# Patient Record
Sex: FEMALE | Race: WHITE | Employment: FULL TIME | URBAN - METROPOLITAN AREA
[De-identification: names, ages, dates, MRNs, and addresses within clinical notes are randomized per-mention and may not be internally consistent; named-entity substitution may affect disease eponyms.]

---

## 2017-01-20 ENCOUNTER — OFFICE VISIT (OUTPATIENT)
Dept: INTERNAL MEDICINE CLINIC | Facility: CLINIC | Age: 47
End: 2017-01-20

## 2017-01-20 VITALS
DIASTOLIC BLOOD PRESSURE: 83 MMHG | TEMPERATURE: 98 F | HEART RATE: 82 BPM | BODY MASS INDEX: 46 KG/M2 | SYSTOLIC BLOOD PRESSURE: 126 MMHG | WEIGHT: 257.13 LBS | RESPIRATION RATE: 20 BRPM

## 2017-01-20 DIAGNOSIS — J40 BRONCHITIS: Primary | ICD-10-CM

## 2017-01-20 PROCEDURE — 99212 OFFICE O/P EST SF 10 MIN: CPT | Performed by: INTERNAL MEDICINE

## 2017-01-20 PROCEDURE — 99213 OFFICE O/P EST LOW 20 MIN: CPT | Performed by: INTERNAL MEDICINE

## 2017-01-20 RX ORDER — BENZONATATE 200 MG/1
200 CAPSULE ORAL 3 TIMES DAILY PRN
Qty: 12 CAPSULE | Refills: 0 | Status: SHIPPED | OUTPATIENT
Start: 2017-01-20 | End: 2017-03-28 | Stop reason: ALTCHOICE

## 2017-01-20 RX ORDER — AMOXICILLIN 875 MG/1
875 TABLET, COATED ORAL 2 TIMES DAILY
Qty: 20 TABLET | Refills: 0 | Status: SHIPPED | OUTPATIENT
Start: 2017-01-20 | End: 2017-01-30

## 2017-01-20 NOTE — PROGRESS NOTES
HPI:    Patient ID: Chun Yanez is a 40year old female. Cough  This is a new problem. The current episode started 1 to 4 weeks ago. The problem has been gradually worsening. The problem occurs every few minutes. The cough is non-productive.  Maria Eugenia Losartan Potassium-HCTZ 100-25 MG Oral Tab Take 1 tablet by mouth once daily. Disp: 90 tablet Rfl: 2   mupirocin 2 % External Ointment Apply 1 Application topically 3 (three) times daily.  Disp: 15 g Rfl: 1   ibuprofen (MOTRIN) 800 MG Oral Tab  Disp:  Rfl

## 2017-02-03 ENCOUNTER — OFFICE VISIT (OUTPATIENT)
Dept: INTERNAL MEDICINE CLINIC | Facility: CLINIC | Age: 47
End: 2017-02-03

## 2017-02-03 VITALS
BODY MASS INDEX: 45.25 KG/M2 | SYSTOLIC BLOOD PRESSURE: 128 MMHG | WEIGHT: 255.38 LBS | TEMPERATURE: 98 F | HEART RATE: 60 BPM | HEIGHT: 63 IN | DIASTOLIC BLOOD PRESSURE: 90 MMHG | RESPIRATION RATE: 20 BRPM

## 2017-02-03 DIAGNOSIS — I10 ESSENTIAL HYPERTENSION: ICD-10-CM

## 2017-02-03 DIAGNOSIS — J40 BRONCHITIS: Primary | ICD-10-CM

## 2017-02-03 PROCEDURE — 99212 OFFICE O/P EST SF 10 MIN: CPT | Performed by: INTERNAL MEDICINE

## 2017-02-03 PROCEDURE — 99214 OFFICE O/P EST MOD 30 MIN: CPT | Performed by: INTERNAL MEDICINE

## 2017-02-03 RX ORDER — LEVOFLOXACIN 750 MG/1
750 TABLET ORAL DAILY
Qty: 5 TABLET | Refills: 0 | Status: SHIPPED | OUTPATIENT
Start: 2017-02-03 | End: 2017-02-08

## 2017-02-03 NOTE — PROGRESS NOTES
HPI:    Patient ID: Brigette Davis is a 40year old female. Cough  This is a recurrent problem. The current episode started 1 to 4 weeks ago. The problem has been gradually worsening. The problem occurs hourly. The cough is productive of sputum.  Adal Losartan Potassium-HCTZ 100-25 MG Oral Tab Take 1 tablet by mouth once daily. Disp: 90 tablet Rfl: 2   mupirocin 2 % External Ointment Apply 1 Application topically 3 (three) times daily.  Disp: 15 g Rfl: 1   ibuprofen (MOTRIN) 800 MG Oral Tab  Disp:  Rfl

## 2017-02-13 ENCOUNTER — OFFICE VISIT (OUTPATIENT)
Dept: INTERNAL MEDICINE CLINIC | Facility: CLINIC | Age: 47
End: 2017-02-13

## 2017-02-13 VITALS
BODY MASS INDEX: 45.13 KG/M2 | WEIGHT: 254.69 LBS | SYSTOLIC BLOOD PRESSURE: 124 MMHG | RESPIRATION RATE: 16 BRPM | HEIGHT: 63 IN | DIASTOLIC BLOOD PRESSURE: 80 MMHG | HEART RATE: 82 BPM

## 2017-02-13 DIAGNOSIS — Z00.00 ROUTINE HEALTH MAINTENANCE: ICD-10-CM

## 2017-02-13 DIAGNOSIS — M72.2 PLANTAR FASCIITIS, BILATERAL: ICD-10-CM

## 2017-02-13 DIAGNOSIS — E03.9 ACQUIRED HYPOTHYROIDISM: ICD-10-CM

## 2017-02-13 DIAGNOSIS — I10 ESSENTIAL HYPERTENSION: Primary | ICD-10-CM

## 2017-02-13 DIAGNOSIS — Z12.31 VISIT FOR SCREENING MAMMOGRAM: ICD-10-CM

## 2017-02-13 PROCEDURE — 99212 OFFICE O/P EST SF 10 MIN: CPT | Performed by: INTERNAL MEDICINE

## 2017-02-13 PROCEDURE — 99214 OFFICE O/P EST MOD 30 MIN: CPT | Performed by: INTERNAL MEDICINE

## 2017-02-13 NOTE — PATIENT INSTRUCTIONS
Do fasting labs in April. .  Fast for 12 hours. Water is okay. Walk in. Please schedule your mammogram in July. Call [phone].

## 2017-02-13 NOTE — PROGRESS NOTES
HPI:    Patient ID: Jennifer Whipple is a 40year old female. HPI Comments: She gets heel pain bilat. She hasn't been exercising. She will do a triathelon this summer. Heel Pain  This is a chronic problem.  The current episode started more than 1 obese. She appears well-developed. HENT:   Head: Normocephalic and atraumatic. Eyes: Conjunctivae and EOM are normal.   Cardiovascular: Normal rate, regular rhythm and normal heart sounds.     Pulmonary/Chest: Effort normal and breath sounds normal. No

## 2017-03-27 ENCOUNTER — TELEPHONE (OUTPATIENT)
Dept: INTERNAL MEDICINE CLINIC | Facility: CLINIC | Age: 47
End: 2017-03-27

## 2017-03-27 NOTE — TELEPHONE ENCOUNTER
Pt stts that she is having left ear pain and it also hurts when she swallows. Any recommendations for relief or is appt needed?

## 2017-03-28 ENCOUNTER — OFFICE VISIT (OUTPATIENT)
Dept: INTERNAL MEDICINE CLINIC | Facility: CLINIC | Age: 47
End: 2017-03-28

## 2017-03-28 VITALS
SYSTOLIC BLOOD PRESSURE: 152 MMHG | WEIGHT: 260.19 LBS | BODY MASS INDEX: 46 KG/M2 | DIASTOLIC BLOOD PRESSURE: 95 MMHG | TEMPERATURE: 97 F | HEART RATE: 71 BPM

## 2017-03-28 DIAGNOSIS — H66.92 ACUTE LEFT OTITIS MEDIA: Primary | ICD-10-CM

## 2017-03-28 PROCEDURE — 99212 OFFICE O/P EST SF 10 MIN: CPT | Performed by: INTERNAL MEDICINE

## 2017-03-28 PROCEDURE — 99214 OFFICE O/P EST MOD 30 MIN: CPT | Performed by: INTERNAL MEDICINE

## 2017-03-28 RX ORDER — AMOXICILLIN 875 MG/1
875 TABLET, COATED ORAL 2 TIMES DAILY
Qty: 20 TABLET | Refills: 0 | Status: SHIPPED | OUTPATIENT
Start: 2017-03-28 | End: 2017-05-22

## 2017-03-28 NOTE — TELEPHONE ENCOUNTER
Actions Requested: Asking if Left ear pain needs evaluation, appt given for tomorrow at [facility], DR. Lenora Conn at 3:50, Dr. Gela Delacruz out of the office, sent to Dr. Heidi Yeboah MD OC  Situation/Background   Problem: Left ear pain   Onset: today   Associated Sympto

## 2017-03-28 NOTE — PROGRESS NOTES
HPI:    Patient ID: Tip Doctor is a 40year old female. HPI  Ear Pain   There is pain in the left ear. This is a new problem. The current episode started yesterday. The problem occurs constantly. The problem has been gradually worsening.  Associat Losartan Potassium-HCTZ 100-25 MG Oral Tab Take 1 tablet by mouth once daily. Disp: 90 tablet Rfl: 2   mupirocin 2 % External Ointment Apply 1 Application topically 3 (three) times daily.  Disp: 15 g Rfl: 1   Allergies:  Azithromycin                Comment: TempSrc: Oral   Weight: 260 lb 3.2 oz (118.026 kg)     Body mass index is 46.1 kg/(m^2). ASSESSMENT/PLAN:   (J33.09) Acute left otitis media  (primary encounter diagnosis)  The patient complains of acute left ear pain that began yesterday.  Franklin Scott

## 2017-04-30 ENCOUNTER — LAB ENCOUNTER (OUTPATIENT)
Dept: LAB | Facility: HOSPITAL | Age: 47
End: 2017-04-30
Attending: INTERNAL MEDICINE
Payer: COMMERCIAL

## 2017-04-30 DIAGNOSIS — I10 ESSENTIAL HYPERTENSION WITH GOAL BLOOD PRESSURE LESS THAN 140/90: Primary | ICD-10-CM

## 2017-04-30 DIAGNOSIS — Z00.00 ROUTINE HEALTH MAINTENANCE: ICD-10-CM

## 2017-04-30 DIAGNOSIS — E03.9 ACQUIRED HYPOTHYROIDISM: ICD-10-CM

## 2017-04-30 PROCEDURE — 85025 COMPLETE CBC W/AUTO DIFF WBC: CPT

## 2017-04-30 PROCEDURE — 80061 LIPID PANEL: CPT

## 2017-04-30 PROCEDURE — 84443 ASSAY THYROID STIM HORMONE: CPT

## 2017-04-30 PROCEDURE — 80053 COMPREHEN METABOLIC PANEL: CPT

## 2017-04-30 PROCEDURE — 36415 COLL VENOUS BLD VENIPUNCTURE: CPT

## 2017-05-04 RX ORDER — LOSARTAN POTASSIUM AND HYDROCHLOROTHIAZIDE 25; 100 MG/1; MG/1
1 TABLET ORAL
Qty: 90 TABLET | Refills: 1 | Status: SHIPPED | OUTPATIENT
Start: 2017-05-04 | End: 2017-08-15

## 2017-05-04 RX ORDER — LEVOTHYROXINE SODIUM 0.1 MG/1
TABLET ORAL
Qty: 90 TABLET | Refills: 1 | Status: SHIPPED | OUTPATIENT
Start: 2017-05-04 | End: 2017-08-15

## 2017-05-17 ENCOUNTER — TELEPHONE (OUTPATIENT)
Dept: INTERNAL MEDICINE CLINIC | Facility: CLINIC | Age: 47
End: 2017-05-17

## 2017-05-17 NOTE — TELEPHONE ENCOUNTER
Patient wants to know if she can be squeezed in for an lexie. With Dr. Calvin Pollock - this is regarding My Chart Encounter 05/15/2017. Please advise.

## 2017-05-22 ENCOUNTER — OFFICE VISIT (OUTPATIENT)
Dept: INTERNAL MEDICINE CLINIC | Facility: CLINIC | Age: 47
End: 2017-05-22

## 2017-05-22 VITALS
SYSTOLIC BLOOD PRESSURE: 155 MMHG | HEIGHT: 63 IN | HEART RATE: 76 BPM | BODY MASS INDEX: 45 KG/M2 | DIASTOLIC BLOOD PRESSURE: 79 MMHG | WEIGHT: 254 LBS

## 2017-05-22 DIAGNOSIS — E66.01 MORBID OBESITY DUE TO EXCESS CALORIES (HCC): Primary | ICD-10-CM

## 2017-05-22 PROCEDURE — 99213 OFFICE O/P EST LOW 20 MIN: CPT | Performed by: INTERNAL MEDICINE

## 2017-05-22 PROCEDURE — 99212 OFFICE O/P EST SF 10 MIN: CPT | Performed by: INTERNAL MEDICINE

## 2017-08-15 ENCOUNTER — OFFICE VISIT (OUTPATIENT)
Dept: INTERNAL MEDICINE CLINIC | Facility: CLINIC | Age: 47
End: 2017-08-15

## 2017-08-15 VITALS
HEIGHT: 63 IN | BODY MASS INDEX: 44.98 KG/M2 | RESPIRATION RATE: 18 BRPM | DIASTOLIC BLOOD PRESSURE: 82 MMHG | WEIGHT: 253.88 LBS | HEART RATE: 78 BPM | SYSTOLIC BLOOD PRESSURE: 118 MMHG

## 2017-08-15 DIAGNOSIS — Z00.00 ROUTINE HEALTH MAINTENANCE: Primary | ICD-10-CM

## 2017-08-15 DIAGNOSIS — E03.9 ACQUIRED HYPOTHYROIDISM: ICD-10-CM

## 2017-08-15 DIAGNOSIS — I10 ESSENTIAL HYPERTENSION WITH GOAL BLOOD PRESSURE LESS THAN 140/90: ICD-10-CM

## 2017-08-15 PROCEDURE — 99396 PREV VISIT EST AGE 40-64: CPT | Performed by: INTERNAL MEDICINE

## 2017-08-15 RX ORDER — LEVOTHYROXINE SODIUM 0.1 MG/1
TABLET ORAL
Qty: 90 TABLET | Refills: 2 | Status: SHIPPED | OUTPATIENT
Start: 2017-08-15 | End: 2018-08-25

## 2017-08-15 RX ORDER — LOSARTAN POTASSIUM AND HYDROCHLOROTHIAZIDE 25; 100 MG/1; MG/1
1 TABLET ORAL
Qty: 90 TABLET | Refills: 2 | Status: SHIPPED | OUTPATIENT
Start: 2017-08-15 | End: 2018-08-25

## 2017-08-15 NOTE — PROGRESS NOTES
HPI:    Patient ID: Abhay Lucio is a 56year old female. Pt is here for a physical.          Review of Systems   Constitutional: Negative for chills, diaphoresis and fever.    HENT: Negative for congestion, ear pain, nosebleeds, sore throat and tro present. Cardiovascular: Normal rate, regular rhythm and normal heart sounds. No murmur heard. Pulmonary/Chest: Effort normal and breath sounds normal. No respiratory distress. She has no wheezes. She has no rales.  Right breast exhibits no inverted n Oral Tab; Take 1 tablet by mouth once daily. Dispense: 90 tablet; Refill: 2    4. Morbid obesity  Counseled regarding the importance of weight loss for overall health.   Advised to start a high protein, low carb diet or join an organized diet program and

## 2017-08-15 NOTE — PATIENT INSTRUCTIONS
Follow a low carbohydrate diet. Eat no more than 100 gm of carbs daily. Please schedule your mammogram.  Call [phone].

## 2017-11-20 ENCOUNTER — OFFICE VISIT (OUTPATIENT)
Dept: INTERNAL MEDICINE CLINIC | Facility: CLINIC | Age: 47
End: 2017-11-20

## 2017-11-20 VITALS
HEART RATE: 86 BPM | TEMPERATURE: 99 F | BODY MASS INDEX: 46 KG/M2 | DIASTOLIC BLOOD PRESSURE: 86 MMHG | WEIGHT: 259 LBS | RESPIRATION RATE: 21 BRPM | SYSTOLIC BLOOD PRESSURE: 150 MMHG

## 2017-11-20 DIAGNOSIS — J02.9 PHARYNGITIS, UNSPECIFIED ETIOLOGY: Primary | ICD-10-CM

## 2017-11-20 PROCEDURE — 99214 OFFICE O/P EST MOD 30 MIN: CPT | Performed by: INTERNAL MEDICINE

## 2017-11-20 PROCEDURE — 99212 OFFICE O/P EST SF 10 MIN: CPT | Performed by: INTERNAL MEDICINE

## 2017-11-20 PROCEDURE — 87880 STREP A ASSAY W/OPTIC: CPT | Performed by: INTERNAL MEDICINE

## 2017-11-20 RX ORDER — AMOXICILLIN 875 MG/1
875 TABLET, COATED ORAL 2 TIMES DAILY
Qty: 20 TABLET | Refills: 0 | Status: SHIPPED | OUTPATIENT
Start: 2017-11-20 | End: 2018-08-25 | Stop reason: ALTCHOICE

## 2017-11-21 NOTE — PROGRESS NOTES
HPI:    Patient ID: Taye Olivas is a 56year old female. Presents for evaluation of ear pain. HPI  Since yesterday patient experiencing pain in the ER, tender to touch below ear at the corner of the left jaw.   Feels fatigued, she works as a kinde present. Cardiovascular: Normal rate. An irregularly irregular rhythm present. No murmur heard. Pulmonary/Chest: Effort normal and breath sounds normal. No respiratory distress. She has no wheezes.    Lymphadenopathy:     She has no cervical adenopath

## 2018-06-20 ENCOUNTER — PATIENT OUTREACH (OUTPATIENT)
Dept: CASE MANAGEMENT | Age: 48
End: 2018-06-20

## 2018-07-30 ENCOUNTER — PATIENT OUTREACH (OUTPATIENT)
Dept: CASE MANAGEMENT | Age: 48
End: 2018-07-30

## 2018-07-30 NOTE — PROGRESS NOTES
Outreach to patient in regards to scheduling her HTN F/U appt. Left message to call back ext. [phone]. Thank you.

## 2018-08-25 ENCOUNTER — LAB ENCOUNTER (OUTPATIENT)
Dept: LAB | Age: 48
End: 2018-08-25
Attending: INTERNAL MEDICINE
Payer: COMMERCIAL

## 2018-08-25 ENCOUNTER — OFFICE VISIT (OUTPATIENT)
Dept: INTERNAL MEDICINE CLINIC | Facility: CLINIC | Age: 48
End: 2018-08-25

## 2018-08-25 VITALS
DIASTOLIC BLOOD PRESSURE: 84 MMHG | SYSTOLIC BLOOD PRESSURE: 132 MMHG | WEIGHT: 268 LBS | HEART RATE: 64 BPM | HEIGHT: 63 IN | BODY MASS INDEX: 47.48 KG/M2

## 2018-08-25 DIAGNOSIS — I10 ESSENTIAL HYPERTENSION WITH GOAL BLOOD PRESSURE LESS THAN 140/90: Primary | ICD-10-CM

## 2018-08-25 DIAGNOSIS — Z00.00 ROUTINE HEALTH MAINTENANCE: ICD-10-CM

## 2018-08-25 DIAGNOSIS — E03.9 ACQUIRED HYPOTHYROIDISM: ICD-10-CM

## 2018-08-25 LAB
ALBUMIN SERPL BCP-MCNC: 3.8 G/DL (ref 3.5–4.8)
ALBUMIN/GLOB SERPL: 1.3 {RATIO} (ref 1–2)
ALP SERPL-CCNC: 49 U/L (ref 32–100)
ALT SERPL-CCNC: 27 U/L (ref 14–54)
ANION GAP SERPL CALC-SCNC: 7 MMOL/L (ref 0–18)
AST SERPL-CCNC: 23 U/L (ref 15–41)
BASOPHILS # BLD: 0.1 K/UL (ref 0–0.2)
BASOPHILS NFR BLD: 1 %
BILIRUB SERPL-MCNC: 0.7 MG/DL (ref 0.3–1.2)
BUN SERPL-MCNC: 17 MG/DL (ref 8–20)
BUN/CREAT SERPL: 21.5 (ref 10–20)
CALCIUM SERPL-MCNC: 8.9 MG/DL (ref 8.5–10.5)
CHLORIDE SERPL-SCNC: 104 MMOL/L (ref 95–110)
CHOLEST SERPL-MCNC: 236 MG/DL (ref 110–200)
CO2 SERPL-SCNC: 28 MMOL/L (ref 22–32)
CREAT SERPL-MCNC: 0.79 MG/DL (ref 0.5–1.5)
EOSINOPHIL # BLD: 0.2 K/UL (ref 0–0.7)
EOSINOPHIL NFR BLD: 5 %
ERYTHROCYTE [DISTWIDTH] IN BLOOD BY AUTOMATED COUNT: 13.8 % (ref 11–15)
GLOBULIN PLAS-MCNC: 3 G/DL (ref 2.5–3.7)
GLUCOSE SERPL-MCNC: 110 MG/DL (ref 70–99)
HCT VFR BLD AUTO: 43 % (ref 35–48)
HDLC SERPL-MCNC: 51 MG/DL
HGB BLD-MCNC: 14.3 G/DL (ref 12–16)
LDLC SERPL CALC-MCNC: 161 MG/DL (ref 0–99)
LYMPHOCYTES # BLD: 1.4 K/UL (ref 1–4)
LYMPHOCYTES NFR BLD: 30 %
MCH RBC QN AUTO: 29.9 PG (ref 27–32)
MCHC RBC AUTO-ENTMCNC: 33.2 G/DL (ref 32–37)
MCV RBC AUTO: 90 FL (ref 80–100)
MONOCYTES # BLD: 0.4 K/UL (ref 0–1)
MONOCYTES NFR BLD: 8 %
NEUTROPHILS # BLD AUTO: 2.7 K/UL (ref 1.8–7.7)
NEUTROPHILS NFR BLD: 56 %
NONHDLC SERPL-MCNC: 185 MG/DL
OSMOLALITY UR CALC.SUM OF ELEC: 290 MOSM/KG (ref 275–295)
PATIENT FASTING: YES
PLATELET # BLD AUTO: 287 K/UL (ref 140–400)
PMV BLD AUTO: 8.2 FL (ref 7.4–10.3)
POTASSIUM SERPL-SCNC: 3.6 MMOL/L (ref 3.3–5.1)
PROT SERPL-MCNC: 6.8 G/DL (ref 5.9–8.4)
RBC # BLD AUTO: 4.77 M/UL (ref 3.7–5.4)
SODIUM SERPL-SCNC: 139 MMOL/L (ref 136–144)
TRIGL SERPL-MCNC: 122 MG/DL (ref 1–149)
TSH SERPL-ACNC: 2.61 UIU/ML (ref 0.45–5.33)
WBC # BLD AUTO: 4.8 K/UL (ref 4–11)

## 2018-08-25 PROCEDURE — 99213 OFFICE O/P EST LOW 20 MIN: CPT | Performed by: INTERNAL MEDICINE

## 2018-08-25 PROCEDURE — 83036 HEMOGLOBIN GLYCOSYLATED A1C: CPT

## 2018-08-25 PROCEDURE — 80053 COMPREHEN METABOLIC PANEL: CPT

## 2018-08-25 PROCEDURE — 80061 LIPID PANEL: CPT

## 2018-08-25 PROCEDURE — 85025 COMPLETE CBC W/AUTO DIFF WBC: CPT

## 2018-08-25 PROCEDURE — 36415 COLL VENOUS BLD VENIPUNCTURE: CPT

## 2018-08-25 PROCEDURE — 99212 OFFICE O/P EST SF 10 MIN: CPT | Performed by: INTERNAL MEDICINE

## 2018-08-25 PROCEDURE — 84443 ASSAY THYROID STIM HORMONE: CPT

## 2018-08-25 RX ORDER — LEVOTHYROXINE SODIUM 0.1 MG/1
TABLET ORAL
Qty: 90 TABLET | Refills: 2 | Status: SHIPPED | OUTPATIENT
Start: 2018-08-25 | End: 2019-10-14

## 2018-08-25 RX ORDER — LOSARTAN POTASSIUM AND HYDROCHLOROTHIAZIDE 25; 100 MG/1; MG/1
1 TABLET ORAL
Qty: 90 TABLET | Refills: 2 | Status: SHIPPED | OUTPATIENT
Start: 2018-08-25 | End: 2019-10-14

## 2018-08-25 NOTE — PROGRESS NOTES
HPI:    Patient ID: Devi Hooks is a 53year old female. She has not been exercising. Her heel pain has resolved. She needs her meds refilled. HTN   This is a chronic problem. The current episode started more than 1 year ago.  The problem (121.6 kg)   LMP 08/14/2018   BMI 47.47 kg/m²   PHYSICAL EXAM:   Physical Exam   Nursing note and vitals reviewed. Constitutional: She is oriented to person, place, and time and obese. She appears well-developed.    HENT:   Head: Normocephalic and atrauma

## 2018-08-26 LAB — HBA1C MFR BLD: 5.7 % (ref 4–6)

## 2018-10-08 ENCOUNTER — OFFICE VISIT (OUTPATIENT)
Dept: INTERNAL MEDICINE CLINIC | Facility: CLINIC | Age: 48
End: 2018-10-08

## 2018-10-08 ENCOUNTER — MED REC SCAN ONLY (OUTPATIENT)
Dept: INTERNAL MEDICINE CLINIC | Facility: CLINIC | Age: 48
End: 2018-10-08

## 2018-10-08 VITALS
RESPIRATION RATE: 18 BRPM | DIASTOLIC BLOOD PRESSURE: 82 MMHG | BODY MASS INDEX: 47.8 KG/M2 | WEIGHT: 269.81 LBS | SYSTOLIC BLOOD PRESSURE: 134 MMHG | HEIGHT: 63 IN | HEART RATE: 80 BPM

## 2018-10-08 DIAGNOSIS — E66.01 CLASS 3 SEVERE OBESITY DUE TO EXCESS CALORIES WITH SERIOUS COMORBIDITY AND BODY MASS INDEX (BMI) OF 45.0 TO 49.9 IN ADULT (HCC): ICD-10-CM

## 2018-10-08 DIAGNOSIS — Z00.00 ROUTINE HEALTH MAINTENANCE: Primary | ICD-10-CM

## 2018-10-08 DIAGNOSIS — E03.9 ACQUIRED HYPOTHYROIDISM: ICD-10-CM

## 2018-10-08 DIAGNOSIS — I10 ESSENTIAL HYPERTENSION WITH GOAL BLOOD PRESSURE LESS THAN 140/90: ICD-10-CM

## 2018-10-08 PROBLEM — J40 BRONCHITIS: Status: RESOLVED | Noted: 2017-01-20 | Resolved: 2018-10-08

## 2018-10-08 PROCEDURE — 90471 IMMUNIZATION ADMIN: CPT | Performed by: INTERNAL MEDICINE

## 2018-10-08 PROCEDURE — 90686 IIV4 VACC NO PRSV 0.5 ML IM: CPT | Performed by: INTERNAL MEDICINE

## 2018-10-08 PROCEDURE — 99396 PREV VISIT EST AGE 40-64: CPT | Performed by: INTERNAL MEDICINE

## 2018-10-08 NOTE — PROGRESS NOTES
HPI:    Patient ID: Author Alexis is a 53year old female. Pt is here for a physical.    Pt joined a gym. Hyperlipidemia   This is a chronic problem. The current episode started more than 1 year ago. The problem is uncontrolled.  Recent lipid reaction(s): AZITHROMYCIN  Hydrocodone                 Comment:Other reaction(s): feels dazed     Social History    Tobacco Use      Smoking status: Never Smoker      Smokeless tobacco: Never Used    Alcohol use:  Yes      Alcohol/week: 0.0 oz      Comment: adnexum displays no tenderness. No signs of injury around the vagina. Genitourinary Comments: Ovaries not palpable. Lymphadenopathy:     She has no cervical adenopathy. Neurological: She is alert and oriented to person, place, and time.  No cranial ne

## 2018-10-08 NOTE — ASSESSMENT & PLAN NOTE
Unremarkable exam.  Counseled regarding exercise and healthy diet. Labs reviewed with pt.   PAP was done in 2016

## 2019-01-26 ENCOUNTER — OFFICE VISIT (OUTPATIENT)
Dept: INTERNAL MEDICINE CLINIC | Facility: CLINIC | Age: 49
End: 2019-01-26

## 2019-01-26 VITALS
HEART RATE: 72 BPM | SYSTOLIC BLOOD PRESSURE: 136 MMHG | DIASTOLIC BLOOD PRESSURE: 86 MMHG | HEIGHT: 63 IN | TEMPERATURE: 99 F | BODY MASS INDEX: 48.43 KG/M2 | WEIGHT: 273.31 LBS

## 2019-01-26 DIAGNOSIS — J01.90 ACUTE NON-RECURRENT SINUSITIS, UNSPECIFIED LOCATION: Primary | ICD-10-CM

## 2019-01-26 LAB
CONTROL LINE PRESENT WITH A CLEAR BACKGROUND (YES/NO): YES YES/NO
KIT LOT #: NORMAL NUMERIC
STREP GRP A CUL-SCR: NEGATIVE

## 2019-01-26 PROCEDURE — 99213 OFFICE O/P EST LOW 20 MIN: CPT | Performed by: INTERNAL MEDICINE

## 2019-01-26 PROCEDURE — 87880 STREP A ASSAY W/OPTIC: CPT | Performed by: INTERNAL MEDICINE

## 2019-01-26 PROCEDURE — 99212 OFFICE O/P EST SF 10 MIN: CPT | Performed by: INTERNAL MEDICINE

## 2019-01-26 RX ORDER — AMOXICILLIN AND CLAVULANATE POTASSIUM 875; 125 MG/1; MG/1
1 TABLET, FILM COATED ORAL 2 TIMES DAILY
Qty: 20 TABLET | Refills: 0 | Status: SHIPPED | OUTPATIENT
Start: 2019-01-26 | End: 2019-02-05

## 2019-01-26 NOTE — ASSESSMENT & PLAN NOTE
The patient has a sore throat and earache. 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 Her ear pain is likely due to either an early otitis or eustachian tube dysfunction. Will treat with antibiotics.

## 2019-01-26 NOTE — PROGRESS NOTES
HPI:    Patient ID: Suzi Crenshaw is a 53year old female. She has been sick for 5 days. Her left ear is painful. She lost her voice. Ear Pain    There is pain in the left ear. This is a new problem. The current episode started yesterday.  The 273 lb 4.8 oz (124 kg)   BMI 48.41 kg/m²   PHYSICAL EXAM:   Physical Exam   Nursing note and vitals reviewed. Constitutional: She appears well-developed and well-nourished. No distress. HENT:   Head: Normocephalic and atraumatic.    Right Ear: Tympanic

## 2019-02-12 ENCOUNTER — OFFICE VISIT (OUTPATIENT)
Dept: INTERNAL MEDICINE CLINIC | Facility: CLINIC | Age: 49
End: 2019-02-12

## 2019-02-12 VITALS
HEIGHT: 63 IN | HEART RATE: 76 BPM | SYSTOLIC BLOOD PRESSURE: 128 MMHG | TEMPERATURE: 98 F | BODY MASS INDEX: 47.29 KG/M2 | WEIGHT: 266.88 LBS | DIASTOLIC BLOOD PRESSURE: 80 MMHG

## 2019-02-12 DIAGNOSIS — R05.9 COUGH: Primary | ICD-10-CM

## 2019-02-12 PROBLEM — J01.90 ACUTE NON-RECURRENT SINUSITIS: Status: RESOLVED | Noted: 2019-01-26 | Resolved: 2019-02-12

## 2019-02-12 PROCEDURE — 99213 OFFICE O/P EST LOW 20 MIN: CPT | Performed by: INTERNAL MEDICINE

## 2019-02-12 PROCEDURE — 99212 OFFICE O/P EST SF 10 MIN: CPT | Performed by: INTERNAL MEDICINE

## 2019-02-12 RX ORDER — LEVOFLOXACIN 500 MG/1
500 TABLET, FILM COATED ORAL DAILY
Qty: 7 TABLET | Refills: 0 | Status: SHIPPED | OUTPATIENT
Start: 2019-02-12 | End: 2019-02-19

## 2019-02-12 NOTE — PROGRESS NOTES
HPI:    Patient ID: Author Espinoza is a 53year old female. Pt was here 1/26/19 for otitis media. The antibiotic caused nausea so she didn't finish it. The cough improved for a while and then worsened. Now the cough is severe.   She had a temp 101 No    Family History   Problem Relation Age of Onset   • Diabetes Mother         borderline   • Colon Cancer Paternal Uncle        . /80 (BP Location: Right arm, Patient Position: Sitting, Cuff Size: large)   Pulse 76   Temp 98.4 °F (36.9 °C) (Oral)

## 2019-02-12 NOTE — ASSESSMENT & PLAN NOTE
Pt is a  and has had a cough for 3 weeks. She didn't tolerate Augmentin and cannot take Zithromax. Will Rx Levaquin. Robitussin DM and Mucinex over-the-counter  Note for work.

## 2019-02-12 NOTE — PATIENT INSTRUCTIONS
Take Mucinex over-the-counter during the day. Robitussin DM at night. Please schedule your mammogram.  Call [phone].

## 2019-02-22 ENCOUNTER — TELEPHONE (OUTPATIENT)
Dept: INTERNAL MEDICINE CLINIC | Facility: CLINIC | Age: 49
End: 2019-02-22

## 2019-02-22 NOTE — TELEPHONE ENCOUNTER
Pt states still coughing and wondering if she needs to be seen again by LV. Last OV with LV on 2/12 for same and given 1 week antibiotic. No longer has fever, but still coughing.  Informed pt antibiotic still working, but would schedule appt for next week i

## 2019-02-27 ENCOUNTER — OFFICE VISIT (OUTPATIENT)
Dept: INTERNAL MEDICINE CLINIC | Facility: CLINIC | Age: 49
End: 2019-02-27

## 2019-02-27 VITALS
WEIGHT: 271 LBS | HEART RATE: 70 BPM | DIASTOLIC BLOOD PRESSURE: 82 MMHG | BODY MASS INDEX: 48.02 KG/M2 | SYSTOLIC BLOOD PRESSURE: 124 MMHG | HEIGHT: 63 IN

## 2019-02-27 DIAGNOSIS — J98.01 COUGH DUE TO BRONCHOSPASM: Primary | ICD-10-CM

## 2019-02-27 PROBLEM — R05.9 COUGH: Status: RESOLVED | Noted: 2019-02-12 | Resolved: 2019-02-27

## 2019-02-27 PROCEDURE — 99213 OFFICE O/P EST LOW 20 MIN: CPT | Performed by: INTERNAL MEDICINE

## 2019-02-27 PROCEDURE — 99212 OFFICE O/P EST SF 10 MIN: CPT | Performed by: INTERNAL MEDICINE

## 2019-02-27 RX ORDER — ALBUTEROL SULFATE 90 UG/1
2 AEROSOL, METERED RESPIRATORY (INHALATION) EVERY 6 HOURS PRN
Qty: 1 INHALER | Refills: 0 | Status: SHIPPED | OUTPATIENT
Start: 2019-02-27 | End: 2019-05-24

## 2019-02-27 RX ORDER — CODEINE PHOSPHATE AND GUAIFENESIN 10; 100 MG/5ML; MG/5ML
5 SOLUTION ORAL EVERY 6 HOURS PRN
Qty: 240 ML | Refills: 0 | Status: SHIPPED | OUTPATIENT
Start: 2019-02-27 | End: 2019-03-09

## 2019-02-27 RX ORDER — PREDNISONE 10 MG/1
TABLET ORAL
Qty: 11 TABLET | Refills: 0 | Status: SHIPPED | OUTPATIENT
Start: 2019-02-27 | End: 2019-05-24 | Stop reason: ALTCHOICE

## 2019-02-27 NOTE — PROGRESS NOTES
HPI:    Patient ID: Ella Foster is a 53year old female. She has been coughing for over a month. It is like a spasm. She has had antibiotics twice, most recently Levaquin.   She improved a little bit, but the cough is severe and she has stress in TAKE 1 TABLET BY MOUTH EVERY DAY BEFORE BREAKFAST Disp: 90 tablet Rfl: 2   Losartan Potassium-HCTZ 100-25 MG Oral Tab Take 1 tablet by mouth once daily.  Disp: 90 tablet Rfl: 2       Allergies:  Azithromycin                Comment:Other reaction(s): JORGE ALBERTO Take 5 mL by mouth every 6 (six) hours as needed for cough. NO DRIVING FOR 6 HOURS AFTER TAKING THIS MEDICATION. Dispense: 240 mL; Refill: 0  - Albuterol Sulfate  (90 Base) MCG/ACT Inhalation Aero Soln;  Inhale 2 puffs into the lungs every 6 (six) h

## 2019-05-24 ENCOUNTER — OFFICE VISIT (OUTPATIENT)
Dept: INTERNAL MEDICINE CLINIC | Facility: CLINIC | Age: 49
End: 2019-05-24

## 2019-05-24 VITALS
DIASTOLIC BLOOD PRESSURE: 86 MMHG | HEIGHT: 63 IN | OXYGEN SATURATION: 94 % | TEMPERATURE: 98 F | SYSTOLIC BLOOD PRESSURE: 139 MMHG | WEIGHT: 270 LBS | HEART RATE: 78 BPM | BODY MASS INDEX: 47.84 KG/M2

## 2019-05-24 DIAGNOSIS — J04.0 ACUTE VIRAL LARYNGITIS: ICD-10-CM

## 2019-05-24 DIAGNOSIS — J06.9 URI WITH COUGH AND CONGESTION: Primary | ICD-10-CM

## 2019-05-24 PROCEDURE — 99212 OFFICE O/P EST SF 10 MIN: CPT | Performed by: PHYSICIAN ASSISTANT

## 2019-05-24 PROCEDURE — 99213 OFFICE O/P EST LOW 20 MIN: CPT | Performed by: PHYSICIAN ASSISTANT

## 2019-05-24 RX ORDER — BENZONATATE 100 MG/1
100 CAPSULE ORAL 3 TIMES DAILY PRN
Qty: 30 CAPSULE | Refills: 0 | Status: SHIPPED | OUTPATIENT
Start: 2019-05-24 | End: 2019-10-14

## 2019-05-24 RX ORDER — ALBUTEROL SULFATE 90 UG/1
2 AEROSOL, METERED RESPIRATORY (INHALATION) EVERY 4 HOURS PRN
Qty: 1 INHALER | Refills: 6 | Status: SHIPPED | OUTPATIENT
Start: 2019-05-24 | End: 2019-10-14

## 2019-05-24 NOTE — PATIENT INSTRUCTIONS
Start taking tessalon Perles up to three times daily for cough  Over the counter xyzal or zyrtec daily in the morning  Use inhaler in the evening 2 puffs   Can use cough syrup given previously or nyquil if not working  Hot steam and hot teas to soothe thro

## 2019-05-24 NOTE — PROGRESS NOTES
HPI:    Patient ID: James Guidry is a 53year old female. HPI   Patient presents complaining of 1 week of a cough which has been mildly productive with whitish-yellow mucus and rhinorrhea and a hoarse voice. Patient is a teacher.   Patient previous Unspecified essential hypertension      Social History    Tobacco Use      Smoking status: Never Smoker      Smokeless tobacco: Never Used    Alcohol use:  Yes      Alcohol/week: 0.0 oz      Comment: rarely    Past Surgical History:   Procedure Laterality D morning  Use inhaler in the evening 2 puffs   Can use cough syrup given previously or nyquil if not working    2. Acute viral laryngitis  hot steam and hot teas to soothe throat and salt water gargles.      No orders of the defined types were placed in this

## 2019-05-29 ENCOUNTER — OFFICE VISIT (OUTPATIENT)
Dept: INTERNAL MEDICINE CLINIC | Facility: CLINIC | Age: 49
End: 2019-05-29

## 2019-05-29 VITALS
SYSTOLIC BLOOD PRESSURE: 158 MMHG | HEART RATE: 81 BPM | BODY MASS INDEX: 47.84 KG/M2 | DIASTOLIC BLOOD PRESSURE: 97 MMHG | WEIGHT: 270 LBS | TEMPERATURE: 99 F | HEIGHT: 63 IN

## 2019-05-29 DIAGNOSIS — L03.011 FELON OF FINGER OF RIGHT HAND: Primary | ICD-10-CM

## 2019-05-29 PROCEDURE — 99213 OFFICE O/P EST LOW 20 MIN: CPT | Performed by: PHYSICIAN ASSISTANT

## 2019-05-29 PROCEDURE — 99212 OFFICE O/P EST SF 10 MIN: CPT | Performed by: PHYSICIAN ASSISTANT

## 2019-05-29 RX ORDER — AMOXICILLIN AND CLAVULANATE POTASSIUM 875; 125 MG/1; MG/1
TABLET, FILM COATED ORAL
Refills: 0 | COMMUNITY
Start: 2019-05-27 | End: 2019-10-14

## 2019-05-29 NOTE — PROGRESS NOTES
HPI:    Patient ID: Dana Benitez is a 53year old female. HPI   Patient presents for follow-up from the ER of a felon on the right index finger that was numbed and lanced. Patient was also given Augmentin at the ER.   States that she is noticed that • Unspecified essential hypertension      Social History    Tobacco Use      Smoking status: Never Smoker      Smokeless tobacco: Never Used    Alcohol use:  Yes      Alcohol/week: 0.0 oz      Comment: rarely    Past Surgical History:   Procedure Laterality -Advised patient to monitor her finger. Currently sensation is intact, no infection noted, range of motion is intact and cap refill is present. Bruising present on the finger which is traveling down the finger slowly.   Advised patient is possible this ju

## 2019-06-06 ENCOUNTER — TELEPHONE (OUTPATIENT)
Dept: INTERNAL MEDICINE CLINIC | Facility: CLINIC | Age: 49
End: 2019-06-06

## 2019-10-14 ENCOUNTER — OFFICE VISIT (OUTPATIENT)
Dept: INTERNAL MEDICINE CLINIC | Facility: CLINIC | Age: 49
End: 2019-10-14

## 2019-10-14 ENCOUNTER — LAB ENCOUNTER (OUTPATIENT)
Dept: LAB | Age: 49
End: 2019-10-14
Attending: PHYSICIAN ASSISTANT
Payer: COMMERCIAL

## 2019-10-14 VITALS
HEIGHT: 63 IN | HEART RATE: 88 BPM | BODY MASS INDEX: 48.37 KG/M2 | SYSTOLIC BLOOD PRESSURE: 132 MMHG | TEMPERATURE: 98 F | WEIGHT: 273 LBS | DIASTOLIC BLOOD PRESSURE: 78 MMHG

## 2019-10-14 DIAGNOSIS — Z00.00 PHYSICAL EXAM: ICD-10-CM

## 2019-10-14 DIAGNOSIS — I10 ESSENTIAL HYPERTENSION WITH GOAL BLOOD PRESSURE LESS THAN 140/90: ICD-10-CM

## 2019-10-14 DIAGNOSIS — E03.9 ACQUIRED HYPOTHYROIDISM: ICD-10-CM

## 2019-10-14 DIAGNOSIS — Z23 NEED FOR VACCINATION: ICD-10-CM

## 2019-10-14 DIAGNOSIS — Z12.39 SCREENING FOR BREAST CANCER: ICD-10-CM

## 2019-10-14 DIAGNOSIS — Z00.00 PHYSICAL EXAM: Primary | ICD-10-CM

## 2019-10-14 DIAGNOSIS — E66.01 CLASS 3 SEVERE OBESITY DUE TO EXCESS CALORIES WITHOUT SERIOUS COMORBIDITY WITH BODY MASS INDEX (BMI) OF 45.0 TO 49.9 IN ADULT (HCC): ICD-10-CM

## 2019-10-14 PROCEDURE — 36415 COLL VENOUS BLD VENIPUNCTURE: CPT

## 2019-10-14 PROCEDURE — 90686 IIV4 VACC NO PRSV 0.5 ML IM: CPT | Performed by: PHYSICIAN ASSISTANT

## 2019-10-14 PROCEDURE — 84443 ASSAY THYROID STIM HORMONE: CPT

## 2019-10-14 PROCEDURE — 85025 COMPLETE CBC W/AUTO DIFF WBC: CPT

## 2019-10-14 PROCEDURE — 84439 ASSAY OF FREE THYROXINE: CPT

## 2019-10-14 PROCEDURE — 81001 URINALYSIS AUTO W/SCOPE: CPT

## 2019-10-14 PROCEDURE — 80053 COMPREHEN METABOLIC PANEL: CPT

## 2019-10-14 PROCEDURE — 99396 PREV VISIT EST AGE 40-64: CPT | Performed by: PHYSICIAN ASSISTANT

## 2019-10-14 PROCEDURE — 90471 IMMUNIZATION ADMIN: CPT | Performed by: PHYSICIAN ASSISTANT

## 2019-10-14 PROCEDURE — 80061 LIPID PANEL: CPT

## 2019-10-14 RX ORDER — LEVOTHYROXINE SODIUM 0.1 MG/1
TABLET ORAL
Qty: 90 TABLET | Refills: 2 | Status: SHIPPED | OUTPATIENT
Start: 2019-10-14 | End: 2020-08-17

## 2019-10-14 RX ORDER — LOSARTAN POTASSIUM AND HYDROCHLOROTHIAZIDE 25; 100 MG/1; MG/1
1 TABLET ORAL
Qty: 90 TABLET | Refills: 2 | Status: SHIPPED | OUTPATIENT
Start: 2019-10-14 | End: 2020-09-07

## 2019-10-14 NOTE — PROGRESS NOTES
HPI:    Patient ID: Beryl Cano is a 51year old female. HPI   Patient presents for a physical exam, doing generally well. Complains of BL foot pain which is a chronic issue, patient is aware she needs to loose weight but has been lazy.  Is interes Pulse 88   Temp 98.1 °F (36.7 °C) (Oral)   Ht 5' 3\" (1.6 m)   Wt 273 lb (123.8 kg)   LMP 09/16/2019 (Approximate)   BMI 48.36 kg/m²   Body mass index is 48.36 kg/m². Physical Exam    Constitutional: She is oriented to person, place, and time and obese. self-exam. Body mass index is 48.36 kg/m². recommended low fat diet and aerobic exercise 30 minutes three times weekly. The patient indicates understanding of these issues and agrees to the plan. The patient is asked to return for CPX in 1 year.

## 2020-01-30 ENCOUNTER — HOSPITAL ENCOUNTER (OUTPATIENT)
Dept: MAMMOGRAPHY | Age: 50
Discharge: HOME | End: 2020-01-30
Attending: PHYSICIAN ASSISTANT
Payer: COMMERCIAL

## 2020-01-30 ENCOUNTER — OFFICE VISIT (OUTPATIENT)
Dept: SURGERY | Facility: CLINIC | Age: 50
End: 2020-01-30

## 2020-01-30 VITALS
BODY MASS INDEX: 47.97 KG/M2 | HEIGHT: 62.5 IN | WEIGHT: 267.38 LBS | DIASTOLIC BLOOD PRESSURE: 78 MMHG | SYSTOLIC BLOOD PRESSURE: 118 MMHG | HEART RATE: 82 BPM

## 2020-01-30 DIAGNOSIS — E03.9 ACQUIRED HYPOTHYROIDISM: ICD-10-CM

## 2020-01-30 DIAGNOSIS — E66.01 MORBID OBESITY WITH BMI OF 45.0-49.9, ADULT (HCC): ICD-10-CM

## 2020-01-30 DIAGNOSIS — I10 HYPERTENSION, UNSPECIFIED TYPE: ICD-10-CM

## 2020-01-30 DIAGNOSIS — R73.03 PREDIABETES: ICD-10-CM

## 2020-01-30 DIAGNOSIS — E78.00 HYPERCHOLESTEREMIA: Primary | ICD-10-CM

## 2020-01-30 DIAGNOSIS — Z86.718 HISTORY OF DVT (DEEP VEIN THROMBOSIS): ICD-10-CM

## 2020-01-30 DIAGNOSIS — R63.5 WEIGHT GAIN: ICD-10-CM

## 2020-01-30 DIAGNOSIS — Z12.39 SCREENING FOR BREAST CANCER: ICD-10-CM

## 2020-01-30 DIAGNOSIS — R06.83 SNORING: ICD-10-CM

## 2020-01-30 PROCEDURE — 99204 OFFICE O/P NEW MOD 45 MIN: CPT | Performed by: NURSE PRACTITIONER

## 2020-01-30 PROCEDURE — 77067 SCR MAMMO BI INCL CAD: CPT | Performed by: PHYSICIAN ASSISTANT

## 2020-01-30 PROCEDURE — 77063 BREAST TOMOSYNTHESIS BI: CPT | Performed by: PHYSICIAN ASSISTANT

## 2020-01-30 RX ORDER — PHENTERMINE HYDROCHLORIDE 15 MG/1
15 CAPSULE ORAL EVERY MORNING
Qty: 30 CAPSULE | Refills: 0 | Status: SHIPPED | OUTPATIENT
Start: 2020-01-30 | End: 2020-02-27

## 2020-01-30 RX ORDER — TOPIRAMATE 25 MG/1
25 TABLET ORAL DAILY
Qty: 30 TABLET | Refills: 1 | Status: SHIPPED | OUTPATIENT
Start: 2020-01-30 | End: 2020-01-30

## 2020-01-30 RX ORDER — TOPIRAMATE 25 MG/1
TABLET ORAL
Qty: 90 TABLET | Refills: 0 | Status: SHIPPED | OUTPATIENT
Start: 2020-01-30 | End: 2020-06-12

## 2020-01-30 NOTE — PATIENT INSTRUCTIONS
Values: Breathing well, Fitting on Furniture, Children, Health      Set a bed time:   Aim to go to sleep at 10:30 PM  Consider magnesium 200 to 400 mg for sleep if needed    Consider a sleep study     Aim for 1 fruit/day: berries, avocado, tomatoes, apples can increase this to half your body weight in ounces/day. Aim for total daily carbohydrates:  g/day- non starchy vegetables are free after that (ex. greens, peppers, broccoli, cauliflower). Aim for 65-80 g protein per day.     Aim for 3-4 servi

## 2020-01-30 NOTE — PROGRESS NOTES
The [facility]ness [facility] Weight [facility] Consultation Note       Date of Consult:  2020    Patient:  Marline Cardoza  :      1970  MRN:      [id number]    Referring Provider: Dr. Michela Viramontes       Chief Complaint:  Patient presents with:  Ankit Acevedo morning. 30 capsule 0   • Levothyroxine Sodium 100 MCG Oral Tab TAKE 1 TABLET BY MOUTH EVERY DAY BEFORE BREAKFAST 90 tablet 2   • Losartan Potassium-HCTZ 100-25 MG Oral Tab Take 1 tablet by mouth once daily.  90 tablet 2   • TOPIRAMATE 25 MG Oral Tab TAKE 1 coffee, 1 cup daily        Occupational Exposure: Not Asked        Hobby Hazards: Not Asked        Sleep Concern: Not Asked        Stress Concern: Not Asked        Weight Concern: Not Asked        Special Diet: Not Asked        Back Care: Not Asked daily    Behavior Modifications Reviewed and Discussed:    Eat breakfast, Eat 3 meals per day, Plan meals in advance, Read nutrition labels, Drink 64oz of water per day, Maintain a daily food journal, Utilize portion control strategies to reduce calorie in hypothyroidism  Hypertension, unspecified type  History of DVT (deep vein thrombosis)  Snoring  Morbid obesity with BMI of 45.0-49.9, adult [facility])    PLAN     Patient is not interested in bariatric surgery.  Patient desires to pursue medical weight loss at t Future  -     VITAMIN B12; Future  -     VITAMIN D, 25-HYDROXY; Future  -     TSH+FREE T4; Future  -     Phentermine HCl 15 MG Oral Cap;  Take 1 capsule (15 mg total) by mouth every morning.  -     LEPTIN, SERUM; Future    Morbid obesity with BMI of 45.0-49 week.    4. Increase fruit and vegetable servings to 5-6 per day. 5. Improve sleep and stress.        Reviewed labs: 10/14/19  Renal/liver function intact; CBC in range  TSH 3.790 on levothyroxine  Plan for updated thyroid studies, vitamin D, B 12, lepti

## 2020-02-22 ENCOUNTER — APPOINTMENT (OUTPATIENT)
Dept: LAB | Facility: HOSPITAL | Age: 50
End: 2020-02-22
Attending: NURSE PRACTITIONER
Payer: COMMERCIAL

## 2020-02-22 DIAGNOSIS — I10 HYPERTENSION, UNSPECIFIED TYPE: ICD-10-CM

## 2020-02-22 DIAGNOSIS — E03.9 ACQUIRED HYPOTHYROIDISM: ICD-10-CM

## 2020-02-22 DIAGNOSIS — R63.5 WEIGHT GAIN: ICD-10-CM

## 2020-02-22 DIAGNOSIS — R06.83 SNORING: ICD-10-CM

## 2020-02-22 DIAGNOSIS — E78.00 HYPERCHOLESTEREMIA: ICD-10-CM

## 2020-02-22 DIAGNOSIS — R73.03 PREDIABETES: ICD-10-CM

## 2020-02-22 DIAGNOSIS — Z86.718 HISTORY OF DVT (DEEP VEIN THROMBOSIS): ICD-10-CM

## 2020-02-22 LAB
EST. AVERAGE GLUCOSE BLD GHB EST-MCNC: 117 MG/DL (ref 68–126)
HBA1C MFR BLD HPLC: 5.7 % (ref ?–5.7)
T4 FREE SERPL-MCNC: 1.6 NG/DL (ref 0.8–1.7)
TSI SER-ACNC: 1.63 MIU/ML (ref 0.36–3.74)
VIT B12 SERPL-MCNC: 659 PG/ML (ref 193–986)

## 2020-02-22 PROCEDURE — 84443 ASSAY THYROID STIM HORMONE: CPT

## 2020-02-22 PROCEDURE — 84439 ASSAY OF FREE THYROXINE: CPT

## 2020-02-22 PROCEDURE — 36415 COLL VENOUS BLD VENIPUNCTURE: CPT

## 2020-02-22 PROCEDURE — 82397 CHEMILUMINESCENT ASSAY: CPT

## 2020-02-22 PROCEDURE — 83036 HEMOGLOBIN GLYCOSYLATED A1C: CPT

## 2020-02-22 PROCEDURE — 82306 VITAMIN D 25 HYDROXY: CPT

## 2020-02-22 PROCEDURE — 82607 VITAMIN B-12: CPT

## 2020-02-24 LAB — 25(OH)D3 SERPL-MCNC: 10.2 NG/ML (ref 30–100)

## 2020-02-27 ENCOUNTER — OFFICE VISIT (OUTPATIENT)
Dept: SURGERY | Facility: CLINIC | Age: 50
End: 2020-02-27

## 2020-02-27 VITALS
WEIGHT: 254.88 LBS | DIASTOLIC BLOOD PRESSURE: 80 MMHG | SYSTOLIC BLOOD PRESSURE: 112 MMHG | OXYGEN SATURATION: 97 % | BODY MASS INDEX: 45.73 KG/M2 | HEIGHT: 62.5 IN | HEART RATE: 90 BPM

## 2020-02-27 DIAGNOSIS — R63.5 WEIGHT GAIN: ICD-10-CM

## 2020-02-27 DIAGNOSIS — R06.83 SNORING: ICD-10-CM

## 2020-02-27 DIAGNOSIS — E66.01 MORBID OBESITY WITH BMI OF 45.0-49.9, ADULT (HCC): ICD-10-CM

## 2020-02-27 DIAGNOSIS — R73.03 PREDIABETES: ICD-10-CM

## 2020-02-27 DIAGNOSIS — Z86.718 HISTORY OF DVT (DEEP VEIN THROMBOSIS): ICD-10-CM

## 2020-02-27 DIAGNOSIS — I10 HYPERTENSION, UNSPECIFIED TYPE: Primary | ICD-10-CM

## 2020-02-27 DIAGNOSIS — E55.9 VITAMIN D DEFICIENCY: ICD-10-CM

## 2020-02-27 DIAGNOSIS — E78.00 HYPERCHOLESTEREMIA: ICD-10-CM

## 2020-02-27 DIAGNOSIS — E03.9 ACQUIRED HYPOTHYROIDISM: ICD-10-CM

## 2020-02-27 PROCEDURE — 99214 OFFICE O/P EST MOD 30 MIN: CPT | Performed by: NURSE PRACTITIONER

## 2020-02-27 RX ORDER — PHENTERMINE HYDROCHLORIDE 15 MG/1
15 CAPSULE ORAL EVERY MORNING
Qty: 30 CAPSULE | Refills: 1 | Status: SHIPPED | OUTPATIENT
Start: 2020-02-27 | End: 2020-04-27

## 2020-02-27 NOTE — PATIENT INSTRUCTIONS
Continue medications as ordered, monitor tingling closely. If unable to tolerate, switch to PGX.      Start PGX Daily, 2.5 grams packet mixed with water or 1-2 capsules to stabilize blood sugar and increase satiety (fullness) prior to dinner Jack Brown

## 2020-02-27 NOTE — PROGRESS NOTES
[address]  Dept: [phone]       Patient:  Author Espinoza  :      1970  MRN:      [id number]    Chief Complaint:  Patient present Start weight: 267    Wt Readings from Last 6 Encounters:  02/27/20 : 254 lb 14.4 oz (115.6 kg)  01/30/20 : 267 lb 6.4 oz (121.3 kg)  10/14/19 : 273 lb (123.8 kg)  05/29/19 : 270 lb (122.5 kg)  05/24/19 : 270 lb (122.5 kg)  02/27/19 : 271 lb (122.9 kg) member of club or organization: Not on file        Attends meetings of clubs or organizations: Not on file        Relationship status: Not on file      Intimate partner violence:        Fear of current or ex partner: Not on file        Emotionally abused: [id number]  · Average CHO Intake: Not tracking   · Is patient exercising? no  · Type of exercise?  Other:  Recess duty daily- walks the field 10 minutes     Eating Habits  · Patient states the following:  · Eats 3 meal(s) per day  · Length of time it takes symmetric, no tenderness/mass/nodules  Lungs: clear to auscultation bilaterally  Heart: S1, S2 normal, no murmur, click, rub or gallop, regular rate and rhythm  Abdomen: soft, non-tender; bowel sounds normal; no masses,  no organomegaly and abdomen obese 10/14/2019 09:42 AM    TRIG 135 10/14/2019 09:42 AM    VLDL 27 10/14/2019 09:42 AM       HYPERTENSION: Blood pressure stable on the above medications. No interval change in antihypertensive medication.      HYPOTHYROIDISM: On levothyroxine.  Continue presen the evening if unable to tolerate topiramate      Discussed risks, benefits, rationale, and side effects of medication(s) including hypertension, palpitations, tachycardia, dizziness, constipation, dry mouth, and anxiety among others. She understands that

## 2020-02-28 LAB — LEPTIN: 23.9 NG/ML

## 2020-04-26 DIAGNOSIS — R63.5 WEIGHT GAIN: ICD-10-CM

## 2020-04-26 DIAGNOSIS — E78.00 HYPERCHOLESTEREMIA: ICD-10-CM

## 2020-04-26 DIAGNOSIS — R73.03 PREDIABETES: ICD-10-CM

## 2020-04-26 DIAGNOSIS — R06.83 SNORING: ICD-10-CM

## 2020-04-26 DIAGNOSIS — I10 HYPERTENSION, UNSPECIFIED TYPE: ICD-10-CM

## 2020-04-26 DIAGNOSIS — E03.9 ACQUIRED HYPOTHYROIDISM: ICD-10-CM

## 2020-04-26 DIAGNOSIS — Z86.718 HISTORY OF DVT (DEEP VEIN THROMBOSIS): ICD-10-CM

## 2020-04-27 RX ORDER — PHENTERMINE HYDROCHLORIDE 15 MG/1
CAPSULE ORAL
Qty: 30 CAPSULE | Refills: 0 | Status: SHIPPED | OUTPATIENT
Start: 2020-04-27 | End: 2020-06-12 | Stop reason: DRUGHIGH

## 2020-05-07 ENCOUNTER — VIRTUAL PHONE E/M (OUTPATIENT)
Dept: SURGERY | Facility: CLINIC | Age: 50
End: 2020-05-07

## 2020-05-07 VITALS — WEIGHT: 238 LBS | BODY MASS INDEX: 43 KG/M2

## 2020-05-07 DIAGNOSIS — R73.03 PREDIABETES: ICD-10-CM

## 2020-05-07 DIAGNOSIS — Z86.718 HISTORY OF DVT (DEEP VEIN THROMBOSIS): ICD-10-CM

## 2020-05-07 DIAGNOSIS — E03.9 ACQUIRED HYPOTHYROIDISM: ICD-10-CM

## 2020-05-07 DIAGNOSIS — E66.01 MORBID OBESITY WITH BMI OF 40.0-44.9, ADULT (HCC): ICD-10-CM

## 2020-05-07 DIAGNOSIS — I10 HYPERTENSION, UNSPECIFIED TYPE: ICD-10-CM

## 2020-05-07 DIAGNOSIS — E55.9 VITAMIN D DEFICIENCY: ICD-10-CM

## 2020-05-07 DIAGNOSIS — E78.00 HYPERCHOLESTEREMIA: Primary | ICD-10-CM

## 2020-05-07 DIAGNOSIS — R06.83 SNORING: ICD-10-CM

## 2020-05-07 DIAGNOSIS — Z51.81 ENCOUNTER FOR THERAPEUTIC DRUG MONITORING: ICD-10-CM

## 2020-05-07 PROCEDURE — 99214 OFFICE O/P EST MOD 30 MIN: CPT | Performed by: NURSE PRACTITIONER

## 2020-05-07 NOTE — PATIENT INSTRUCTIONS
Read about plant powered eating, pick a jump start program:   [place] over [employer]. Eat to Live. How Not to Diet. Blue Zones. Whole 30. Or Eat Fat, Get Thin.  Or 10 Day Blood Sugar Detox- These are more paleo driven, includes more clean, organic anima

## 2020-05-07 NOTE — PROGRESS NOTES
Virtual Telephone Check-In    Suzi Crenshaw verbally consents to a Virtual/Telephone Check-In visit on 05/07/20. Patient understands and accepts financial responsibility for any deductible, co-insurance and/or co-pays associated with this service. antihypertensive medication.      HYPOTHYROIDISM: On levothyroxine. Continue present management.     OBESITY/WEIGHT GAIN:  PREDIABETES:     Discussed starting weight:  267 lbs; BMI: 48.1; WC: 51 in.   Patient's goal weight: Unsure  Values: Breathing well, F dry mouth, and anxiety among others. She understands that I will not call in the prescription for her; she has to have an appointment to have the medication refilled. Must avoid pregnancy during use, counseled on adequate contraception during use.  Patient

## 2020-06-12 ENCOUNTER — OFFICE VISIT (OUTPATIENT)
Dept: SURGERY | Facility: CLINIC | Age: 50
End: 2020-06-12

## 2020-06-12 VITALS
DIASTOLIC BLOOD PRESSURE: 64 MMHG | SYSTOLIC BLOOD PRESSURE: 118 MMHG | HEIGHT: 62.5 IN | OXYGEN SATURATION: 98 % | WEIGHT: 223.19 LBS | HEART RATE: 96 BPM | BODY MASS INDEX: 40.05 KG/M2

## 2020-06-12 DIAGNOSIS — Z51.81 ENCOUNTER FOR THERAPEUTIC DRUG MONITORING: ICD-10-CM

## 2020-06-12 DIAGNOSIS — E66.01 MORBID OBESITY WITH BMI OF 40.0-44.9, ADULT (HCC): ICD-10-CM

## 2020-06-12 DIAGNOSIS — R06.83 SNORING: ICD-10-CM

## 2020-06-12 DIAGNOSIS — Z86.718 HISTORY OF DVT (DEEP VEIN THROMBOSIS): ICD-10-CM

## 2020-06-12 DIAGNOSIS — R73.03 PREDIABETES: ICD-10-CM

## 2020-06-12 DIAGNOSIS — E55.9 VITAMIN D DEFICIENCY: ICD-10-CM

## 2020-06-12 DIAGNOSIS — E78.00 HYPERCHOLESTEREMIA: Primary | ICD-10-CM

## 2020-06-12 DIAGNOSIS — I10 HYPERTENSION, UNSPECIFIED TYPE: ICD-10-CM

## 2020-06-12 DIAGNOSIS — E03.9 ACQUIRED HYPOTHYROIDISM: ICD-10-CM

## 2020-06-12 PROCEDURE — 99214 OFFICE O/P EST MOD 30 MIN: CPT | Performed by: NURSE PRACTITIONER

## 2020-06-12 RX ORDER — PHENTERMINE HYDROCHLORIDE 30 MG/1
30 CAPSULE ORAL EVERY MORNING
Qty: 30 CAPSULE | Refills: 1 | Status: SHIPPED | OUTPATIENT
Start: 2020-06-12 | End: 2020-08-20 | Stop reason: DRUGHIGH

## 2020-06-12 RX ORDER — TOPIRAMATE 25 MG/1
25 TABLET ORAL DAILY
Qty: 90 TABLET | Refills: 0 | Status: SHIPPED | OUTPATIENT
Start: 2020-06-12 | End: 2020-08-20

## 2020-06-12 NOTE — PROGRESS NOTES
[address], [address]  Dept: [phone]       Patient:  Chun Yanez  :      1970  MRN:      [id number]    Chief Complaint:  Patient present weight loss: -44    Start weight: 267    Wt Readings from Last 6 Encounters:  06/12/20 : 223 lb 3.2 oz (101.2 kg)  05/07/20 : 238 lb (108 kg)  02/27/20 : 254 lb 14.4 oz (115.6 kg)  01/30/20 : 267 lb 6.4 oz (121.3 kg)  10/14/19 : 273 lb (123.8 kg)  05/29/19 Not on file        Attends [employer] service: Not on file        Active member of club or organization: Not on file        Attends meetings of clubs or organizations: Not on file        Relationship status: Not on file      Intimate partner violence: nutrition labels? yes  · Average Caloric Intake:   Not tracking   · Average CHO Intake: Not tracking   · Is patient exercising? no  · Type of exercise?  Other:  Walks dog occasionally    Eating Habits  · Patient states the following:  · Eats 3 meal(s) per obvious abnormality, atraumatic  Neck: no adenopathy, no carotid bruit, no JVD, supple, symmetrical, trachea midline and thyroid not enlarged, symmetric, no tenderness/mass/nodules  Lungs: clear to auscultation bilaterally  Heart: S1, S2 normal, no murmur, deficiency    Morbid obesity with BMI of 40.0-44.9, adult (HCC)  -     Phentermine HCl 30 MG Oral Cap; Take 1 capsule (30 mg total) by mouth every morning.  -     topiramate 25 MG Oral Tab; Take 1 tablet (25 mg total) by mouth daily.     HYPERCHOLESTEROLEMI intact; CBC in range  TSH 3.790 on levothyroxine  2/22/2020 Updated TSH, T4 in range  B 12 in range  Vitamin D low- continue 50,000 IU capsule weekly      Reviewed medication options for weight loss in detail with patient.      +cravings, emotional eating,

## 2020-06-12 NOTE — PATIENT INSTRUCTIONS
Values: Breathing well, Fitting on Furniture, Children, Health      Exercise Goal: Outdoor bike riding or dog walking- Monday- build up slowly.     Bowen Morton, Naturopathic Doctor   [facility]  [address]

## 2020-07-10 ENCOUNTER — OFFICE VISIT (OUTPATIENT)
Dept: SURGERY | Facility: CLINIC | Age: 50
End: 2020-07-10

## 2020-07-10 VITALS
BODY MASS INDEX: 38.43 KG/M2 | DIASTOLIC BLOOD PRESSURE: 74 MMHG | WEIGHT: 214.19 LBS | SYSTOLIC BLOOD PRESSURE: 116 MMHG | HEIGHT: 62.5 IN | HEART RATE: 76 BPM

## 2020-07-10 DIAGNOSIS — E03.9 ACQUIRED HYPOTHYROIDISM: ICD-10-CM

## 2020-07-10 DIAGNOSIS — Z51.81 ENCOUNTER FOR THERAPEUTIC DRUG MONITORING: ICD-10-CM

## 2020-07-10 DIAGNOSIS — R06.83 SNORING: ICD-10-CM

## 2020-07-10 DIAGNOSIS — E66.9 OBESITY (BMI 30-39.9): ICD-10-CM

## 2020-07-10 DIAGNOSIS — E78.00 HYPERCHOLESTEREMIA: Primary | ICD-10-CM

## 2020-07-10 DIAGNOSIS — E55.9 VITAMIN D DEFICIENCY: ICD-10-CM

## 2020-07-10 DIAGNOSIS — Z86.718 HISTORY OF DVT (DEEP VEIN THROMBOSIS): ICD-10-CM

## 2020-07-10 DIAGNOSIS — R73.03 PREDIABETES: ICD-10-CM

## 2020-07-10 DIAGNOSIS — I10 HYPERTENSION, UNSPECIFIED TYPE: ICD-10-CM

## 2020-07-10 PROCEDURE — 99214 OFFICE O/P EST MOD 30 MIN: CPT | Performed by: NURSE PRACTITIONER

## 2020-07-10 NOTE — PROGRESS NOTES
[address]  Dept: [phone]       Patient:  Devi Hooks  :      1970  MRN:      [id number]    Chief Complaint:  Patient present weight loss: -53    Start weight: 267    Wt Readings from Last 6 Encounters:  07/10/20 : 214 lb 3.2 oz (97.2 kg)  06/12/20 : 223 lb 3.2 oz (101.2 kg)  05/07/20 : 238 lb (108 kg)  02/27/20 : 254 lb 14.4 oz (115.6 kg)  01/30/20 : 267 lb 6.4 oz (121.3 kg)  10 together: Not on file        Attends Hinduism service: Not on file        Active member of club or organization: Not on file        Attends meetings of clubs or organizations: Not on file        Relationship status: Not on file      Intimate partner viole reading nutrition labels? yes  · Average Caloric Intake:      · Average CHO Intake:    · Is patient exercising? yes  · Type of exercise?  Other:  Daily walks with dogs; plans to restart swimming     Eating Habits  · Patient states the following:  · Eats 3 Head: Normocephalic, without obvious abnormality, atraumatic  Neck: no adenopathy, no carotid bruit, no JVD, supple, symmetrical, trachea midline and thyroid not enlarged, symmetric, no tenderness/mass/nodules  Lungs: clear to auscultation bilaterally  H Date/Time    CHOLEST 216 (H) 10/14/2019 09:42 AM     (H) 10/14/2019 09:42 AM    HDL 52 10/14/2019 09:42 AM    TRIG 135 10/14/2019 09:42 AM    VLDL 27 10/14/2019 09:42 AM       HYPERTENSION: Blood pressure stable on the above medications.  No interval mg in the AM-med refilled.      Continue 25 mg topiramate in the evening with dinner - side effect mild tingling.     May take PGX if desires.     Discussed risks, benefits, rationale, and side effects of medication(s) including hypertension, palpitations,

## 2020-07-10 NOTE — PATIENT INSTRUCTIONS
Practice Intermittent Fasting consistently. Aim to fast at least 12 hours overnight. Eat/drink between the hours of 7 am and 7 pm. Avoid food/drink after 7 pm.  Drink water any time. You may consider vegetable or bone broth in the evening as needed.

## 2020-07-24 ENCOUNTER — OFFICE VISIT (OUTPATIENT)
Dept: INTERNAL MEDICINE CLINIC | Facility: CLINIC | Age: 50
End: 2020-07-24

## 2020-07-24 VITALS
WEIGHT: 214.38 LBS | TEMPERATURE: 99 F | SYSTOLIC BLOOD PRESSURE: 126 MMHG | DIASTOLIC BLOOD PRESSURE: 76 MMHG | HEIGHT: 62 IN | HEART RATE: 85 BPM | BODY MASS INDEX: 39.45 KG/M2

## 2020-07-24 DIAGNOSIS — H66.90 ACUTE OTITIS MEDIA, UNSPECIFIED OTITIS MEDIA TYPE: Primary | ICD-10-CM

## 2020-07-24 PROCEDURE — 3074F SYST BP LT 130 MM HG: CPT | Performed by: PHYSICIAN ASSISTANT

## 2020-07-24 PROCEDURE — 3008F BODY MASS INDEX DOCD: CPT | Performed by: PHYSICIAN ASSISTANT

## 2020-07-24 PROCEDURE — 99213 OFFICE O/P EST LOW 20 MIN: CPT | Performed by: PHYSICIAN ASSISTANT

## 2020-07-24 PROCEDURE — 3078F DIAST BP <80 MM HG: CPT | Performed by: PHYSICIAN ASSISTANT

## 2020-07-24 RX ORDER — AMOXICILLIN AND CLAVULANATE POTASSIUM 875; 125 MG/1; MG/1
1 TABLET, FILM COATED ORAL 2 TIMES DAILY
Qty: 14 TABLET | Refills: 0 | Status: SHIPPED | OUTPATIENT
Start: 2020-07-24 | End: 2020-07-31

## 2020-07-24 NOTE — PROGRESS NOTES
HPI:    Patient ID: Lupe Castillo is a 53year old female. HPI  Presents with BL ear pain, worse on the left, has been swimming frequently, denies fevers or discharge from the ear. Review of Systems   Constitutional: Negative.     HENT: Positive fo SURGERY  2001    [facility]    • OTHER SURGICAL HISTORY  2004    vaginal delivery   • OTHER SURGICAL HISTORY  2006    vaginal delivery   \  Family History   Problem Relation Age of Onset   • Hypertension Father         overweight    • Diabetes Mother         jordan Tab 14 tablet 0     Sig: Take 1 tablet by mouth 2 (two) times daily for 7 days.        Imaging & Referrals:  None         [address] [facility] [facility] [id number]

## 2020-07-30 ENCOUNTER — TELEMEDICINE (OUTPATIENT)
Dept: INTERNAL MEDICINE CLINIC | Facility: CLINIC | Age: 50
End: 2020-07-30

## 2020-07-30 DIAGNOSIS — H92.02 LEFT EAR PAIN: Primary | ICD-10-CM

## 2020-07-30 PROCEDURE — 99213 OFFICE O/P EST LOW 20 MIN: CPT | Performed by: PHYSICIAN ASSISTANT

## 2020-07-30 RX ORDER — METHYLPREDNISOLONE 4 MG/1
TABLET ORAL
Qty: 1 KIT | Refills: 0 | Status: SHIPPED | OUTPATIENT
Start: 2020-07-30 | End: 2020-08-20

## 2020-07-30 NOTE — PROGRESS NOTES
This is a telemedicine visit with live, interactive video and audio. Patient understands and accepts financial responsibility for any deductible, co-insurance and/or co-pays associated with this service.     SUBJECTIVE  Follow up for left ear pain, stat Levothyroxine Sodium 100 MCG Oral Tab TAKE 1 TABLET BY MOUTH EVERY DAY BEFORE BREAKFAST 90 tablet 2   • Losartan Potassium-HCTZ 100-25 MG Oral Tab Take 1 tablet by mouth once daily.  90 tablet 2       OBJECTIVE  Physical Exam:   alert, appears stated age an

## 2020-08-06 ENCOUNTER — OFFICE VISIT (OUTPATIENT)
Dept: OTOLARYNGOLOGY | Facility: CLINIC | Age: 50
End: 2020-08-06

## 2020-08-06 VITALS
TEMPERATURE: 98 F | WEIGHT: 214.5 LBS | HEIGHT: 63 IN | BODY MASS INDEX: 38.01 KG/M2 | DIASTOLIC BLOOD PRESSURE: 90 MMHG | SYSTOLIC BLOOD PRESSURE: 112 MMHG

## 2020-08-06 DIAGNOSIS — H92.03 OTALGIA OF BOTH EARS: Primary | ICD-10-CM

## 2020-08-06 PROCEDURE — 3008F BODY MASS INDEX DOCD: CPT | Performed by: OTOLARYNGOLOGY

## 2020-08-06 PROCEDURE — 3080F DIAST BP >= 90 MM HG: CPT | Performed by: OTOLARYNGOLOGY

## 2020-08-06 PROCEDURE — 3074F SYST BP LT 130 MM HG: CPT | Performed by: OTOLARYNGOLOGY

## 2020-08-06 PROCEDURE — 99243 OFF/OP CNSLTJ NEW/EST LOW 30: CPT | Performed by: OTOLARYNGOLOGY

## 2020-08-06 RX ORDER — CELECOXIB 200 MG/1
CAPSULE ORAL
Qty: 90 CAPSULE | Refills: 0 | OUTPATIENT
Start: 2020-08-06

## 2020-08-06 RX ORDER — CELECOXIB 200 MG/1
200 CAPSULE ORAL DAILY PRN
Qty: 30 CAPSULE | Refills: 0 | Status: SHIPPED | OUTPATIENT
Start: 2020-08-06 | End: 2020-09-05

## 2020-08-06 NOTE — PROGRESS NOTES
Juan Montalvo is a 53year old female. Patient presents with:  Ear Problem: c/o ear pain for 3 weeks, had 1 course of antibiotic and steroids      HISTORY OF PRESENT ILLNESS  She presents with 3 weeks history of bilateral ear pain.   Seen by her primar Fatigue, fever and weight loss. ENMT Negative Drooling. Eyes Negative Blurred vision and vision changes. Respiratory Negative Dyspnea and wheezing. Cardio Negative Chest pain, irregular heartbeat/palpitations and syncope.    GI Negative Abdominal pa Current Outpatient Medications:   •  celecoxib 200 MG Oral Cap, Take 1 capsule (200 mg total) by mouth daily as needed for Pain., Disp: 30 capsule, Rfl: 0  •  Phentermine HCl 30 MG Oral Cap, Take 1 capsule (30 mg total) by mouth every morning., Disp:

## 2020-08-16 DIAGNOSIS — E03.9 ACQUIRED HYPOTHYROIDISM: ICD-10-CM

## 2020-08-17 RX ORDER — LEVOTHYROXINE SODIUM 0.1 MG/1
TABLET ORAL
Qty: 90 TABLET | Refills: 2 | Status: SHIPPED | OUTPATIENT
Start: 2020-08-17 | End: 2021-08-12

## 2020-08-20 ENCOUNTER — OFFICE VISIT (OUTPATIENT)
Dept: SURGERY | Facility: CLINIC | Age: 50
End: 2020-08-20

## 2020-08-20 VITALS
HEART RATE: 72 BPM | BODY MASS INDEX: 37.77 KG/M2 | HEIGHT: 62.5 IN | SYSTOLIC BLOOD PRESSURE: 112 MMHG | DIASTOLIC BLOOD PRESSURE: 76 MMHG | WEIGHT: 210.5 LBS

## 2020-08-20 DIAGNOSIS — R73.03 PREDIABETES: ICD-10-CM

## 2020-08-20 DIAGNOSIS — E66.9 OBESITY (BMI 30-39.9): ICD-10-CM

## 2020-08-20 DIAGNOSIS — R06.83 SNORING: ICD-10-CM

## 2020-08-20 DIAGNOSIS — I10 HYPERTENSION, UNSPECIFIED TYPE: ICD-10-CM

## 2020-08-20 DIAGNOSIS — E78.00 HYPERCHOLESTEREMIA: Primary | ICD-10-CM

## 2020-08-20 DIAGNOSIS — Z86.718 HISTORY OF DVT (DEEP VEIN THROMBOSIS): ICD-10-CM

## 2020-08-20 DIAGNOSIS — E03.9 ACQUIRED HYPOTHYROIDISM: ICD-10-CM

## 2020-08-20 DIAGNOSIS — Z51.81 ENCOUNTER FOR THERAPEUTIC DRUG MONITORING: ICD-10-CM

## 2020-08-20 DIAGNOSIS — E55.9 VITAMIN D DEFICIENCY: ICD-10-CM

## 2020-08-20 DIAGNOSIS — E66.01 MORBID OBESITY WITH BMI OF 40.0-44.9, ADULT (HCC): ICD-10-CM

## 2020-08-20 PROCEDURE — 99214 OFFICE O/P EST MOD 30 MIN: CPT | Performed by: NURSE PRACTITIONER

## 2020-08-20 PROCEDURE — 3074F SYST BP LT 130 MM HG: CPT | Performed by: NURSE PRACTITIONER

## 2020-08-20 PROCEDURE — 3078F DIAST BP <80 MM HG: CPT | Performed by: NURSE PRACTITIONER

## 2020-08-20 PROCEDURE — 3008F BODY MASS INDEX DOCD: CPT | Performed by: NURSE PRACTITIONER

## 2020-08-20 RX ORDER — TOPIRAMATE 25 MG/1
25 TABLET ORAL 2 TIMES DAILY
Qty: 180 TABLET | Refills: 0 | Status: SHIPPED | OUTPATIENT
Start: 2020-08-20 | End: 2021-04-16

## 2020-08-20 RX ORDER — PHENTERMINE HYDROCHLORIDE 37.5 MG/1
37.5 CAPSULE ORAL EVERY MORNING
Qty: 30 CAPSULE | Refills: 1 | Status: SHIPPED | OUTPATIENT
Start: 2020-08-20 | End: 2020-10-07

## 2020-08-20 NOTE — PATIENT INSTRUCTIONS
Marco Mixonecttonya  Factor 75    Green   [employer]     [employer]   [employer]  [employer]      Isacc Alex, Naturopathic Doctor   [address]   [place] [address] [phone]

## 2020-08-20 NOTE — PROGRESS NOTES
[address]  Dept: [phone]       Patient:  Rajendra Galvan  :      1970  MRN:      [id number]    Chief Complaint:  Patient present Encounters:  08/20/20 : 210 lb 8 oz (95.5 kg)  08/06/20 : 214 lb 8.1 oz (97.3 kg)  07/24/20 : 214 lb 6.4 oz (97.3 kg)  07/10/20 : 214 lb 3.2 oz (97.2 kg)  06/12/20 : 223 lb 3.2 oz (101.2 kg)  05/07/20 : 238 lb (108 kg)      Patient Medications:    Current on file      Intimate partner violence:        Fear of current or ex partner: Not on file        Emotionally abused: Not on file        Physically abused: Not on file        Forced sexual activity: Not on file    Other Topics      Concerns:         following:  · Eats 3 meal(s) per day  · Length of time it takes to consume a meal:    · # of snacks per day: 2   · Type of snacks:  Cheese stick, fruit    · Amount of soda consumption per day:  None   · Amount of water (in ounces) per day:  Close to 64 oz not enlarged, symmetric, no tenderness/mass/nodules  Lungs: clear to auscultation bilaterally  Heart: S1, S2 normal, no murmur, click, rub or gallop, regular rate and rhythm  Abdomen: soft, non-tender; bowel sounds normal; no masses,  no organomegaly and a 137 (H) 10/14/2019 09:42 AM    HDL 52 10/14/2019 09:42 AM    TRIG 135 10/14/2019 09:42 AM    VLDL 27 10/14/2019 09:42 AM       HYPERTENSION: Blood pressure stable on the above medications.  No interval change in antihypertensive medication.      HYPOTHYROID refilled.      Continue 25 mg topiramate, increase to BID - monitor closely for side effect tingling.     May take PGX if desires.     Discussed risks, benefits, rationale, and side effects of medication(s) including hypertension, palpitations, tachycardia

## 2020-09-03 DIAGNOSIS — I10 ESSENTIAL HYPERTENSION WITH GOAL BLOOD PRESSURE LESS THAN 140/90: ICD-10-CM

## 2020-09-07 RX ORDER — LOSARTAN POTASSIUM AND HYDROCHLOROTHIAZIDE 25; 100 MG/1; MG/1
TABLET ORAL
Qty: 90 TABLET | Refills: 2 | Status: SHIPPED | OUTPATIENT
Start: 2020-09-07 | End: 2021-08-12

## 2020-09-10 ENCOUNTER — OFFICE VISIT (OUTPATIENT)
Dept: OTOLARYNGOLOGY | Facility: CLINIC | Age: 50
End: 2020-09-10

## 2020-09-10 VITALS — HEIGHT: 62.5 IN | BODY MASS INDEX: 37.68 KG/M2 | WEIGHT: 210 LBS | TEMPERATURE: 97 F

## 2020-09-10 DIAGNOSIS — H92.03 OTALGIA OF BOTH EARS: Primary | ICD-10-CM

## 2020-09-10 PROCEDURE — 3008F BODY MASS INDEX DOCD: CPT | Performed by: OTOLARYNGOLOGY

## 2020-09-10 PROCEDURE — 99213 OFFICE O/P EST LOW 20 MIN: CPT | Performed by: OTOLARYNGOLOGY

## 2020-09-10 RX ORDER — CYCLOBENZAPRINE HCL 5 MG
5 TABLET ORAL 3 TIMES DAILY PRN
Qty: 90 TABLET | Refills: 1 | Status: SHIPPED | OUTPATIENT
Start: 2020-09-10 | End: 2021-05-26

## 2020-09-10 RX ORDER — CELECOXIB 200 MG/1
200 CAPSULE ORAL DAILY PRN
Qty: 30 CAPSULE | Refills: 1 | Status: SHIPPED | OUTPATIENT
Start: 2020-09-10 | End: 2020-09-11

## 2020-09-10 NOTE — PROGRESS NOTES
Gordo Ma is a 53year old female.   Patient presents with:  Ear Problem: TMJ, per still having lower jaw pain ,  per pt still feeling stressed , pt did get mouth guard and has been using since 08/22      HISTORY OF PRESENT ILLNESS  She presents wit History:   Diagnosis Date   • Clot in the renal vein ([facility] [address] 01/01/2014    left lower extremity   • Cyst of skin     right abdominal skin cyst January 2016   • Hypothyroidism    • Unspecified essential hypertension        Past Surgical History:   Procedure L Normal Inspection - Right: Normal, Left: Normal. Canal - Right: Normal, Left: Normal. TM - Right: Normal, Left: Normal.   Skin Normal Inspection - Normal.        Lymph Detail Normal Submental. Submandibular.  Anterior cervical. Posterior cervical. Supraclav Daisy Mendoza MD    9/10/2020    6:13 PM

## 2020-09-11 RX ORDER — CELECOXIB 200 MG/1
CAPSULE ORAL
Qty: 90 CAPSULE | Refills: 0 | Status: SHIPPED | OUTPATIENT
Start: 2020-09-11 | End: 2021-05-26

## 2020-10-01 ENCOUNTER — OFFICE VISIT (OUTPATIENT)
Dept: SURGERY | Facility: CLINIC | Age: 50
End: 2020-10-01

## 2020-10-01 VITALS
DIASTOLIC BLOOD PRESSURE: 92 MMHG | HEART RATE: 93 BPM | BODY MASS INDEX: 36.28 KG/M2 | WEIGHT: 202.19 LBS | SYSTOLIC BLOOD PRESSURE: 144 MMHG | HEIGHT: 62.5 IN | OXYGEN SATURATION: 95 %

## 2020-10-01 DIAGNOSIS — Z86.718 HISTORY OF DVT (DEEP VEIN THROMBOSIS): ICD-10-CM

## 2020-10-01 DIAGNOSIS — E66.9 OBESITY (BMI 30-39.9): ICD-10-CM

## 2020-10-01 DIAGNOSIS — R73.03 PREDIABETES: ICD-10-CM

## 2020-10-01 DIAGNOSIS — I10 HYPERTENSION, UNSPECIFIED TYPE: ICD-10-CM

## 2020-10-01 DIAGNOSIS — E78.00 HYPERCHOLESTEREMIA: Primary | ICD-10-CM

## 2020-10-01 DIAGNOSIS — R06.83 SNORING: ICD-10-CM

## 2020-10-01 DIAGNOSIS — E03.9 ACQUIRED HYPOTHYROIDISM: ICD-10-CM

## 2020-10-01 DIAGNOSIS — E55.9 VITAMIN D DEFICIENCY: ICD-10-CM

## 2020-10-01 DIAGNOSIS — Z51.81 ENCOUNTER FOR THERAPEUTIC DRUG MONITORING: ICD-10-CM

## 2020-10-01 PROCEDURE — 3008F BODY MASS INDEX DOCD: CPT | Performed by: NURSE PRACTITIONER

## 2020-10-01 PROCEDURE — 3080F DIAST BP >= 90 MM HG: CPT | Performed by: NURSE PRACTITIONER

## 2020-10-01 PROCEDURE — 99214 OFFICE O/P EST MOD 30 MIN: CPT | Performed by: NURSE PRACTITIONER

## 2020-10-01 PROCEDURE — 3077F SYST BP >= 140 MM HG: CPT | Performed by: NURSE PRACTITIONER

## 2020-10-01 NOTE — PATIENT INSTRUCTIONS
Start magnesium 200 mg/day. Epsom salt baths. 5 minutes of relaxation of relaxation. Continue medications. Add 2 veggies to dinner and a handful of nuts or seeds. Return to clinic around the holidays.

## 2020-10-01 NOTE — PROGRESS NOTES
[address]  Dept: [phone]       Patient:  Mima Joseph  :      1970  MRN:      [id number]    Chief Complaint:  Patient present oz (97.3 kg)  07/24/20 : 214 lb 6.4 oz (97.3 kg)  07/10/20 : 214 lb 3.2 oz (97.2 kg)      Patient Medications:    Current Outpatient Medications   Medication Sig Dispense Refill   • CELECOXIB 200 MG Oral Cap TAKE 1 CAPSULE(200 MG) BY MOUTH DAILY AS NEEDED Not on file        Attends meetings of clubs or organizations: Not on file        Relationship status: Not on file      Intimate partner violence        Fear of current or ex partner: Not on file        Emotionally abused: Not on file        Physically abu exercising? yes  · Type of exercise?      Eating Habits  · Patient states the following:  · Eats 3 meal(s) per day  · # of snacks per day: 1-2   · Type of snacks:  fruit    · Amount of soda consumption per day:  None   · Amount of water (in ounces) per day: click, rub or gallop, regular rate and rhythm  Abdomen: soft, non-tender; bowel sounds normal; no masses,  no organomegaly and abdomen obese   Extremities: extremities normal, atraumatic, no cyanosis or edema  Pulses: 2+ and symmetric  Skin: Skin color, te medications. No interval change in antihypertensive medication. Monitor closely.       HYPOTHYROIDISM: On levothyroxine. Continue present management.     OBESITY/WEIGHT GAIN:  PREDIABETES:     Discussed starting weight:  267 lbs; BMI: 48.1; WC: 51 in.   Maryanne Moder rationale, and side effects of medication(s) including hypertension, palpitations, tachycardia, dizziness, constipation, dry mouth, and anxiety among others. She understands that I will not call in the prescription for her; she has to have an appointment t

## 2020-10-07 DIAGNOSIS — F43.9 STRESS: ICD-10-CM

## 2020-10-07 DIAGNOSIS — I10 HYPERTENSION, UNSPECIFIED TYPE: ICD-10-CM

## 2020-10-07 DIAGNOSIS — E78.00 HYPERCHOLESTEREMIA: Primary | ICD-10-CM

## 2020-10-07 DIAGNOSIS — Z86.718 HISTORY OF DVT (DEEP VEIN THROMBOSIS): ICD-10-CM

## 2020-10-07 DIAGNOSIS — R73.03 PREDIABETES: ICD-10-CM

## 2020-10-07 DIAGNOSIS — E55.9 VITAMIN D DEFICIENCY: ICD-10-CM

## 2020-10-07 DIAGNOSIS — E66.9 OBESITY (BMI 30-39.9): ICD-10-CM

## 2020-10-07 DIAGNOSIS — E03.9 ACQUIRED HYPOTHYROIDISM: ICD-10-CM

## 2020-10-08 RX ORDER — PHENTERMINE HYDROCHLORIDE 37.5 MG/1
37.5 CAPSULE ORAL EVERY MORNING
Qty: 30 CAPSULE | Refills: 1 | Status: SHIPPED | OUTPATIENT
Start: 2020-10-08 | End: 2021-02-15

## 2020-10-26 ENCOUNTER — OFFICE VISIT (OUTPATIENT)
Dept: INTERNAL MEDICINE CLINIC | Facility: CLINIC | Age: 50
End: 2020-10-26

## 2020-10-26 VITALS
SYSTOLIC BLOOD PRESSURE: 132 MMHG | HEART RATE: 85 BPM | OXYGEN SATURATION: 99 % | DIASTOLIC BLOOD PRESSURE: 84 MMHG | HEIGHT: 62.5 IN | TEMPERATURE: 98 F | WEIGHT: 208 LBS | BODY MASS INDEX: 37.32 KG/M2

## 2020-10-26 DIAGNOSIS — Z12.31 ENCOUNTER FOR SCREENING MAMMOGRAM FOR MALIGNANT NEOPLASM OF BREAST: ICD-10-CM

## 2020-10-26 DIAGNOSIS — E55.9 VITAMIN D DEFICIENCY: ICD-10-CM

## 2020-10-26 DIAGNOSIS — Z00.00 PHYSICAL EXAM: Primary | ICD-10-CM

## 2020-10-26 PROCEDURE — 3079F DIAST BP 80-89 MM HG: CPT | Performed by: PHYSICIAN ASSISTANT

## 2020-10-26 PROCEDURE — 3008F BODY MASS INDEX DOCD: CPT | Performed by: PHYSICIAN ASSISTANT

## 2020-10-26 PROCEDURE — 99396 PREV VISIT EST AGE 40-64: CPT | Performed by: PHYSICIAN ASSISTANT

## 2020-10-26 PROCEDURE — 3075F SYST BP GE 130 - 139MM HG: CPT | Performed by: PHYSICIAN ASSISTANT

## 2020-10-26 NOTE — PROGRESS NOTES
HPI:    Patient ID: Bruna Wilcox is a 53year old female. HPI   Patient presents for a physical exam, doing generally well.has been seeing bariatrics, doing well with the treatment. Notes more stress with her children right now.  Last pap was 2016 a History:   Procedure Laterality Date   • ANKLE FRACTURE SURGERY Left 2014    left ankle stress fracture 2014   • KNEE SURGERY  2001    MCL    • OTHER SURGICAL HISTORY  2004    vaginal delivery   • OTHER SURGICAL HISTORY  2006    vaginal delivery   \  Famil place, and time. She has normal reflexes. She displays normal reflexes. No cranial nerve deficit. Skin: Skin is warm and dry. No rash noted. She is not diaphoretic. No erythema. No pallor. Psychiatric: She has a normal mood and affect.  Her behavior is

## 2020-11-25 ENCOUNTER — TELEMEDICINE (OUTPATIENT)
Dept: SURGERY | Facility: CLINIC | Age: 50
End: 2020-11-25

## 2020-11-25 VITALS — BODY MASS INDEX: 38 KG/M2 | WEIGHT: 211 LBS

## 2020-11-25 DIAGNOSIS — R06.83 SNORING: ICD-10-CM

## 2020-11-25 DIAGNOSIS — R73.03 PREDIABETES: ICD-10-CM

## 2020-11-25 DIAGNOSIS — E55.9 VITAMIN D DEFICIENCY: ICD-10-CM

## 2020-11-25 DIAGNOSIS — Z51.81 ENCOUNTER FOR THERAPEUTIC DRUG MONITORING: ICD-10-CM

## 2020-11-25 DIAGNOSIS — I10 HYPERTENSION, UNSPECIFIED TYPE: ICD-10-CM

## 2020-11-25 DIAGNOSIS — E03.9 ACQUIRED HYPOTHYROIDISM: ICD-10-CM

## 2020-11-25 DIAGNOSIS — E78.00 HYPERCHOLESTEREMIA: Primary | ICD-10-CM

## 2020-11-25 DIAGNOSIS — Z86.718 HISTORY OF DVT (DEEP VEIN THROMBOSIS): ICD-10-CM

## 2020-11-25 PROCEDURE — 99214 OFFICE O/P EST MOD 30 MIN: CPT | Performed by: NURSE PRACTITIONER

## 2020-11-25 NOTE — PROGRESS NOTES
Virtual Video/Telephone Check-In    Norberto Martinez verbally consents to a [facility] visit on 11/25/20. Patient has been referred to the [facility] website at [email or web address] to review the yearly Consent to Treat document.     P Medical History:   Diagnosis Date   • Clot in the renal vein ([facility] [address] 01/01/2014    left lower extremity   • Cyst of skin     right abdominal skin cyst January 2016   • Hypothyroidism    • Unspecified essential hypertension         Comorbidities:  Back pain-I needs        Medical: Not on file        Non-medical: Not on file    Tobacco Use      Smoking status: Never Smoker      Smokeless tobacco: Never Used    Substance and Sexual Activity      Alcohol use:  Yes        Alcohol/week: 0.0 standard drinks        Com borderline, overweight    • Colon Cancer Paternal Uncle      Initial Intake:  After dinner behavior: chips, popcorn cakes   Night eating: -  Portion sizes: at times   Binge: BED 7-  Emotional: +  Depression: Total PHQ Score: 1  Grazing: -  Sweet tooth: at headaches  Behavioral/Psych: negative  Endocrine: negative  All other systems were reviewed and are negative    Physical Exam:  General: alert, oriented x 3, cooperative, speaking in full sentences, appears stated age and cooperative, obese   Head: Normoce 09:42 AM    VLDL 27 10/14/2019 09:42 AM       HYPERTENSION: Blood pressure elevated on the above medications. No interval change in antihypertensive medication. Monitor closely.       HYPOTHYROIDISM: On levothyroxine.  Continue present management.     JOHNY desires.     Discussed risks, benefits, rationale, and side effects of medication(s) including hypertension, palpitations, tachycardia, dizziness, constipation, dry mouth, and anxiety among others. She understands that I will not call in the prescription f

## 2020-11-28 ENCOUNTER — LAB ENCOUNTER (OUTPATIENT)
Dept: LAB | Age: 50
End: 2020-11-28
Attending: PHYSICIAN ASSISTANT
Payer: COMMERCIAL

## 2020-11-28 DIAGNOSIS — Z00.00 PHYSICAL EXAM: ICD-10-CM

## 2020-11-28 DIAGNOSIS — E55.9 VITAMIN D DEFICIENCY: ICD-10-CM

## 2020-11-28 PROCEDURE — 84439 ASSAY OF FREE THYROXINE: CPT

## 2020-11-28 PROCEDURE — 82306 VITAMIN D 25 HYDROXY: CPT

## 2020-11-28 PROCEDURE — 80061 LIPID PANEL: CPT

## 2020-11-28 PROCEDURE — 85025 COMPLETE CBC W/AUTO DIFF WBC: CPT

## 2020-11-28 PROCEDURE — 80053 COMPREHEN METABOLIC PANEL: CPT

## 2020-11-28 PROCEDURE — 84443 ASSAY THYROID STIM HORMONE: CPT

## 2020-11-28 PROCEDURE — 81001 URINALYSIS AUTO W/SCOPE: CPT

## 2020-11-28 PROCEDURE — 36415 COLL VENOUS BLD VENIPUNCTURE: CPT

## 2021-01-30 ENCOUNTER — HOSPITAL ENCOUNTER (OUTPATIENT)
Dept: MAMMOGRAPHY | Age: 51
Discharge: HOME | End: 2021-01-30
Attending: PHYSICIAN ASSISTANT
Payer: COMMERCIAL

## 2021-01-30 ENCOUNTER — LAB ENCOUNTER (OUTPATIENT)
Dept: LAB | Age: 51
End: 2021-01-30
Attending: PHYSICIAN ASSISTANT
Payer: COMMERCIAL

## 2021-01-30 DIAGNOSIS — Z12.31 ENCOUNTER FOR SCREENING MAMMOGRAM FOR MALIGNANT NEOPLASM OF BREAST: ICD-10-CM

## 2021-01-30 DIAGNOSIS — E03.9 ACQUIRED HYPOTHYROIDISM: ICD-10-CM

## 2021-01-30 LAB — TSI SER-ACNC: 2.92 MIU/ML (ref 0.36–3.74)

## 2021-01-30 PROCEDURE — 77067 SCR MAMMO BI INCL CAD: CPT | Performed by: PHYSICIAN ASSISTANT

## 2021-01-30 PROCEDURE — 36415 COLL VENOUS BLD VENIPUNCTURE: CPT

## 2021-01-30 PROCEDURE — 84443 ASSAY THYROID STIM HORMONE: CPT

## 2021-01-30 PROCEDURE — 77063 BREAST TOMOSYNTHESIS BI: CPT | Performed by: PHYSICIAN ASSISTANT

## 2021-02-15 ENCOUNTER — LAB ENCOUNTER (OUTPATIENT)
Dept: LAB | Facility: HOSPITAL | Age: 51
End: 2021-02-15
Attending: NURSE PRACTITIONER
Payer: COMMERCIAL

## 2021-02-15 ENCOUNTER — OFFICE VISIT (OUTPATIENT)
Dept: SURGERY | Facility: CLINIC | Age: 51
End: 2021-02-15

## 2021-02-15 VITALS
BODY MASS INDEX: 37.08 KG/M2 | WEIGHT: 206.63 LBS | HEART RATE: 102 BPM | HEIGHT: 62.5 IN | OXYGEN SATURATION: 99 % | DIASTOLIC BLOOD PRESSURE: 76 MMHG | SYSTOLIC BLOOD PRESSURE: 120 MMHG

## 2021-02-15 DIAGNOSIS — I10 HYPERTENSION, UNSPECIFIED TYPE: ICD-10-CM

## 2021-02-15 DIAGNOSIS — E78.00 HYPERCHOLESTEREMIA: ICD-10-CM

## 2021-02-15 DIAGNOSIS — E03.9 ACQUIRED HYPOTHYROIDISM: ICD-10-CM

## 2021-02-15 DIAGNOSIS — E78.00 HYPERCHOLESTEREMIA: Primary | ICD-10-CM

## 2021-02-15 DIAGNOSIS — R06.83 SNORING: ICD-10-CM

## 2021-02-15 DIAGNOSIS — E55.9 VITAMIN D DEFICIENCY: ICD-10-CM

## 2021-02-15 DIAGNOSIS — E66.9 OBESITY (BMI 30-39.9): ICD-10-CM

## 2021-02-15 DIAGNOSIS — Z86.718 HISTORY OF DVT (DEEP VEIN THROMBOSIS): ICD-10-CM

## 2021-02-15 DIAGNOSIS — R73.03 PREDIABETES: ICD-10-CM

## 2021-02-15 DIAGNOSIS — Z51.81 ENCOUNTER FOR THERAPEUTIC DRUG MONITORING: ICD-10-CM

## 2021-02-15 PROCEDURE — 99214 OFFICE O/P EST MOD 30 MIN: CPT | Performed by: NURSE PRACTITIONER

## 2021-02-15 PROCEDURE — 3008F BODY MASS INDEX DOCD: CPT | Performed by: NURSE PRACTITIONER

## 2021-02-15 PROCEDURE — 93010 ELECTROCARDIOGRAM REPORT: CPT | Performed by: NURSE PRACTITIONER

## 2021-02-15 PROCEDURE — 3074F SYST BP LT 130 MM HG: CPT | Performed by: NURSE PRACTITIONER

## 2021-02-15 PROCEDURE — 3078F DIAST BP <80 MM HG: CPT | Performed by: NURSE PRACTITIONER

## 2021-02-15 PROCEDURE — 93005 ELECTROCARDIOGRAM TRACING: CPT

## 2021-02-15 RX ORDER — PHENTERMINE AND TOPIRAMATE 7.5; 46 MG/1; MG/1
1 CAPSULE, EXTENDED RELEASE ORAL DAILY
Qty: 30 CAPSULE | Refills: 0 | Status: SHIPPED | OUTPATIENT
Start: 2021-02-15 | End: 2021-04-16

## 2021-02-15 RX ORDER — PHENTERMINE HYDROCHLORIDE 37.5 MG/1
37.5 CAPSULE ORAL EVERY MORNING
Qty: 14 CAPSULE | Refills: 0 | Status: SHIPPED | OUTPATIENT
Start: 2021-02-15 | End: 2021-04-16

## 2021-02-15 NOTE — PROGRESS NOTES
[address], [address]  Dept: [phone]       Patient:  Maritza Biggs  :      1970  MRN:      [id number]    Chief Complaint:  Weight Nelida kg)  10/01/20 : 202 lb 3.2 oz (91.7 kg)  09/10/20 : 210 lb (95.3 kg)  08/20/20 : 210 lb 8 oz (95.5 kg)      Patient Medications:    Current Outpatient Medications   Medication Sig Dispense Refill   • Phentermine-Topiramate (QSYMIA) 7.5-46 MG Oral Capsule S Minutes per session: Not on file      Stress: Not on file    Relationships      Social connections        Talks on phone: Not on file        Gets together: Not on file        Attends Buddhist service: Not on file        Active member of club or [place] [employer]  Sports Drinks:  No                  Juice:  No     Food Journal  · Reviewed and Discussed:       · Patient has a Food Journal?: yes     · Patient is reading nutrition labels?   yes  · Average Caloric Intake:      · Average CHO Intake:    · Is pa supple, symmetrical, trachea midline and thyroid not enlarged, symmetric, no tenderness/mass/nodules  Lungs: clear to auscultation bilaterally  Heart: S1, S2 normal, no murmur, click, rub or gallop, regular rate and rhythm  Abdomen: soft, non-tender; bowel morning. Obesity (BMI 30-39.9)  -     Phentermine-Topiramate (QSYMIA) 7.5-46 MG Oral Capsule SR 24 Hr; Take 1 capsule by mouth daily.  -     Phentermine HCl 37.5 MG Oral Cap; Take 1 capsule (37.5 mg total) by mouth every morning.     HYPERCHOLESTEROLEMIA range  TSH 3.790 on levothyroxine  2/22/2020 Updated TSH, T4 in range. B 12 in range. Vitamin D low- continue 50,000 IU capsule weekly. 11/28/2020: CMP and CBC in range.   Vitamin D remains low.     Reviewed medication options for weight loss in detail

## 2021-04-05 NOTE — PATIENT INSTRUCTIONS
Increase water intake to 70-90 oz of water per day. Increase activity level- try to walk more outside. Will follow up with ania lawrence.

## 2021-04-05 NOTE — PROGRESS NOTES
Brigette Davis is a 53year old female Acupuncture Therapy. Complaints:  1. Stress  2. Desire to lose weight  3. LT knee pain    Initial Consult: /84 after tx.  Raul Noriega complains of extremely high stress associated with family situation.   This has

## 2021-04-08 NOTE — PROGRESS NOTES
Tip Doctor is a 53year old female Acupuncture Therapy. Complaints:  1. Stress/anxiety  2. LT shoulder pain  3. Desire to lose weight  4.  LT knee pain     Initial Consult: /84 after tx.  Samreen Miranda complains of extremely high stress associated wi

## 2021-04-16 DIAGNOSIS — Z51.81 ENCOUNTER FOR THERAPEUTIC DRUG MONITORING: ICD-10-CM

## 2021-04-16 DIAGNOSIS — E66.9 OBESITY (BMI 30-39.9): ICD-10-CM

## 2021-04-16 RX ORDER — PHENTERMINE AND TOPIRAMATE 7.5; 46 MG/1; MG/1
1 CAPSULE, EXTENDED RELEASE ORAL DAILY
Qty: 30 CAPSULE | Refills: 0 | Status: SHIPPED | OUTPATIENT
Start: 2021-04-16 | End: 2021-05-26 | Stop reason: DRUGHIGH

## 2021-04-16 NOTE — PROGRESS NOTES
Norberto Martinez is a 53year old female Acupuncture Therapy. Complaints:  1. Stress/anxiety 8/10  2. LT shoulder pain  3.  Desire to lose weight     Initial Consult from 4/5/21: /84 after tx. Kinga Books complains of extremely high stress associated wi

## 2021-04-19 NOTE — PROGRESS NOTES
Maritza Biggs is a 53year old female Acupuncture Therapy. Complaints:  1. Stress/anxiety 8/10  2. LT shoulder pain  3.  Desire to lose weight     Initial Consult from 4/5/21: /84 after tx. Storm Álvarez complains of extremely high stress associated wi

## 2021-04-23 NOTE — PROGRESS NOTES
Maritza Hamilton is a 53year old female Acupuncture Therapy. Complaints:  1. Stress/anxiety 8/10  2. Desire to lose weight     Initial Consult from 4/5/21: /84 after tx. Patel Manley complains of extremely high stress associated with family situation.

## 2021-05-22 DIAGNOSIS — E66.9 OBESITY (BMI 30-39.9): ICD-10-CM

## 2021-05-22 DIAGNOSIS — Z51.81 ENCOUNTER FOR THERAPEUTIC DRUG MONITORING: ICD-10-CM

## 2021-05-26 ENCOUNTER — OFFICE VISIT (OUTPATIENT)
Dept: SURGERY | Facility: CLINIC | Age: 51
End: 2021-05-26

## 2021-05-26 VITALS
BODY MASS INDEX: 38.1 KG/M2 | WEIGHT: 212.38 LBS | OXYGEN SATURATION: 98 % | SYSTOLIC BLOOD PRESSURE: 120 MMHG | HEART RATE: 80 BPM | DIASTOLIC BLOOD PRESSURE: 84 MMHG | HEIGHT: 62.5 IN

## 2021-05-26 DIAGNOSIS — R73.03 PREDIABETES: ICD-10-CM

## 2021-05-26 DIAGNOSIS — E78.00 HYPERCHOLESTEREMIA: ICD-10-CM

## 2021-05-26 DIAGNOSIS — Z51.81 ENCOUNTER FOR THERAPEUTIC DRUG MONITORING: Primary | ICD-10-CM

## 2021-05-26 DIAGNOSIS — R06.83 SNORING: ICD-10-CM

## 2021-05-26 DIAGNOSIS — E55.9 VITAMIN D DEFICIENCY: ICD-10-CM

## 2021-05-26 DIAGNOSIS — E66.9 OBESITY (BMI 30-39.9): ICD-10-CM

## 2021-05-26 DIAGNOSIS — Z86.718 HISTORY OF DVT (DEEP VEIN THROMBOSIS): ICD-10-CM

## 2021-05-26 DIAGNOSIS — I10 HYPERTENSION, UNSPECIFIED TYPE: ICD-10-CM

## 2021-05-26 DIAGNOSIS — E03.9 ACQUIRED HYPOTHYROIDISM: ICD-10-CM

## 2021-05-26 PROCEDURE — 3008F BODY MASS INDEX DOCD: CPT | Performed by: NURSE PRACTITIONER

## 2021-05-26 PROCEDURE — 99214 OFFICE O/P EST MOD 30 MIN: CPT | Performed by: NURSE PRACTITIONER

## 2021-05-26 PROCEDURE — 3074F SYST BP LT 130 MM HG: CPT | Performed by: NURSE PRACTITIONER

## 2021-05-26 PROCEDURE — 3079F DIAST BP 80-89 MM HG: CPT | Performed by: NURSE PRACTITIONER

## 2021-05-26 RX ORDER — PHENTERMINE AND TOPIRAMATE 11.25; 69 MG/1; MG/1
1 CAPSULE, EXTENDED RELEASE ORAL DAILY
Qty: 30 CAPSULE | Refills: 1 | Status: SHIPPED | OUTPATIENT
Start: 2021-05-26 | End: 2021-06-25

## 2021-05-26 RX ORDER — PHENTERMINE AND TOPIRAMATE 7.5; 46 MG/1; MG/1
1 CAPSULE, EXTENDED RELEASE ORAL DAILY
Qty: 30 CAPSULE | Refills: 0 | OUTPATIENT
Start: 2021-05-26

## 2021-05-26 NOTE — PROGRESS NOTES
[address]  Dept: [phone]       Patient:  El Villegas  :      1970  MRN:      [id number]    Chief Complaint:  Weight Nelida (94.3 kg)  10/01/20 : 202 lb 3.2 oz (91.7 kg)  09/10/20 : 210 lb (95.3 kg)      Patient Medications:    Current Outpatient Medications   Medication Sig Dispense Refill   • Phentermine-Topiramate (QSYMIA) 11.25-69 MG Oral Capsule SR 24 Hr Take 1 capsule by Transportation (Non-Medical):   Physical Activity:       Days of Exercise per Week:       Minutes of Exercise per Session:   Stress:       Feeling of Stress :   Social Connections:       Frequency of Communication with Friends and Family:       Frequency o water/day   · Toughest challenge:  Family stress   · Sleep: Better    Poor eating, describes lack of motivation and time      Nutritional Goals  Calorie-controlled diet:  Healthy Plate Method , Eat 310-632 calories within 1 hour of waking  and Eat 3-4 cups 5.7 on 2/22/2020.       HYPERTENSION:  The patient's blood pressure is elevated today, however she reports significant stress, tearful during visit.       HYPERCHOLESTEROLEMIA: Elevated.        Lab Results   Component Value Date/Time    CHOLEST 230 (H) 11/2 management.     OBESITY/WEIGHT GAIN:  PREDIABETES:     Discussed starting weight:  267 lbs; BMI: 48.1; WC: 51 in.   Patient's goal weight: Unsure  Values: Breathing well, Fitting on Furniture, Children, Health       Recommended patient continue intensive li others. She understands that I will not call in the prescription for her; she has to have an appointment to have the medication refilled. Must avoid pregnancy during use, counseled on adequate contraception during use.  Patient states understanding of all i

## 2021-07-22 ENCOUNTER — OFFICE VISIT (OUTPATIENT)
Dept: SURGERY | Facility: CLINIC | Age: 51
End: 2021-07-22

## 2021-07-22 VITALS
WEIGHT: 212.63 LBS | SYSTOLIC BLOOD PRESSURE: 112 MMHG | HEART RATE: 92 BPM | OXYGEN SATURATION: 98 % | BODY MASS INDEX: 38.15 KG/M2 | DIASTOLIC BLOOD PRESSURE: 74 MMHG | HEIGHT: 62.5 IN

## 2021-07-22 DIAGNOSIS — E66.9 OBESITY (BMI 30-39.9): ICD-10-CM

## 2021-07-22 DIAGNOSIS — R06.83 SNORING: ICD-10-CM

## 2021-07-22 DIAGNOSIS — E55.9 VITAMIN D DEFICIENCY: ICD-10-CM

## 2021-07-22 DIAGNOSIS — R73.03 PREDIABETES: ICD-10-CM

## 2021-07-22 DIAGNOSIS — Z86.718 HISTORY OF DVT (DEEP VEIN THROMBOSIS): ICD-10-CM

## 2021-07-22 DIAGNOSIS — I10 HYPERTENSION, UNSPECIFIED TYPE: ICD-10-CM

## 2021-07-22 DIAGNOSIS — E78.00 HYPERCHOLESTEREMIA: ICD-10-CM

## 2021-07-22 DIAGNOSIS — E03.9 ACQUIRED HYPOTHYROIDISM: ICD-10-CM

## 2021-07-22 DIAGNOSIS — Z51.81 ENCOUNTER FOR THERAPEUTIC DRUG MONITORING: Primary | ICD-10-CM

## 2021-07-22 DIAGNOSIS — F43.9 STRESS: ICD-10-CM

## 2021-07-22 PROCEDURE — 3074F SYST BP LT 130 MM HG: CPT | Performed by: NURSE PRACTITIONER

## 2021-07-22 PROCEDURE — 3078F DIAST BP <80 MM HG: CPT | Performed by: NURSE PRACTITIONER

## 2021-07-22 PROCEDURE — 99214 OFFICE O/P EST MOD 30 MIN: CPT | Performed by: NURSE PRACTITIONER

## 2021-07-22 PROCEDURE — 3008F BODY MASS INDEX DOCD: CPT | Performed by: NURSE PRACTITIONER

## 2021-07-22 RX ORDER — PHENTERMINE AND TOPIRAMATE 11.25; 69 MG/1; MG/1
CAPSULE, EXTENDED RELEASE ORAL
COMMUNITY
End: 2021-07-22

## 2021-07-22 RX ORDER — SERTRALINE HYDROCHLORIDE 25 MG/1
25 TABLET, FILM COATED ORAL DAILY
Qty: 30 TABLET | Refills: 2 | Status: SHIPPED | OUTPATIENT
Start: 2021-07-22 | End: 2021-08-16

## 2021-07-22 RX ORDER — PHENTERMINE AND TOPIRAMATE 11.25; 69 MG/1; MG/1
1 CAPSULE, EXTENDED RELEASE ORAL DAILY
Qty: 30 CAPSULE | Refills: 2 | Status: SHIPPED | OUTPATIENT
Start: 2021-07-22 | End: 2021-08-26

## 2021-07-22 NOTE — PATIENT INSTRUCTIONS
Recommend magnesium glycinate 200 to 500 mg daily to improve sleep. Consider zoloft 50 mg/day. Continue Qsymia.

## 2021-07-22 NOTE — PROGRESS NOTES
[address]  Dept: [phone]       Patient:  Eugenio Resendiz  :      1970  MRN:      [id number]    Chief Complaint:  Weight Nelida lb (95.7 kg)  10/26/20 : 208 lb (94.3 kg)  10/01/20 : 202 lb 3.2 oz (91.7 kg)      Patient Medications:    Current Outpatient Medications   Medication Sig Dispense Refill   • Phentermine-Topiramate (QSYMIA) 11.25-69 MG Oral Capsule SR 24 Hr Take by mouth. (Non-Medical):   Physical Activity:       Days of Exercise per Week:       Minutes of Exercise per Session:   Stress:       Feeling of Stress :   Social Connections:       Frequency of Communication with Friends and Family:       Frequency of Social Gather · Toughest challenge:  Family stress   · Sleep: Bad      Nutritional Goals  Calorie-controlled diet:  Healthy Plate Method , Eat 044-907 calories within 1 hour of waking  and Eat 3-4 cups of fresh fruits or vegetables daily    Behavior Modifications Revi pressure is elevated today, however she reports significant stress, tearful during visit.       HYPERCHOLESTEROLEMIA: Elevated.        Lab Results   Component Value Date/Time    CHOLEST 230 (H) 11/28/2020 10:14 AM     (H) 11/28/2020 10:14 AM    HDL 7 modifications at this time for weight loss.     Reviewed lifestyle modifications: Whole Food/Plant Strong/Low Glycemic Index diet, moderate alcohol consumption, reduced sodium intake to no more than 2,400 mg/day, and at least 150 minutes of moderate physic understanding of all information and instructions.     2/23/2021 EKG within normal limits. Prefers not to take injectable, Saxenda.     May take PGX if desires.     Recommend magnesium glycinate to improve sleep.      Continue IF, 12 hours.     Hold off

## 2021-08-12 DIAGNOSIS — I10 ESSENTIAL HYPERTENSION WITH GOAL BLOOD PRESSURE LESS THAN 140/90: ICD-10-CM

## 2021-08-12 DIAGNOSIS — E03.9 ACQUIRED HYPOTHYROIDISM: ICD-10-CM

## 2021-08-12 RX ORDER — LOSARTAN POTASSIUM AND HYDROCHLOROTHIAZIDE 25; 100 MG/1; MG/1
1 TABLET ORAL DAILY
Qty: 90 TABLET | Refills: 0 | Status: SHIPPED | OUTPATIENT
Start: 2021-08-12 | End: 2021-12-20

## 2021-08-12 RX ORDER — LEVOTHYROXINE SODIUM 0.1 MG/1
100 TABLET ORAL
Qty: 90 TABLET | Refills: 0 | Status: SHIPPED | OUTPATIENT
Start: 2021-08-12 | End: 2021-12-20

## 2021-08-12 NOTE — TELEPHONE ENCOUNTER
Refilled per [facility], [facility] protocol.  90-day Rx sent; patient has appt to establish care scheduled for 8/31/21 with Dr Scott Lantigua.   Requested Prescriptions   Pending Prescriptions Disp Refills    Levothyroxine Sodium 100 MCG Oral Tab 90 tablet 2     Sig:

## 2021-08-16 DIAGNOSIS — F43.9 STRESS: ICD-10-CM

## 2021-08-16 DIAGNOSIS — E66.9 OBESITY (BMI 30-39.9): ICD-10-CM

## 2021-08-16 RX ORDER — SERTRALINE HYDROCHLORIDE 25 MG/1
25 TABLET, FILM COATED ORAL DAILY
Qty: 30 TABLET | Refills: 2 | Status: SHIPPED | OUTPATIENT
Start: 2021-08-16 | End: 2021-08-31

## 2021-08-26 DIAGNOSIS — F43.9 STRESS: ICD-10-CM

## 2021-08-26 DIAGNOSIS — E66.9 OBESITY (BMI 30-39.9): ICD-10-CM

## 2021-08-26 RX ORDER — PHENTERMINE AND TOPIRAMATE 11.25; 69 MG/1; MG/1
1 CAPSULE, EXTENDED RELEASE ORAL DAILY
Qty: 30 CAPSULE | Refills: 2 | Status: SHIPPED | OUTPATIENT
Start: 2021-08-26 | End: 2021-12-20

## 2021-08-31 ENCOUNTER — OFFICE VISIT (OUTPATIENT)
Dept: INTERNAL MEDICINE CLINIC | Facility: CLINIC | Age: 51
End: 2021-08-31

## 2021-08-31 VITALS
HEIGHT: 62.5 IN | HEART RATE: 66 BPM | WEIGHT: 209 LBS | RESPIRATION RATE: 15 BRPM | DIASTOLIC BLOOD PRESSURE: 82 MMHG | BODY MASS INDEX: 37.5 KG/M2 | SYSTOLIC BLOOD PRESSURE: 130 MMHG

## 2021-08-31 DIAGNOSIS — Z12.11 COLON CANCER SCREENING: ICD-10-CM

## 2021-08-31 DIAGNOSIS — E66.9 OBESITY (BMI 30-39.9): ICD-10-CM

## 2021-08-31 DIAGNOSIS — E03.9 ACQUIRED HYPOTHYROIDISM: ICD-10-CM

## 2021-08-31 DIAGNOSIS — I10 HYPERTENSION, UNSPECIFIED TYPE: Primary | ICD-10-CM

## 2021-08-31 DIAGNOSIS — F43.9 STRESS: ICD-10-CM

## 2021-08-31 DIAGNOSIS — F41.9 ANXIETY: ICD-10-CM

## 2021-08-31 DIAGNOSIS — Z01.419 ENCOUNTER FOR ROUTINE GYNECOLOGICAL EXAMINATION WITH PAPANICOLAOU SMEAR OF CERVIX: ICD-10-CM

## 2021-08-31 PROCEDURE — 3079F DIAST BP 80-89 MM HG: CPT | Performed by: INTERNAL MEDICINE

## 2021-08-31 PROCEDURE — 3008F BODY MASS INDEX DOCD: CPT | Performed by: INTERNAL MEDICINE

## 2021-08-31 PROCEDURE — 3075F SYST BP GE 130 - 139MM HG: CPT | Performed by: INTERNAL MEDICINE

## 2021-08-31 PROCEDURE — 99214 OFFICE O/P EST MOD 30 MIN: CPT | Performed by: INTERNAL MEDICINE

## 2021-08-31 RX ORDER — SERTRALINE HYDROCHLORIDE 25 MG/1
25 TABLET, FILM COATED ORAL DAILY
Qty: 90 TABLET | Refills: 2 | Status: SHIPPED | OUTPATIENT
Start: 2021-08-31 | End: 2021-12-20

## 2021-08-31 NOTE — PROGRESS NOTES
Devi Hooks is a 49year old female.   Patient presents with:  Establish Care      HPI:   New pt -used to see PA ARNAV  C/c establish care   C/o anxiety >depression -going through a divorce and now living separate separate so sleeping better and feeling denies headaches , + anxiety >> depression-- overall doing well, better     EXAM:   BP (!) 132/96 (BP Location: Right arm, Patient Position: Sitting, Cuff Size: large)   Pulse 66   Resp 15   Ht 5' 2.5\" (1.588 m)   Wt 209 lb (94.8 kg)   LMP 01/29/2021   BM

## 2021-10-02 DIAGNOSIS — E66.9 OBESITY (BMI 30-39.9): ICD-10-CM

## 2021-10-02 DIAGNOSIS — F43.9 STRESS: ICD-10-CM

## 2021-10-02 RX ORDER — PHENTERMINE AND TOPIRAMATE 11.25; 69 MG/1; MG/1
1 CAPSULE, EXTENDED RELEASE ORAL DAILY
Qty: 30 CAPSULE | Refills: 2 | OUTPATIENT
Start: 2021-10-02

## 2021-12-20 ENCOUNTER — OFFICE VISIT (OUTPATIENT)
Dept: INTERNAL MEDICINE CLINIC | Facility: CLINIC | Age: 51
End: 2021-12-20

## 2021-12-20 ENCOUNTER — HOSPITAL ENCOUNTER (OUTPATIENT)
Dept: GENERAL RADIOLOGY | Facility: HOSPITAL | Age: 51
Discharge: HOME | End: 2021-12-20
Attending: INTERNAL MEDICINE
Payer: COMMERCIAL

## 2021-12-20 ENCOUNTER — LAB ENCOUNTER (OUTPATIENT)
Dept: LAB | Facility: HOSPITAL | Age: 51
End: 2021-12-20
Attending: INTERNAL MEDICINE
Payer: COMMERCIAL

## 2021-12-20 VITALS
BODY MASS INDEX: 40.52 KG/M2 | WEIGHT: 225.81 LBS | SYSTOLIC BLOOD PRESSURE: 147 MMHG | HEIGHT: 62.5 IN | DIASTOLIC BLOOD PRESSURE: 68 MMHG | HEART RATE: 69 BPM

## 2021-12-20 DIAGNOSIS — E55.9 VITAMIN D DEFICIENCY: ICD-10-CM

## 2021-12-20 DIAGNOSIS — E03.9 ACQUIRED HYPOTHYROIDISM: ICD-10-CM

## 2021-12-20 DIAGNOSIS — F41.9 ANXIETY AND DEPRESSION: ICD-10-CM

## 2021-12-20 DIAGNOSIS — M79.642 PAIN IN BOTH HANDS: ICD-10-CM

## 2021-12-20 DIAGNOSIS — E66.01 MORBID OBESITY WITH BMI OF 40.0-44.9, ADULT (HCC): ICD-10-CM

## 2021-12-20 DIAGNOSIS — I10 PRIMARY HYPERTENSION: Primary | ICD-10-CM

## 2021-12-20 DIAGNOSIS — R73.03 BORDERLINE DIABETES: ICD-10-CM

## 2021-12-20 DIAGNOSIS — M79.641 PAIN IN BOTH HANDS: ICD-10-CM

## 2021-12-20 DIAGNOSIS — M25.50 MULTIPLE JOINT PAIN: ICD-10-CM

## 2021-12-20 DIAGNOSIS — F32.A ANXIETY AND DEPRESSION: ICD-10-CM

## 2021-12-20 DIAGNOSIS — I10 ESSENTIAL HYPERTENSION WITH GOAL BLOOD PRESSURE LESS THAN 140/90: ICD-10-CM

## 2021-12-20 PROCEDURE — 86140 C-REACTIVE PROTEIN: CPT

## 2021-12-20 PROCEDURE — 86200 CCP ANTIBODY: CPT

## 2021-12-20 PROCEDURE — 83036 HEMOGLOBIN GLYCOSYLATED A1C: CPT

## 2021-12-20 PROCEDURE — 3008F BODY MASS INDEX DOCD: CPT | Performed by: INTERNAL MEDICINE

## 2021-12-20 PROCEDURE — 73130 X-RAY EXAM OF HAND: CPT | Performed by: INTERNAL MEDICINE

## 2021-12-20 PROCEDURE — 36415 COLL VENOUS BLD VENIPUNCTURE: CPT

## 2021-12-20 PROCEDURE — 86431 RHEUMATOID FACTOR QUANT: CPT

## 2021-12-20 PROCEDURE — 86038 ANTINUCLEAR ANTIBODIES: CPT

## 2021-12-20 PROCEDURE — 3077F SYST BP >= 140 MM HG: CPT | Performed by: INTERNAL MEDICINE

## 2021-12-20 PROCEDURE — 82306 VITAMIN D 25 HYDROXY: CPT

## 2021-12-20 PROCEDURE — 85652 RBC SED RATE AUTOMATED: CPT

## 2021-12-20 PROCEDURE — 99214 OFFICE O/P EST MOD 30 MIN: CPT | Performed by: INTERNAL MEDICINE

## 2021-12-20 PROCEDURE — 3078F DIAST BP <80 MM HG: CPT | Performed by: INTERNAL MEDICINE

## 2021-12-20 RX ORDER — LEVOTHYROXINE SODIUM 0.1 MG/1
100 TABLET ORAL
Qty: 90 TABLET | Refills: 3 | Status: SHIPPED | OUTPATIENT
Start: 2021-12-20

## 2021-12-20 RX ORDER — LOSARTAN POTASSIUM AND HYDROCHLOROTHIAZIDE 25; 100 MG/1; MG/1
1 TABLET ORAL DAILY
Qty: 90 TABLET | Refills: 3 | Status: SHIPPED | OUTPATIENT
Start: 2021-12-20

## 2021-12-20 NOTE — PROGRESS NOTES
Abhay Lucio is a 49year old female.   Patient presents with:  Pain: hand, wrist, hip and elbow       HPI:   Pt ocmes as an urgent visit   C/c sarika rosado pains   C/o fingers b/l and chynauckels hurt   Sister had RA after her first child 22 yrs ago and she Alcohol/week: 0.0 standard drinks      Comment: rarely    Drug use: No       REVIEW OF SYSTEMS:   GENERAL HEALTH: No fevers, chills, sweats, fatigue  VISION: No recent vision problems, blurry vision or double vision  RESPIRATORY: denies shortness of breath XR HAND (MIN 3 VIEWS), LEFT (CPT=73130); Future  And   Pain in both hands  -     XR HAND (MIN 3 VIEWS), LEFT (CPT=73130); Future  -     XR HAND (MIN 3 VIEWS), RIGHT (CPT=73130);  Future  Will check labs and baseline xray  and refer to rheum     Vitamin D de

## 2021-12-23 ENCOUNTER — TELEPHONE (OUTPATIENT)
Dept: INTERNAL MEDICINE CLINIC | Facility: CLINIC | Age: 51
End: 2021-12-23

## 2021-12-23 DIAGNOSIS — Z00.00 PHYSICAL EXAM, ANNUAL: Primary | ICD-10-CM

## 2021-12-23 DIAGNOSIS — Z12.31 SCREENING MAMMOGRAM, ENCOUNTER FOR: ICD-10-CM

## 2021-12-24 NOTE — TELEPHONE ENCOUNTER
Detailed message left on patients voicmail (HIPPA verified) informing order was placed. Central scheduling number provided and office number provided if any questions.

## 2021-12-27 ENCOUNTER — NURSE ONLY (OUTPATIENT)
Dept: GASTROENTEROLOGY | Facility: CLINIC | Age: 51
End: 2021-12-27

## 2021-12-27 DIAGNOSIS — Z12.11 COLON CANCER SCREENING: Primary | ICD-10-CM

## 2021-12-27 RX ORDER — POLYETHYLENE GLYCOL 3350, SODIUM CHLORIDE, SODIUM BICARBONATE, POTASSIUM CHLORIDE 420; 11.2; 5.72; 1.48 G/4L; G/4L; G/4L; G/4L
POWDER, FOR SOLUTION ORAL
Qty: 4000 ML | Refills: 0 | Status: SHIPPED | OUTPATIENT
Start: 2021-12-27

## 2021-12-27 NOTE — PROGRESS NOTES
Scheduled for:  Colonoscopy [address]    Provider Name:  Dr. Diana Ortiz  Date:  4/28/2022  Location:  [facility]  Sedation:   MAC  Time:  9:15 am (pt is aware to arrive at 8:15 am)  Prep:  Split dose trilyte  Meds/Allergies Reconciled?: Brenda/APN reviewed.     Diagnosis w

## 2021-12-27 NOTE — PROGRESS NOTES
The patient's chart has been reviewed. Okay to schedule pt for CLN r/t screening for CLN CA with Dr. Zachary Evangelista w/ [facility] or  Dr. Misti Tobias w/ [facility] or MAC.      Split dose Colyte/TriLyte or equivalent(eRx) preparation.   -Eligible for NE: No r/t BMI  -Den

## 2021-12-27 NOTE — PROGRESS NOTES
Gigi Bruno patient for her scheduled telephone colon screening.      PCP visit on 12/20/2021 & referred for her first colonoscopy     CBC from 11/28/2020 show no signs of anemia     Anticoagulants: no  Diabetic Meds: no  BP meds(Ace inhibitors/ARB's

## 2021-12-30 ENCOUNTER — OFFICE VISIT (OUTPATIENT)
Dept: SURGERY | Facility: CLINIC | Age: 51
End: 2021-12-30

## 2021-12-30 VITALS
DIASTOLIC BLOOD PRESSURE: 84 MMHG | BODY MASS INDEX: 39.29 KG/M2 | OXYGEN SATURATION: 98 % | HEIGHT: 62.5 IN | HEART RATE: 80 BPM | WEIGHT: 219 LBS | SYSTOLIC BLOOD PRESSURE: 122 MMHG

## 2021-12-30 DIAGNOSIS — I10 HYPERTENSION, UNSPECIFIED TYPE: ICD-10-CM

## 2021-12-30 DIAGNOSIS — R63.5 WEIGHT GAIN: ICD-10-CM

## 2021-12-30 DIAGNOSIS — R73.03 PREDIABETES: ICD-10-CM

## 2021-12-30 DIAGNOSIS — Z51.81 ENCOUNTER FOR THERAPEUTIC DRUG MONITORING: ICD-10-CM

## 2021-12-30 DIAGNOSIS — E03.9 ACQUIRED HYPOTHYROIDISM: ICD-10-CM

## 2021-12-30 DIAGNOSIS — E66.9 OBESITY (BMI 30-39.9): ICD-10-CM

## 2021-12-30 DIAGNOSIS — F43.9 STRESS: ICD-10-CM

## 2021-12-30 DIAGNOSIS — E78.00 HYPERCHOLESTEREMIA: ICD-10-CM

## 2021-12-30 DIAGNOSIS — R06.83 SNORING: ICD-10-CM

## 2021-12-30 DIAGNOSIS — E55.9 VITAMIN D DEFICIENCY: ICD-10-CM

## 2021-12-30 DIAGNOSIS — Z51.81 ENCOUNTER FOR THERAPEUTIC DRUG MONITORING: Primary | ICD-10-CM

## 2021-12-30 DIAGNOSIS — Z86.718 HISTORY OF DVT (DEEP VEIN THROMBOSIS): ICD-10-CM

## 2021-12-30 PROCEDURE — 3074F SYST BP LT 130 MM HG: CPT | Performed by: NURSE PRACTITIONER

## 2021-12-30 PROCEDURE — 99214 OFFICE O/P EST MOD 30 MIN: CPT | Performed by: NURSE PRACTITIONER

## 2021-12-30 PROCEDURE — 3079F DIAST BP 80-89 MM HG: CPT | Performed by: NURSE PRACTITIONER

## 2021-12-30 PROCEDURE — 3008F BODY MASS INDEX DOCD: CPT | Performed by: NURSE PRACTITIONER

## 2021-12-30 RX ORDER — TOPIRAMATE 25 MG/1
25 TABLET ORAL 2 TIMES DAILY
Qty: 60 TABLET | Refills: 1 | Status: SHIPPED | OUTPATIENT
Start: 2021-12-30 | End: 2021-12-31

## 2021-12-30 RX ORDER — PHENTERMINE HYDROCHLORIDE 37.5 MG/1
37.5 TABLET ORAL
Qty: 30 TABLET | Refills: 1 | Status: SHIPPED | OUTPATIENT
Start: 2021-12-30

## 2021-12-30 NOTE — PROGRESS NOTES
[address]  Dept: [phone]       Patient:  Mima Joseph  :      1970  MRN:      [id number]    Chief Complaint:  Weight Nelida kg)  08/31/21 : 209 lb (94.8 kg)  07/22/21 : 212 lb 9.6 oz (96.4 kg)  05/26/21 : 212 lb 6.4 oz (96.3 kg)  02/15/21 : 206 lb 9.6 oz (93.7 kg)      Patient Medications:    Current Outpatient Medications   Medication Sig Dispense Refill   • Phentermine HCl 37 Social History Narrative      Not on file    Social Determinants of Health  Financial Resource Strain: Not on file  Food Insecurity: Not on file  Transportation Needs: Not on file  Physical Activity: Not on file  Stress: Not on file  Social Connections: No daily    Behavior Modifications Reviewed and Discussed  Eat breakfast, Eat 3 meals per day, Plan meals in advance, Read nutrition labels, Drink 64 oz of water per day, Maintain a daily food journal, Utlize portion control strategies to reduce calorie intak 10:14 AM    HDL 72 (H) 11/28/2020 10:14 AM    TRIG 119 11/28/2020 10:14 AM    VLDL 24 11/28/2020 10:14 AM     HYPOTHYROIDISM: On levothyroxine. Updated TSH on 2/22/2020 1.630.  1/30/2021 updated TSH 2.920.     Encounter Diagnosis(ses):   Encounter for the tablet (37.5 mg total) by mouth every morning before breakfast.  -     topiramate 25 MG Oral Tab; Take 1 tablet (25 mg total) by mouth 2 (two) times daily.  -     COMP METABOLIC PANEL (14); Future  -     EKG 12-LEAD;  Future    Acquired hypothyroidism  - PANEL (14); Future  -     EKG 12-LEAD; Future    HYPERCHOLESTEROLEMIA:  Recommend dietary changes and lifestyle modifications as discussed below. Monitor.        Lab Results   Component Value Date/Time    CHOLEST 230 (H) 11/28/2020 10:14 AM     (H) 1 substance abuse. Previous successful treatment with phentermine/topiramate combination (topiramate 25 mg BID, phentermine 37.5 mg/day).     Hold off on Qsymia.     Restart phentermine/topiramate combination 1/2 tablet of 37.5 mg in the AM, increase to fu

## 2021-12-30 NOTE — PATIENT INSTRUCTIONS
Restart phentermine/topiramate combination:    Restart phentermine 1/2 tablet of 37.5 mg in the AM, increase to full tablet as tolerated; Restart 25 mg topiramate in the evening around dinnertime, increase to twice a day as tolerated.     I recommend a

## 2021-12-31 RX ORDER — TOPIRAMATE 25 MG/1
TABLET ORAL
Qty: 180 TABLET | Refills: 0 | Status: SHIPPED | OUTPATIENT
Start: 2021-12-31

## 2022-01-17 ENCOUNTER — OFFICE VISIT (OUTPATIENT)
Dept: RHEUMATOLOGY | Facility: CLINIC | Age: 52
End: 2022-01-17
Payer: COMMERCIAL

## 2022-01-17 VITALS
HEART RATE: 89 BPM | DIASTOLIC BLOOD PRESSURE: 93 MMHG | HEIGHT: 62.5 IN | BODY MASS INDEX: 38.22 KG/M2 | WEIGHT: 213 LBS | SYSTOLIC BLOOD PRESSURE: 136 MMHG

## 2022-01-17 DIAGNOSIS — M15.9 PRIMARY OSTEOARTHRITIS INVOLVING MULTIPLE JOINTS: Primary | ICD-10-CM

## 2022-01-17 DIAGNOSIS — M79.644 PAIN IN FINGER OF BOTH HANDS: ICD-10-CM

## 2022-01-17 DIAGNOSIS — M79.645 PAIN IN FINGER OF BOTH HANDS: ICD-10-CM

## 2022-01-17 PROCEDURE — 3080F DIAST BP >= 90 MM HG: CPT | Performed by: INTERNAL MEDICINE

## 2022-01-17 PROCEDURE — 99244 OFF/OP CNSLTJ NEW/EST MOD 40: CPT | Performed by: INTERNAL MEDICINE

## 2022-01-17 PROCEDURE — 3008F BODY MASS INDEX DOCD: CPT | Performed by: INTERNAL MEDICINE

## 2022-01-17 PROCEDURE — 3075F SYST BP GE 130 - 139MM HG: CPT | Performed by: INTERNAL MEDICINE

## 2022-01-17 RX ORDER — METHYLPREDNISOLONE 4 MG/1
TABLET ORAL
Qty: 1 EACH | Refills: 0 | Status: SHIPPED | OUTPATIENT
Start: 2022-01-17

## 2022-01-17 NOTE — PROGRESS NOTES
Dear Dr. Ross Buitrago:    I saw your patient Fernanda Colby in consultation this afternoon at your request, for evaluation of bilateral hand pain.       As you know, she is a 69-year-old woman who developed primarily left third finger PIP joint pain a month and Sed rate 7. Past medical history:  She had mild COVID infection in October 2021 from which she recovered quickly. She has hypertension, hypothyroidism, obesity, high cholesterol, prediabetes. She had left knee MCL surgery in 2011.   She has has vitami squaring of her thumb bases and some bony prominence of her thumb IP and MCP joints. Her second through fifth MCP joints are without inflammation. No obvious bony enlargement across her hand DIP joints.   There seems to be some prominence especially of he

## 2022-01-31 ENCOUNTER — HOSPITAL ENCOUNTER (OUTPATIENT)
Dept: MAMMOGRAPHY | Age: 52
Discharge: HOME | End: 2022-01-31
Attending: INTERNAL MEDICINE
Payer: COMMERCIAL

## 2022-01-31 DIAGNOSIS — Z12.31 SCREENING MAMMOGRAM, ENCOUNTER FOR: ICD-10-CM

## 2022-01-31 PROCEDURE — 77063 BREAST TOMOSYNTHESIS BI: CPT | Performed by: INTERNAL MEDICINE

## 2022-01-31 PROCEDURE — 77067 SCR MAMMO BI INCL CAD: CPT | Performed by: INTERNAL MEDICINE

## 2022-02-21 ENCOUNTER — LAB ENCOUNTER (OUTPATIENT)
Dept: LAB | Age: 52
End: 2022-02-21
Attending: NURSE PRACTITIONER
Payer: COMMERCIAL

## 2022-02-21 ENCOUNTER — OFFICE VISIT (OUTPATIENT)
Dept: INTERNAL MEDICINE CLINIC | Facility: CLINIC | Age: 52
End: 2022-02-21
Payer: COMMERCIAL

## 2022-02-21 VITALS
WEIGHT: 215 LBS | SYSTOLIC BLOOD PRESSURE: 134 MMHG | DIASTOLIC BLOOD PRESSURE: 73 MMHG | HEIGHT: 62.5 IN | HEART RATE: 80 BPM | BODY MASS INDEX: 38.57 KG/M2

## 2022-02-21 DIAGNOSIS — I10 HYPERTENSION, UNSPECIFIED TYPE: ICD-10-CM

## 2022-02-21 DIAGNOSIS — R73.03 PREDIABETES: ICD-10-CM

## 2022-02-21 DIAGNOSIS — E55.9 VITAMIN D DEFICIENCY: ICD-10-CM

## 2022-02-21 DIAGNOSIS — Z86.718 HISTORY OF DVT (DEEP VEIN THROMBOSIS): ICD-10-CM

## 2022-02-21 DIAGNOSIS — F43.9 STRESS: ICD-10-CM

## 2022-02-21 DIAGNOSIS — Z00.00 PHYSICAL EXAM, ANNUAL: ICD-10-CM

## 2022-02-21 DIAGNOSIS — M54.6 CHRONIC MIDLINE THORACIC BACK PAIN: ICD-10-CM

## 2022-02-21 DIAGNOSIS — R63.5 WEIGHT GAIN: ICD-10-CM

## 2022-02-21 DIAGNOSIS — G89.29 CHRONIC MIDLINE THORACIC BACK PAIN: ICD-10-CM

## 2022-02-21 DIAGNOSIS — Z51.81 ENCOUNTER FOR THERAPEUTIC DRUG MONITORING: ICD-10-CM

## 2022-02-21 DIAGNOSIS — E66.9 OBESITY (BMI 30-39.9): ICD-10-CM

## 2022-02-21 DIAGNOSIS — E03.9 ACQUIRED HYPOTHYROIDISM: ICD-10-CM

## 2022-02-21 DIAGNOSIS — E78.00 HYPERCHOLESTEREMIA: ICD-10-CM

## 2022-02-21 DIAGNOSIS — N62 LARGE BREASTS: ICD-10-CM

## 2022-02-21 DIAGNOSIS — Z00.00 PHYSICAL EXAM, ANNUAL: Primary | ICD-10-CM

## 2022-02-21 DIAGNOSIS — R06.83 SNORING: ICD-10-CM

## 2022-02-21 LAB
ALBUMIN SERPL-MCNC: 3.4 G/DL (ref 3.4–5)
ALBUMIN/GLOB SERPL: 1.1 {RATIO} (ref 1–2)
ALP LIVER SERPL-CCNC: 44 U/L
ALT SERPL-CCNC: 21 U/L
ANION GAP SERPL CALC-SCNC: 5 MMOL/L (ref 0–18)
AST SERPL-CCNC: 9 U/L (ref 15–37)
BILIRUB SERPL-MCNC: 0.5 MG/DL (ref 0.1–2)
BUN BLD-MCNC: 21 MG/DL (ref 7–18)
BUN/CREAT SERPL: 25 (ref 10–20)
CALCIUM BLD-MCNC: 8.7 MG/DL (ref 8.5–10.1)
CHLORIDE SERPL-SCNC: 107 MMOL/L (ref 98–112)
CHOLEST SERPL-MCNC: 199 MG/DL (ref ?–200)
CO2 SERPL-SCNC: 27 MMOL/L (ref 21–32)
CREAT BLD-MCNC: 0.84 MG/DL
DEPRECATED RDW RBC AUTO: 41.6 FL (ref 35.1–46.3)
ERYTHROCYTE [DISTWIDTH] IN BLOOD BY AUTOMATED COUNT: 12.8 % (ref 11–15)
FASTING PATIENT LIPID ANSWER: YES
FASTING STATUS PATIENT QL REPORTED: YES
GLOBULIN PLAS-MCNC: 3 G/DL (ref 2.8–4.4)
GLUCOSE BLD-MCNC: 107 MG/DL (ref 70–99)
HCT VFR BLD AUTO: 43.6 %
HDLC SERPL-MCNC: 67 MG/DL (ref 40–59)
HGB BLD-MCNC: 14.5 G/DL
LDLC SERPL CALC-MCNC: 105 MG/DL (ref ?–100)
MCH RBC QN AUTO: 29.4 PG (ref 26–34)
MCHC RBC AUTO-ENTMCNC: 33.3 G/DL (ref 31–37)
MCV RBC AUTO: 88.4 FL
NONHDLC SERPL-MCNC: 132 MG/DL (ref ?–130)
OSMOLALITY SERPL CALC.SUM OF ELEC: 291 MOSM/KG (ref 275–295)
PLATELET # BLD AUTO: 335 10(3)UL (ref 150–450)
POTASSIUM SERPL-SCNC: 3.8 MMOL/L (ref 3.5–5.1)
PROT SERPL-MCNC: 6.4 G/DL (ref 6.4–8.2)
RBC # BLD AUTO: 4.93 X10(6)UL
SODIUM SERPL-SCNC: 139 MMOL/L (ref 136–145)
TRIGL SERPL-MCNC: 155 MG/DL (ref 30–149)
TSI SER-ACNC: 2.52 MIU/ML (ref 0.36–3.74)
VIT D+METAB SERPL-MCNC: 13.8 NG/ML (ref 30–100)
VLDLC SERPL CALC-MCNC: 26 MG/DL (ref 0–30)
WBC # BLD AUTO: 5.5 X10(3) UL (ref 4–11)

## 2022-02-21 PROCEDURE — 3075F SYST BP GE 130 - 139MM HG: CPT | Performed by: INTERNAL MEDICINE

## 2022-02-21 PROCEDURE — 82306 VITAMIN D 25 HYDROXY: CPT

## 2022-02-21 PROCEDURE — 80061 LIPID PANEL: CPT

## 2022-02-21 PROCEDURE — 99396 PREV VISIT EST AGE 40-64: CPT | Performed by: INTERNAL MEDICINE

## 2022-02-21 PROCEDURE — 36415 COLL VENOUS BLD VENIPUNCTURE: CPT

## 2022-02-21 PROCEDURE — 84443 ASSAY THYROID STIM HORMONE: CPT

## 2022-02-21 PROCEDURE — 85027 COMPLETE CBC AUTOMATED: CPT

## 2022-02-21 PROCEDURE — 93010 ELECTROCARDIOGRAM REPORT: CPT | Performed by: NURSE PRACTITIONER

## 2022-02-21 PROCEDURE — 93005 ELECTROCARDIOGRAM TRACING: CPT

## 2022-02-21 PROCEDURE — 3078F DIAST BP <80 MM HG: CPT | Performed by: INTERNAL MEDICINE

## 2022-02-21 PROCEDURE — 3008F BODY MASS INDEX DOCD: CPT | Performed by: INTERNAL MEDICINE

## 2022-02-21 PROCEDURE — 80053 COMPREHEN METABOLIC PANEL: CPT

## 2022-02-23 RX ORDER — ERGOCALCIFEROL 1.25 MG/1
50000 CAPSULE ORAL
Qty: 12 CAPSULE | Refills: 0 | Status: SHIPPED | OUTPATIENT
Start: 2022-02-23 | End: 2022-05-12

## 2022-03-06 DIAGNOSIS — R73.03 PREDIABETES: ICD-10-CM

## 2022-03-06 DIAGNOSIS — R63.5 WEIGHT GAIN: ICD-10-CM

## 2022-03-06 DIAGNOSIS — R06.83 SNORING: ICD-10-CM

## 2022-03-06 DIAGNOSIS — F43.9 STRESS: ICD-10-CM

## 2022-03-06 DIAGNOSIS — E03.9 ACQUIRED HYPOTHYROIDISM: ICD-10-CM

## 2022-03-06 DIAGNOSIS — Z51.81 ENCOUNTER FOR THERAPEUTIC DRUG MONITORING: ICD-10-CM

## 2022-03-06 DIAGNOSIS — Z86.718 HISTORY OF DVT (DEEP VEIN THROMBOSIS): ICD-10-CM

## 2022-03-06 DIAGNOSIS — I10 HYPERTENSION, UNSPECIFIED TYPE: ICD-10-CM

## 2022-03-06 DIAGNOSIS — E66.9 OBESITY (BMI 30-39.9): ICD-10-CM

## 2022-03-06 DIAGNOSIS — E78.00 HYPERCHOLESTEREMIA: ICD-10-CM

## 2022-03-06 DIAGNOSIS — E55.9 VITAMIN D DEFICIENCY: ICD-10-CM

## 2022-03-06 RX ORDER — ERGOCALCIFEROL 1.25 MG/1
50000 CAPSULE ORAL
Qty: 12 CAPSULE | Refills: 0 | OUTPATIENT
Start: 2022-03-06 | End: 2022-05-23

## 2022-03-06 NOTE — TELEPHONE ENCOUNTER
Duplicate request, previously addressed. ergocalciferol 1.25 MG (48017 UT) Oral Cap 12 capsule 0 2/23/2022 5/12/2022    Sig - Route: Take 1 capsule (50,000 Units total) by mouth every 7 days for 12 doses.  - Oral    Sent to pharmacy as: Vitamin D (Ergocalciferol) 1.25 MG (16430 UT) Oral Capsule (DRISDOL/VITAMIN D2)    E-Prescribing Status: Receipt confirmed by pharmacy (2/23/2022  2:55 PM CST)        Order Questions               Pharmacy    [facility] #[id number] - [place], [facility] [address] [place] [address] AT [facility]  [address] [facility] [place], [phone], [phone]

## 2022-03-08 RX ORDER — PHENTERMINE HYDROCHLORIDE 37.5 MG/1
TABLET ORAL
Qty: 30 TABLET | Refills: 0 | OUTPATIENT
Start: 2022-03-08

## 2022-03-09 RX ORDER — PHENTERMINE HYDROCHLORIDE 37.5 MG/1
37.5 TABLET ORAL
Qty: 30 TABLET | Refills: 1 | Status: SHIPPED | OUTPATIENT
Start: 2022-03-09

## 2022-03-31 ENCOUNTER — OFFICE VISIT (OUTPATIENT)
Dept: SURGERY | Facility: CLINIC | Age: 52
End: 2022-03-31
Payer: COMMERCIAL

## 2022-03-31 VITALS
OXYGEN SATURATION: 97 % | HEART RATE: 88 BPM | SYSTOLIC BLOOD PRESSURE: 112 MMHG | DIASTOLIC BLOOD PRESSURE: 80 MMHG | WEIGHT: 214.38 LBS | BODY MASS INDEX: 38.46 KG/M2 | HEIGHT: 62.5 IN

## 2022-03-31 DIAGNOSIS — Z86.718 HISTORY OF DVT (DEEP VEIN THROMBOSIS): ICD-10-CM

## 2022-03-31 DIAGNOSIS — E55.9 VITAMIN D DEFICIENCY: ICD-10-CM

## 2022-03-31 DIAGNOSIS — E78.00 HYPERCHOLESTEREMIA: ICD-10-CM

## 2022-03-31 DIAGNOSIS — I10 HYPERTENSION, UNSPECIFIED TYPE: ICD-10-CM

## 2022-03-31 DIAGNOSIS — Z51.81 ENCOUNTER FOR THERAPEUTIC DRUG MONITORING: Primary | ICD-10-CM

## 2022-03-31 DIAGNOSIS — R06.83 SNORING: ICD-10-CM

## 2022-03-31 DIAGNOSIS — E03.9 ACQUIRED HYPOTHYROIDISM: ICD-10-CM

## 2022-03-31 DIAGNOSIS — E66.9 OBESITY (BMI 30-39.9): ICD-10-CM

## 2022-03-31 DIAGNOSIS — R73.03 PREDIABETES: ICD-10-CM

## 2022-03-31 DIAGNOSIS — R63.5 WEIGHT GAIN: ICD-10-CM

## 2022-03-31 DIAGNOSIS — F43.9 STRESS: ICD-10-CM

## 2022-03-31 PROCEDURE — 3008F BODY MASS INDEX DOCD: CPT | Performed by: NURSE PRACTITIONER

## 2022-03-31 PROCEDURE — 3079F DIAST BP 80-89 MM HG: CPT | Performed by: NURSE PRACTITIONER

## 2022-03-31 PROCEDURE — 99214 OFFICE O/P EST MOD 30 MIN: CPT | Performed by: NURSE PRACTITIONER

## 2022-03-31 PROCEDURE — 3074F SYST BP LT 130 MM HG: CPT | Performed by: NURSE PRACTITIONER

## 2022-03-31 RX ORDER — TOPIRAMATE 25 MG/1
25 TABLET ORAL 2 TIMES DAILY
Qty: 180 TABLET | Refills: 0 | Status: SHIPPED | OUTPATIENT
Start: 2022-03-31

## 2022-04-20 ENCOUNTER — TELEPHONE (OUTPATIENT)
Dept: GASTROENTEROLOGY | Facility: CLINIC | Age: 52
End: 2022-04-20

## 2022-04-20 NOTE — TELEPHONE ENCOUNTER
Patient contacted. I reviewed medications to hold: ex phentermine/losartan-hydrochlorothiazide/vitamins/supplements. Reviewed how to mix and drink split dose prep. Confirmed arrival date and time. Reviewed diet modifications prior to procedure in depth. [facility].

## 2022-04-28 ENCOUNTER — ANESTHESIA (OUTPATIENT)
Dept: ENDOSCOPY | Facility: HOSPITAL | Age: 52
End: 2022-04-28
Payer: COMMERCIAL

## 2022-04-28 ENCOUNTER — HOSPITAL ENCOUNTER (OUTPATIENT)
Facility: HOSPITAL | Age: 52
Setting detail: HOSPITAL OUTPATIENT SURGERY
Discharge: HOME | End: 2022-04-28
Attending: INTERNAL MEDICINE | Admitting: INTERNAL MEDICINE
Payer: COMMERCIAL

## 2022-04-28 ENCOUNTER — ANESTHESIA EVENT (OUTPATIENT)
Dept: ENDOSCOPY | Facility: HOSPITAL | Age: 52
End: 2022-04-28
Payer: COMMERCIAL

## 2022-04-28 VITALS
OXYGEN SATURATION: 99 % | HEIGHT: 62 IN | DIASTOLIC BLOOD PRESSURE: 94 MMHG | BODY MASS INDEX: 39.38 KG/M2 | RESPIRATION RATE: 21 BRPM | HEART RATE: 73 BPM | WEIGHT: 214 LBS | SYSTOLIC BLOOD PRESSURE: 142 MMHG

## 2022-04-28 DIAGNOSIS — Z12.11 COLON CANCER SCREENING: ICD-10-CM

## 2022-04-28 LAB — B-HCG UR QL: NEGATIVE

## 2022-04-28 PROCEDURE — 45380 COLONOSCOPY AND BIOPSY: CPT | Performed by: INTERNAL MEDICINE

## 2022-04-28 PROCEDURE — 0DBL8ZX EXCISION OF TRANSVERSE COLON, VIA NATURAL OR ARTIFICIAL OPENING ENDOSCOPIC, DIAGNOSTIC: ICD-10-PCS | Performed by: INTERNAL MEDICINE

## 2022-04-28 RX ORDER — SODIUM CHLORIDE, SODIUM LACTATE, POTASSIUM CHLORIDE, CALCIUM CHLORIDE 600; 310; 30; 20 MG/100ML; MG/100ML; MG/100ML; MG/100ML
INJECTION, SOLUTION INTRAVENOUS CONTINUOUS
Status: DISCONTINUED | OUTPATIENT
Start: 2022-04-28 | End: 2022-04-28

## 2022-04-28 RX ORDER — LIDOCAINE HYDROCHLORIDE 10 MG/ML
INJECTION, SOLUTION EPIDURAL; INFILTRATION; INTRACAUDAL; PERINEURAL AS NEEDED
Status: DISCONTINUED | OUTPATIENT
Start: 2022-04-28 | End: 2022-04-28 | Stop reason: SURG

## 2022-04-28 RX ADMIN — SODIUM CHLORIDE, SODIUM LACTATE, POTASSIUM CHLORIDE, CALCIUM CHLORIDE: 600; 310; 30; 20 INJECTION, SOLUTION INTRAVENOUS at 09:57:00

## 2022-04-28 RX ADMIN — LIDOCAINE HYDROCHLORIDE 50 MG: 10 INJECTION, SOLUTION EPIDURAL; INFILTRATION; INTRACAUDAL; PERINEURAL at 09:36:00

## 2022-04-28 RX ADMIN — SODIUM CHLORIDE, SODIUM LACTATE, POTASSIUM CHLORIDE, CALCIUM CHLORIDE: 600; 310; 30; 20 INJECTION, SOLUTION INTRAVENOUS at 09:36:00

## 2022-04-28 NOTE — ANESTHESIA POSTPROCEDURE EVALUATION
Patient: Ramon Rojas    Procedure Summary     Date: 04/28/22 Room / Location: [address] [place] [address] ENDOSCOPY 04 / [address] [place] [address] ENDOSCOPY    Anesthesia Start: 4452 Anesthesia Stop: 1001    Procedure: COLONOSCOPY (N/A ) Diagnosis:       Colon cancer screening      (Polyp, diverticulosis, hemorrhoids)    Surgeons: Carol Valadez MD Anesthesiologist: Pastora Fonseca CRNA    Anesthesia Type: MAC ASA Status: 3          Anesthesia Type: MAC    Vitals Value Taken Time   /89 04/28/22 1002   Temp  04/28/22 1002   Pulse 91 04/28/22 1002   Resp 20 04/28/22 1002   SpO2 98 04/28/22 1002       [address] [place] [address] AN Post Evaluation:   Patient Evaluated in PACU  Patient Participation: complete - patient participated  Level of Consciousness: awake  Pain Management: adequate  Airway Patency:patent  Dental exam unchanged from preop  Yes    Cardiovascular Status: acceptable  Respiratory Status: acceptable  Postoperative Hydration acceptable      [address], CRNA  4/28/2022 10:02 AM

## 2022-04-28 NOTE — OPERATIVE REPORT
[facility] Endoscopy Report      Preoperative Diagnosis:  - colon cancer screening      Postoperative Diagnosis:  - colon polyp x 1  - diverticulosis   - internal hemorrhoids      Procedure:    Colonoscopy       Surgeon:  Sherry Hargrove M.D. Anesthesia:  MAC sedation    Technique:  After informed consent, the patient was placed in the left lateral recumbent position. Digital rectal examination revealed no palpable intraluminal abnormalities. An Olympus variable stiffness 190 series HD colonoscope was inserted into the rectum and advanced under direct vision by following the lumen to the cecum. The colon was examined upon withdrawal in the left lateral position. The procedure was well tolerated without immediate complication. Findings:  The preparation of the colon was good. The terminal ileum was examined for 4 cm and visually normal.  The ileocecal valve was well preserved. The visualized colonic mucosa from the cecum to the anal verge was normal with an intact vascular pattern. Colon polyp x1 removed from the proximal transverse colon, this was diminutive and removed by cold forceps technique. No bleeding was noted at the polypectomy site and specimens retrieved and sent for analysis. Diverticulosis located extending from the sigmoid to the transverse colon, no evidence of diverticulitis. Small internal hemorrhoids noted on retroflexed view. Estimated blood loss-insignificant  Specimens-colon polyp      Impression:  - colon polyp x 1  - diverticulosis   - internal hemorrhoids    Recommendations:  - Post polypectomy instructions given  - Repeat colonoscopy in 7- 10 years  - High fiber diet for diverticular disease  - Symptomatic treatment of hemorrhoids          Dariel Manrique.  Britta Mills MD  4/28/2022  10:00 AM

## 2022-04-29 ENCOUNTER — TELEPHONE (OUTPATIENT)
Dept: GASTROENTEROLOGY | Facility: CLINIC | Age: 52
End: 2022-04-29

## 2022-04-29 NOTE — TELEPHONE ENCOUNTER
----- Message from Evelyn Kim MD sent at 4/29/2022  5:01 PM CDT -----  I wanted to get back to you with your colonoscopy results. You had one colon polyp removed which was benign. I would advise a repeat colonoscopy in 10 years to make sure no new polyps are forming. You also have internal hemorrhoids and diverticulosis. Please stay on a high fiber diet and call with any questions.

## 2022-05-21 DIAGNOSIS — I10 HYPERTENSION, UNSPECIFIED TYPE: ICD-10-CM

## 2022-05-21 DIAGNOSIS — R63.5 WEIGHT GAIN: ICD-10-CM

## 2022-05-21 DIAGNOSIS — E78.00 HYPERCHOLESTEREMIA: ICD-10-CM

## 2022-05-21 DIAGNOSIS — Z51.81 ENCOUNTER FOR THERAPEUTIC DRUG MONITORING: ICD-10-CM

## 2022-05-21 DIAGNOSIS — R06.83 SNORING: ICD-10-CM

## 2022-05-21 DIAGNOSIS — F43.9 STRESS: ICD-10-CM

## 2022-05-21 DIAGNOSIS — E66.9 OBESITY (BMI 30-39.9): ICD-10-CM

## 2022-05-21 DIAGNOSIS — E03.9 ACQUIRED HYPOTHYROIDISM: ICD-10-CM

## 2022-05-21 DIAGNOSIS — E55.9 VITAMIN D DEFICIENCY: ICD-10-CM

## 2022-05-21 DIAGNOSIS — R73.03 PREDIABETES: ICD-10-CM

## 2022-05-21 DIAGNOSIS — Z86.718 HISTORY OF DVT (DEEP VEIN THROMBOSIS): ICD-10-CM

## 2022-05-23 RX ORDER — PHENTERMINE HYDROCHLORIDE 37.5 MG/1
37.5 TABLET ORAL
Qty: 30 TABLET | Refills: 1 | Status: SHIPPED | OUTPATIENT
Start: 2022-05-23

## 2022-07-11 ENCOUNTER — OFFICE VISIT (OUTPATIENT)
Dept: SURGERY | Facility: CLINIC | Age: 52
End: 2022-07-11
Payer: COMMERCIAL

## 2022-07-11 VITALS
SYSTOLIC BLOOD PRESSURE: 118 MMHG | DIASTOLIC BLOOD PRESSURE: 70 MMHG | HEART RATE: 85 BPM | BODY MASS INDEX: 37.32 KG/M2 | WEIGHT: 208 LBS | HEIGHT: 62.5 IN | OXYGEN SATURATION: 97 %

## 2022-07-11 DIAGNOSIS — F43.9 STRESS: ICD-10-CM

## 2022-07-11 DIAGNOSIS — R73.03 PREDIABETES: ICD-10-CM

## 2022-07-11 DIAGNOSIS — Z51.81 ENCOUNTER FOR THERAPEUTIC DRUG MONITORING: Primary | ICD-10-CM

## 2022-07-11 DIAGNOSIS — E66.9 OBESITY (BMI 30-39.9): ICD-10-CM

## 2022-07-11 DIAGNOSIS — I10 HYPERTENSION, UNSPECIFIED TYPE: ICD-10-CM

## 2022-07-11 DIAGNOSIS — E78.00 HYPERCHOLESTEREMIA: ICD-10-CM

## 2022-07-11 DIAGNOSIS — E03.9 ACQUIRED HYPOTHYROIDISM: ICD-10-CM

## 2022-07-11 DIAGNOSIS — E55.9 VITAMIN D DEFICIENCY: ICD-10-CM

## 2022-07-11 DIAGNOSIS — Z86.718 HISTORY OF DVT (DEEP VEIN THROMBOSIS): ICD-10-CM

## 2022-07-11 DIAGNOSIS — R63.5 WEIGHT GAIN: ICD-10-CM

## 2022-07-11 DIAGNOSIS — R06.83 SNORING: ICD-10-CM

## 2022-07-11 RX ORDER — PHENTERMINE HYDROCHLORIDE 37.5 MG/1
37.5 TABLET ORAL
Qty: 30 TABLET | Refills: 2 | Status: SHIPPED | OUTPATIENT
Start: 2022-07-11

## 2022-07-11 RX ORDER — TOPIRAMATE 25 MG/1
25 TABLET ORAL 2 TIMES DAILY
Qty: 180 TABLET | Refills: 0 | Status: SHIPPED | OUTPATIENT
Start: 2022-07-11

## 2022-07-11 NOTE — PATIENT INSTRUCTIONS
Other: Aim for 150 minutes exercise/week  Outdoor bike path- 20 minutes 3 days/week     3 Day, 5 Day, 7 Day, 10 Day Challenge by next visit.

## 2022-07-22 ENCOUNTER — PATIENT MESSAGE (OUTPATIENT)
Dept: SURGERY | Facility: CLINIC | Age: 52
End: 2022-07-22

## 2022-07-22 ENCOUNTER — TELEPHONE (OUTPATIENT)
Dept: SURGERY | Facility: CLINIC | Age: 52
End: 2022-07-22

## 2022-10-02 DIAGNOSIS — F41.9 ANXIETY: ICD-10-CM

## 2022-10-03 RX ORDER — SERTRALINE HYDROCHLORIDE 25 MG/1
25 TABLET, FILM COATED ORAL DAILY
Qty: 90 TABLET | Refills: 1 | Status: SHIPPED | OUTPATIENT
Start: 2022-10-03 | End: 2023-02-20 | Stop reason: ALTCHOICE

## 2022-10-03 NOTE — TELEPHONE ENCOUNTER
Refill passed per [place] [facility], [facility] protocol. Requested Prescriptions   Pending Prescriptions Disp Refills    sertraline 25 MG Oral Tab 90 tablet 1     Sig: Take 1 tablet (25 mg total) by mouth daily.         Psychiatric Non-Scheduled (Anti-Anxiety) Passed - 10/2/2022  7:32 PM        Passed - In person appointment or virtual visit in the past 6 mos or appointment in next 3 mos       Recent Outpatient Visits              2 months ago Encounter for therapeutic drug monitoring    [facility] [employer] [facility]    5 months ago [address], [address] [place], MD    Telemedicine    6 months ago Encounter for therapeutic drug monitoring    Jermaine Jain [place] [address] Enoch Hack, APRN    Office Visit    7 months ago Physical exam, annual    [facility], [facility], [address] MD    Office Visit    8 months ago Primary osteoarthritis involving multiple joints    [facility], [address] [facility],[address], Dejuan Wallace MD    Office Visit                            Recent Outpatient Visits              2 months ago Encounter for therapeutic drug monitoring    [facility]    Office Visit    5 months ago [address], [address] Sal[address] MD    Telemedicine    6 months ago Encounter for therapeutic drug monitoring    Jermaine Jain [address] E[place]ch Hack, APRN    Office Visit    7 months ago Physical exam, annual    Edenilson Solomon MD    Office Visit    8 months ago Primary osteoarthritis involving multiple joints    [facility] [place] [facility], Dejuan Wallace MD    Office Visit

## 2022-10-10 ENCOUNTER — TELEMEDICINE (OUTPATIENT)
Dept: INTERNAL MEDICINE CLINIC | Facility: CLINIC | Age: 52
End: 2022-10-10

## 2022-10-10 DIAGNOSIS — U07.1 COVID: Primary | ICD-10-CM

## 2022-10-10 PROCEDURE — 99213 OFFICE O/P EST LOW 20 MIN: CPT | Performed by: NURSE PRACTITIONER

## 2022-10-10 NOTE — PATIENT INSTRUCTIONS
Stop takng     Take Flonase nasal spray daily for congestion and runny nose    Take allegra or zyrtec daily for congestion, post-nasal drip and runny nose    You can also take benadryl at night time as needed for congestion and cough    Increase your fluid intake     Continue lozenges as needed for sore throat     You can also take tylenol as needed for sore throat

## 2022-10-12 ENCOUNTER — PATIENT MESSAGE (OUTPATIENT)
Dept: INTERNAL MEDICINE CLINIC | Facility: CLINIC | Age: 52
End: 2022-10-12

## 2022-10-12 NOTE — TELEPHONE ENCOUNTER
From: Ez John  To: CELESTE Doshi  Sent: 10/12/2022 11:31 AM CDT  Subject: Note for school    Hi,    I was informed by my school that I do need a drFarhat's note about our visit. Can you provide a note for me? My symptoms have improved some but I do still have coughing fits, blow my nose often, and have a headache. I'm still deciding if I will return to school Thursday or Friday. Thank you!   Tiara Gupta

## 2022-10-15 DIAGNOSIS — R63.5 WEIGHT GAIN: ICD-10-CM

## 2022-10-15 DIAGNOSIS — E03.9 ACQUIRED HYPOTHYROIDISM: ICD-10-CM

## 2022-10-15 DIAGNOSIS — R06.83 SNORING: ICD-10-CM

## 2022-10-15 DIAGNOSIS — R73.03 PREDIABETES: ICD-10-CM

## 2022-10-15 DIAGNOSIS — F43.9 STRESS: ICD-10-CM

## 2022-10-15 DIAGNOSIS — Z51.81 ENCOUNTER FOR THERAPEUTIC DRUG MONITORING: ICD-10-CM

## 2022-10-15 DIAGNOSIS — E78.00 HYPERCHOLESTEREMIA: ICD-10-CM

## 2022-10-15 DIAGNOSIS — I10 HYPERTENSION, UNSPECIFIED TYPE: ICD-10-CM

## 2022-10-15 DIAGNOSIS — E66.9 OBESITY (BMI 30-39.9): ICD-10-CM

## 2022-10-15 DIAGNOSIS — Z86.718 HISTORY OF DVT (DEEP VEIN THROMBOSIS): ICD-10-CM

## 2022-10-15 DIAGNOSIS — E55.9 VITAMIN D DEFICIENCY: ICD-10-CM

## 2022-10-19 RX ORDER — TOPIRAMATE 25 MG/1
25 TABLET ORAL 2 TIMES DAILY
Qty: 180 TABLET | Refills: 0 | Status: SHIPPED | OUTPATIENT
Start: 2022-10-19

## 2022-12-13 ENCOUNTER — OFFICE VISIT (OUTPATIENT)
Dept: INTERNAL MEDICINE CLINIC | Facility: CLINIC | Age: 52
End: 2022-12-13
Payer: COMMERCIAL

## 2022-12-13 VITALS
RESPIRATION RATE: 16 BRPM | SYSTOLIC BLOOD PRESSURE: 138 MMHG | BODY MASS INDEX: 39.93 KG/M2 | DIASTOLIC BLOOD PRESSURE: 88 MMHG | HEIGHT: 62 IN | WEIGHT: 217 LBS | HEART RATE: 90 BPM

## 2022-12-13 DIAGNOSIS — M25.512 CHRONIC PAIN OF BOTH SHOULDERS: ICD-10-CM

## 2022-12-13 DIAGNOSIS — M54.6 CHRONIC MIDLINE THORACIC BACK PAIN: ICD-10-CM

## 2022-12-13 DIAGNOSIS — G89.29 CHRONIC MIDLINE THORACIC BACK PAIN: ICD-10-CM

## 2022-12-13 DIAGNOSIS — N62 LARGE BREASTS: Primary | ICD-10-CM

## 2022-12-13 DIAGNOSIS — G89.29 CHRONIC PAIN OF BOTH SHOULDERS: ICD-10-CM

## 2022-12-13 DIAGNOSIS — Z01.419 ENCOUNTER FOR ROUTINE GYNECOLOGICAL EXAMINATION WITH PAPANICOLAOU SMEAR OF CERVIX: ICD-10-CM

## 2022-12-13 DIAGNOSIS — M25.511 CHRONIC PAIN OF BOTH SHOULDERS: ICD-10-CM

## 2022-12-13 PROCEDURE — 99213 OFFICE O/P EST LOW 20 MIN: CPT | Performed by: NURSE PRACTITIONER

## 2022-12-13 PROCEDURE — 3008F BODY MASS INDEX DOCD: CPT | Performed by: NURSE PRACTITIONER

## 2022-12-13 PROCEDURE — 3075F SYST BP GE 130 - 139MM HG: CPT | Performed by: NURSE PRACTITIONER

## 2022-12-13 PROCEDURE — 3079F DIAST BP 80-89 MM HG: CPT | Performed by: NURSE PRACTITIONER

## 2022-12-21 ENCOUNTER — OFFICE VISIT (OUTPATIENT)
Dept: OBGYN CLINIC | Facility: CLINIC | Age: 52
End: 2022-12-21
Payer: COMMERCIAL

## 2022-12-21 ENCOUNTER — HOSPITAL ENCOUNTER (OUTPATIENT)
Dept: GENERAL RADIOLOGY | Facility: HOSPITAL | Age: 52
Discharge: HOME | End: 2022-12-21
Attending: NURSE PRACTITIONER
Payer: COMMERCIAL

## 2022-12-21 VITALS
BODY MASS INDEX: 40 KG/M2 | DIASTOLIC BLOOD PRESSURE: 77 MMHG | HEART RATE: 73 BPM | SYSTOLIC BLOOD PRESSURE: 118 MMHG | WEIGHT: 216 LBS

## 2022-12-21 DIAGNOSIS — Z23 NEED FOR VACCINATION: ICD-10-CM

## 2022-12-21 DIAGNOSIS — G89.29 CHRONIC PAIN OF BOTH SHOULDERS: ICD-10-CM

## 2022-12-21 DIAGNOSIS — M25.511 CHRONIC PAIN OF BOTH SHOULDERS: ICD-10-CM

## 2022-12-21 DIAGNOSIS — M25.512 CHRONIC PAIN OF BOTH SHOULDERS: ICD-10-CM

## 2022-12-21 DIAGNOSIS — Z12.31 SCREENING MAMMOGRAM FOR BREAST CANCER: ICD-10-CM

## 2022-12-21 DIAGNOSIS — M54.6 CHRONIC MIDLINE THORACIC BACK PAIN: ICD-10-CM

## 2022-12-21 DIAGNOSIS — Z23 NEEDS FLU SHOT: ICD-10-CM

## 2022-12-21 DIAGNOSIS — E66.9 OBESITY WITH BODY MASS INDEX OF 30.0-39.9: ICD-10-CM

## 2022-12-21 DIAGNOSIS — Z01.419 ENCOUNTER FOR ANNUAL ROUTINE GYNECOLOGICAL EXAMINATION: Primary | ICD-10-CM

## 2022-12-21 DIAGNOSIS — Z12.4 SCREENING FOR MALIGNANT NEOPLASM OF CERVIX: ICD-10-CM

## 2022-12-21 DIAGNOSIS — G89.29 CHRONIC MIDLINE THORACIC BACK PAIN: ICD-10-CM

## 2022-12-21 PROCEDURE — 90686 IIV4 VACC NO PRSV 0.5 ML IM: CPT | Performed by: ADVANCED PRACTICE MIDWIFE

## 2022-12-21 PROCEDURE — 90471 IMMUNIZATION ADMIN: CPT | Performed by: ADVANCED PRACTICE MIDWIFE

## 2022-12-21 PROCEDURE — 3078F DIAST BP <80 MM HG: CPT | Performed by: ADVANCED PRACTICE MIDWIFE

## 2022-12-21 PROCEDURE — 73030 X-RAY EXAM OF SHOULDER: CPT | Performed by: NURSE PRACTITIONER

## 2022-12-21 PROCEDURE — 99396 PREV VISIT EST AGE 40-64: CPT | Performed by: ADVANCED PRACTICE MIDWIFE

## 2022-12-21 PROCEDURE — 72072 X-RAY EXAM THORAC SPINE 3VWS: CPT | Performed by: NURSE PRACTITIONER

## 2022-12-21 PROCEDURE — 3074F SYST BP LT 130 MM HG: CPT | Performed by: ADVANCED PRACTICE MIDWIFE

## 2022-12-22 LAB — HPV I/H RISK 1 DNA SPEC QL NAA+PROBE: NEGATIVE

## 2023-01-20 ENCOUNTER — TELEPHONE (OUTPATIENT)
Dept: OBGYN CLINIC | Facility: CLINIC | Age: 53
End: 2023-01-20

## 2023-01-20 DIAGNOSIS — E03.9 ACQUIRED HYPOTHYROIDISM: ICD-10-CM

## 2023-01-20 DIAGNOSIS — I10 ESSENTIAL HYPERTENSION WITH GOAL BLOOD PRESSURE LESS THAN 140/90: ICD-10-CM

## 2023-01-23 RX ORDER — LOSARTAN POTASSIUM AND HYDROCHLOROTHIAZIDE 25; 100 MG/1; MG/1
1 TABLET ORAL DAILY
Qty: 90 TABLET | Refills: 0 | Status: SHIPPED | OUTPATIENT
Start: 2023-01-23

## 2023-01-23 RX ORDER — LEVOTHYROXINE SODIUM 0.1 MG/1
TABLET ORAL
Qty: 90 TABLET | Refills: 0 | Status: SHIPPED | OUTPATIENT
Start: 2023-01-23

## 2023-01-23 NOTE — TELEPHONE ENCOUNTER
Please advise on refill request for       LOSARTAN-HYDROCHLOROTHIAZIDE 100-25 MG Oral Tab [Pharmacy Med Name: LOSARTAN/HCTZ 100/25MG TABLETS] 90 tablet 3    Sig: Take 1 tablet by mouth daily. Hypertensive Medications Protocol Failed - 1/20/2023  6:54 AM       Failed - CMP or BMP in past 6 months         Refill passed per [facility], [facility] protocol. Requested Prescriptions   Pending Prescriptions Disp Refills    LEVOTHYROXINE 100 MCG Oral Tab [Pharmacy Med Name: LEVOTHYROXINE 0.100MG (100MCG) TAB] 90 tablet 3     Sig: TAKE 1 TABLET(100 MCG) BY MOUTH BEFORE BREAKFAST       Thyroid Medication Protocol Passed - 1/20/2023  6:54 AM        Passed - TSH in past 12 months        Passed - Last TSH value is normal     Lab Results   Component Value Date    TSH 2.520 02/21/2022    [facility] 2.14 04/17/2016                 Passed - In person appointment or virtual visit in the past 12 mos or appointment in next 3 mos     Recent Outpatient Visits              1 month ago Encounter for annual routine gynecological examination    [employer], [address], [address] [facility], [facility], [address] [facility]    Office Visit    1 month ago Large breasts    [facility]    Office Visit    3 months ago [place], [address] [place], [place] [address]    Telemedicine    6 months ago Encounter for therapeutic drug monitoring    [address] [facility] APR    Office Visit    9 months ago [facility] Tyree MANCUSO, [facility] Ann Julian MD    Telemedicine                        LOSARTAN-HYDROCHLOROTHIAZIDE 100-25 MG Oral Tab [Pharmacy Med Name: LOSARTAN/HCTZ 100/25MG TABLETS] 90 tablet 3     Sig: Take 1 tablet by mouth daily.        Hypertensive Medications Protocol Failed - 1/20/2023  6:54 AM        Failed - CMP or BMP in past 6 months     No results found for this or any previous visit (from the past 4392 hour(s)).             Passed - In person appointment in the past 12 or next 3 months     Recent Outpatient Visits              1 month ago Encounter for annual routine gynecological examination    [facility] Leos, [address], [address] [facility], [place], [address]    Office Visit    1 month ago Large breasts    Camilo Mcalester, [place], Elfrida, APRN    Office Visit    3 months ago [facility], [place], Elfrida, APRN    Telemedicine    6 months ago Encounter for therapeutic drug monitoring    Melina Hem, Joseph, Wen Face, APRN    Office Visit    9 months ago [address], [address], Lila Hayes MD    Telemedicine                      Passed - Last BP reading less than 140/90     BP Readings from Last 1 Encounters:  12/21/22 : 118/77              Passed - In person appointment or virtual visit in the past 6 months     Recent Outpatient Visits              1 month ago Encounter for annual routine gynecological examination    Presley Leos, [address], [address] [facility], CN    Office Visit    1 month ago Large breasts    Camilo Mcalester, T[place], Elfrida, APRN    Office Visit    3 months ago [address] [facility], T[place], Elfrida, APRN    Telemedicine    6 months ago Encounter for therapeutic drug monitoring    Melina Hem, Joseph, Wen Face, APRN    Office Visit    9 months ago [address], [address] Layman Links, MD    Telemedicine                      [facility] - [facility] or GFRNAA > 50     GFR Evaluation  GFRNAA: 81 , resulted on 2/21/2022

## 2023-02-20 ENCOUNTER — HOSPITAL ENCOUNTER (OUTPATIENT)
Dept: GENERAL RADIOLOGY | Age: 53
Discharge: HOME | End: 2023-02-20
Attending: NURSE PRACTITIONER
Payer: COMMERCIAL

## 2023-02-20 ENCOUNTER — OFFICE VISIT (OUTPATIENT)
Dept: INTERNAL MEDICINE CLINIC | Facility: CLINIC | Age: 53
End: 2023-02-20

## 2023-02-20 VITALS
BODY MASS INDEX: 41.04 KG/M2 | SYSTOLIC BLOOD PRESSURE: 148 MMHG | RESPIRATION RATE: 16 BRPM | DIASTOLIC BLOOD PRESSURE: 98 MMHG | HEIGHT: 62 IN | HEART RATE: 74 BPM | WEIGHT: 223 LBS

## 2023-02-20 DIAGNOSIS — M25.572 ACUTE LEFT ANKLE PAIN: Primary | ICD-10-CM

## 2023-02-20 DIAGNOSIS — M25.572 ACUTE LEFT ANKLE PAIN: ICD-10-CM

## 2023-02-20 PROCEDURE — 73610 X-RAY EXAM OF ANKLE: CPT | Performed by: NURSE PRACTITIONER

## 2023-02-20 PROCEDURE — 99213 OFFICE O/P EST LOW 20 MIN: CPT | Performed by: NURSE PRACTITIONER

## 2023-02-20 PROCEDURE — 3080F DIAST BP >= 90 MM HG: CPT | Performed by: NURSE PRACTITIONER

## 2023-02-20 PROCEDURE — 3077F SYST BP >= 140 MM HG: CPT | Performed by: NURSE PRACTITIONER

## 2023-02-20 PROCEDURE — 3008F BODY MASS INDEX DOCD: CPT | Performed by: NURSE PRACTITIONER

## 2023-02-20 RX ORDER — METHYLPREDNISOLONE 4 MG/1
TABLET ORAL
Qty: 21 TABLET | Refills: 0 | Status: SHIPPED | OUTPATIENT
Start: 2023-02-20

## 2023-03-17 ENCOUNTER — OFFICE VISIT (OUTPATIENT)
Dept: PODIATRY CLINIC | Facility: CLINIC | Age: 53
End: 2023-03-17

## 2023-03-17 ENCOUNTER — HOSPITAL ENCOUNTER (OUTPATIENT)
Dept: MRI IMAGING | Age: 53
Discharge: HOME | End: 2023-03-17
Attending: STUDENT IN AN ORGANIZED HEALTH CARE EDUCATION/TRAINING PROGRAM
Payer: COMMERCIAL

## 2023-03-17 DIAGNOSIS — S82.55XD CLOSED NONDISPLACED FRACTURE OF MEDIAL MALLEOLUS OF LEFT TIBIA WITH ROUTINE HEALING: ICD-10-CM

## 2023-03-17 DIAGNOSIS — M76.822 POSTERIOR TIBIAL TENDON DYSFUNCTION (PTTD) OF LEFT LOWER EXTREMITY: ICD-10-CM

## 2023-03-17 DIAGNOSIS — G89.29 CHRONIC PAIN OF LEFT ANKLE: Primary | ICD-10-CM

## 2023-03-17 DIAGNOSIS — M25.572 CHRONIC PAIN OF LEFT ANKLE: Primary | ICD-10-CM

## 2023-03-17 DIAGNOSIS — M25.572 CHRONIC PAIN OF LEFT ANKLE: ICD-10-CM

## 2023-03-17 DIAGNOSIS — G89.29 CHRONIC PAIN OF LEFT ANKLE: ICD-10-CM

## 2023-03-17 PROCEDURE — A9575 INJ GADOTERATE MEGLUMI 0.1ML: HCPCS | Performed by: STUDENT IN AN ORGANIZED HEALTH CARE EDUCATION/TRAINING PROGRAM

## 2023-03-17 PROCEDURE — 99203 OFFICE O/P NEW LOW 30 MIN: CPT | Performed by: STUDENT IN AN ORGANIZED HEALTH CARE EDUCATION/TRAINING PROGRAM

## 2023-03-17 PROCEDURE — 73723 MRI JOINT LWR EXTR W/O&W/DYE: CPT | Performed by: STUDENT IN AN ORGANIZED HEALTH CARE EDUCATION/TRAINING PROGRAM

## 2023-03-17 PROCEDURE — L1902 AFO ANKLE GAUNTLET PRE OTS: HCPCS | Performed by: STUDENT IN AN ORGANIZED HEALTH CARE EDUCATION/TRAINING PROGRAM

## 2023-03-17 RX ORDER — ERGOCALCIFEROL 1.25 MG/1
50000 CAPSULE ORAL WEEKLY
Qty: 4 CAPSULE | Refills: 0 | Status: SHIPPED | OUTPATIENT
Start: 2023-03-17 | End: 2023-04-16

## 2023-03-17 RX ORDER — GADOTERATE MEGLUMINE 376.9 MG/ML
20 INJECTION INTRAVENOUS
Status: COMPLETED | OUTPATIENT
Start: 2023-03-17 | End: 2023-03-17

## 2023-03-17 RX ADMIN — GADOTERATE MEGLUMINE 20 ML: 376.9 INJECTION INTRAVENOUS at 16:37:00

## 2023-03-20 ENCOUNTER — OFFICE VISIT (OUTPATIENT)
Dept: PODIATRY CLINIC | Facility: CLINIC | Age: 53
End: 2023-03-20

## 2023-03-20 DIAGNOSIS — M66.872 TIBIALIS POSTERIOR TENDON TEAR, NONTRAUMATIC, LEFT: Primary | ICD-10-CM

## 2023-03-21 ENCOUNTER — TELEPHONE (OUTPATIENT)
Dept: CASE MANAGEMENT | Age: 53
End: 2023-03-21

## 2023-03-21 ENCOUNTER — TELEPHONE (OUTPATIENT)
Dept: ORTHOPEDICS CLINIC | Facility: CLINIC | Age: 53
End: 2023-03-21

## 2023-03-21 DIAGNOSIS — M66.872 NONTRAUMATIC TEAR OF TIBIALIS POSTERIOR TENDON, LEFT: Primary | ICD-10-CM

## 2023-03-21 NOTE — TELEPHONE ENCOUNTER
S/w Emigdio [place] with the referral team and she states referral request has to come from the pcp office and she will send request to pcp office. [facility] is on the [facility] list so it should not be an issue for pt to go there. Called Michelle at [facility] and informed her of this and that pt will need to wait for referral approval from pcp.  Michelle will f/u with pt

## 2023-03-21 NOTE — TELEPHONE ENCOUNTER
Per Michelle asking if pt's referral can be faxed to their office, pt wants to be seen there instead of [place].  Please advise    Fax: [phone]

## 2023-03-21 NOTE — TELEPHONE ENCOUNTER
Dexter Schafer,     Orthopedics department is requesting a referral  for patient to see Dr. Cedric Cardoso at [address] [place] [address] location. Pended referral please review diagnosis and sign off if you agree. Thank you.   Blake Estes

## 2023-03-27 ENCOUNTER — MED REC SCAN ONLY (OUTPATIENT)
Dept: INTERNAL MEDICINE CLINIC | Facility: CLINIC | Age: 53
End: 2023-03-27

## 2023-04-19 ENCOUNTER — LAB ENCOUNTER (OUTPATIENT)
Dept: LAB | Facility: HOSPITAL | Age: 53
End: 2023-04-19
Attending: ORTHOPAEDIC SURGERY
Payer: COMMERCIAL

## 2023-04-19 DIAGNOSIS — M76.822 POSTERIOR TIBIAL TENDINITIS OF LEFT LEG: ICD-10-CM

## 2023-04-19 DIAGNOSIS — Z01.818 PREOPERATIVE EXAMINATION, UNSPECIFIED: Primary | ICD-10-CM

## 2023-04-19 DIAGNOSIS — E55.9 AVITAMINOSIS D: ICD-10-CM

## 2023-04-19 LAB
ALBUMIN SERPL-MCNC: 3.3 G/DL (ref 3.4–5)
ALBUMIN/GLOB SERPL: 1 {RATIO} (ref 1–2)
ALP LIVER SERPL-CCNC: 44 U/L
ALT SERPL-CCNC: 18 U/L
ANION GAP SERPL CALC-SCNC: 1 MMOL/L (ref 0–18)
AST SERPL-CCNC: 12 U/L (ref 15–37)
ATRIAL RATE: 73 BPM
BASOPHILS # BLD AUTO: 0.05 X10(3) UL (ref 0–0.2)
BASOPHILS NFR BLD AUTO: 1 %
BILIRUB SERPL-MCNC: 0.5 MG/DL (ref 0.1–2)
BUN BLD-MCNC: 17 MG/DL (ref 7–18)
BUN/CREAT SERPL: 19.3 (ref 10–20)
CALCIUM BLD-MCNC: 8.6 MG/DL (ref 8.5–10.1)
CHLORIDE SERPL-SCNC: 108 MMOL/L (ref 98–112)
CO2 SERPL-SCNC: 29 MMOL/L (ref 21–32)
CREAT BLD-MCNC: 0.88 MG/DL
DEPRECATED RDW RBC AUTO: 42.2 FL (ref 35.1–46.3)
EOSINOPHIL # BLD AUTO: 0.21 X10(3) UL (ref 0–0.7)
EOSINOPHIL NFR BLD AUTO: 4.3 %
ERYTHROCYTE [DISTWIDTH] IN BLOOD BY AUTOMATED COUNT: 13.1 % (ref 11–15)
FASTING STATUS PATIENT QL REPORTED: NO
GFR SERPLBLD BASED ON 1.73 SQ M-ARVRAT: 80 ML/MIN/1.73M2 (ref 60–?)
GLOBULIN PLAS-MCNC: 3.4 G/DL (ref 2.8–4.4)
GLUCOSE BLD-MCNC: 100 MG/DL (ref 70–99)
HCT VFR BLD AUTO: 41.4 %
HGB BLD-MCNC: 13.6 G/DL
IMM GRANULOCYTES # BLD AUTO: 0.01 X10(3) UL (ref 0–1)
IMM GRANULOCYTES NFR BLD: 0.2 %
LYMPHOCYTES # BLD AUTO: 1.49 X10(3) UL (ref 1–4)
LYMPHOCYTES NFR BLD AUTO: 30.3 %
MCH RBC QN AUTO: 29 PG (ref 26–34)
MCHC RBC AUTO-ENTMCNC: 32.9 G/DL (ref 31–37)
MCV RBC AUTO: 88.3 FL
MONOCYTES # BLD AUTO: 0.41 X10(3) UL (ref 0.1–1)
MONOCYTES NFR BLD AUTO: 8.4 %
NEUTROPHILS # BLD AUTO: 2.74 X10 (3) UL (ref 1.5–7.7)
NEUTROPHILS # BLD AUTO: 2.74 X10(3) UL (ref 1.5–7.7)
NEUTROPHILS NFR BLD AUTO: 55.8 %
OSMOLALITY SERPL CALC.SUM OF ELEC: 288 MOSM/KG (ref 275–295)
P AXIS: 29 DEGREES
P-R INTERVAL: 166 MS
PLATELET # BLD AUTO: 300 10(3)UL (ref 150–450)
POTASSIUM SERPL-SCNC: 3.7 MMOL/L (ref 3.5–5.1)
PROT SERPL-MCNC: 6.7 G/DL (ref 6.4–8.2)
Q-T INTERVAL: 400 MS
QRS DURATION: 76 MS
QTC CALCULATION (BEZET): 440 MS
R AXIS: 21 DEGREES
RBC # BLD AUTO: 4.69 X10(6)UL
SODIUM SERPL-SCNC: 138 MMOL/L (ref 136–145)
T AXIS: 27 DEGREES
VENTRICULAR RATE: 73 BPM
VIT D+METAB SERPL-MCNC: 36.8 NG/ML (ref 30–100)
WBC # BLD AUTO: 4.9 X10(3) UL (ref 4–11)

## 2023-04-19 PROCEDURE — 82306 VITAMIN D 25 HYDROXY: CPT

## 2023-04-19 PROCEDURE — 93010 ELECTROCARDIOGRAM REPORT: CPT | Performed by: INTERNAL MEDICINE

## 2023-04-19 PROCEDURE — 36415 COLL VENOUS BLD VENIPUNCTURE: CPT

## 2023-04-19 PROCEDURE — 93005 ELECTROCARDIOGRAM TRACING: CPT

## 2023-04-19 PROCEDURE — 80053 COMPREHEN METABOLIC PANEL: CPT

## 2023-04-19 PROCEDURE — 85025 COMPLETE CBC W/AUTO DIFF WBC: CPT

## 2023-04-26 DIAGNOSIS — I10 ESSENTIAL HYPERTENSION WITH GOAL BLOOD PRESSURE LESS THAN 140/90: ICD-10-CM

## 2023-04-26 DIAGNOSIS — E03.9 ACQUIRED HYPOTHYROIDISM: ICD-10-CM

## 2023-04-27 RX ORDER — LEVOTHYROXINE SODIUM 0.1 MG/1
100 TABLET ORAL
Qty: 90 TABLET | Refills: 0 | Status: SHIPPED | OUTPATIENT
Start: 2023-04-27

## 2023-04-27 RX ORDER — LOSARTAN POTASSIUM AND HYDROCHLOROTHIAZIDE 25; 100 MG/1; MG/1
1 TABLET ORAL DAILY
Qty: 90 TABLET | Refills: 0 | Status: SHIPPED | OUTPATIENT
Start: 2023-04-27

## 2023-04-27 NOTE — TELEPHONE ENCOUNTER
Spoke with patient, (  Name and  verified ) informed of message  Below    Patient will callback to schedule an appointment

## 2023-04-27 NOTE — TELEPHONE ENCOUNTER
Please review; protocol failed/ no protocol  Medication pended for your review and approval.    Requested Prescriptions   Pending Prescriptions Disp Refills    levothyroxine 100 MCG Oral Tab 90 tablet 0     Sig: Take 1 tablet (100 mcg total) by mouth before breakfast.       Thyroid Medication Protocol Failed - 4/26/2023  7:10 AM        Failed - TSH in past 12 months        Passed - Last TSH value is normal     Lab Results   Component Value Date    TSH 2.520 02/21/2022    [facility] 2.14 04/17/2016                 Passed - In person appointment or virtual visit in the past 12 mos or appointment in next 3 mos     Recent Outpatient Visits              1 month ago Tibialis posterior tendon tear, nontraumatic, left    [facility], [place], Noah Calder[place]    Office Visit    1 month ago Chronic pain of left ankle    [facility], [place], Noah [place]    Office Visit    2 months ago Acute left ankle pain    [facility], [address] [place] [address]    Office Visit    4 months ago Encounter for annual routine gynecological examination    [address] [facility], [address] [facility],[address], [address] [facility], [facility]    Office Visit    4 months ago Large breasts    [facility], [address] [place] [address]    Office Visit                        losartan-hydroCHLOROthiazide 100-25 MG Oral Tab 90 tablet 0     Sig: Take 1 tablet by mouth daily.        Hypertensive Medications Protocol Failed - 4/26/2023  7:10 AM        Failed - Last BP reading less than 140/90     BP Readings from Last 1 Encounters:  02/20/23 : (!) 148/98                Passed - In person appointment in the past 12 or next 3 months     Recent Outpatient Visits              1 month ago Tibialis posterior tendon tear, nontraumatic, left    [facility], [place], Noah Jair Hickman MARIA[place]    Office Visit    1 month ago Chronic pain of left ankle    [facility], [address], [place] Jair [place]    Office Visit    2 months ago Acute left ankle pain    [facility], [address] [facility], [place], [facility]    Office Visit    4 months ago Encounter for annual routine gynecological examination    [address], [address] [facility],[address], [address] [facility], RONDA Scott    Office Visit    4 months ago Large breasts    [facility], [address] [facility], [facility], [place], [facility]    Office Visit                      Passed - CMP or BMP in past 6 months     Recent Results (from the past 4392 hour(s))   COMP METABOLIC PANEL (14)    Collection Time: 04/19/23  9:04 AM   Result Value Ref Range    Glucose 100 (H) 70 - 99 mg/dL    Sodium 138 136 - 145 mmol/L    Potassium 3.7 3.5 - 5.1 mmol/L    Chloride 108 98 - 112 mmol/L    CO2 29.0 21.0 - 32.0 mmol/L    Anion Gap 1 0 - 18 mmol/L    BUN 17 7 - 18 mg/dL    Creatinine 0.88 0.55 - 1.02 mg/dL    BUN/CREA Ratio 19.3 10.0 - 20.0    Calcium, Total 8.6 8.5 - 10.1 mg/dL    Calculated Osmolality 288 275 - 295 mOsm/kg    eGFR-Cr 80 >=60 mL/min/1.73m2    ALT 18 13 - 56 U/L    AST 12 (L) 15 - 37 U/L    Alkaline Phosphatase 44 41 - 108 U/L    Bilirubin, Total 0.5 0.1 - 2.0 mg/dL    Total Protein 6.7 6.4 - 8.2 g/dL    Albumin 3.3 (L) 3.4 - 5.0 g/dL    Globulin  3.4 2.8 - 4.4 g/dL    A/G Ratio 1.0 1.0 - 2.0    Patient Fasting for CMP? No      *Note: Due to a large number of results and/or encounters for the requested time period, some results have not been displayed. A complete set of results can be found in Results Review.                  Passed - In person appointment or virtual visit in the past 6 months     Recent Outpatient Visits              1 month ago Tibialis posterior tendon tear, nontraumatic, left    [facility], [address], Noah Calderón MARIANA    Office Visit    1 month ago Chronic pain of left ankle    [facility], [address], Shara Doss    Office Visit    2 months ago Acute left ankle pain    [facility], [address] [facility], [facility]    Office Visit    4 months ago Encounter for annual routine gynecological examination    [address], [address] [facility],[address], [address] [facility], [facility]    Office Visit    4 months ago Large breasts    [place], [address] [facility], [place], [facility]    Office Visit                      Passed - [facility] or GFRNAA > 50     GFR Evaluation  EGFRCR: 80 , resulted on 4/19/2023

## 2023-04-27 NOTE — TELEPHONE ENCOUNTER
Left message to call back please transfer to triage. A mychart message was also sent. Please place a TSH order.    Thanks

## 2023-04-28 ENCOUNTER — MED REC SCAN ONLY (OUTPATIENT)
Dept: INTERNAL MEDICINE CLINIC | Facility: CLINIC | Age: 53
End: 2023-04-28

## 2023-05-11 ENCOUNTER — APPOINTMENT (OUTPATIENT)
Dept: GENERAL RADIOLOGY | Facility: HOSPITAL | Age: 53
End: 2023-05-11
Attending: ORTHOPAEDIC SURGERY
Payer: COMMERCIAL

## 2023-05-11 ENCOUNTER — HOSPITAL ENCOUNTER (OUTPATIENT)
Facility: HOSPITAL | Age: 53
Setting detail: HOSPITAL OUTPATIENT SURGERY
Discharge: HOME | End: 2023-05-11
Attending: ORTHOPAEDIC SURGERY | Admitting: ORTHOPAEDIC SURGERY
Payer: COMMERCIAL

## 2023-05-11 ENCOUNTER — ANESTHESIA EVENT (OUTPATIENT)
Dept: SURGERY | Facility: HOSPITAL | Age: 53
End: 2023-05-11
Payer: COMMERCIAL

## 2023-05-11 ENCOUNTER — ANESTHESIA (OUTPATIENT)
Dept: SURGERY | Facility: HOSPITAL | Age: 53
End: 2023-05-11
Payer: COMMERCIAL

## 2023-05-11 VITALS
OXYGEN SATURATION: 96 % | HEIGHT: 62 IN | HEART RATE: 78 BPM | WEIGHT: 227 LBS | SYSTOLIC BLOOD PRESSURE: 138 MMHG | RESPIRATION RATE: 16 BRPM | TEMPERATURE: 98 F | BODY MASS INDEX: 41.77 KG/M2 | DIASTOLIC BLOOD PRESSURE: 83 MMHG

## 2023-05-11 LAB — B-HCG UR QL: NEGATIVE

## 2023-05-11 PROCEDURE — 0LXW0ZZ TRANSFER LEFT FOOT TENDON, OPEN APPROACH: ICD-10-PCS | Performed by: ORTHOPAEDIC SURGERY

## 2023-05-11 PROCEDURE — 0QSM04Z REPOSITION LEFT TARSAL WITH INTERNAL FIXATION DEVICE, OPEN APPROACH: ICD-10-PCS | Performed by: ORTHOPAEDIC SURGERY

## 2023-05-11 PROCEDURE — 0L8P0ZZ DIVISION OF LEFT LOWER LEG TENDON, OPEN APPROACH: ICD-10-PCS | Performed by: ORTHOPAEDIC SURGERY

## 2023-05-11 PROCEDURE — 81025 URINE PREGNANCY TEST: CPT

## 2023-05-11 PROCEDURE — 64450 NJX AA&/STRD OTHER PN/BRANCH: CPT | Performed by: ORTHOPAEDIC SURGERY

## 2023-05-11 PROCEDURE — 76000 FLUOROSCOPY <1 HR PHYS/QHP: CPT | Performed by: ORTHOPAEDIC SURGERY

## 2023-05-11 PROCEDURE — 76942 ECHO GUIDE FOR BIOPSY: CPT | Performed by: ORTHOPAEDIC SURGERY

## 2023-05-11 PROCEDURE — 0MQR0ZZ REPAIR LEFT ANKLE BURSA AND LIGAMENT, OPEN APPROACH: ICD-10-PCS | Performed by: ORTHOPAEDIC SURGERY

## 2023-05-11 PROCEDURE — 0LQW0ZZ REPAIR LEFT FOOT TENDON, OPEN APPROACH: ICD-10-PCS | Performed by: ORTHOPAEDIC SURGERY

## 2023-05-11 DEVICE — TENO SCRW,BIO-COMP
Type: IMPLANTABLE DEVICE | Site: ANKLE | Status: FUNCTIONAL
Brand: ARTHREX®

## 2023-05-11 RX ORDER — CEFAZOLIN SODIUM/WATER 2 G/20 ML
SYRINGE (ML) INTRAVENOUS AS NEEDED
Status: DISCONTINUED | OUTPATIENT
Start: 2023-05-11 | End: 2023-05-11 | Stop reason: SURG

## 2023-05-11 RX ORDER — DEXAMETHASONE SODIUM PHOSPHATE 10 MG/ML
INJECTION, SOLUTION INTRAMUSCULAR; INTRAVENOUS AS NEEDED
Status: DISCONTINUED | OUTPATIENT
Start: 2023-05-11 | End: 2023-05-11 | Stop reason: SURG

## 2023-05-11 RX ORDER — DOCUSATE SODIUM 100 MG/1
100 CAPSULE, LIQUID FILLED ORAL 2 TIMES DAILY PRN
Qty: 20 CAPSULE | Refills: 0 | Status: SHIPPED | OUTPATIENT
Start: 2023-05-11

## 2023-05-11 RX ORDER — IBUPROFEN 600 MG/1
600 TABLET ORAL EVERY 6 HOURS PRN
Qty: 60 TABLET | Refills: 0 | Status: SHIPPED
Start: 2023-05-11

## 2023-05-11 RX ORDER — SODIUM CHLORIDE, SODIUM LACTATE, POTASSIUM CHLORIDE, CALCIUM CHLORIDE 600; 310; 30; 20 MG/100ML; MG/100ML; MG/100ML; MG/100ML
INJECTION, SOLUTION INTRAVENOUS CONTINUOUS
Status: DISCONTINUED | OUTPATIENT
Start: 2023-05-11 | End: 2023-05-11

## 2023-05-11 RX ORDER — CEFAZOLIN SODIUM/WATER 2 G/20 ML
2 SYRINGE (ML) INTRAVENOUS ONCE
Status: DISCONTINUED | OUTPATIENT
Start: 2023-05-11 | End: 2023-05-11 | Stop reason: HOSPADM

## 2023-05-11 RX ORDER — METOCLOPRAMIDE 10 MG/1
10 TABLET ORAL ONCE
Status: COMPLETED | OUTPATIENT
Start: 2023-05-11 | End: 2023-05-11

## 2023-05-11 RX ORDER — GLYCOPYRROLATE 0.2 MG/ML
INJECTION, SOLUTION INTRAMUSCULAR; INTRAVENOUS AS NEEDED
Status: DISCONTINUED | OUTPATIENT
Start: 2023-05-11 | End: 2023-05-11 | Stop reason: SURG

## 2023-05-11 RX ORDER — MIDAZOLAM HYDROCHLORIDE 1 MG/ML
INJECTION INTRAMUSCULAR; INTRAVENOUS AS NEEDED
Status: DISCONTINUED | OUTPATIENT
Start: 2023-05-11 | End: 2023-05-11 | Stop reason: SURG

## 2023-05-11 RX ORDER — HYDROCODONE BITARTRATE AND ACETAMINOPHEN 5; 325 MG/1; MG/1
1 TABLET ORAL EVERY 6 HOURS PRN
Qty: 30 TABLET | Refills: 0 | Status: SHIPPED | OUTPATIENT
Start: 2023-05-11

## 2023-05-11 RX ORDER — SODIUM CHLORIDE, SODIUM LACTATE, POTASSIUM CHLORIDE, CALCIUM CHLORIDE 600; 310; 30; 20 MG/100ML; MG/100ML; MG/100ML; MG/100ML
INJECTION, SOLUTION INTRAVENOUS CONTINUOUS PRN
Status: DISCONTINUED | OUTPATIENT
Start: 2023-05-11 | End: 2023-05-11 | Stop reason: SURG

## 2023-05-11 RX ORDER — ACETAMINOPHEN 500 MG
1000 TABLET ORAL EVERY 6 HOURS PRN
Qty: 90 TABLET | Refills: 0 | Status: SHIPPED | OUTPATIENT
Start: 2023-05-11

## 2023-05-11 RX ORDER — DEXAMETHASONE SODIUM PHOSPHATE 4 MG/ML
VIAL (ML) INJECTION AS NEEDED
Status: DISCONTINUED | OUTPATIENT
Start: 2023-05-11 | End: 2023-05-11 | Stop reason: SURG

## 2023-05-11 RX ORDER — HYDROCODONE BITARTRATE AND ACETAMINOPHEN 5; 325 MG/1; MG/1
1 TABLET ORAL EVERY 6 HOURS PRN
Status: DISCONTINUED | OUTPATIENT
Start: 2023-05-11 | End: 2023-05-11

## 2023-05-11 RX ORDER — ACETAMINOPHEN 500 MG
1000 TABLET ORAL ONCE
Status: COMPLETED | OUTPATIENT
Start: 2023-05-11 | End: 2023-05-11

## 2023-05-11 RX ORDER — ROPIVACAINE HYDROCHLORIDE 5 MG/ML
INJECTION, SOLUTION EPIDURAL; INFILTRATION; PERINEURAL
Status: DISCONTINUED | OUTPATIENT
Start: 2023-05-11 | End: 2023-05-11 | Stop reason: SURG

## 2023-05-11 RX ORDER — MORPHINE SULFATE 10 MG/ML
6 INJECTION, SOLUTION INTRAMUSCULAR; INTRAVENOUS EVERY 10 MIN PRN
Status: DISCONTINUED | OUTPATIENT
Start: 2023-05-11 | End: 2023-05-11

## 2023-05-11 RX ORDER — ONDANSETRON 2 MG/ML
4 INJECTION INTRAMUSCULAR; INTRAVENOUS EVERY 6 HOURS PRN
Status: DISCONTINUED | OUTPATIENT
Start: 2023-05-11 | End: 2023-05-11

## 2023-05-11 RX ORDER — LIDOCAINE HYDROCHLORIDE 10 MG/ML
INJECTION, SOLUTION INFILTRATION; PERINEURAL
Status: DISCONTINUED | OUTPATIENT
Start: 2023-05-11 | End: 2023-05-11 | Stop reason: SURG

## 2023-05-11 RX ORDER — HYDROMORPHONE HYDROCHLORIDE 1 MG/ML
0.4 INJECTION, SOLUTION INTRAMUSCULAR; INTRAVENOUS; SUBCUTANEOUS EVERY 5 MIN PRN
Status: DISCONTINUED | OUTPATIENT
Start: 2023-05-11 | End: 2023-05-11

## 2023-05-11 RX ORDER — PROCHLORPERAZINE EDISYLATE 5 MG/ML
5 INJECTION INTRAMUSCULAR; INTRAVENOUS EVERY 8 HOURS PRN
Status: DISCONTINUED | OUTPATIENT
Start: 2023-05-11 | End: 2023-05-11

## 2023-05-11 RX ORDER — MORPHINE SULFATE 4 MG/ML
2 INJECTION, SOLUTION INTRAMUSCULAR; INTRAVENOUS EVERY 10 MIN PRN
Status: DISCONTINUED | OUTPATIENT
Start: 2023-05-11 | End: 2023-05-11

## 2023-05-11 RX ORDER — ONDANSETRON 2 MG/ML
INJECTION INTRAMUSCULAR; INTRAVENOUS AS NEEDED
Status: DISCONTINUED | OUTPATIENT
Start: 2023-05-11 | End: 2023-05-11 | Stop reason: SURG

## 2023-05-11 RX ORDER — HYDROMORPHONE HYDROCHLORIDE 1 MG/ML
0.2 INJECTION, SOLUTION INTRAMUSCULAR; INTRAVENOUS; SUBCUTANEOUS EVERY 5 MIN PRN
Status: DISCONTINUED | OUTPATIENT
Start: 2023-05-11 | End: 2023-05-11

## 2023-05-11 RX ORDER — EPHEDRINE SULFATE 50 MG/ML
INJECTION, SOLUTION INTRAVENOUS AS NEEDED
Status: DISCONTINUED | OUTPATIENT
Start: 2023-05-11 | End: 2023-05-11 | Stop reason: SURG

## 2023-05-11 RX ORDER — ASPIRIN 81 MG/1
81 TABLET ORAL 2 TIMES DAILY
Qty: 70 TABLET | Refills: 0 | Status: SHIPPED | OUTPATIENT
Start: 2023-05-11

## 2023-05-11 RX ORDER — NALOXONE HYDROCHLORIDE 0.4 MG/ML
80 INJECTION, SOLUTION INTRAMUSCULAR; INTRAVENOUS; SUBCUTANEOUS AS NEEDED
Status: DISCONTINUED | OUTPATIENT
Start: 2023-05-11 | End: 2023-05-11

## 2023-05-11 RX ORDER — HYDROMORPHONE HYDROCHLORIDE 1 MG/ML
0.6 INJECTION, SOLUTION INTRAMUSCULAR; INTRAVENOUS; SUBCUTANEOUS EVERY 5 MIN PRN
Status: DISCONTINUED | OUTPATIENT
Start: 2023-05-11 | End: 2023-05-11

## 2023-05-11 RX ORDER — FAMOTIDINE 20 MG/1
20 TABLET, FILM COATED ORAL ONCE
Status: COMPLETED | OUTPATIENT
Start: 2023-05-11 | End: 2023-05-11

## 2023-05-11 RX ORDER — DEXAMETHASONE SODIUM PHOSPHATE 10 MG/ML
INJECTION, SOLUTION INTRAMUSCULAR; INTRAVENOUS
Status: DISCONTINUED | OUTPATIENT
Start: 2023-05-11 | End: 2023-05-11 | Stop reason: SURG

## 2023-05-11 RX ORDER — MORPHINE SULFATE 4 MG/ML
4 INJECTION, SOLUTION INTRAMUSCULAR; INTRAVENOUS EVERY 10 MIN PRN
Status: DISCONTINUED | OUTPATIENT
Start: 2023-05-11 | End: 2023-05-11

## 2023-05-11 RX ORDER — LIDOCAINE HYDROCHLORIDE 10 MG/ML
INJECTION, SOLUTION EPIDURAL; INFILTRATION; INTRACAUDAL; PERINEURAL AS NEEDED
Status: DISCONTINUED | OUTPATIENT
Start: 2023-05-11 | End: 2023-05-11 | Stop reason: SURG

## 2023-05-11 RX ORDER — TRAMADOL HYDROCHLORIDE 50 MG/1
50 TABLET ORAL EVERY 6 HOURS PRN
Qty: 24 TABLET | Refills: 0 | Status: SHIPPED | OUTPATIENT
Start: 2023-05-11

## 2023-05-11 RX ADMIN — SODIUM CHLORIDE, SODIUM LACTATE, POTASSIUM CHLORIDE, CALCIUM CHLORIDE: 600; 310; 30; 20 INJECTION, SOLUTION INTRAVENOUS at 09:26:00

## 2023-05-11 RX ADMIN — EPHEDRINE SULFATE 5 MG: 50 INJECTION, SOLUTION INTRAVENOUS at 10:32:00

## 2023-05-11 RX ADMIN — LIDOCAINE HYDROCHLORIDE 50 MG: 10 INJECTION, SOLUTION EPIDURAL; INFILTRATION; INTRACAUDAL; PERINEURAL at 09:32:00

## 2023-05-11 RX ADMIN — DEXAMETHASONE SODIUM PHOSPHATE 8 MG: 10 INJECTION, SOLUTION INTRAMUSCULAR; INTRAVENOUS at 09:39:00

## 2023-05-11 RX ADMIN — ROPIVACAINE HYDROCHLORIDE 30 ML: 5 INJECTION, SOLUTION EPIDURAL; INFILTRATION; PERINEURAL at 09:00:00

## 2023-05-11 RX ADMIN — LIDOCAINE HYDROCHLORIDE 5 ML: 10 INJECTION, SOLUTION INFILTRATION; PERINEURAL at 09:00:00

## 2023-05-11 RX ADMIN — DEXAMETHASONE SODIUM PHOSPHATE 10 MG: 10 INJECTION, SOLUTION INTRAMUSCULAR; INTRAVENOUS at 09:00:00

## 2023-05-11 RX ADMIN — ONDANSETRON 4 MG: 2 INJECTION INTRAMUSCULAR; INTRAVENOUS at 11:34:00

## 2023-05-11 RX ADMIN — GLYCOPYRROLATE 0.2 MG: 0.2 INJECTION, SOLUTION INTRAMUSCULAR; INTRAVENOUS at 09:39:00

## 2023-05-11 RX ADMIN — MIDAZOLAM HYDROCHLORIDE 2 MG: 1 INJECTION INTRAMUSCULAR; INTRAVENOUS at 09:28:00

## 2023-05-11 RX ADMIN — DEXAMETHASONE SODIUM PHOSPHATE 8 MG: 4 MG/ML VIAL (ML) INJECTION at 09:40:00

## 2023-05-11 RX ADMIN — SODIUM CHLORIDE, SODIUM LACTATE, POTASSIUM CHLORIDE, CALCIUM CHLORIDE: 600; 310; 30; 20 INJECTION, SOLUTION INTRAVENOUS at 10:31:00

## 2023-05-11 RX ADMIN — CEFAZOLIN SODIUM/WATER 2 G: 2 G/20 ML SYRINGE (ML) INTRAVENOUS at 09:36:00

## 2023-05-11 RX ADMIN — SODIUM CHLORIDE, SODIUM LACTATE, POTASSIUM CHLORIDE, CALCIUM CHLORIDE: 600; 310; 30; 20 INJECTION, SOLUTION INTRAVENOUS at 11:10:00

## 2023-05-11 NOTE — OPERATIVE REPORT
Rodrigo Bobo    PATIENT'S NAME: Radha Guadarrama   ATTENDING PHYSICIAN: Mayur Phillip DO   OPERATING PHYSICIAN: Jorge L Antonio   PATIENT ACCOUNT#:   [de-identified]    LOCATION:  [facility]  MEDICAL RECORD #:   [id number]       YOB: 1970  ADMISSION DATE:       05/11/2023      OPERATION DATE:  05/11/2023    OPERATIVE REPORT      PREOPERATIVE DIAGNOSIS:    1. Left posterior tibial tendon tear. 2.   Left posterior tibial tendinitis. 3.   Left hindfoot valgus. 4.   Equinus contracture. POSTOPERATIVE DIAGNOSIS:    1. Left posterior tibial tendon tear. 2.   Left posterior tibial tendinitis. 3.   Left hindfoot valgus. 4.   Equinus contracture. 5.   Spring ligament rupture. PROCEDURE:    1. Left foot posterior tibial tendon repair. 2.   Medial displacement calcaneal osteotomy. 3.   Flexor digitorum longus deep tendon transfer. 4.   Gastroc resection. 5.   Spring ligament secondary reconstruction. ASSISTANT:  Sanjay Dawson PA-C, who was essential in aiding reduction, assist in repair in order to facilitate surgery and preserved the neurovascular bundle and cartilage. ANESTHESIA:  General with a regional block. ESTIMATED BLOOD LOSS:  10 mL. IMPLANTS:  Medline 6.5 cannulated headless screw x2, Arthrex 4.75 x 15 mm tenodesis screw. COMPLICATIONS:  None. CONDITION:  Stable to the PACU. INDICATIONS:  Patient is a 50-year-old female, with a history of pain and injury to her left foot. She failed conservative management, including wearing a CAM boot, anti-inflammatories, therapy, exercises and activity modification. All risks, benefits and alternatives were explained to the patient, including, but not limited to DVT, infection, wound healing problems, bone healing problems, neurovascular damage, risks of continued pain, and risk for need for further surgery. Patient understood and wished to proceed with the above procedure. OPERATIVE TECHNIQUE:  Patient was met in the preoperative holding area and marked with an indelible marker. She was brought back to the operative suite and placed on the operating table in supine position. General anesthesia was administered by department of anesthesia. Once she was adequately sedated, a well padded left thigh tourniquet was placed and she was prepped and draped in the usual sterile fashion. A time-out was performed and all in the room were in agreement with patient, procedure, site of procedure. Preoperative antibiotics were administered by the Department of Anesthesia prior to incision. The limb was exsanguinated and tourniquet inflated to 10-15 mmHg and reinflated for approximately 90 minutes. Left foot posterior tibial tendon repair. We made a medial incision over the posterior tibial tendon and dissected down. We identified our tear and repaired this with a #2 FiberWire suture. We found we had excellent repair. The synovitis was excised from the tendon sheath with a rongeur and scalpel. Medial displacement calcaneal osteotomy. Through a lateral incision we dissected down and identified our calcaneus and protected our neurovascular bundle, as well as our peroneal tendons. The soft tissue was removed off the lateral wall and protectors were placed both proximally and distally to protect our soft tissue. The position was checked under fluoroscopy. Once we selected our position, the oscillating saw was used to make an osteotomy. A medial side was performed and the guide pin was used to help hold the osteotomy in position. A second guide wire was placed and the position was checked under fluoroscopy. Once we elected our position we made an incision posteriorly, measured, drilled and inserted 2 headless screws. There was excellent compression achieved across our osteotomy site with good shift. Flexor digitorum deep tendon transfer.     Through a medial incision we dissected down and identified our flexor digitorum longus tendon. An incision was made in the tendon sheath and synovitis was removed. Careful dissection was made down to the [address] and a tenotomy was performed. Tendon was then secured with the FiberLoop suture and a guide pin was placed in the navicular. The position was checked under fluoroscopy. Once we elected our position, we measured a 4.5 tendon and drilled it for a 4.75 tenodesis screw. The tendon was transferred into the bone and secured with a tenodesis screw under proper tension. We had good restoration of the foot arch medially. Gastrocnemius recession:  The knee was extended and ankle dorsiflexed. A medial longitudinal incision was made along the medial gastrocnemius muscle and carried down to the fascial layer. The dissection was taken down to the gastroc and soleus fascia. Under tension, the gastroc fascia is released with a scalpel, which allowed for significantly increased ankle dorsiflexion with knee extension. Medial spring ligament secondary repair. Through a medial incision we dissected down and identified a chronic tear of the spring ligament, which exposed our talonavicular joint. We elected to repair the tendon using a #2 FiberWire suture and repaired this up to the joint in a running fashion. This was repaired up to the posterior tibial tendon as well. There was good purchase of our suture and improved alignment of the foot. Our wounds were then thoroughly irrigated. Final x-rays were taken and incisions were closed using 0 Vicryl suture for the fascial layer, 2-0 Vicryl for subcutaneous tissue and running 3-0 Monocryl for subcuticular closure. Nylon 3-0 was placed in the heel and a sterile dressing was placed, consisting of silver alginate sponge, 4 x 4, ABD, Webril, and a bulky Herron sugar-tong splint. The tourniquet had been deflated and toes pinked up nicely.   The patient was awoken from anesthesia and brought to PACU in stable condition. She will be nonweightbearing to her left lower extremity, received DVT prophylaxis and will follow up in the office in 1 week.      Dictated By Fidel Gilmore DO  d: 05/11/2023 11:34:29  t: 05/11/2023 17:17:18  [facility] [id number]  KZO/

## 2023-05-11 NOTE — H&P
Pre-op Diagnosis: Posterior tibial tendinitis left leg    The above referenced H&P was reviewed by Eugene Anthony PA-C on 5/11/2023, the patient was examined and no significant changes have occurred in the patient's condition since the H&P was performed. OK to proceed with procedure as planned.

## 2023-05-11 NOTE — BRIEF OP NOTE
Pre-Operative Diagnosis: Posterior tibial tendinitis left leg     Post-Operative Diagnosis: Posterior tibial tendinitis left leg      Procedure Performed:   Left posterior tibial tendon repair, flexor digitorum longus transfer medial displacement calcaneal osteotomy, gastrocnemius recession, SPRING LIGAMENT REPAIR    Surgeon(s) and Role:     * Meghann Acevedo DO - Primary    Assistant(s):  PA: Dasia Tillman PA-C     Surgical Findings: posterior tibial tendinitis     Specimen: n/a     Estimated Blood Loss: 10 cc    Dictation Number:  [id number]    Brandi Pelayo DO  5/11/2023  11:25 AM

## 2023-05-11 NOTE — ANESTHESIA PROCEDURE NOTES
Airway  Date/Time: 5/11/2023 9:34 AM  Urgency: Elective    Airway not difficult    General Information and Staff    Patient location during procedure: OR  Anesthesiologist: Wilbert Leong DO  Performed: anesthesiologist   Performed by: Wilbert Leong DO  Authorized by: Wilbert Leong DO      Indications and Patient Condition  Indications for airway management: anesthesia  Spontaneous Ventilation: absent  Sedation level: deep  Preoxygenated: yes  Patient position: sniffing  Mask difficulty assessment: 1 - vent by mask    Final Airway Details  Final airway type: endotracheal airway      Successful airway: ETT  Cuffed: yes   Successful intubation technique: direct laryngoscopy  Facilitating devices/methods: intubating stylet  Endotracheal tube insertion site: oral  Blade: Lazaro  Blade size: #4  ETT size (mm): 7.0    Cormack-Lehane Classification: grade IIA - partial view of glottis  Placement verified by: chest auscultation and capnometry   Measured from: teeth  ETT to teeth (cm): 21  Number of attempts at approach: 1  Number of other approaches attempted: 0

## 2023-05-11 NOTE — ANESTHESIA PROCEDURE NOTES
Peripheral Block    Date/Time: 5/11/2023 9:00 AM    Performed by: Neeraj Andrea MD  Authorized by: Neeraj Andrea MD      General Information and Staff    Start Time:  5/11/2023 9:00 AM  End Time:  5/11/2023 9:15 AM  Anesthesiologist:  Neeraj Andrea MD  Performed by: Anesthesiologist  Patient Location:  PACU    Block Placement: Pre Induction  Site Identification: real time ultrasound guided and image stored and retrievable    Block site/laterality marked before start: site marked  Reason for Block: at surgeon's request and post-op pain management    Preanesthetic Checklist: 2 patient identifers, IV checked, risks and benefits discussed, monitors and equipment checked, pre-op evaluation, timeout performed, anesthesia consent and sterile technique used      Procedure Details    Patient Position:   Prep: ChloraPrep and patient draped    Monitoring:  Cardiac monitor  Block Type:  Popliteal  Laterality:  Left  Injection Technique:  Single-shot    Needle    Needle Type:  Short-bevel  Needle Gauge:  22 G  Needle Length:  90 mm  Needle Localization:  Ultrasound guidance  Reason for Ultrasound Use: appropriate spread of the medication was noted in real time and no ultrasound evidence of intravascular and/or intraneural injection            Assessment    Injection Assessment:  Good spread noted, incremental injection, local visualized surrounding nerve on ultrasound, low pressure, negative aspiration for heme and no pain on injection  Heart Rate Change: No    - Patient tolerated block procedure well without evidence of immediate block related complications.      Medications  5/11/2023 9:00 AM  fentaNYL (SUBLIMAZE) injection 50mcg/ml - Intravenous   50 mcg - 5/11/2023 12:43:00 PM  lidocaine injection 1% - Intradermal   5 mL - 5/11/2023 9:00:00 AM  ropivacaine (NAROPIN) injection 0.5% - Infiltration   30 mL - 5/11/2023 9:00:00 AM  dexamethasone (DECADRON) PF injection 10 mg/ml - Injection   10 mg - 5/11/2023 9:00:00 AM    Additional Comments

## 2023-05-23 ENCOUNTER — MED REC SCAN ONLY (OUTPATIENT)
Dept: INTERNAL MEDICINE CLINIC | Facility: CLINIC | Age: 53
End: 2023-05-23

## 2023-06-12 ENCOUNTER — TELEPHONE (OUTPATIENT)
Dept: CASE MANAGEMENT | Age: 53
End: 2023-06-12

## 2023-06-12 DIAGNOSIS — M76.822 POSTERIOR TIBIAL TENDINITIS, LEFT LEG: Primary | ICD-10-CM

## 2023-06-12 NOTE — TELEPHONE ENCOUNTER
Dr. Ramsey Villalobos,     The patient is requesting a referral for physical therapy post surgery. Please check DX. Pended referral please review diagnosis and sign off if you agree. Thank you.   Blake Estes

## 2023-06-28 ENCOUNTER — MED REC SCAN ONLY (OUTPATIENT)
Dept: INTERNAL MEDICINE CLINIC | Facility: CLINIC | Age: 53
End: 2023-06-28

## 2023-06-29 ENCOUNTER — MED REC SCAN ONLY (OUTPATIENT)
Dept: INTERNAL MEDICINE CLINIC | Facility: CLINIC | Age: 53
End: 2023-06-29

## 2023-07-12 ENCOUNTER — MED REC SCAN ONLY (OUTPATIENT)
Dept: INTERNAL MEDICINE CLINIC | Facility: CLINIC | Age: 53
End: 2023-07-12

## 2023-07-12 ENCOUNTER — TELEPHONE (OUTPATIENT)
Dept: INTERNAL MEDICINE CLINIC | Facility: CLINIC | Age: 53
End: 2023-07-12

## 2023-07-12 DIAGNOSIS — M76.822 POSTERIOR TIBIAL TENDINITIS OF LEFT LEG: ICD-10-CM

## 2023-07-12 DIAGNOSIS — Z47.89 ORTHOTIC TRAINING: Primary | ICD-10-CM

## 2023-07-12 DIAGNOSIS — M25.572 LEFT ANKLE PAIN, UNSPECIFIED CHRONICITY: ICD-10-CM

## 2023-07-12 DIAGNOSIS — M77.32 CALCANEAL SPUR OF LEFT FOOT: ICD-10-CM

## 2023-07-12 DIAGNOSIS — M65.872 OTHER SYNOVITIS AND TENOSYNOVITIS, LEFT ANKLE AND FOOT: ICD-10-CM

## 2023-07-12 DIAGNOSIS — S96.012D STRAIN OF MUSCLE AND TENDON OF LONG FLEXOR MUSCLE OF TOE AT ANKLE AND FOOT LEVEL, LEFT FOOT, SUBSEQUENT ENCOUNTER: ICD-10-CM

## 2023-07-12 NOTE — TELEPHONE ENCOUNTER
Received fax from [facility] requesting a referral to be placed for patient to continue to receive PT through their facility.     Referral pended and routed to PCP for approval     CPT codes provided:  [phone]

## 2023-07-24 ENCOUNTER — MED REC SCAN ONLY (OUTPATIENT)
Dept: INTERNAL MEDICINE CLINIC | Facility: CLINIC | Age: 53
End: 2023-07-24

## 2023-07-29 DIAGNOSIS — I10 ESSENTIAL HYPERTENSION WITH GOAL BLOOD PRESSURE LESS THAN 140/90: ICD-10-CM

## 2023-07-29 DIAGNOSIS — E03.9 ACQUIRED HYPOTHYROIDISM: ICD-10-CM

## 2023-07-31 RX ORDER — LOSARTAN POTASSIUM AND HYDROCHLOROTHIAZIDE 25; 100 MG/1; MG/1
1 TABLET ORAL DAILY
Qty: 90 TABLET | Refills: 2 | Status: SHIPPED | OUTPATIENT
Start: 2023-07-31

## 2023-07-31 NOTE — TELEPHONE ENCOUNTER
Please review. Protocol failed / Has no protocol. No Active/ Future labs pended for TSH    Requested Prescriptions   Pending Prescriptions Disp Refills    levothyroxine 100 MCG Oral Tab 90 tablet 0     Sig: Take 1 tablet (100 mcg total) by mouth before breakfast.       Thyroid Medication Protocol Failed - 7/29/2023  8:36 AM        Failed - TSH in past 12 months        Passed - Last TSH value is normal     Lab Results   Component Value Date    TSH 2.520 02/21/2022    [facility] 2.14 04/17/2016                 Passed - In person appointment or virtual visit in the past 12 mos or appointment in next 3 mos     Recent Outpatient Visits              4 months ago Tibialis posterior tendon tear, nontraumatic, left    [facility], [address], [place]    Office Visit    4 months ago Chronic pain of left ankle    [facility], [address], [place]    Office Visit    5 months ago Acute left ankle pain    [facility], [address] [place] [address], [address] [place] [address]    Office Visit    7 months ago Encounter for annual routine gynecological examination    [address] [facility],[address], [address] [facility],[address] Floor, [facility], [facility]    Office Visit    7 months ago Large breasts    [facility], [address] [place] [address], [address] [place] [address]    Office Visit                       Signed Prescriptions Disp Refills    losartan-hydroCHLOROthiazide 100-25 MG Oral Tab 90 tablet 2     Sig: Take 1 tablet by mouth daily.        Hypertensive Medications Protocol Passed - 7/29/2023  8:36 AM        Passed - In person appointment in the past 12 or next 3 months     Recent Outpatient Visits              4 months ago Tibialis posterior tendon tear, nontraumatic, left    [facility], [address], [place]    Office Visit    4 months ago Chronic pain of left ankle    [facility], [address], [address] [place], [place]    Office Visit    5 months ago Acute left ankle pain    [facility], [address] [facility]    Office Visit    7 months ago Encounter for annual routine gynecological examination    [employer], [address] [facility],[address], 1200 [facility], [facility]    Office Visit    7 months ago Large breasts    [facility], [address] [facility], [facility]    Office Visit                      Passed - Last BP reading less than 140/90     BP Readings from Last 1 Encounters:  05/11/23 : 138/83              Passed - CMP or BMP in past 6 months     Recent Results (from the past 4392 hour(s))   COMP METABOLIC PANEL (14)    Collection Time: 04/19/23  9:04 AM   Result Value Ref Range    Glucose 100 (H) 70 - 99 mg/dL    Sodium 138 136 - 145 mmol/L    Potassium 3.7 3.5 - 5.1 mmol/L    Chloride 108 98 - 112 mmol/L    CO2 29.0 21.0 - 32.0 mmol/L    Anion Gap 1 0 - 18 mmol/L    BUN 17 7 - 18 mg/dL    Creatinine 0.88 0.55 - 1.02 mg/dL    BUN/CREA Ratio 19.3 10.0 - 20.0    Calcium, Total 8.6 8.5 - 10.1 mg/dL    Calculated Osmolality 288 275 - 295 mOsm/kg    eGFR-Cr 80 >=60 mL/min/1.73m2    ALT 18 13 - 56 U/L    AST 12 (L) 15 - 37 U/L    Alkaline Phosphatase 44 41 - 108 U/L    Bilirubin, Total 0.5 0.1 - 2.0 mg/dL    Total Protein 6.7 6.4 - 8.2 g/dL    Albumin 3.3 (L) 3.4 - 5.0 g/dL    Globulin  3.4 2.8 - 4.4 g/dL    A/G Ratio 1.0 1.0 - 2.0    Patient Fasting for CMP? No      *Note: Due to a large number of results and/or encounters for the requested time period, some results have not been displayed. A complete set of results can be found in Results Review.                Passed - In person appointment or virtual visit in the past 6 months     Recent Outpatient Visits              4 months ago Tibialis posterior tendon tear, nontraumatic, left [address] [facility], [place], [place]    Office Visit    4 months ago Chronic pain of left ankle    [facility], [address], [place], [place]    Office Visit    5 months ago Acute left ankle pain    [facility], [address] [facility], [address] [place] [address]    Office Visit    7 months ago Encounter for annual routine gynecological examination    Brandi Avelar, [address], 1200 [facility], [address] [place] [address], [facility]    Office Visit    7 months ago Large breasts    [address] [facility], [address] [place] [address], [place], [facility]    Office Visit                      Passed - EGFRCR or GFRNAA > 50     GFR Evaluation  EGFRCR: 80 , resulted on 4/19/2023                Recent Outpatient Visits              4 months ago Tibialis posterior tendon tear, nontraumatic, left    [facility], [address], [place], [place]    Office Visit    4 months ago Chronic pain of left ankle    [facility], [address], [place], [place]    Office Visit    5 months ago Acute left ankle pain    [facility], [address] [facility], [address] [place] [address], [place], [facility]    Office Visit    7 months ago Encounter for annual routine gynecological examination    Brandi Avelar, [address], [address] [facility], [address] [place] [address], [facility]    Office Visit    7 months ago Large breasts    [address] [facility], [address] [place] [address], [facility]    Office Visit

## 2023-07-31 NOTE — TELEPHONE ENCOUNTER
Refill passed per [facility] protocol. Requested Prescriptions   Pending Prescriptions Disp Refills    levothyroxine 100 MCG Oral Tab 90 tablet 0     Sig: Take 1 tablet (100 mcg total) by mouth before breakfast.       Thyroid Medication Protocol Failed - 7/29/2023  8:36 AM        Failed - TSH in past 12 months        Passed - Last TSH value is normal     Lab Results   Component Value Date    TSH 2.520 02/21/2022    [facility] 2.14 04/17/2016                 Passed - In person appointment or virtual visit in the past 12 mos or appointment in next 3 mos     Recent Outpatient Visits              4 months ago Tibialis posterior tendon tear, nontraumatic, left    [facility], [place], [place]    Office Visit    4 months ago Chronic pain of left ankle    [facility], [address] [place], [place]    Office Visit    5 months ago Acute left ankle pain    [address] [facility],[address], [address]    Office Visit    7 months ago Encounter for annual routine gynecological examination    [address] [facility],[address], [address] [facility],[address], 1200 [facility], [facility]    Office Visit    7 months ago Large breasts    [facility], [address] [place] [address]    Office Visit                        losartan-hydroCHLOROthiazide 100-25 MG Oral Tab 90 tablet 0     Sig: Take 1 tablet by mouth daily.        Hypertensive Medications Protocol Passed - 7/29/2023  8:36 AM        Passed - In person appointment in the past 12 or next 3 months     Recent Outpatient Visits              4 months ago Tibialis posterior tendon tear, nontraumatic, left    [facility], [place], [place]    Office Visit    4 months ago Chronic pain of left ankle    [facility], [address] [place], Noah Fitch D[place]    Office Visit    5 months ago Acute left ankle pain    [facility], [address] [facility], [place], [facility]    Office Visit    7 months ago Encounter for annual routine gynecological examination    Tonie Gutierrez, [address] [facility] [address], 1200 [facility], [place], [facility]    Office Visit    7 months ago Large breasts    [facility], [address] [facility], [facility], [place], [facility]    Office Visit                      Passed - Last BP reading less than 140/90     BP Readings from Last 1 Encounters:  05/11/23 : 138/83              Passed - CMP or BMP in past 6 months     Recent Results (from the past 4392 hour(s))   COMP METABOLIC PANEL (14)    Collection Time: 04/19/23  9:04 AM   Result Value Ref Range    Glucose 100 (H) 70 - 99 mg/dL    Sodium 138 136 - 145 mmol/L    Potassium 3.7 3.5 - 5.1 mmol/L    Chloride 108 98 - 112 mmol/L    CO2 29.0 21.0 - 32.0 mmol/L    Anion Gap 1 0 - 18 mmol/L    BUN 17 7 - 18 mg/dL    Creatinine 0.88 0.55 - 1.02 mg/dL    BUN/CREA Ratio 19.3 10.0 - 20.0    Calcium, Total 8.6 8.5 - 10.1 mg/dL    Calculated Osmolality 288 275 - 295 mOsm/kg    eGFR-Cr 80 >=60 mL/min/1.73m2    ALT 18 13 - 56 U/L    AST 12 (L) 15 - 37 U/L    Alkaline Phosphatase 44 41 - 108 U/L    Bilirubin, Total 0.5 0.1 - 2.0 mg/dL    Total Protein 6.7 6.4 - 8.2 g/dL    Albumin 3.3 (L) 3.4 - 5.0 g/dL    Globulin  3.4 2.8 - 4.4 g/dL    A/G Ratio 1.0 1.0 - 2.0    Patient Fasting for CMP? No      *Note: Due to a large number of results and/or encounters for the requested time period, some results have not been displayed. A complete set of results can be found in Results Review.                Passed - In person appointment or virtual visit in the past 6 months     Recent Outpatient Visits              4 months ago Tibialis posterior tendon tear, nontraumatic, left    [facility], [address], [place], [place]    Office Visit 4 months ago Chronic pain of left ankle    [facility], [address], [place], [place]    Office Visit    5 months ago Acute left ankle pain    [facility], [address] [facility], [address] [place] [address]    Office Visit    7 months ago Encounter for annual routine gynecological examination    [address], [address], 1200 [facility], [address]     Office Visit    7 months ago Large breasts    [address] [facility], [address] [place] [address], [facility]    Office Visit                      Passed - EGFRCR or GFRNAA > 50     GFR Evaluation  EGFRCR: 80 , resulted on 4/19/2023               Recent Outpatient Visits              4 months ago Tibialis posterior tendon tear, nontraumatic, left    [facility], [address], [place], [place]    Office Visit    4 months ago Chronic pain of left ankle    [facility], [address], [place], [place]    Office Visit    5 months ago Acute left ankle pain    [facility], [address] [facility], [address] [place] [address]    Office Visit    7 months ago Encounter for annual routine gynecological examination    [address], [address], 1200 [facility], [facility]    Office Visit    7 months ago Large breasts    [address] [facility], [address] [place] [address], [facility]    Office Visit

## 2023-08-01 RX ORDER — LEVOTHYROXINE SODIUM 0.1 MG/1
100 TABLET ORAL
Qty: 90 TABLET | Refills: 0 | Status: SHIPPED | OUTPATIENT
Start: 2023-08-01

## 2023-08-10 ENCOUNTER — TELEPHONE (OUTPATIENT)
Dept: INTERNAL MEDICINE CLINIC | Facility: CLINIC | Age: 53
End: 2023-08-10

## 2023-08-10 ENCOUNTER — MED REC SCAN ONLY (OUTPATIENT)
Dept: INTERNAL MEDICINE CLINIC | Facility: CLINIC | Age: 53
End: 2023-08-10

## 2023-08-10 NOTE — TELEPHONE ENCOUNTER
Konrad Manning  [phone]   Fax: [phone]    They faxed a form for Prior Authorization for PT for the pt's ins. Call Bharatul w/ status when they will receive back.

## 2023-10-02 ENCOUNTER — OFFICE VISIT (OUTPATIENT)
Dept: SURGERY | Facility: CLINIC | Age: 53
End: 2023-10-02
Payer: COMMERCIAL

## 2023-10-02 VITALS
SYSTOLIC BLOOD PRESSURE: 130 MMHG | BODY MASS INDEX: 41.62 KG/M2 | OXYGEN SATURATION: 97 % | HEART RATE: 73 BPM | DIASTOLIC BLOOD PRESSURE: 82 MMHG | WEIGHT: 232 LBS | HEIGHT: 62.5 IN

## 2023-10-02 DIAGNOSIS — F43.9 STRESS: ICD-10-CM

## 2023-10-02 DIAGNOSIS — R73.03 PREDIABETES: ICD-10-CM

## 2023-10-02 DIAGNOSIS — E66.01 MORBID OBESITY WITH BMI OF 40.0-44.9, ADULT (HCC): ICD-10-CM

## 2023-10-02 DIAGNOSIS — R06.83 SNORING: ICD-10-CM

## 2023-10-02 DIAGNOSIS — E78.00 HYPERCHOLESTEREMIA: Primary | ICD-10-CM

## 2023-10-02 DIAGNOSIS — I10 HYPERTENSION, UNSPECIFIED TYPE: ICD-10-CM

## 2023-10-02 DIAGNOSIS — E03.9 ACQUIRED HYPOTHYROIDISM: ICD-10-CM

## 2023-10-02 DIAGNOSIS — Z86.718 HISTORY OF DVT (DEEP VEIN THROMBOSIS): ICD-10-CM

## 2023-10-02 DIAGNOSIS — E55.9 VITAMIN D DEFICIENCY: ICD-10-CM

## 2023-10-02 RX ORDER — PHENTERMINE HYDROCHLORIDE 37.5 MG/1
37.5 TABLET ORAL
Qty: 30 TABLET | Refills: 3 | Status: SHIPPED | OUTPATIENT
Start: 2023-10-02

## 2023-10-02 RX ORDER — TOPIRAMATE 25 MG/1
25 TABLET ORAL 2 TIMES DAILY
Qty: 60 TABLET | Refills: 3 | Status: SHIPPED | OUTPATIENT
Start: 2023-10-02

## 2023-10-02 NOTE — PATIENT INSTRUCTIONS
Aim for 80 grams protein/day. 35 grams fiber/day. 25-30 grams/meal.     3 PM nuts/seeds. Eat within 1-3 hours upon waking. Meals between 7 or 9 AM and 7 PM.     6 days on, 1 day off. Cronometer for tracking. Add psyllium husk daily. Reynaldo. Build up to 35-40 grams of fiber/day. Aim for 64 oz of water/day. Aim for a total of:  2 fruits a day (avocado, tomato, citrus/oranges, apples, berries)  1/2 cup or medium size    4 non-starchy vegetables/day (1/2 cup cooked; 1 cup raw) (greens, peppers, onions, garlic, broccoli, cauliflower, brussels sprouts, asparagus, etc.)    0-2 starches/day (oatmeal, sweet potato, carrots, brown rice, etc). 3 protein per day (fish, seafood, meat, or plants: salmon, nuts, seeds, shrimp, chicken, turkey, beans, lentils, chickpeas, etc). 2 healthy fats (avocado, avocado oil, olives, olive oil, salmon, nuts, seeds)    I recommend a whole food, plant powered diet with low glycemic index:     Aim for 3 meals a day and 1-2 snacks as needed. Aim for a protein + produce at each meal time. Breakfast ideas:  1. Fruit and nuts/seeds. 2. Eggs scrambled with vegetables. 3. Oatmeal (stovetop), cinnamon, 2 tbsp flaxseed, berries, nuts. 4. Protein shake + fruit. (1 scoop with water/ice). [place] [id number], [place], [place], and Sp[place] are good brands. Snacks:  Raw vegetables and hummus, apples and peanut butter, nuts, seeds, fruit, pecans drizzled with organic honey. Use the Healthy Plate method for lunch and dinner:  1/2 right side of plate non-starchy vegetables. Bottom left 1/4 plate protein. Top left 1/4 starch as desired. Aim for 150 minutes moderate level exercise weekly with 2-3 days strength training. Add Magnesium glycinate 200 to 500 mg/day for sleep. Life Extension. NOW. Garden of Life. Whole Food Brand. MaryRuth's. Pure Encapsulations. Or consider Natural Calm.    by Jailyn Bean  Follow dosage instructions. Start 1 teaspoon and increase up to 3 teaspoons as needed for sleep.

## 2023-10-05 ENCOUNTER — OFFICE VISIT (OUTPATIENT)
Dept: SURGERY | Facility: CLINIC | Age: 53
End: 2023-10-05
Payer: COMMERCIAL

## 2023-10-05 DIAGNOSIS — R73.03 PREDIABETES: ICD-10-CM

## 2023-10-05 DIAGNOSIS — E78.00 HYPERCHOLESTEREMIA: ICD-10-CM

## 2023-10-05 DIAGNOSIS — I10 HYPERTENSION, UNSPECIFIED TYPE: ICD-10-CM

## 2023-10-05 DIAGNOSIS — E55.9 VITAMIN D DEFICIENCY: ICD-10-CM

## 2023-10-05 DIAGNOSIS — E03.9 ACQUIRED HYPOTHYROIDISM: ICD-10-CM

## 2023-10-05 DIAGNOSIS — E66.01 MORBID OBESITY WITH BMI OF 40.0-44.9, ADULT (HCC): Primary | ICD-10-CM

## 2023-10-05 PROCEDURE — 97802 MEDICAL NUTRITION INDIV IN: CPT | Performed by: DIETITIAN, REGISTERED

## 2023-10-05 NOTE — PATIENT INSTRUCTIONS
Goals:   1) Aim for 60-75g of protein per day  2) Try gold standard optimum nutrition protein powder, dymatize protein powder, orgain protein powder  3) Aim for 64oz/day of water per day  4) Aim for 15-20g of protein for breakfast: cottage cheese, greek yogurt, smoothie, eggs  5) Aim for protein and produce with every meal  6) Try to chew 25-30 times per bite  7) Try to follow myplate method for lunch and dinner  8) Try hearts of palm pasta, zucchini noodles, cauliflower rice  9) Can try healthy choice cafe steamers

## 2023-11-12 DIAGNOSIS — E03.9 ACQUIRED HYPOTHYROIDISM: ICD-10-CM

## 2023-11-14 RX ORDER — LEVOTHYROXINE SODIUM 0.1 MG/1
100 TABLET ORAL
Qty: 90 TABLET | Refills: 0 | Status: SHIPPED | OUTPATIENT
Start: 2023-11-14

## 2023-11-14 NOTE — TELEPHONE ENCOUNTER
Please review; protocol failed.    Requested Prescriptions   Pending Prescriptions Disp Refills    LEVOTHYROXINE 100 MCG Oral Tab [Pharmacy Med Name: LEVOTHYROXINE 0.100MG (100MCG) TAB] 90 tablet 0     Sig: TAKE 1 TABLET(100 MCG) BY MOUTH BEFORE BREAKFAST       Thyroid Medication Protocol Failed - 11/12/2023 11:02 PM        Failed - TSH in past 12 months        Passed - Last TSH value is normal     Lab Results   Component Value Date    TSH 2.520 02/21/2022    [facility] 2.14 04/17/2016                 Passed - In person appointment or virtual visit in the past 12 mos or appointment in next 3 mos     Recent Outpatient Visits              1 month ago Morbid obesity with BMI of 40.0-44.9, adult ([facility] [address]    [address], [address], [address] [place] [address], [address] [place] [address]    Office Visit    1 month ago [facility], [address], [facility], [facility]    Office Visit    7 months ago Tibialis posterior tendon tear, nontraumatic, left    [facility], [address], [place], [place]    Office Visit    8 months ago Chronic pain of left ankle    [facility], [address], [facility] [place] [place]    Office Visit    8 months ago Acute left ankle pain    [facility]    [facility]          Future Appointments         Provider Department Appt Notes    In 2 months John Merritt, [facility] [address], [address], [facility]                  Recent Outpatient Visits              1 month ago Morbid obesity with BMI of 40.0-44.9, adult [facility])    [address], [address], [address] [place] [address], [facility], [address] [place] [address]    Office Visit    1 month ago [facility], [address], [facility], APR    Office Visit    7 months ago Tibialis posterior tendon tear, nontraumatic, left [facility], [place], [place]    Office Visit    8 months ago Chronic pain of left ankle    [facility], [address], [place], [place]    Office Visit    8 months ago Acute left ankle pain    [facility], [employer], Marian [place], [place]    Office Visit          Future Appointments         Provider Department Appt Notes    In 2 months [facility] Man [facility], [address] [facility],[address], [facility]

## 2023-12-08 ENCOUNTER — TELEMEDICINE (OUTPATIENT)
Dept: INTERNAL MEDICINE CLINIC | Facility: CLINIC | Age: 53
End: 2023-12-08
Payer: COMMERCIAL

## 2023-12-08 DIAGNOSIS — M25.512 CHRONIC LEFT SHOULDER PAIN: Primary | ICD-10-CM

## 2023-12-08 DIAGNOSIS — G89.29 CHRONIC LEFT SHOULDER PAIN: Primary | ICD-10-CM

## 2023-12-08 PROCEDURE — 99213 OFFICE O/P EST LOW 20 MIN: CPT | Performed by: NURSE PRACTITIONER

## 2023-12-08 RX ORDER — CYCLOBENZAPRINE HCL 5 MG
5 TABLET ORAL NIGHTLY
Qty: 30 TABLET | Refills: 0 | Status: SHIPPED | OUTPATIENT
Start: 2023-12-08

## 2023-12-08 RX ORDER — CELECOXIB 100 MG/1
100 CAPSULE ORAL 2 TIMES DAILY
Qty: 30 CAPSULE | Refills: 0 | Status: SHIPPED | OUTPATIENT
Start: 2023-12-08

## 2023-12-08 NOTE — PROGRESS NOTES
Virtual Visit Check-In    [address] or legal guardian   verbally consents to a Virtual Visit Check-In service on 12/8/2023    Patient understands and accepts financial responsibility for any deductible, co-insurance and/or co-pays associated with this service. Duration of the service:   Call duration  10 minutes   Video visit     Chief Complaint   Patient presents with    Shoulder Pain     Left shoulder pain x 2 weeks. HPI:    Patient ID: Partha Javier is a 47year old female. She presents with left shoulder pain. She is having decreased ROM. She has increased pain when she lays on the left side. She states a few weeks ago she was doing leaf blowing. She thinks that trigger her symptoms. The symptoms started about 2 weeks. She describes the pain as shooting and throbbing. She has taken ibuprofen and heat for her symptoms with little relief. She is concerned because the symptoms have not improved despite taking medication. She did have an x-ray of the left shoulder completed in 12/2022 that showed Primary osteoarthritic changes of the left acromioclavicular joint. ROS    Review of Systems   Constitutional:  Negative for fever. HENT: Negative. Respiratory:  Negative for cough, shortness of breath and wheezing. Cardiovascular:  Negative for chest pain. Gastrointestinal:  Negative for abdominal pain. Genitourinary: Negative. Musculoskeletal:  Positive for arthralgias (left shoulder). Skin: Negative. Neurological: Negative. Psychiatric/Behavioral: Negative. Current Outpatient Medications   Medication Sig Dispense Refill    celecoxib (CELEBREX) 100 MG Oral Cap Take 1 capsule (100 mg total) by mouth 2 (two) times daily. 30 capsule 0    cyclobenzaprine 5 MG Oral Tab Take 1 tablet (5 mg total) by mouth nightly.  30 tablet 0    levothyroxine 100 MCG Oral Tab Take 1 tablet (100 mcg total) by mouth before breakfast. 90 tablet 0    Phentermine HCl 37.5 MG Oral Tab Take 1 tablet (37.5 mg total) by mouth every morning before breakfast. 30 tablet 3    topiramate 25 MG Oral Tab Take 1 tablet (25 mg total) by mouth 2 (two) times daily. 60 tablet 3    losartan-hydroCHLOROthiazide 100-25 MG Oral Tab Take 1 tablet by mouth daily. 90 tablet 2    diclofenac 1 % External Gel Apply 2 g topically 4 (four) times daily. 1 each 0       Allergies: Allergies   Allergen Reactions    Azithromycin      Other reaction(s): AZITHROMYCIN    Hydrocodone      Other reaction(s): feels dazed          Current Outpatient Medications:     celecoxib (CELEBREX) 100 MG Oral Cap, Take 1 capsule (100 mg total) by mouth 2 (two) times daily. , Disp: 30 capsule, Rfl: 0    cyclobenzaprine 5 MG Oral Tab, Take 1 tablet (5 mg total) by mouth nightly., Disp: 30 tablet, Rfl: 0    levothyroxine 100 MCG Oral Tab, Take 1 tablet (100 mcg total) by mouth before breakfast., Disp: 90 tablet, Rfl: 0    Phentermine HCl 37.5 MG Oral Tab, Take 1 tablet (37.5 mg total) by mouth every morning before breakfast., Disp: 30 tablet, Rfl: 3    topiramate 25 MG Oral Tab, Take 1 tablet (25 mg total) by mouth 2 (two) times daily. , Disp: 60 tablet, Rfl: 3    losartan-hydroCHLOROthiazide 100-25 MG Oral Tab, Take 1 tablet by mouth daily. , Disp: 90 tablet, Rfl: 2    diclofenac 1 % External Gel, Apply 2 g topically 4 (four) times daily. , Disp: 1 each, Rfl: 0    Past Medical History:   Diagnosis Date    Clot in the renal vein ([facility] [address] 01/01/2014    left lower extremity    Cyst of skin     right abdominal skin cyst January 2016    Deep vein thrombosis (HCC)     hx of DVT    Disorder of thyroid     High blood pressure     Hx of motion sickness     Hypothyroidism     PONV (postoperative nausea and vomiting)     Unspecified essential hypertension     Visual impairment          PHYSICAL EXAM:     Vitals signs taken at home if available:    Limited examination for this telephone visit due to the coronavirus emergency    Patient was speaking in complete sentences, no increased work of breathing and very coherent and alert on the phone. Alert and oriented x 3  Patient was responding to questions appropriately. Patient did not appear short of breath. ASSESSMENT/PLAN:     Encounter Diagnosis   Name Primary? Chronic left shoulder pain Yes       1. Chronic left shoulder pain  - celecoxib (CELEBREX) 100 MG Oral Cap; Take 1 capsule (100 mg total) by mouth 2 (two) times daily. Dispense: 30 capsule; Refill: 0  - cyclobenzaprine 5 MG Oral Tab; Take 1 tablet (5 mg total) by mouth nightly. Dispense: 30 tablet; Refill: 0  - if symptoms do not improve patient should follow up with physiatry. Patient reminded to practice good health and safety measures including washing hands, social distancing, covering mouth when coughing/ sneezing, avoid social meetings/ gatherings in face of this Covid 19 pandemic. Patient verbalized understanding of plan and all questions answered to the best of my ability. Patient to call back if any change/ worsening of symptoms. No orders of the defined types were placed in this encounter. Meds This Visit:  Requested Prescriptions     Signed Prescriptions Disp Refills    celecoxib (CELEBREX) 100 MG Oral Cap 30 capsule 0     Sig: Take 1 capsule (100 mg total) by mouth 2 (two) times daily. cyclobenzaprine 5 MG Oral Tab 30 tablet 0     Sig: Take 1 tablet (5 mg total) by mouth nightly. Imaging & Referrals:  None               Chartiy Marinelli, CELESTE  12/8/2023  9:36 AM      Please note that the following visit was completed using two-way, real-time interactive audio and/or video communication. This has been done in good herrera to provide continuity of care in the best interest of the provider-patient relationship, due to the ongoing public health crisis/national emergency and because of restrictions of visitation. There are limitations of this visit as no physical exam could be performed.   Every conscious effort was taken to allow for sufficient and adequate time. This billing was spent on reviewing labs, medications, radiology tests and decision making.   Appropriate medical decision-making and tests are ordered as detailed in the plan of care above  [id number]

## 2023-12-16 ENCOUNTER — HOSPITAL ENCOUNTER (OUTPATIENT)
Dept: MAMMOGRAPHY | Age: 53
Discharge: HOME | End: 2023-12-16
Attending: ADVANCED PRACTICE MIDWIFE
Payer: COMMERCIAL

## 2023-12-16 DIAGNOSIS — Z12.31 SCREENING MAMMOGRAM FOR BREAST CANCER: ICD-10-CM

## 2023-12-16 PROCEDURE — 77063 BREAST TOMOSYNTHESIS BI: CPT | Performed by: ADVANCED PRACTICE MIDWIFE

## 2023-12-16 PROCEDURE — 77067 SCR MAMMO BI INCL CAD: CPT | Performed by: ADVANCED PRACTICE MIDWIFE

## 2023-12-21 ENCOUNTER — OFFICE VISIT (OUTPATIENT)
Dept: INTERNAL MEDICINE CLINIC | Facility: CLINIC | Age: 53
End: 2023-12-21
Payer: COMMERCIAL

## 2023-12-21 VITALS
RESPIRATION RATE: 16 BRPM | BODY MASS INDEX: 39.01 KG/M2 | HEIGHT: 62 IN | DIASTOLIC BLOOD PRESSURE: 90 MMHG | SYSTOLIC BLOOD PRESSURE: 130 MMHG | HEART RATE: 99 BPM | WEIGHT: 212 LBS

## 2023-12-21 DIAGNOSIS — R09.81 NASAL CONGESTION: ICD-10-CM

## 2023-12-21 DIAGNOSIS — M26.622 TMJ TENDERNESS, LEFT: ICD-10-CM

## 2023-12-21 DIAGNOSIS — M25.512 CHRONIC LEFT SHOULDER PAIN: Primary | ICD-10-CM

## 2023-12-21 DIAGNOSIS — H92.02 LEFT EAR PAIN: ICD-10-CM

## 2023-12-21 DIAGNOSIS — G89.29 CHRONIC LEFT SHOULDER PAIN: Primary | ICD-10-CM

## 2023-12-21 PROCEDURE — 3075F SYST BP GE 130 - 139MM HG: CPT | Performed by: NURSE PRACTITIONER

## 2023-12-21 PROCEDURE — 3080F DIAST BP >= 90 MM HG: CPT | Performed by: NURSE PRACTITIONER

## 2023-12-21 PROCEDURE — 99214 OFFICE O/P EST MOD 30 MIN: CPT | Performed by: NURSE PRACTITIONER

## 2023-12-21 PROCEDURE — 3008F BODY MASS INDEX DOCD: CPT | Performed by: NURSE PRACTITIONER

## 2023-12-21 RX ORDER — FLUTICASONE PROPIONATE 50 MCG
2 SPRAY, SUSPENSION (ML) NASAL DAILY
Qty: 11.1 ML | Refills: 0 | Status: SHIPPED | OUTPATIENT
Start: 2023-12-21 | End: 2023-12-22

## 2023-12-21 RX ORDER — METHYLPREDNISOLONE 4 MG/1
TABLET ORAL
Qty: 21 TABLET | Refills: 0 | Status: SHIPPED | OUTPATIENT
Start: 2023-12-21

## 2023-12-21 RX ORDER — CYCLOBENZAPRINE HCL 5 MG
5 TABLET ORAL NIGHTLY
Qty: 30 TABLET | Refills: 0 | Status: SHIPPED | OUTPATIENT
Start: 2023-12-21

## 2023-12-22 RX ORDER — FLUTICASONE PROPIONATE 50 MCG
2 SPRAY, SUSPENSION (ML) NASAL DAILY
Qty: 48 G | Refills: 3 | Status: SHIPPED | OUTPATIENT
Start: 2023-12-22

## 2023-12-22 NOTE — TELEPHONE ENCOUNTER
Refill passed per [facility] protocol.   Requested Prescriptions   Pending Prescriptions Disp Refills    FLUTICASONE PROPIONATE 50 MCG/ACT Nasal Suspension [Pharmacy Med Name: FLUTICASONE 50MCG NASAL SP (120) RX] 48 g 0     Sig: SHAKE LIQUID AND USE 2 SPRAYS IN EACH NOSTRIL DAILY       Allergy Medication Protocol Passed - 12/21/2023 11:58 AM        Passed - In person appointment or virtual visit in the past 12 mos or appointment in next 3 mos     Recent Outpatient Visits              Yesterday Chronic left shoulder pain    Anival Holman, [place], APRROSMERY    Office Visit    2 weeks ago Chronic left shoulder pain    [facility], [address] [facility]    Telemedicine    2 months ago Morbid obesity with BMI of 40.0-44.9, [facility])    [address], [address] [facility],[address], [address] [place] [address]    Office Visit    2 months ago [address], [place], CELESTE    Office Visit    9 months ago Tibialis posterior tendon tear, nontraumatic, left    [employer], [address], [place], [place]    Office Visit          Future Appointments         Provider Department Appt Notes    In 1 month CELESTE Castillo [address], [address], [place] [employer]                  Recent Outpatient Visits              Yesterday Chronic left shoulder pain    [facility], [facility] [place]    Office Visit    2 weeks ago Chronic left shoulder pain    [facility], [address] [facility] [place] [facility], APRN    Telemedicine    2 months ago Morbid obesity with BMI of 40.0-44.9, [facility])    [address], [address] [facility],[address], [address] [place] [address]    Office Visit    2 months ago [address] [facility], [address], [place], CELESTE    Office Visit    9 months ago Tibialis posterior tendon tear, nontraumatic, left    [facility], [address], [place], [facility]    Office Visit          Future Appointments         Provider Department Appt Notes    In 1 month CELESTE [facility], [address], [facility]

## 2024-01-02 ENCOUNTER — PATIENT MESSAGE (OUTPATIENT)
Dept: INTERNAL MEDICINE CLINIC | Facility: CLINIC | Age: 54
End: 2024-01-02

## 2024-01-02 DIAGNOSIS — M25.512 CHRONIC LEFT SHOULDER PAIN: Primary | ICD-10-CM

## 2024-01-02 DIAGNOSIS — G89.29 CHRONIC LEFT SHOULDER PAIN: Primary | ICD-10-CM

## 2024-01-03 NOTE — TELEPHONE ENCOUNTER
Unfortunately no sooner openings in ortho than end of Jan currently. Please let me know if you want us to make pt appt and offer her the wait list.

## 2024-01-03 NOTE — TELEPHONE ENCOUNTER
From: Nunu Guan  To: Luz Anguiano  Sent: 1/2/2024 11:03 AM CST  Subject: scheduling with ortho    Anthony Escobar,    I tried scheduling with ortho and the first available appointment with anyone in the group you referred me to is in the end of January. You told me to message you if I could not get in quickly to possibly get a different referral for someone else.    Thank you!  Karely

## 2024-01-03 NOTE — TELEPHONE ENCOUNTER
I placed a referral for a different orthopedic. She can contact them to see if they have a sooner appointment.

## 2024-01-11 ENCOUNTER — TELEPHONE (OUTPATIENT)
Dept: INTERNAL MEDICINE CLINIC | Facility: CLINIC | Age: 54
End: 2024-01-11

## 2024-01-11 NOTE — TELEPHONE ENCOUNTER
Patient called to request referral be faxed to below information. Patient's appointment is Monday 1/15.     NAOMI LOPEZ    SURGERY, ORTHOPEDIC    Fax # [phone]

## 2024-01-16 ENCOUNTER — ORDER TRANSCRIPTION (OUTPATIENT)
Dept: PHYSICAL THERAPY | Facility: HOSPITAL | Age: 54
End: 2024-01-16

## 2024-01-16 DIAGNOSIS — M25.512 ACUTE PAIN OF LEFT SHOULDER: Primary | ICD-10-CM

## 2024-01-22 ENCOUNTER — OFFICE VISIT (OUTPATIENT)
Dept: SURGERY | Facility: CLINIC | Age: 54
End: 2024-01-22
Payer: COMMERCIAL

## 2024-01-22 VITALS
HEART RATE: 92 BPM | OXYGEN SATURATION: 96 % | WEIGHT: 208 LBS | HEIGHT: 62.5 IN | SYSTOLIC BLOOD PRESSURE: 110 MMHG | DIASTOLIC BLOOD PRESSURE: 80 MMHG | BODY MASS INDEX: 37.32 KG/M2

## 2024-01-22 DIAGNOSIS — E55.9 VITAMIN D DEFICIENCY: ICD-10-CM

## 2024-01-22 DIAGNOSIS — Z51.81 ENCOUNTER FOR THERAPEUTIC DRUG MONITORING: ICD-10-CM

## 2024-01-22 DIAGNOSIS — Z86.718 HISTORY OF DVT (DEEP VEIN THROMBOSIS): ICD-10-CM

## 2024-01-22 DIAGNOSIS — F43.9 STRESS: ICD-10-CM

## 2024-01-22 DIAGNOSIS — E66.01 MORBID OBESITY WITH BMI OF 40.0-44.9, ADULT (HCC): ICD-10-CM

## 2024-01-22 DIAGNOSIS — R73.03 PREDIABETES: ICD-10-CM

## 2024-01-22 DIAGNOSIS — E03.9 ACQUIRED HYPOTHYROIDISM: ICD-10-CM

## 2024-01-22 DIAGNOSIS — R06.83 SNORING: ICD-10-CM

## 2024-01-22 DIAGNOSIS — I10 HYPERTENSION, UNSPECIFIED TYPE: Primary | ICD-10-CM

## 2024-01-22 DIAGNOSIS — E66.9 OBESITY (BMI 30-39.9): ICD-10-CM

## 2024-01-22 PROCEDURE — 99214 OFFICE O/P EST MOD 30 MIN: CPT | Performed by: NURSE PRACTITIONER

## 2024-01-22 PROCEDURE — 3079F DIAST BP 80-89 MM HG: CPT | Performed by: NURSE PRACTITIONER

## 2024-01-22 PROCEDURE — 3074F SYST BP LT 130 MM HG: CPT | Performed by: NURSE PRACTITIONER

## 2024-01-22 PROCEDURE — 3008F BODY MASS INDEX DOCD: CPT | Performed by: NURSE PRACTITIONER

## 2024-01-22 RX ORDER — PHENTERMINE HYDROCHLORIDE 37.5 MG/1
37.5 TABLET ORAL
Qty: 30 TABLET | Refills: 3 | Status: SHIPPED | OUTPATIENT
Start: 2024-01-22

## 2024-01-22 RX ORDER — TOPIRAMATE 25 MG/1
25 TABLET ORAL 2 TIMES DAILY
Qty: 180 TABLET | Refills: 1 | Status: SHIPPED | OUTPATIENT
Start: 2024-01-22

## 2024-01-23 NOTE — PROGRESS NOTES
[facility], [facility], [place]  [address]  [facility] [address]  Dept: [phone]       Patient:  Nunu Guan  :      1970  MRN:      [id number]    Chief Complaint:  Weight Management, Obesity, Follow up     SUBJECTIVE     History of Present Illness:  Nunu is being seen today for a follow-up for non surgical weight loss.     Doing well.       Initial HPI: 2020  50 yo female presents to clinic for medically supported weight loss.      Patient is considering medications and is not considering bariatric surgery for weight loss.     Patient denies any history of eating disorder(s).     Patient is employed: .  Patient lives with, spouse and 2 children (13 and 15).     Patient's goal weight: Unsure   Biggest weight loss in the past: 100 lbs  How weight loss was achieved: Exercise   Heaviest weight ever: Current   Previous use of medical weight loss medications:     Physical activity: None      Sleep: 5-6 hours/night    Sleep screening: +snores, consider sleep study     Barriers: Motivation      Values: Breathing well, Fitting on Furniture, Children, Health      Past Medical History:   Past Medical History:   Diagnosis Date    Clot in the renal vein (HCC) 2014    left lower extremity    Cyst of skin     right abdominal skin cyst 2016    Deep vein thrombosis (HCC)     hx of DVT    Disorder of thyroid     High blood pressure     Hx of motion sickness     Hypercholesteremia 2020    Hypothyroidism     PONV (postoperative nausea and vomiting)     Unspecified essential hypertension     Visual impairment         Comorbidities:  Back pain-Improvement?  yes, Hypertension-Improvement?  yes and Hyperlipidemia-Improvement?  no    OBJECTIVE     Vitals: /80 (BP Location: Right arm, Patient Position: Sitting, Cuff Size: adult)   Pulse 92   Ht 5' 2.5\" (1.588 m)   Wt 208 lb (94.3 kg)   LMP 12/15/2023  (Within Days)   SpO2 96%   BMI 37.44 kg/m²     Initial weight loss: -24   Total weight loss: -59   Start weight: 267    Wt Readings from Last 6 Encounters:   01/22/24 208 lb (94.3 kg)   12/21/23 212 lb (96.2 kg)   10/02/23 232 lb (105.2 kg)   05/11/23 227 lb (103 kg)   02/20/23 223 lb (101.2 kg)   12/21/22 216 lb (98 kg)       Patient Medications:    Current Outpatient Medications   Medication Sig Dispense Refill    Phentermine HCl 37.5 MG Oral Tab Take 1 tablet (37.5 mg total) by mouth every morning before breakfast. 30 tablet 3    topiramate 25 MG Oral Tab Take 1 tablet (25 mg total) by mouth 2 (two) times daily. 180 tablet 1    fluticasone propionate 50 MCG/ACT Nasal Suspension 2 sprays by Nasal route daily. 48 g 3    cyclobenzaprine 5 MG Oral Tab Take 1 tablet (5 mg total) by mouth nightly. 30 tablet 0    levothyroxine 100 MCG Oral Tab Take 1 tablet (100 mcg total) by mouth before breakfast. 90 tablet 0    losartan-hydroCHLOROthiazide 100-25 MG Oral Tab Take 1 tablet by mouth daily. 90 tablet 2    diclofenac 1 % External Gel Apply 2 g topically 4 (four) times daily. 1 each 0     Allergies:  Azithromycin and Hydrocodone     Social History:  Reviewed    Surgical History:    Past Surgical History:   Procedure Laterality Date    ANKLE FRACTURE SURGERY Left 2014    left ankle stress fracture 2014    ARTHROSCOPY OF JOINT UNLISTED      left knee screw    COLONOSCOPY N/A 04/28/2022    Procedure: COLONOSCOPY;  Surgeon: Flex Rubi MD;  Location: [facility] ENDOSCOPY    KNEE SURGERY  2001    [facility]     OTHER SURGICAL HISTORY  2004    vaginal delivery    OTHER SURGICAL HISTORY  2006    vaginal delivery     Family History:    Family History   Problem Relation Age of Onset    Hypertension Father         overweight     Other (alzheimers) Father     Diabetes Mother         borderline, overweight     Other (Other) Maternal Grandmother         Karie's Granulomatosis    Cancer Maternal Grandfather         LUNG    Other (Other)  Sister         Rheumatoid Arthritis    Colon Cancer Paternal Uncle      Initial Intake:  After dinner behavior: chips, popcorn cakes   Night eating: -  Portion sizes: at times   Binge: BED 7-  Emotional: +  Depression: Total PHQ Score: 1  Grazing: -  Sweet tooth: at times   Crunchy/salty: +  Etoh: Wine, a few times a week (1-2 glasses)   Soda Drinker: Yes                 If yes, how much?:  Occasional  Sports Drinks:  No                  Juice:  No     Food Journal  Reviewed and Discussed:       Patient has a Food Journal?: yes   [employer]  Patient is reading nutrition labels?  yes  Average Caloric Intake:      Average CHO Intake:    Is patient exercising? no Minimal   Type of exercise? PT shoulder     Eating Habits  Patient states the following:  Eats 3 meal(s) per day  # of snacks per day: 1-2   Type of snacks:  fruit    Amount of soda consumption per day:  1 every couple of weeks   Amount of water (in ounces) per day:  Not adequate   Toughest challenge:  Family stress   Sleep: Good       Nutritional Goals  Eat 3-4 cups of fresh fruits or vegetables daily    Behavior Modifications Reviewed and Discussed  Eat breakfast, Eat 3 meals per day, Plan meals in advance, Read nutrition labels, Drink 64 oz of water per day, Maintain a daily food journal, Utlize portion control strategies to reduce calorie intake, Identify triggers for eating and manage cues and Eat slowly and take 20 to 30 minutes to complete each meal    Exercise Goals Reviewed and Discussed    Other:  Aim for 150 minutes exercise/week    Step challenge    ROS:    Constitutional: negative  Respiratory: negative  Cardiovascular: negative  Gastrointestinal: negative  Genitourinary:negative  Integument/breast: negative  Hematologic/lymphatic: negative  Musculoskeletal:positive for back pain and improved   Neurological: positive for headaches  Behavioral/Psych: negative  Endocrine: negative  All other systems were reviewed and are negative      Physical Exam:    General appearance: alert, appears stated age, cooperative and obese   Head: Normocephalic, without obvious abnormality, atraumatic  Neck: no adenopathy, no carotid bruit, no JVD, supple, symmetrical, trachea midline and thyroid not enlarged, symmetric, no tenderness/mass/nodules  Lungs: clear to auscultation bilaterally  Heart: S1, S2 normal, no murmur, click, rub or gallop, regular rate and rhythm  Abdomen: soft, non-tender; bowel sounds normal; no masses,  no organomegaly and abdomen obese   Extremities: extremities normal, atraumatic, no cyanosis or edema  Pulses: 2+ and symmetric  Skin: Skin color, texture, turgor normal. No rashes or lesions  Neurologic: Grossly normal    ASSESSMENT       Encounter Diagnosis(ses):   Encounter Diagnoses   Name Primary?    Hypertension, unspecified type Yes    Obesity (BMI 30-39.9)     Stress     Encounter for therapeutic drug monitoring     Prediabetes     History of DVT (deep vein thrombosis)     Acquired hypothyroidism     Vitamin D deficiency     Snoring     Morbid obesity with BMI of 40.0-44.9, adult ([facility])        PLAN         Diagnoses and all orders for this visit:    Hypertension, unspecified type    Obesity (BMI 30-39.9)    Stress    Encounter for therapeutic drug monitoring    Prediabetes    History of DVT (deep vein thrombosis)    Acquired hypothyroidism    Vitamin D deficiency    Snoring    Morbid obesity with BMI of 40.0-44.9, adult ([facility])  -     Phentermine HCl 37.5 MG Oral Tab; Take 1 tablet (37.5 mg total) by mouth every morning before breakfast.  -     topiramate 25 MG Oral Tab; Take 1 tablet (25 mg total) by mouth 2 (two) times daily.      HYPERCHOLESTEROLEMIA:  Recommend dietary changes and lifestyle modifications as discussed below. Monitor.       Lab Results   Component Value Date/Time    CHOLEST 199 02/21/2022 09:47 AM     (H) 02/21/2022 09:47 AM    HDL 67 (H) 02/21/2022 09:47 AM    TRIG 155 (H) 02/21/2022 09:47 AM    VLDL 26 02/21/2022 09:47 AM        HYPERTENSION: Blood pressure controlled on the above medications. No interval change in antihypertensive medication. Monitor closely.       HYPOTHYROIDISM: On levothyroxine. Continue present management.     History Vitamin D deficiency.     OBESITY/WEIGHT GAIN:  PREDIABETES:     Discussed starting weight:  267 lbs; BMI: 48.1; WC: 51 in.  Patient's goal weight: Unsure  Values: Breathing well, Fitting on Furniture, Children, Health       Recommended patient continue intensive lifestyle and behavioral modifications at this time for weight loss.     Reviewed lifestyle modifications: Whole Food/Plant Strong/Low Glycemic Index diet, moderate alcohol consumption, reduced sodium intake to no more than 2,400 mg/day, and at least 150 minutes of moderate physical activity per week.   Avoid processed, poor quality carbohydrates, refined grains, flour, sugar.     Goals for next month:  1. Keep a food log.   2. Drink 64 ounces of non-caloric beverages per day. No fruit juices or regular soda.  3. Aim for 150 minutes moderate exercise per week.    4. Increase fruit and vegetable servings to 5-6 per day.    5. Improve sleep and stress.      Reviewed labs: 10/14/19  Renal/liver function intact; CBC in range  TSH 3.790 on levothyroxine  2/22/2020 Updated TSH, T4 in range.  B 12 in range.  Vitamin D low- continue 50,000 IU capsule weekly.   11/28/2020: CMP and CBC in range.  Vitamin D remains low.  2/21/22- Blood sugar elevated, CMP otherwise notes no significant abnormalities.   LDL and triglycerides elevated.   Vitamin D low- will need to supplement.    4/19/23- CBC in range.  , CMP otherwise in range.   Vitamin D 36.8. Supplement OTC daily.     Reviewed medication options for weight loss in detail with patient.      +cravings, emotional eating, evening snacking- improved.     Leptin elevated.  Denies hx cardiac disease or substance abuse.    Previous successful treatment with phentermine/topiramate combination  (topiramate 25 mg BID, phentermine 37.5 mg/day).     Hold off on Qsymia.    Continue phentermine 37.5 mg in the AM.    Continue 25 mg topiramate BID.    Prefers not to take injectable, Saxenda or Wegovy.    Consider Contrave.       Discussed risks, benefits, rationale, and side effects of medication(s) including hypertension, palpitations, tachycardia, dizziness, constipation, dry mouth, and anxiety among others.     2/23/2021 EKG within normal limits. EKG done 2/15/22, no change.  4/19/23- EKG NSR.    Recommend magnesium glycinate to improve sleep.      IF, 12 hours.     Hold off on food tracking for now, causes significant anxiety- follow [employer].     Continue counseling.     Referral to integrative center previously provided: acupuncture, massage, mindfulness therapy.    Goal weight: < 200 lbs.    RTC: 4 months.   CELESTE Mckee

## 2024-02-01 ENCOUNTER — TELEPHONE (OUTPATIENT)
Dept: PHYSICAL THERAPY | Facility: HOSPITAL | Age: 54
End: 2024-02-01

## 2024-02-06 ENCOUNTER — OFFICE VISIT (OUTPATIENT)
Dept: PHYSICAL THERAPY | Age: 54
End: 2024-02-06
Attending: ORTHOPAEDIC SURGERY
Payer: COMMERCIAL

## 2024-02-06 ENCOUNTER — TELEPHONE (OUTPATIENT)
Dept: PHYSICAL THERAPY | Facility: HOSPITAL | Age: 54
End: 2024-02-06

## 2024-02-06 DIAGNOSIS — M25.512 ACUTE PAIN OF LEFT SHOULDER: Primary | ICD-10-CM

## 2024-02-06 PROCEDURE — 97110 THERAPEUTIC EXERCISES: CPT

## 2024-02-06 PROCEDURE — 97162 PT EVAL MOD COMPLEX 30 MIN: CPT

## 2024-02-06 NOTE — PROGRESS NOTES
SHOULDER EVALUATION:     Diagnosis:   Acute pain of left shoulder (M25.512)       Referring Provider: Lurdes  Date of Evaluation:    2/6/2024    Precautions:  None Next MD visit:   none scheduled  Date of Surgery: n/a     PATIENT SUMMARY   Nunu Guan is a 53 year old female who presents to therapy today with complaints of left shoulder pain. Patient reports a progressive increase of left shoudler pain over the last few months.  She helped her son with yard work a couple months ago and that really aggravated her left shoudler pain.  She became more limited with her mobility prompting her to follow up with the MD.  She is right hand dominate.  She did get an injection in the shoudler on 1/15 and she was able to reach better, but now this week she is noticing that any improvement she had is now gone.  She had also tried pain creams and steroid pack to manage the pain.  She lcoates her pain at the superior shoudler, anterior shoudler and anterior/lateral upper arm.  She describes her pain as a pinching and sharp pain in the shoudler. She denies any specific numbness/tingling or neck pain.  She does try to avoid using the left arm.  She has used heat and also ibuprofen for pain relief.  Patient would like physical therapy at this time to improve their functional independence.      Pt describes pain level current 1/10, at best 1/10, at worst 4/10.   Current functional limitations include reaching out in front , reaching up, reaching to the side, reaching behind her back, donning her bra, UE dressing, donning a jacket, using a blow dryer, laying on the left, carrying a bag with the left.      Nunu describes prior level of function able to do all activities with minimal shoudler . Pt goals include decrease her shoudler pain and improve her overall UE function.  Past medical history was reviewed with Nunu. Significant findings include HTN, hx of DVT, hypothyroidism    ASSESSMENT  Nunu presents to  physical therapy evaluation with primary c/o left shoulder pain. The results of the objective tests and measures show decreased range of motion, capsular stiffness and strenght deficits which is causing her pain at end range and decreased control of the humeral head in the glenoid. She has insufficient strenght to move the shoudler with a good scapulohumeral rhythm causing her pain and shoulder impingement.  Functional deficits include but are not limited to pain with dressing, styling her hair, carrying and reaching.  Signs and symptoms are consistent with diagnosis of acute shoulder pain . Pt and PT discussed evaluation findings, pathology, POC and HEP.  Pt voiced understanding and performs HEP correctly without reported pain. Skilled Physical Therapy is medically necessary to address the above impairments and reach functional goals.     OBJECTIVE:   Observation/Posture: Patient stands with a forward head, thoracic kyphosis, anterior scapular tilt, minimal shoudler depression, and left scapular upward rotation   Palpation: tender at the anterior shoudler, biceps tendon, and lateral shoulder  Sensation: No deficits to light touch in the bilateral UE.  Cervical Screen: WFL into all planes     AROM: (* denotes performed with pain)   Shoulder    Flexion: R 175; L 155  Abduction: R 180; L 150  ER: R 88; L 80  IR: R 38; L 35     Accessory motion: decreased scpaular upward rotation with active abduction. Stiff with GH joint inferior and posterior glides on the right     Flexibility: short and stiff pec minor bilaterally, R > L    Strength/MMT: (* denotes performed with pain)  Shoulder Elbow Scapular   Flexion: R 4+/5; L 3+/5  Abduction: R 4+/5; L 3+*/5  ER: R 4/5; L 3+*/5  IR: R 4/5; L 4/5   Flexion: R 5/5; L 4/5  Extension: R 4+ /5; L 4+/5  Biceps: R 4+/5; L 4+/5  Triceps: R 4+/5; L 4+/5  Mid trap: R 3/5; L 3-/5*   Low trap: R 3/5; L 3-/5*     Special tests:     Subacromial Pain Syndrome:  Empty Can test: R negative, L  positive  Painful Arc Sign: R negative, L positive  External Rotation Resistance: R negative , L positive  Robles-Jules Impingement Sign: R positive , L positive      Today’s Treatment and Response:   Pt education was provided on exam findings, treatment diagnosis, treatment plan, expectations, and prognosis. Pt was also provided recommendations for activity modifications, possible soreness after evaluation, and importance of remaining active.  Patient was instructed in and issued a HEP for: Dowel ER, isometric ER, isometric IR, scap retraction and pendulums     Charges: PT Eval Moderate Complexity, 2 ther ex      Total Timed Treatment: 23 min     Total Treatment Time: 43 min Interventions performed at the initial evaluation:   GH joint inferior and posterior glides and long axis distraction grade II-III and grade III PROM ER, flexion, scaption and abduction. Isometric ER and IR x 10, 3\", scap retraction x 10, 5\", pendulums x 10 each CW and CCW and cane ER for AAROM x 15    Based on clinical rationale and outcome measures, this evaluation involved Moderate Complexity decision making due to 1-2 personal factors/comorbidities, 3 body structures involved/activity limitations, and evolving symptoms including changing pain levels.  PLAN OF CARE:    Goals: (to be met in 10 visits)   1. Patient will be independent in a progressive HEP to help manage symptoms and achieve functional independence in 3 sessions.  2. Patient will decrease her max pain to 2/10 as needed to improve her functional independence in 3 weeks.  3. Patient will increase her shoudler range by 10 deg into all directions to decrease pain with dressing by 50%.  4. Patient will increase her shoudler abduction range to 165 deg or more to decrease pain with reaching out to the side with daily chores  5. Patient will increase her flexion range to 170 deg to facilitate decreased pain with styling her hair  6. Patient will increase her ER/IR strenght to 4+/5  as needed to improve her ability to lift and carry during ADLS.    Frequency / Duration: Patient will be seen for 2 x/week or a total of 10 visits over a 90 day period. Treatment will include: Manual Therapy, Neuromuscular Re-education, Self-Care Home Management, Therapeutic Activities, Therapeutic Exercise, and Home Exercise Program instruction    Education or treatment limitation: None  Rehab Potential:good    QuickDASH Outcome Score  Score: 15.91 % (2/1/2024  9:20 AM)      Patient/Family/Caregiver was advised of these findings, precautions, and treatment options and has agreed to actively participate in planning and for this course of care.    Thank you for your referral. Please co-sign or sign and return this letter via fax as soon as possible to [phone]. If you have any questions, please contact me at Dept: [phone]    Sincerely,  Electronically signed by therapist: Angella Hansen PT  Physician's certification required: Yes  I certify the need for these services furnished under this plan of treatment and while under my care.    X___________________________________________________ Date____________________    Certification From: 2/6/2024  To:5/6/2024

## 2024-02-08 ENCOUNTER — ORDER TRANSCRIPTION (OUTPATIENT)
Dept: PHYSICAL THERAPY | Facility: HOSPITAL | Age: 54
End: 2024-02-08

## 2024-02-08 DIAGNOSIS — M25.512 ACUTE PAIN OF LEFT SHOULDER: Primary | ICD-10-CM

## 2024-02-10 DIAGNOSIS — I10 ESSENTIAL HYPERTENSION WITH GOAL BLOOD PRESSURE LESS THAN 140/90: ICD-10-CM

## 2024-02-10 DIAGNOSIS — E03.9 ACQUIRED HYPOTHYROIDISM: ICD-10-CM

## 2024-02-12 RX ORDER — LEVOTHYROXINE SODIUM 0.1 MG/1
100 TABLET ORAL
Qty: 90 TABLET | Refills: 0 | Status: SHIPPED | OUTPATIENT
Start: 2024-02-12

## 2024-02-12 RX ORDER — LOSARTAN POTASSIUM AND HYDROCHLOROTHIAZIDE 25; 100 MG/1; MG/1
1 TABLET ORAL DAILY
Qty: 90 TABLET | Refills: 3 | Status: SHIPPED | OUTPATIENT
Start: 2024-02-12

## 2024-02-12 NOTE — TELEPHONE ENCOUNTER
Refill passed per [facility] protocol.  Requested Prescriptions   Pending Prescriptions Disp Refills    losartan-hydroCHLOROthiazide 100-25 MG Oral Tab 90 tablet 2     Sig: Take 1 tablet by mouth daily.       Hypertension Medications Protocol Passed - 2/10/2024 12:27 AM        Passed - CMP or BMP in past 12 months        Passed - Last BP reading less than 140/90     BP Readings from Last 1 Encounters:   01/22/24 110/80               Passed - In person appointment or virtual visit in the past 12 mos or appointment in next 3 mos     Recent Outpatient Visits              6 days ago Acute pain of left shoulder    [facility] [place] Angella Hansen, PT    Office Visit    3 weeks ago Hypertension, unspecified type    [facility], Sangeeta Sidhu APRN    Office Visit    1 month ago Chronic left shoulder pain    [facility], Luz Mojica APRN    Office Visit    2 months ago Chronic left shoulder pain    [facility], [place]    Telemedicine    4 months ago Morbid obesity with BMI of 40.0-44.9, adult ([facility])    [facility], [place] [address]    Office Visit          Future Appointments         Provider Department Appt Notes    Tomorrow Angella Hansen, PT [facility] [place] 5 visits 2/1 to 5/1  [employer]  no c/p, 60 visits    In 1 week Angella Hansen, PT [facility] [place] 5 visits 2/1 to 5/1  [employer]  no c/p, 60 visits    In 1 week Jade Angella, PT [facility] [place] 5 visits 2/1 to 5/1  [employer]  no c/p, 60 visits    In 2 weeks Jade Angella, PT [facility] [place] 5 visits 2/1 to 5/1  [employer]  no c/p, 60 visits    In 2 weeks Angella Hansen, PT [facility] [place] 5 visits 2/1 to 5/1  [facility]  no c/p, 60 visits    In 3 weeks Lindsay Hansenil, PT [facility] [place] 5 visits 2/1 to 5/1  [facility] [employer] [facility]  no c/p, 60 visits    In 3 weeks HilliardGeorge, Angella, PT [facility] [place] 5 visits 2/1 to 5/1  [facility] [employer] [facility]  no c/p, 60 visits    In 1 month Darcy Hansengail, PT [facility] [place] 5 visits 2/1 to 5/1  [facility]  no c/p, 60 visits    In 3 months [facility]                Passed - EGFRCR or GFRNAA > 50     GFR Evaluation  EGFRCR: 80 , resulted on 4/19/2023            levothyroxine 100 MCG Oral Tab 90 tablet 0     Sig: Take 1 tablet (100 mcg total) by mouth before breakfast.       Thyroid Medication Protocol Failed - 2/10/2024 12:27 AM        Failed - TSH in past 12 months        Passed - Last TSH value is normal     Lab Results   Component Value Date    TSH 2.520 02/21/2022    THYROIDFUNC 2.14 04/17/2016                 Passed - In person appointment or virtual visit in the past 12 mos or appointment in next 3 mos     Recent Outpatient Visits              6 days ago Acute pain of left shoulder    [facility] [place] Angella Hansen PT    Office Visit    3 weeks ago Hypertension, unspecified type    [facility]    Office Visit    1 month ago [facility] left [facility]    Office Visit    2 months ago [facility] left shoulder pain    [facility]    [facility]icine    4 months ago Morbid obesity with BMI of 40.0-44.9, adult ([facility])    [facility]    Office Visit          Future Appointments         Provider Department Appt Notes     Tomorrow Hilliard-Brittich, Angella, PT [facility] [place] 5 visits 2/1 to 5/1  [employer]  no c/p, 60 visits    In 1 week Hilliard-Brittich, Angella, PT [facility] [place] 5 visits 2/1 to 5/1  [employer]  no c/p, 60 visits    In 1 week Hilliard-Brittich, Angella, PT [facility] [place] 5 visits 2/1 to 5/1  [employer]  no c/p, 60 visits    In 2 weeks Hilliard-Brittich, Angella, PT [facility] [place] 5 visits 2/1 to 5/1  [employer]  no c/p, 60 visits    In 2 weeks Hilliard-Brittich, Angella, PT [facility] [place] 5 visits 2/1 to 5/1  [employer]  no c/p, 60 visits    In 3 weeks Hilliard-Brittich, Angella, PT [facility] [place] 5 visits 2/1 to 5/1  [employer]  no c/p, 60 visits    In 3 weeks Hilliard-Brittich, Angella, PT [facility] [place] 5 visits 2/1 to 5/1  [employer]  no c/p, 60 visits    In 1 month Hilliard-Brittich, Angella, PT [facility] [place] 5 visits 2/1 to 5/1  [employer]  no c/p, 60 visits    In 3 months Sangeeta Marcum APRN [facility], [place]                   Future Appointments         Provider Department Appt Notes    Tomorrow Hilliard-Brittich, Angella, PT [facility] [place] 5 visits 2/1 to 5/1  [employer]  no c/p, 60 visits    In 1 week Hilliard-Brittich, Angella, PT [facility] [place] 5 visits 2/1 to 5/1  [employer]  no c/p, 60 visits    In 1 week Jade, Angella, PT [facility] [place] 5 visits 2/1 to 5/1  [facility] [facility]  no c/p, 60 visits    In 2 weeks Arminda-Caden, Angella, PT [facility] [place] 5 visits 2/1 to 5/1  [facility]  no c/p, 60 visits    In 2 weeks Jade, Angella, PT [facility] [place] 5 visits 2/1 to 5/1  [facility]  no c/p, 60 visits    In 3 weeks Arminda-Caden, Angella, PT [facility] [place] 5 visits 2/1 to 5/1  [facility]  [facility]/p, 60 visits    In 3 weeks Angella Hansen, PT [facility] [place] 5 visits 2/1 to 5/1  [facility]  no c/p, 60 visits    In 1 month Jade, Angella, PT [facility] [place] 5 visits 2/1 to 5/1  [facility]  no c/p, 60 visits    In 3 months Sangeeta Marcum APRN [facility] [place]           Recent Outpatient Visits              6 days ago Acute pain of left shoulder    [facility] [place] Angella Hansen, PT    Office Visit    3 weeks ago Hypertension, unspecified type    [facility] [place]    Office Visit    1 month ago Chronic left shoulder pain    [facility] [place] [facility]    Office Visit    2 months ago Chronic left shoulder pain    [facility] [place]    Telemedicine    4 months ago Morbid obesity with BMI of 40.0-44.9, adult ([facility]    [facility] [place]    Office Visit

## 2024-02-12 NOTE — TELEPHONE ENCOUNTER
Please review; protocol failed/No Protocol    Sent Sunlothart message to patient to complete thyroid labs from 11/2023    LOV:12/21/2023  Requested Prescriptions   Pending Prescriptions Disp Refills    levothyroxine 100 MCG Oral Tab 90 tablet 0     Sig: Take 1 tablet (100 mcg total) by mouth before breakfast.       Thyroid Medication Protocol Failed - 2/10/2024 12:27 AM        Failed - TSH in past 12 months        Passed - Last TSH value is normal     Lab Results   Component Value Date    TSH 2.520 02/21/2022    THYROIDFUNC 2.14 04/17/2016                 Passed - In person appointment or virtual visit in the past 12 mos or appointment in next 3 mos     Recent Outpatient Visits              6 days ago Acute pain of left shoulder    [facility] [place] Angella Hansen PT    Office Visit    3 weeks ago Hypertension, unspecified type    [facility], Sangeeta Sidhu APRN    Office Visit    1 month ago Chronic left shoulder pain    [facility], Luz Mojica APRN    Office Visit    2 months ago Chronic left shoulder pain    [facility] [place] [place] [facility]    Telemedicine    4 months ago Morbid obesity with BMI of 40.0-44.9, adult ([facility])    [facility], Isha Castro RD    Office Visit          Future Appointments         Provider Department Appt Notes    Tomorrow Angella Hansen, PT [facility] [place] 5 visits 2/1 to 5/1  [facility]  no c/p, 60 visits    In 1 week Angella Hansen, PT [facility] [place] 5 visits 2/1 to 5/1  [employer]  no c/p, 60 visits    In 1 week Angella Hansen, PT [facility] [place] 5 visits 2/1 to 5/1  [employer]  no c/p, 60 visits    In 2 weeks Angella Hansen, PT [facility] [place] 5  visits 2/1 to 5/1  [facility]  no c/p, 60 visits    In 2 weeks Hilliard-Brittich, Angella, PT [facility] [place] 5 visits 2/1 to 5/1  [employer]  no c/p, 60 visits    In 3 weeks Hilliard-Brittich, Angella, PT [facility] [place] 5 visits 2/1 to 5/1  [facility] [facility]  no c/p, 60 visits    In 3 weeks Hilliard-Brittich, Angella, PT [facility] [place] 5 visits 2/1 to 5/1  [facility] [employer] [facility]  no c/p, 60 visits    In 1 month Hilliard-Brittich, Angella, PT [facility] [place] 5 visits 2/1 to 5/1  [facility] [employer] [facility]  no c/p, 60 visits    In 3 months Sangeeta Marcum APRN [facility]                 Signed Prescriptions Disp Refills    losartan-hydroCHLOROthiazide 100-25 MG Oral Tab 90 tablet 3     Sig: Take 1 tablet by mouth daily.       Hypertension Medications Protocol Passed - 2/10/2024 12:27 AM        Passed - CMP or BMP in past 12 months        Passed - Last BP reading less than 140/90     BP Readings from Last 1 Encounters:   01/22/24 110/80               Passed - In person appointment or virtual visit in the past 12 mos or appointment in next 3 mos     Recent Outpatient Visits              6 days ago Acute pain of left shoulder    [facility] [place] Angella Hansen, PT    Office Visit    3 weeks ago [facility]pertension, unspecified type    [facility]    Office Visit    1 month ago Chronic left shoulder pain    [facility] Luz Mojica APRN    Office Visit    2 months ago [facility] left shoulder pain    [facility]    [facility]    4 months ago Morbid obesity with BMI of 40.0-44.9, adult ([facility])    [facility]    [facility]          Future Appointments         Provider  Department Appt Notes    Tomorrow Hilliard-Brittich, Angella, PT [facility] [place] 5 visits 2/1 to 5/1  [employer]  no c/p, 60 visits    In 1 week Hilliadr-Brittich, Angella, PT [facility] [place] 5 visits 2/1 to 5/1  [employer]  no c/p, 60 visits    In 1 week Hilliard-Brittich, Angella, PT [facility] [place] 5 visits 2/1 to 5/1  [employer]  no c/p, 60 visits    In 2 weeks Hilliard-Brittich, Angella, PT [facility] [place] 5 visits 2/1 to 5/1  [employer]  no c/p, 60 visits    In 2 weeks Hilliard-Brittich, Angella, PT [facility] [place] 5 visits 2/1 to 5/1  [employer]  no c/p, 60 visits    In 3 weeks Hilliard-Brittich, Angella, PT [facility] [place] 5 visits 2/1 to 5/1  [employer]  no c/p, 60 visits    In 3 weeks Hilliard-Brittich, Angella, PT [facility] [place] 5 visits 2/1 to 5/1  [employer]  no c/p, 60 visits    In 1 month Hilliard-Brittich, Angella, PT [facility] [place] 5 visits 2/1 to 5/1  [employer]  no c/p, 60 visits    In 3 months Sangeeta Marcum APRN [facility] [place]                Passed - EGFRCR or GFRNAA > 50     GFR Evaluation  EGFRCR: 80 , resulted on 4/19/2023             Future Appointments         Provider Department Appt Notes    Tomorrow Hilliard-Brittich, Angella, PT [facility] [place] 5 visits 2/1 to 5/1  [employer]  no c/p, 60 visits    In 1 week Hilliard-Brittich, Angella, PT [facility] [place] 5 visits 2/1 to 5/1  [facility]  no c/p, 60 visits    In 1 week Angella Hansen, PT [facility] [place] 5 visits 2/1 to 5/1  [facility]  no c/p, 60 visits    In 2 weeks Angella Hansen, PT [facility] [place] 5 visits 2/1 to 5/1  [facility]  no c/p, 60 visits    In 2 weeks Angella Hansen, PT [facility] [place]  5 visits 2/1 to 5/1  [employer]  no c/p, 60 visits    In 3 weeks Hilliard-Brittich, Angella, PT [facility] [place] 5 visits 2/1 to 5/1  [employer]  no c/p, 60 visits    In 3 weeks Hilliard-Brittich, Angella, PT [facility] [place] 5 visits 2/1 to 5/1  [employer]  no c/p, 60 visits    In 1 month Hilliard-Brittich, Angella, PT [facility] [place] 5 visits 2/1 to 5/1  [employer]  no c/p, 60 visits    In 3 months [facility]           Recent Outpatient Visits              6 days ago Acute pain of left shoulder    [facility] [place] Hilliard-Caden, Angella, PT    Office Visit    3 weeks ago Hypertension, unspecified type    [facility]    Office Visit    1 month ago [facility] left [facility] pain    [facility]    Office Visit    2 months ago Chronic left shoulder pain    [facility], Luz Mojica APRN    Telemedicine    4 months ago Morbid obesity with BMI of 40.0-44.9, adult ([facility])    [facility]    Office Visit

## 2024-02-12 NOTE — TELEPHONE ENCOUNTER
Verified name and date of birth.   The patient stated she did remember and will have it done.   She also wanted to schedule a physical but she stated she cannot get in with Dr. Youngblood until April.   I stated she can schedule with CELESTE Kathleen,  also.   I instructed she needs to schedule a physical appointment  6 months out with understanding.

## 2024-02-13 ENCOUNTER — OFFICE VISIT (OUTPATIENT)
Dept: PHYSICAL THERAPY | Age: 54
End: 2024-02-13
Attending: ORTHOPAEDIC SURGERY
Payer: COMMERCIAL

## 2024-02-13 PROCEDURE — 97140 MANUAL THERAPY 1/> REGIONS: CPT

## 2024-02-13 PROCEDURE — 97110 THERAPEUTIC EXERCISES: CPT

## 2024-02-13 PROCEDURE — 97112 NEUROMUSCULAR REEDUCATION: CPT

## 2024-02-13 NOTE — PROGRESS NOTES
Diagnosis:   Acute pain of left shoulder (M25.512)          Referring Provider: Lurdes  Date of Evaluation:    2/6/24    Precautions:  None Next MD visit:   none scheduled  Date of Surgery: n/a   Insurance Primary/Secondary: [employer] [place] / N/A     # Auth Visits: 10 visits per POC; 5 visits auth until 5/1/24            Subjective: Patient reports that she feels that the shoudler is about the same as it was a the initial evaluation. She has tired the exercises but she has nt been doing them regularly at this point; no problems with the exercises.  She feels that the shoudler is a little bit \"throbbing\" today at the superior and lateral shoulder.  She was more sore in the shoudler, but she thinks that it was how she was sleeping     Pain: 2/10      Objective: See treatment log      Assessment: Patient did have some minimal popping in the shoudler with the scapular retraction and flexion with the GSB this session; decreased with limiting ROM.   She had fatigue with resisted strengthening; she is limited with strength progression due to superior shoulder pain. She was tender and fatigue into the upper traps at the end of her session as she will compensate with shoulder elevation due to RTC and periscap weakness; needs reinforcement.       Goals:   Goals: (to be met in 10 visits)   1. Patient will be independent in a progressive HEP to help manage symptoms and achieve functional independence in 3 sessions.  2. Patient will decrease her max pain to 2/10 as needed to improve her functional independence in 3 weeks.  3. Patient will increase her shoudler range by 10 deg into all directions to decrease pain with dressing by 50%.  4. Patient will increase her shoudler abduction range to 165 deg or more to decrease pain with reaching out to the side with daily chores  5. Patient will increase her flexion range to 170 deg to facilitate decreased pain with styling her hair  6. Patient will increase her ER/IR strenght to 4+/5 as  needed to improve her ability to lift and carry during ADLS.    Plan: Continue with progressive strengthening and end range flexibility to improve her scapulohumeral rhythm; progress wit her/IR walkouts and add side lying exercises.    Date: 2/13/2024  TX#: 2/10 Date:                 TX#: 3/ Date:                 TX#: 4/ Date:                 TX#: 5/   Man L GH joint inferior and posterior glides grade II-III  to improve ROM x 4 min   CFM and STM to the left UT at end of session x 4 min       Ther ex PROM left shoulder flexion, abduction, scaption and ER     AAROM cane supine flexion x 10   Pendulums: CW and CCW x 15 each     Seated AAROM cane ER 2 x 10   Scapular retraction 2 x 10, 5\"    GSB AAROM shoudler flexion, scaption, CW and CCW x 15 each       Resisted shoudler extension 1 x 10 YTB  Wall slides NV       NMR ER isometric x 10, 5\"  IR isometric x 10, 5\"  ER with retraction 1 x 10 YTB   Rows 1 x 10 YTB      Ther act       HEP: ER with retraction    Charges: 1 man (8 min), 1 ther ex (20 min) 1 NMR (10 min)      Total Timed Treatment: 38 min  Total Treatment Time: 40 min      Certification From: 2/6/2024  To:5/6/2024

## 2024-02-20 ENCOUNTER — OFFICE VISIT (OUTPATIENT)
Dept: PHYSICAL THERAPY | Age: 54
End: 2024-02-20
Attending: ORTHOPAEDIC SURGERY
Payer: COMMERCIAL

## 2024-02-20 PROCEDURE — 97112 NEUROMUSCULAR REEDUCATION: CPT

## 2024-02-20 PROCEDURE — 97110 THERAPEUTIC EXERCISES: CPT

## 2024-02-20 NOTE — PROGRESS NOTES
Diagnosis:   Acute pain of left shoulder (M25.512)          Referring Provider: Lurdes  Date of Evaluation:    2/6/24    Precautions:  None Next MD visit:   none scheduled  Date of Surgery: n/a   Insurance Primary/Secondary: [employer] [place] / N/A     # Auth Visits: 10 visits per POC; 5 visits auth until 5/1/24            Subjective: Patient reports that she has throbbing pain in the shoudler today in the left shoulder; she states that she had some increased pain with donning her jacket today and her arm got stuck. She notices that the isometric IR seems to aggravate the shoudler. Her shoudler pain did wake her up last night     Pain: 3/10      Objective: See treatment log      Assessment: Patient was minimally more painful with PROM this session.  Continued with AAROM to help promote increased range and progressed with side lying as needed to improve her shoudler mechanics as that will help to decrease pain and shoudler impingement which is likely contributing to her pain with active reaching. Trial of kinesiotape this session to see if it helps to improve her shoudler mechanics and baseline pain.       Goals:   Goals: (to be met in 10 visits)   1. Patient will be independent in a progressive HEP to help manage symptoms and achieve functional independence in 3 sessions.  2. Patient will decrease her max pain to 2/10 as needed to improve her functional independence in 3 weeks.  3. Patient will increase her shoudler range by 10 deg into all directions to decrease pain with dressing by 50%.  4. Patient will increase her shoudler abduction range to 165 deg or more to decrease pain with reaching out to the side with daily chores  5. Patient will increase her flexion range to 170 deg to facilitate decreased pain with styling her hair  6. Patient will increase her ER/IR strenght to 4+/5 as needed to improve her ability to lift and carry during ADLS.    Plan: Continue with progressive strengthening and end range flexibility to  improve her scapulohumeral rhythm; progress with ER/IR walkouts as tolerated    Date: 2/13/2024  TX#: 2/10 Date:  2/20/24               TX#: 3/10  Date:                 TX#: 4/ Date:                 TX#: 5/   Man L GH joint inferior and posterior glides grade II-III  to improve ROM x 4 min   CFM and STM to the left UT at end of session x 4 min  L GH joint inferior and posterior glides grade II-III  to improve ROM x 4 min        Ther ex PROM left shoulder flexion, abduction, scaption and ER     AAROM cane supine flexion x 10   Pendulums: CW and CCW x 15 each     Seated AAROM cane ER 2 x 10   Scapular retraction 2 x 10, 5\"    GSB AAROM shoudler flexion, scaption, CW and CCW x 15 each       Resisted shoudler extension 1 x 10 YTB  Wall slides NV  PROM left shoulder flexion, abduction, scaption and ER     AAROM cane supine flexion x 10       Seated AAROM cane ER 2 x 10   Scapular retraction 2 x 10, 5\"    Side lying shoudler ER 1 x 10 0# with TR  Side lying shoudler flexion 1 x 1 0  GSB AAROM shoudler flexion, scaption, CW and CCW x 15 each   Pendulums: CW and CCW x 15 each     Resisted shoudler extension 1 x 15 YTB  Wall slides into V x 8      NMR ER isometric x 10, 5\"  IR isometric x 10, 5\"  ER with retraction 1 x 10 YTB   Rows 1 x 10 YTB ER isometric x 10, 5\"  IR isometric DC  ER with retraction 1 x 10 YTB   Rows 1 x 15 YTB  Kinesiotape for inferior and medial glide with I strip for left shoulder     Ther act       HEP: taping and side lying flexion and ER     Charges: 2 ther ex (25 min) 1 NMR (10 min)      Total Timed Treatment: 35  (4 min man) min  Total Treatment Time: 40 min      Certification From: 2/6/2024  To:5/6/2024

## 2024-02-27 ENCOUNTER — OFFICE VISIT (OUTPATIENT)
Dept: PHYSICAL THERAPY | Age: 54
End: 2024-02-27
Attending: ORTHOPAEDIC SURGERY
Payer: COMMERCIAL

## 2024-02-27 PROCEDURE — 97112 NEUROMUSCULAR REEDUCATION: CPT

## 2024-02-27 PROCEDURE — 97110 THERAPEUTIC EXERCISES: CPT

## 2024-02-27 NOTE — PROGRESS NOTES
Diagnosis:   Acute pain of left shoulder (M25.512)          Referring Provider: Lurdes  Date of Evaluation:    2/6/24    Precautions:  None Next MD visit:   none scheduled  Date of Surgery: n/a   Insurance Primary/Secondary: [employer] [place] / N/A     # Auth Visits: 10 visits per POC; 5 visits auth until 5/1/24            Subjective: Patient reports that she does not feel that the shoudler is better.  She did her exercises at home but with the side lying flexion there is a lot of popping. She kept the tape did feel good with the tape on because it felt more supported with the pressure.    Today she doesn't have as much pain at rest but there is pain     Pain: 3/10      Objective: See treatment log    AROM: (* denotes performed with pain)   Shoulder    Flexion: R 175; L 160 (155)  Abduction: R 180; L 170 (150)  ER: R 88; L 83 (80)  IR: R 38; L 35       Assessment: The patient had pain free shoudler mobility following manual cervical traction this visit; trial of cervical retraction to treat the c-spine to see if this helps with her shoudler pain to allow her to progress her end range and also strengthening for better shoudler stability and functional use.    Goals:   Goals: (to be met in 10 visits)   1. Patient will be independent in a progressive HEP to help manage symptoms and achieve functional independence in 3 sessions.  2. Patient will decrease her max pain to 2/10 as needed to improve her functional independence in 3 weeks.  3. Patient will increase her shoudler range by 10 deg into all directions to decrease pain with dressing by 50%.  4. Patient will increase her shoudler abduction range to 165 deg or more to decrease pain with reaching out to the side with daily chores  5. Patient will increase her flexion range to 170 deg to facilitate decreased pain with styling her hair  6. Patient will increase her ER/IR strenght to 4+/5 as needed to improve her ability to lift and carry during ADLS.    Plan: Continue with  progressive strengthening and end range flexibility to improve her scapulohumeral rhythm; progress with shoudler adduction and reassess cervical interventions.   Date: 2/13/2024  TX#: 2/10 Date:  2/20/24               TX#: 3/10  Date:   2/27/24              TX#: 4/10 Date:                 TX#: 5/   Man L GH joint inferior and posterior glides grade II-III  to improve ROM x 4 min   CFM and STM to the left UT at end of session x 4 min  L GH joint inferior and posterior glides grade II-III  to improve ROM x 4 min    L GH joint inferior and posterior glides grade II-III  to improve ROM x 2 min   Manual cervical traction x 4 min     Ther ex PROM left shoulder flexion, abduction, scaption and ER     AAROM cane supine flexion x 10   Pendulums: CW and CCW x 15 each     Seated AAROM cane ER 2 x 10   Scapular retraction 2 x 10, 5\"    GSB AAROM shoudler flexion, scaption, CW and CCW x 15 each       Resisted shoudler extension 1 x 10 YTB  Wall slides NV  PROM left shoulder flexion, abduction, scaption and ER     AAROM cane supine flexion x 10       Seated AAROM cane ER 2 x 10   Scapular retraction 2 x 10, 5\"    Side lying shoudler ER 1 x 10 0# with TR  Side lying shoudler flexion 1 x 1 0  GSB AAROM shoudler flexion, scaption, CW and CCW x 15 each   Pendulums: CW and CCW x 15 each     Resisted shoudler extension 1 x 15 YTB  Wall slides into V x 8  Manual UT stretch left 2 x 30\"    PROM left shoulder flexion, abduction, scaption and ER     AAROM cane supine flexion x 10  ND      Seated AAROM cane ER 2 x 10   Scapular retraction 2 x 10, 5\"  Cervical retraction 2 x 10 sitting    Side lying shoudler ER 1 x 10 0# with TR ND  Side lying shoudler flexion 1 x 1 0  ND  GSB AAROM shoudler flexion, CW and CCW x 10 each   Pendulums: CW and CCW x 15 each       Wall slides into V x 8     NMR ER isometric x 10, 5\"  IR isometric x 10, 5\"  ER with retraction 1 x 10 YTB   Rows 1 x 10 YTB ER isometric x 10, 5\"  IR isometric DC  ER with retraction  1 x 10 YTB   Rows 1 x 15 YTB  Kinesiotape for inferior and medial glide with I strip for left shoulder ER and IR walkout YTT x 5 each   ER with retraction 1 x 10 YTB ND   Rows 1 x 15 YTB  Resisted shoudler extension 1 x 15 YTB  Kinesiotape for inferior and medial glide with I strip for left shoulder    Ther act       HEP: Patient educated on use of kinesiotape for their symptoms as well as wear time, precautions, and removal.   Cervical retraction and chin tucks     Charges: 2 ther ex (26 min) 1 NMR (9 min)      Total Timed Treatment: 35  (6 min man) min  Total Treatment Time: 41 min      Certification From: 2/6/2024  To:5/6/2024

## 2024-02-29 ENCOUNTER — OFFICE VISIT (OUTPATIENT)
Dept: PHYSICAL THERAPY | Age: 54
End: 2024-02-29
Attending: ORTHOPAEDIC SURGERY
Payer: COMMERCIAL

## 2024-02-29 PROCEDURE — 97112 NEUROMUSCULAR REEDUCATION: CPT

## 2024-02-29 PROCEDURE — 97110 THERAPEUTIC EXERCISES: CPT

## 2024-02-29 NOTE — PROGRESS NOTES
Progress Summary  Pt has attended 5 visits in Physical Therapy.       Diagnosis:   Acute pain of left shoulder (M25.512)          Referring Provider: Lurdes  Date of Evaluation:    2/6/24    Precautions:  None Next MD visit:   none scheduled  Date of Surgery: n/a   Insurance Primary/Secondary: [employer] [place] / N/A     # Auth Visits: 10 visits per POC; 5 visits auth until 5/1/24            Subjective: Patient reports that she states that she is still limited with reaching due to pain complaints.  She doesn't try to move too fast with activities, but there will be some sharp pains in the shoudler with reaching.  When she has shoulder pain it is located at the top and the front of the shoulder; this is a constant dull pain.  With reaching the pain is primarily at the top of the shoulder. She is still painful with dressing, donning a jacket, carrying on the left, styling her hair and also with reaching into all directions.      Pain: 2/10  Current functional limitations include reaching out in front , reaching up, reaching to the side, reaching behind her back, donning her bra, UE dressing, donning a jacket, using a blow dryer, laying on the left, carrying a bag with the left.    Objective: See treatment log    AROM: (* denotes performed with pain)   Shoulder    Flexion: R 175; L 160 (155)  Abduction: R 180; L 170 (150)  ER: R 88; L 83 (80)  IR: R 38; L 35       Strength/MMT: (* denotes performed with pain)  Shoulder Elbow Scapular   Flexion: R 4+/5; L 4-/5  Abduction: R 4+/5; L 4-/5  ER: R 4/5; L 3+*/5  IR: R 4/5; L 4/5   Flexion: R 5/5; L 4/5  Extension: R 4+ /5; L 4+/5  Biceps: R 4+/5; L 4+/5  Triceps: R 4+/5; L 4+/5  Mid trap: R 3/5; L 3/5*   Low trap: R 3/5; L 3-/5*     Special tests:     Subacromial Pain Syndrome:  Empty Can test: R negative, L positive  Painful Arc Sign: R negative, L positive  External Rotation Resistance: R negative , L positive  Robles-Jules Impingement Sign: R positive , L negative  (positive)    Assessment: Patient has made gains in her range of motion and strenght sicne beginning physical therapy; however, she continues to have positive impingement signs due to capsular stiffness, range of motion deficits and strength deficits which does not allow her humeral head to stay centered in the glenoid. She would continue to benefit from skilled physical therapy intervention to improve her strength, range of motion and flexibility to improve her shoulder motor control to decrease her pain and improve her functional reach for all ADLS.       Goals:   Goals: (to be met in 10 visits)   1. Patient will be independent in a progressive HEP to help manage symptoms and achieve functional independence in 3 sessions. MET  2. Patient will decrease her max pain to 2/10 as needed to improve her functional independence in 3 weeks. WORKING TOWARD   3. Patient will increase her shoudler range by 10 deg into all directions to decrease pain with dressing by 50%. PARTIALLY ACHIEVED   4. Patient will increase her shoudler abduction range to 165 deg or more to decrease pain with reaching out to the side with daily chores PARTIALLY ACHIEVED   5. Patient will increase her flexion range to 170 deg to facilitate decreased pain with styling her hair WORKING TOWARD   6. Patient will increase her ER/IR strenght to 4+/5 as needed to improve her ability to lift and carry during ADLS. WORKING TOWARD         Plan: Continue skilled Physical Therapy 1-2 x/week or a total of 5 visits over a 90 day period. Treatment will include: manual therapy, range of motion exercises, flexibility exercises, strengthening exercises, postural re-ed, neuromuscular re-education, CKC exercises, joint mobs, RTC strengthening and HEP instruction all to improve her functional independence         Patient/Family/Caregiver was advised of these findings, precautions, and treatment options and has agreed to actively participate in planning and for this course  of care.    Thank you for your referral. If you have any questions, please contact me at Dept: [phone].    Sincerely,  Electronically signed by therapist: Angella Hansen PT     Physician's certification required:  Yes  Please co-sign or sign and return this letter via fax as soon as possible to [phone].   I certify the need for these services furnished under this plan of treatment and while under my care.    X___________________________________________________ Date____________________    Certification From: 2/29/2024  To:5/29/2024      Date:   2/27/24              TX#: 4/10 Date:   2/29/24              TX#: 5/10   Man L GH joint inferior and posterior glides grade II-III  to improve ROM x 2 min   Manual cervical traction x 4 min  L GH joint inferior and posterior glides grade II-III  to improve ROM x 2 min   Manual cervical traction x 4 min    Ther ex Manual UT stretch left 2 x 30\"    PROM left shoulder flexion, abduction, scaption and ER     AAROM cane supine flexion x 10  ND      Seated AAROM cane ER 2 x 10   Scapular retraction 2 x 10, 5\"  Cervical retraction 2 x 10 sitting    Side lying shoudler ER 1 x 10 0# with TR ND  Side lying shoudler flexion 1 x 1 0  ND  GSB AAROM shoudler flexion, CW and CCW x 10 each   Pendulums: CW and CCW x 15 each       Wall slides into V x 8  Manual UT stretch left 2 x 30\"    PROM left shoulder flexion, abduction, scaption and ER         Seated AAROM cane ER 2 x 10   Scapular retraction x 15, 5\"  Cervical retraction 1 x 10 sitting    Side lying shoudler ER 1 x 10 0# with TR ND  Side lying shoudler flexion 1 x 1 0  ND  GSB AAROM shoudler flexion, CW and CCW x 10 each   Pendulums: CW and CCW x 15 each       Wall slides into V x 8   Pulleys: flexion    NMR ER and IR walkout YTT x 5 each   ER with retraction 1 x 10 YTB ND   Rows 1 x 15 YTB  Resisted shoudler extension 1 x 15 YTB  Kinesiotape for inferior and medial glide with I strip for left shoulder ER and IR  walkout YTT x 8 each   ER with retraction 1 x 10 YTB ND   Rows 1 x 15 YTB  Resisted shoudler extension 1 x 15 YTB  Resisted shoudler adduction 1 x 15 YTB  Kinesiotape for inferior and medial glide with I strip for left shoulder ND   Ther act     HEP:   Cervical retraction and chin tucks     Charges: 2 ther ex (25 min) 1 NMR (9 min)      Total Timed Treatment: 34  (6 min man) min  Total Treatment Time: 40 min      Certification From: 2/6/2024  To:5/6/2024

## 2024-03-05 ENCOUNTER — OFFICE VISIT (OUTPATIENT)
Dept: PHYSICAL THERAPY | Age: 54
End: 2024-03-05
Attending: ORTHOPAEDIC SURGERY
Payer: COMMERCIAL

## 2024-03-05 PROCEDURE — 97110 THERAPEUTIC EXERCISES: CPT

## 2024-03-05 PROCEDURE — 97112 NEUROMUSCULAR REEDUCATION: CPT

## 2024-03-05 NOTE — PROGRESS NOTES
Diagnosis:   Acute pain of left shoulder (M25.512)          Referring Provider: Lurdes  Date of Evaluation:    2/6/24    Precautions:  None Next MD visit:   none scheduled  Date of Surgery: n/a   Insurance Primary/Secondary: [employer] [place] / N/A     # Auth Visits: 10 visits per POC; 5 visits auth until 5/1/24            Subjective: Patient reports that she had a lot of pain all weekend so she did not do any exercises.  She states that she has pain in the shoudler to move the shoulder; she feels that her pain is a constant achy pain in the shoudler both at rest and also with movement.  The pain does wake her up at night and this is nearly every night because she can't lay on the left shoudler. She is limited with styling her hair, dressing .  She is not having any neck pain.  She wore the tape from last week until the end of the weekend.     Pain: 2/10  Current functional limitations include reaching out in front,  reaching up, reaching to the side, reaching behind her back, donning her bra, UE dressing, donning a jacket, using a blow dryer, laying on the left, carrying a bag with the left.    Objective: See treatment log      Assessment: Patient was not able to progress side lying ther ex this visit due to superior shoulder pain. She is tender both at the middle deltoid insertion and then also at the supraspinatus tendon suggesting that some of her pain is related to contractile tissue. She did tolerate progression of strengthening with isometrics to see if increased GH joint stability help to improve her functional reach while progressing her end range in CKC; needs reinforcement      Goals:   Goals: (to be met in 10 visits)   1. Patient will be independent in a progressive HEP to help manage symptoms and achieve functional independence in 3 sessions. MET  2. Patient will decrease her max pain to 2/10 as needed to improve her functional independence in 3 weeks. WORKING TOWARD   3. Patient will increase her shoudler  range by 10 deg into all directions to decrease pain with dressing by 50%. PARTIALLY ACHIEVED   4. Patient will increase her shoudler abduction range to 165 deg or more to decrease pain with reaching out to the side with daily chores PARTIALLY ACHIEVED   5. Patient will increase her flexion range to 170 deg to facilitate decreased pain with styling her hair WORKING TOWARD   6. Patient will increase her ER/IR strenght to 4+/5 as needed to improve her ability to lift and carry during ADLS. WORKING TOWARD         Plan: Continue with strengthening and end range stretching to help improve her shoudler mechanics; progress with rows in prone and increased ER with retraction as tolerated. Consider taping    Certification From: 2/29/2024  To:5/29/2024      Date:   2/27/24              TX#: 4/10 Date:   2/29/24              TX#: 5/10 Date:   3/5/24              TX#: 6/10   Man L GH joint inferior and posterior glides grade II-III  to improve ROM x 2 min   Manual cervical traction x 4 min  L GH joint inferior and posterior glides grade II-III  to improve ROM x 2 min   Manual cervical traction x 4 min  L GH joint inferior and posterior glides grade II-III  to improve ROM x 2 min   Manual cervical traction x 4 min    Ther ex Manual UT stretch left 2 x 30\"    PROM left shoulder flexion, abduction, scaption and ER     AAROM cane supine flexion x 10  ND      Seated AAROM cane ER 2 x 10   Scapular retraction 2 x 10, 5\"  Cervical retraction 2 x 10 sitting    Side lying shoudler ER 1 x 10 0# with TR ND  Side lying shoudler flexion 1 x 1 0  ND  GSB AAROM shoudler flexion, CW and CCW x 10 each   Pendulums: CW and CCW x 15 each       Wall slides into V x 8  Manual UT stretch left 2 x 30\"    PROM left shoulder flexion, abduction, scaption and ER         Seated AAROM cane ER 2 x 10   Scapular retraction x 15, 5\"  Cervical retraction 1 x 10 sitting    Side lying shoudler ER 1 x 10 0# with TR ND  Side lying shoudler flexion 1 x 1 0  ND  GSB  AAROM shoudler flexion, CW and CCW x 10 each   Pendulums: CW and CCW x 15 each       Wall slides into V x 8   Pulleys: flexion  Manual UT stretch left 2 x 30\"    PROM left shoulder flexion, abduction, scaption and ER   Supine chin tucks x 10  Side lying shoudler ER 1 x 10 0# with TR  Side lying shoudler flexion 1 x 10      Seated AAROM cane ER 2 x 10   Scapular retraction x 15, 5\"  Cervical retraction 1 x 10 sitting ND       GSB AAROM shoudler flexion, CW and CCW x 10 each  ND  Pendulums: CW and CCW x 15 each       Wall slides into V 2x 5   Abduction isometric x 10, 5\"  Flexion isometric x 10, 5\"   Pulleys: flexion x 1 min    NMR ER and IR walkout YTT x 5 each   ER with retraction 1 x 10 YTB ND   Rows 1 x 15 YTB  Resisted shoudler extension 1 x 15 YTB  Kinesiotape for inferior and medial glide with I strip for left shoulder ER and IR walkout YTT x 8 each   ER with retraction 1 x 10 YTB ND   Rows 1 x 15 YTB  Resisted shoudler extension 1 x 15 YTB  Resisted shoudler adduction 1 x 15 YTB  Kinesiotape for inferior and medial glide with I strip for left shoulder ND ER and IR walkout YTT x 8 each   ER with retraction 1 x 10 YTB - limited resistance    Rows 1 x 15 YTB  Resisted shoudler extension 1 x 15 YTB  Resisted shoudler adduction 1 x 15 YTB (small range)  Seated flexion table stretch 5 x 5\"   Kinesiotape for inferior and medial glide with I strip for left shoulder ND   Ther act      HEP: 3/5  - Isometric Shoulder Abduction at Wall  - 1 x daily - 7 x weekly - 1 sets - 10 reps - 5 hold  - Isometric Shoulder Flexion at Wall  - 1 x daily - 7 x weekly - 1 sets - 10 reps - 5 hold  - Seated Shoulder Flexion Slide at Table Top with Forearm in Neutral  - 1 x daily - 7 x weekly - 1 sets - 5 reps - 5 hold     Charges: 2 ther ex (23 min) 1 NMR (9 min)      Total Timed Treatment: 32  (6 min man) min  Total Treatment Time: 38 min      Certification From: 2/6/2024  To:5/6/2024

## 2024-03-07 ENCOUNTER — OFFICE VISIT (OUTPATIENT)
Dept: PHYSICAL THERAPY | Age: 54
End: 2024-03-07
Attending: ORTHOPAEDIC SURGERY
Payer: COMMERCIAL

## 2024-03-07 PROCEDURE — 97112 NEUROMUSCULAR REEDUCATION: CPT

## 2024-03-07 PROCEDURE — 97110 THERAPEUTIC EXERCISES: CPT

## 2024-03-07 NOTE — PROGRESS NOTES
Diagnosis:   Acute pain of left shoulder (M25.512)          Referring Provider: Lurdes  Date of Evaluation:    2/6/24    Precautions:  None Next MD visit:   none scheduled  Date of Surgery: n/a   Insurance Primary/Secondary: [employer] [place] / N/A     # Auth Visits: 10 visits per POC; 5 visits auth until 5/1/24            Subjective: Patient reports that she was tired and sore after the last session and she is not quite as sore today.  She states that she is always painful with reaching with the shoudler so she is still moderately limited with her activities due to the pain in the shoulder.  She is still trying to avoid carrying with the left arm     Pain: 2/10  Current functional limitations include reaching out in front, reaching up, reaching to the side, reaching behind her back, donning her bra, UE dressing, donning a jacket, using a blow dryer, laying on the left, carrying a bag with the left.    Objective: See treatment log      Assessment: The patient had less pain and stiffness limiting her end range this session. Progressed with prone extension and ER wit retraction as that will help to improve her scapulohumeral rhythm if impingement is primarily contributing to her pain complaints for better pain free reaching.  She needs to increased end range of motion and strenght to keep the humeral head centered in the glenoid for pain free reaching.      Goals:   Goals: (to be met in 10 visits)   1. Patient will be independent in a progressive HEP to help manage symptoms and achieve functional independence in 3 sessions. MET  2. Patient will decrease her max pain to 2/10 as needed to improve her functional independence in 3 weeks. WORKING TOWARD   3. Patient will increase her shoudler range by 10 deg into all directions to decrease pain with dressing by 50%. PARTIALLY ACHIEVED   4. Patient will increase her shoudler abduction range to 165 deg or more to decrease pain with reaching out to the side with daily chores  PARTIALLY ACHIEVED   5. Patient will increase her flexion range to 170 deg to facilitate decreased pain with styling her hair WORKING TOWARD   6. Patient will increase her ER/IR strenght to 4+/5 as needed to improve her ability to lift and carry during ADLS. WORKING TOWARD         Plan: Continue with strengthening and end range stretching to help improve her shoudler mechanics with resisted exercises to progress HEP for DC.  Certification From: 2/29/2024  To:5/29/2024      Date:   2/29/24              TX#: 5/10 Date:   3/5/24              TX#: 6/10 Date:   3/7/24              TX#: 7/10   Man L GH joint inferior and posterior glides grade II-III  to improve ROM x 2 min   Manual cervical traction x 4 min  L GH joint inferior and posterior glides grade II-III  to improve ROM x 2 min   Manual cervical traction x 4 min  L GH joint inferior and posterior glides grade II-III  to improve ROM x 2 min   Manual cervical traction x 4 min    Ther ex Manual UT stretch left 2 x 30\"    PROM left shoulder flexion, abduction, scaption and ER         Seated AAROM cane ER 2 x 10   Scapular retraction x 15, 5\"  Cervical retraction 1 x 10 sitting    Side lying shoudler ER 1 x 10 0# with TR ND  Side lying shoudler flexion 1 x 1 0  ND  GSB AAROM shoudler flexion, CW and CCW x 10 each   Pendulums: CW and CCW x 15 each       Wall slides into V x 8   Pulleys: flexion  Manual UT stretch left 2 x 30\"    PROM left shoulder flexion, abduction, scaption and ER   Supine chin tucks x 10  Side lying shoudler ER 1 x 10 0# with TR  Side lying shoudler flexion 1 x 10      Seated AAROM cane ER 2 x 10   Scapular retraction x 15, 5\"  Cervical retraction 1 x 10 sitting ND       GSB AAROM shoudler flexion, CW and CCW x 10 each  ND  Pendulums: CW and CCW x 15 each       Wall slides into V 2x 5   Abduction isometric x 10, 5\"  Flexion isometric x 10, 5\"   Pulleys: flexion x 1 min  Manual UT stretch left 2 x 30\"    PROM left shoulder flexion, abduction, scaption  and ER   Supine chin tucks x 10  Side lying shoudler ER 1 x 10 0# with TR  Side lying shoudler flexion 1 x 10      Prone shoulder extension x 10     Seated AAROM cane ER 2 x 10   Scapular retraction x 10, 5\"        GSB AAROM shoudler flexion, CW and CCW x 10 each  ND  Pendulums: CW and CCW x 15 each     Wall slides into V 2x 5   Abduction isometric x 10, 5\"  Flexion isometric x 10, 5\"   Pulleys: flexion x 1 min  ND   NMR ER and IR walkout YTT x 8 each   ER with retraction 1 x 10 YTB ND   Rows 1 x 15 YTB  Resisted shoudler extension 1 x 15 YTB  Resisted shoudler adduction 1 x 15 YTB  Kinesiotape for inferior and medial glide with I strip for left shoulder ND ER and IR walkout YTT x 8 each   ER with retraction 1 x 10 YTB - limited resistance    Rows 1 x 15 YTB  Resisted shoudler extension 1 x 15 YTB  Resisted shoudler adduction 1 x 15 YTB (small range)  Seated flexion table stretch 5 x 5\"   Kinesiotape for inferior and medial glide with I strip for left shoulder ND ER and IR walkout YTT x 8 each   ER with retraction 2 x 10 YTB - limited resistance    Rows 1 x 15 YTB  Resisted shoudler extension 1 x 15 YTB  Resisted shoudler adduction 1 x 15 YTB (small range)  Seated flexion table stretch 10 x 5\"   Kinesiotape for inferior and medial glide with I strip for left shoulder ND   Ther act      HEP: 3/5  - Isometric Shoulder Abduction at Wall  - 1 x daily - 7 x weekly - 1 sets - 10 reps - 5 hold  - Isometric Shoulder Flexion at Wall  - 1 x daily - 7 x weekly - 1 sets - 10 reps - 5 hold  - Seated Shoulder Flexion Slide at Table Top with Forearm in Neutral  - 1 x daily - 7 x weekly - 1 sets - 5 reps - 5 hold     Charges: 2 ther ex (25 min) 1 NMR (9 min)      Total Timed Treatment: 34  (6 min man) min  Total Treatment Time: 40 min      Certification From: 2/6/2024  To:5/6/2024

## 2024-03-09 ENCOUNTER — LAB ENCOUNTER (OUTPATIENT)
Dept: LAB | Age: 54
End: 2024-03-09
Attending: NURSE PRACTITIONER
Payer: COMMERCIAL

## 2024-03-09 ENCOUNTER — OFFICE VISIT (OUTPATIENT)
Dept: INTERNAL MEDICINE CLINIC | Facility: CLINIC | Age: 54
End: 2024-03-09
Payer: COMMERCIAL

## 2024-03-09 VITALS
RESPIRATION RATE: 16 BRPM | BODY MASS INDEX: 37.54 KG/M2 | DIASTOLIC BLOOD PRESSURE: 85 MMHG | HEIGHT: 62 IN | WEIGHT: 204 LBS | SYSTOLIC BLOOD PRESSURE: 130 MMHG | HEART RATE: 78 BPM

## 2024-03-09 DIAGNOSIS — Z12.4 CERVICAL CANCER SCREENING: ICD-10-CM

## 2024-03-09 DIAGNOSIS — E78.00 HYPERCHOLESTEREMIA: ICD-10-CM

## 2024-03-09 DIAGNOSIS — E55.9 VITAMIN D DEFICIENCY: ICD-10-CM

## 2024-03-09 DIAGNOSIS — Z12.11 COLON CANCER SCREENING: ICD-10-CM

## 2024-03-09 DIAGNOSIS — Z00.00 ANNUAL PHYSICAL EXAM: ICD-10-CM

## 2024-03-09 DIAGNOSIS — I10 ESSENTIAL HYPERTENSION WITH GOAL BLOOD PRESSURE LESS THAN 140/90: ICD-10-CM

## 2024-03-09 DIAGNOSIS — Z12.31 ENCOUNTER FOR SCREENING MAMMOGRAM FOR MALIGNANT NEOPLASM OF BREAST: ICD-10-CM

## 2024-03-09 DIAGNOSIS — E03.9 ACQUIRED HYPOTHYROIDISM: ICD-10-CM

## 2024-03-09 DIAGNOSIS — Z00.00 ANNUAL PHYSICAL EXAM: Primary | ICD-10-CM

## 2024-03-09 DIAGNOSIS — M25.512 CHRONIC LEFT SHOULDER PAIN: ICD-10-CM

## 2024-03-09 DIAGNOSIS — E66.9 OBESITY (BMI 30-39.9): ICD-10-CM

## 2024-03-09 DIAGNOSIS — G89.29 CHRONIC LEFT SHOULDER PAIN: ICD-10-CM

## 2024-03-09 DIAGNOSIS — R73.03 PREDIABETES: ICD-10-CM

## 2024-03-09 LAB
ALBUMIN SERPL-MCNC: 4.3 G/DL (ref 3.2–4.8)
ALBUMIN/GLOB SERPL: 1.7 {RATIO} (ref 1–2)
ALP LIVER SERPL-CCNC: 49 U/L
ALT SERPL-CCNC: 13 U/L
ANION GAP SERPL CALC-SCNC: 4 MMOL/L (ref 0–18)
AST SERPL-CCNC: 14 U/L (ref ?–34)
BILIRUB SERPL-MCNC: 0.5 MG/DL (ref 0.3–1.2)
BUN BLD-MCNC: 22 MG/DL (ref 9–23)
BUN/CREAT SERPL: 21.4 (ref 10–20)
CALCIUM BLD-MCNC: 9.5 MG/DL (ref 8.7–10.4)
CHLORIDE SERPL-SCNC: 105 MMOL/L (ref 98–112)
CHOLEST SERPL-MCNC: 233 MG/DL (ref ?–200)
CO2 SERPL-SCNC: 29 MMOL/L (ref 21–32)
CREAT BLD-MCNC: 1.03 MG/DL
DEPRECATED RDW RBC AUTO: 39.8 FL (ref 35.1–46.3)
EGFRCR SERPLBLD CKD-EPI 2021: 65 ML/MIN/1.73M2 (ref 60–?)
ERYTHROCYTE [DISTWIDTH] IN BLOOD BY AUTOMATED COUNT: 12.6 % (ref 11–15)
EST. AVERAGE GLUCOSE BLD GHB EST-MCNC: 117 MG/DL (ref 68–126)
FASTING PATIENT LIPID ANSWER: YES
FASTING STATUS PATIENT QL REPORTED: YES
GLOBULIN PLAS-MCNC: 2.5 G/DL (ref 2.8–4.4)
GLUCOSE BLD-MCNC: 96 MG/DL (ref 70–99)
HBA1C MFR BLD: 5.7 % (ref ?–5.7)
HCT VFR BLD AUTO: 44.4 %
HDLC SERPL-MCNC: 56 MG/DL (ref 40–59)
HGB BLD-MCNC: 15.5 G/DL
LDLC SERPL CALC-MCNC: 161 MG/DL (ref ?–100)
MCH RBC QN AUTO: 30.2 PG (ref 26–34)
MCHC RBC AUTO-ENTMCNC: 34.9 G/DL (ref 31–37)
MCV RBC AUTO: 86.4 FL
NONHDLC SERPL-MCNC: 177 MG/DL (ref ?–130)
OSMOLALITY SERPL CALC.SUM OF ELEC: 289 MOSM/KG (ref 275–295)
PLATELET # BLD AUTO: 377 10(3)UL (ref 150–450)
POTASSIUM SERPL-SCNC: 3.6 MMOL/L (ref 3.5–5.1)
PROT SERPL-MCNC: 6.8 G/DL (ref 5.7–8.2)
RBC # BLD AUTO: 5.14 X10(6)UL
SODIUM SERPL-SCNC: 138 MMOL/L (ref 136–145)
TRIGL SERPL-MCNC: 89 MG/DL (ref 30–149)
TSI SER-ACNC: 2.27 MIU/ML (ref 0.55–4.78)
VIT D+METAB SERPL-MCNC: 30.2 NG/ML (ref 30–100)
VLDLC SERPL CALC-MCNC: 17 MG/DL (ref 0–30)
WBC # BLD AUTO: 5.9 X10(3) UL (ref 4–11)

## 2024-03-09 PROCEDURE — 84443 ASSAY THYROID STIM HORMONE: CPT

## 2024-03-09 PROCEDURE — 3075F SYST BP GE 130 - 139MM HG: CPT | Performed by: NURSE PRACTITIONER

## 2024-03-09 PROCEDURE — 83036 HEMOGLOBIN GLYCOSYLATED A1C: CPT

## 2024-03-09 PROCEDURE — 3079F DIAST BP 80-89 MM HG: CPT | Performed by: NURSE PRACTITIONER

## 2024-03-09 PROCEDURE — 80053 COMPREHEN METABOLIC PANEL: CPT

## 2024-03-09 PROCEDURE — 82306 VITAMIN D 25 HYDROXY: CPT

## 2024-03-09 PROCEDURE — 85027 COMPLETE CBC AUTOMATED: CPT

## 2024-03-09 PROCEDURE — 3008F BODY MASS INDEX DOCD: CPT | Performed by: NURSE PRACTITIONER

## 2024-03-09 PROCEDURE — 80061 LIPID PANEL: CPT

## 2024-03-09 PROCEDURE — 99396 PREV VISIT EST AGE 40-64: CPT | Performed by: NURSE PRACTITIONER

## 2024-03-09 PROCEDURE — 36415 COLL VENOUS BLD VENIPUNCTURE: CPT

## 2024-03-09 NOTE — PROGRESS NOTES
Nunu Guan is a 53 year old female.  Chief Complaint   Patient presents with    Physical     HPI:   She is presents for her annual physical exam.     She is currently going to PT for her left shoulder. She feels PT is not helping. She is having limited ROM. She will following up with ortho on 3/15. She did have an injection which only helped for about one week.       Current Outpatient Medications   Medication Sig Dispense Refill    losartan-hydroCHLOROthiazide 100-25 MG Oral Tab Take 1 tablet by mouth daily. 90 tablet 3    levothyroxine 100 MCG Oral Tab Take 1 tablet (100 mcg total) by mouth before breakfast. 90 tablet 0    Phentermine HCl 37.5 MG Oral Tab Take 1 tablet (37.5 mg total) by mouth every morning before breakfast. 30 tablet 3    topiramate 25 MG Oral Tab Take 1 tablet (25 mg total) by mouth 2 (two) times daily. 180 tablet 1    diclofenac 1 % External Gel Apply 2 g topically 4 (four) times daily. 1 each 0      Past Medical History:   Diagnosis Date    Clot in the renal vein (HCC) 01/01/2014    left lower extremity    Cyst of skin     right abdominal skin cyst January 2016    Deep vein thrombosis (HCC)     hx of DVT    Disorder of thyroid     High blood pressure     Hx of motion sickness     Hypercholesteremia 01/30/2020    Hypothyroidism     PONV (postoperative nausea and vomiting)     Unspecified essential hypertension     Visual impairment       Past Surgical History:   Procedure Laterality Date    ANKLE FRACTURE SURGERY Left 2014    left ankle stress fracture 2014    ARTHROSCOPY OF JOINT UNLISTED      left knee screw    COLONOSCOPY N/A 04/28/2022    Procedure: COLONOSCOPY;  Surgeon: Flex Rubi MD;  Location: [facility] ENDOSCOPY    KNEE SURGERY  2001    MCL     OTHER SURGICAL HISTORY  2004    vaginal delivery    OTHER SURGICAL HISTORY  2006    vaginal delivery      Social History:  Social History     Socioeconomic History    Marital status:    Tobacco Use    Smoking status: Never     Smokeless tobacco: Never   Vaping Use    Vaping Use: Never used   Substance and Sexual Activity    Alcohol use: Yes     Alcohol/week: 4.0 standard drinks of alcohol     Types: 4 Standard drinks or equivalent per week     Comment: rarely    Drug use: No   Other Topics Concern    Caffeine Concern Yes     Comment: coffee, 1 cup daily      Family History   Problem Relation Age of Onset    Hypertension Father         overweight     Other (alzheimers) Father     Diabetes Mother         borderline, overweight     Other (Other) Maternal Grandmother         Karie's Granulomatosis    Cancer Maternal Grandfather         LUNG    Other (Other) Sister         Rheumatoid Arthritis    Colon Cancer Paternal Uncle       Allergies   Allergen Reactions    Azithromycin      Other reaction(s): AZITHROMYCIN    Hydrocodone      Other reaction(s): feels dazed        REVIEW OF SYSTEMS:     Review of Systems   Constitutional:  Negative for fever.   HENT: Negative.     Respiratory:  Negative for cough, shortness of breath and wheezing.    Cardiovascular:  Negative for chest pain.   Gastrointestinal:  Negative for abdominal pain.   Genitourinary: Negative.    Musculoskeletal:  Positive for arthralgias (left shoulder).   Skin: Negative.    Neurological: Negative.    Psychiatric/Behavioral: Negative.        Wt Readings from Last 5 Encounters:   03/09/24 204 lb (92.5 kg)   01/22/24 208 lb (94.3 kg)   12/21/23 212 lb (96.2 kg)   10/02/23 232 lb (105.2 kg)   05/11/23 227 lb (103 kg)     Body mass index is 37.31 kg/m².      EXAM:   /85 (BP Location: Right arm)   Pulse 78   Resp 16   Ht 5' 2\" (1.575 m)   Wt 204 lb (92.5 kg)   LMP 12/15/2023 (Within Days)   BMI 37.31 kg/m²     Physical Exam  Vitals reviewed.   Constitutional:       Appearance: Normal appearance.   HENT:      Head: Normocephalic.      Right Ear: Tympanic membrane normal.      Left Ear: Tympanic membrane normal.   Eyes:      Extraocular Movements: Extraocular movements  intact.      Pupils: Pupils are equal, round, and reactive to light.   Cardiovascular:      Rate and Rhythm: Normal rate and regular rhythm.      Pulses: Normal pulses.   Pulmonary:      Breath sounds: Normal breath sounds. No wheezing.   Abdominal:      General: Bowel sounds are normal.      Palpations: Abdomen is soft.      Tenderness: There is no abdominal tenderness.   Musculoskeletal:         General: No swelling. Normal range of motion.      Cervical back: Normal range of motion and neck supple.   Skin:     General: Skin is warm and dry.   Neurological:      Mental Status: She is alert and oriented to person, place, and time.   Psychiatric:         Mood and Affect: Mood normal.         Behavior: Behavior normal.            ASSESSMENT AND PLAN:   1. Annual physical exam  - TSH W Reflex To Free T4 [E]; Future  - Lipid Panel [E]; Future  - CBC, Platelet; No Differential; Future  - Comp Metabolic Panel (14); Future  - Hemoglobin A1C [E]; Future    2. Essential hypertension with goal blood pressure less than 140/90  - BP initially elevated recheck manually 130/85  - CPM    3. Chronic left shoulder pain  - following with ortho  - currently doing PT    4. Acquired hypothyroidism  - TSH W Reflex To Free T4 [E]; Future    5. Cervical cancer screening  - PAP smear completed 1/3/2023  - following with OB    6. Vitamin D deficiency  - Vitamin D; Future    7. Colon cancer screening  - Colonoscopy completed 4/2022   - repeat 4/2032    8. Encounter for screening mammogram for malignant neoplasm of breast  - mammogram completed 12/2023  - repeat in 12 months     9. Obesity (BMI 30-39.9)  - following with weight loss clinic   - currently taking topiramate and phentermine     10. Prediabetes  - Hemoglobin A1C [E]; Future    11. Hypercholesteremia  - Lipid Panel [E]; Future      The patient indicates understanding of these issues and agrees to the plan.  Return in about 6 months (around 9/9/2024).

## 2024-03-14 ENCOUNTER — OFFICE VISIT (OUTPATIENT)
Dept: PHYSICAL THERAPY | Age: 54
End: 2024-03-14
Attending: ORTHOPAEDIC SURGERY
Payer: COMMERCIAL

## 2024-03-14 PROCEDURE — 97110 THERAPEUTIC EXERCISES: CPT

## 2024-03-14 PROCEDURE — 97112 NEUROMUSCULAR REEDUCATION: CPT

## 2024-03-14 NOTE — PROGRESS NOTES
Discharge Summary  Pt has attended 8 visits in Physical Therapy.       Diagnosis:   Acute pain of left shoulder (M25.512)          Referring Provider: Lurdes  Date of Evaluation:    2/6/24    Precautions:  None Next MD visit:   none scheduled  Date of Surgery: n/a   Insurance Primary/Secondary: [employer] [place] / N/A     # Auth Visits: 10 visits per POC; 5 visits auth until 5/1/24            Subjective: Patient reports that she is still having about the same pain in the shoulder; her pain will wake her up at night. She thinks that the pain might be increased as the week progresses and on the weekend. She had a lot of pain on Monday, but she did not remember doing anything different. She is still very limited with reaching, especially reaching quickly, due to pain complaints. She feels that she has not had much improvement with physical therapy and is planning on following up with MD for reassessment     Pain: 2/10  Current functional limitations include reaching out in front, reaching up, reaching to the side, reaching behind her back, donning her bra, UE dressing, donning a jacket, using a blow dryer, laying on the left, carrying a bag with the left.    Objective: See treatment log    AROM: (* denotes performed with pain)   Shoulder    Flexion: R 175; L 162  Abduction: R 180; L 150   ER: R 88; L 88   IR: R 38, L 35       Strength/MMT: (* denotes performed with pain)  Shoulder Elbow Scapular   Flexion: R 4+/5; L 4-/5  Abduction: R 4+/5; L 4-/5 *  ER: R 4/5; L 4-*/5  IR: R 4/5; L 4/5   Flexion: R 5/5; L 4+/5  Extension: R 4+ /5; L 4+/5  Biceps: R 4+/5; L 4+/5  Triceps: R 4+/5; L 4+/5  Mid trap: R 3/5; L 3/5*   Low trap: R 3/5; L 3-/5*     Special tests:     Subacromial Pain Syndrome:  Empty Can test: R negative, L positive  Painful Arc Sign: R negative, L positive  External Rotation Resistance: R negative , L positive  Robles-Jules Impingement Sign: R positive , L negative (positive)    Assessment: Patient has made  gains in her range of motion and strength since beginning physical therapy; however, she continues to have pain with functional reaching and positive impingement signs. She is still stiff in the shoudler capsule and with weakness of the RTC and scapular stabilizers she has difficulty controlling her humeral axis of rotation causing pain with active reaching. Due to limited functional improvement, patient was recommended to follow up with MD for additional medial management of her pain to allow her to progress her functional reach for all ADLS.        Goals:   Goals: (to be met in 10 visits)   1. Patient will be independent in a progressive HEP to help manage symptoms and achieve functional independence in 3 sessions. MET  2. Patient will decrease her max pain to 2/10 as needed to improve her functional independence in 3 weeks. WORKING TOWARD   3. Patient will increase her shoudler range by 10 deg into all directions to decrease pain with dressing by 50%. PARTIALLY ACHIEVED   4. Patient will increase her shoudler abduction range to 165 deg or more to decrease pain with reaching out to the side with daily chores WORKING TOWARD   5. Patient will increase her flexion range to 170 deg to facilitate decreased pain with styling her hair WORKING TOWARD   6. Patient will increase her ER/IR strenght to 4+/5 as needed to improve her ability to lift and carry during ADLS. WORKING TOWARD       Plan: DC with instruction to follow up with their physician due to continuation of symptoms and reinitiate physical therapy pending MD recommendation.      Patient/Family/Caregiver was advised of these findings, precautions, and treatment options and has agreed to actively participate in planning and for this course of care.    Thank you for your referral. If you have any questions, please contact me at Dept: [phone].    Sincerely,  Electronically signed by therapist: Angella Hansen PT     Physician's certification  required:  No  Please co-sign or sign and return this letter via fax as soon as possible to [phone].   I certify the need for these services furnished under this plan of treatment and while under my care.    X___________________________________________________ Date____________________    Certification From: 3/14/2024  To:6/12/2024      Date:   3/5/24              TX#: 6/10 Date:   3/7/24              TX#: 7/10 Date:   3/14/24              TX#: 8/10   Man L GH joint inferior and posterior glides grade II-III  to improve ROM x 2 min   Manual cervical traction x 4 min  L GH joint inferior and posterior glides grade II-III  to improve ROM x 2 min   Manual cervical traction x 4 min     Ther ex Manual UT stretch left 2 x 30\"    PROM left shoulder flexion, abduction, scaption and ER   Supine chin tucks x 10  Side lying shoudler ER 1 x 10 0# with TR  Side lying shoudler flexion 1 x 10      Seated AAROM cane ER 2 x 10   Scapular retraction x 15, 5\"  Cervical retraction 1 x 10 sitting ND       GSB AAROM shoudler flexion, CW and CCW x 10 each  ND  Pendulums: CW and CCW x 15 each       Wall slides into V 2x 5   Abduction isometric x 10, 5\"  Flexion isometric x 10, 5\"   Pulleys: flexion x 1 min  Manual UT stretch left 2 x 30\"    PROM left shoulder flexion, abduction, scaption and ER   Supine chin tucks x 10  Side lying shoudler ER 1 x 10 0# with TR  Side lying shoudler flexion 1 x 10      Prone shoulder extension x 10     Seated AAROM cane ER 2 x 10   Scapular retraction x 10, 5\"        GSB AAROM shoudler flexion, CW and CCW x 10 each  ND  Pendulums: CW and CCW x 15 each     Wall slides into V 2x 5   Abduction isometric x 10, 5\"  Flexion isometric x 10, 5\"   Pulleys: flexion x 1 min  ND Manual UT stretch left 2 x 30\"  L GH joint inferior and posterior glides grade II-III  to improve ROM x 2 min   Manual cervical traction x 4 min   PROM left shoulder flexion, abduction, scaption and ER   Supine chin tucks x 10  Side lying  shoudler ER 1 x 10 0# with TR  Side lying shoudler flexion 1 x 10      Prone shoulder extension x 10     Seated AAROM cane ER 2 x 10   Scapular retraction x 10, 5\"       Pendulums: CW and CCW x 15 each     Wall slides into V 2x 5  ND      Seated flexion table stretch 10 x 5\"   ER with retraction 2 x 10 YTB - limited resistance     NMR ER and IR walkout YTT x 8 each   ER with retraction 1 x 10 YTB - limited resistance    Rows 1 x 15 YTB  Resisted shoudler extension 1 x 15 YTB  Resisted shoudler adduction 1 x 15 YTB (small range)  Seated flexion table stretch 5 x 5\"   Kinesiotape for inferior and medial glide with I strip for left shoulder ND ER and IR walkout YTT x 8 each   ER with retraction 2 x 10 YTB - limited resistance    Rows 1 x 15 YTB  Resisted shoudler extension 1 x 15 YTB  Resisted shoudler adduction 1 x 15 YTB (small range)  Seated flexion table stretch 10 x 5\"   Kinesiotape for inferior and medial glide with I strip for left shoulder ND ER and IR walkout YTT x 8 each     Rows 1 x 15 YTB  Resisted shoudler extension 1 x 15 YTB  Resisted shoudler adduction 1 x 15 YTB (small range)       Ther act      HEP: 3/5  - Isometric Shoulder Abduction at Wall  - 1 x daily - 7 x weekly - 1 sets - 10 reps - 5 hold  - Isometric Shoulder Flexion at Wall  - 1 x daily - 7 x weekly - 1 sets - 10 reps - 5 hold  - Seated Shoulder Flexion Slide at Table Top with Forearm in Neutral  - 1 x daily - 7 x weekly - 1 sets - 5 reps - 5 hold   3/14 reviewed HEP    Charges: 2 ther ex (24 min) 1 NMR (9 min)      Total Timed Treatment: 33 min  Total Treatment Time: 35 min      Certification From: 2/6/2024  To:5/6/2024

## 2024-03-19 ENCOUNTER — PATIENT MESSAGE (OUTPATIENT)
Dept: INTERNAL MEDICINE CLINIC | Facility: CLINIC | Age: 54
End: 2024-03-19

## 2024-03-19 ENCOUNTER — HOSPITAL ENCOUNTER (OUTPATIENT)
Dept: MRI IMAGING | Age: 54
Discharge: HOME | End: 2024-03-19
Attending: ORTHOPAEDIC SURGERY
Payer: COMMERCIAL

## 2024-03-19 DIAGNOSIS — M25.512 LEFT SHOULDER PAIN: ICD-10-CM

## 2024-03-19 PROCEDURE — 73221 MRI JOINT UPR EXTREM W/O DYE: CPT | Performed by: ORTHOPAEDIC SURGERY

## 2024-04-24 ENCOUNTER — OFFICE VISIT (OUTPATIENT)
Dept: ORTHOPEDICS CLINIC | Facility: CLINIC | Age: 54
End: 2024-04-24
Payer: COMMERCIAL

## 2024-04-24 ENCOUNTER — HOSPITAL ENCOUNTER (OUTPATIENT)
Dept: GENERAL RADIOLOGY | Facility: HOSPITAL | Age: 54
Discharge: HOME | End: 2024-04-24
Attending: ORTHOPAEDIC SURGERY
Payer: COMMERCIAL

## 2024-04-24 VITALS
BODY MASS INDEX: 37.54 KG/M2 | HEART RATE: 80 BPM | WEIGHT: 204 LBS | SYSTOLIC BLOOD PRESSURE: 142 MMHG | DIASTOLIC BLOOD PRESSURE: 92 MMHG | HEIGHT: 62 IN

## 2024-04-24 DIAGNOSIS — M67.912 DISORDER OF LEFT ROTATOR CUFF: ICD-10-CM

## 2024-04-24 DIAGNOSIS — R52 PAIN: ICD-10-CM

## 2024-04-24 DIAGNOSIS — R52 PAIN: Primary | ICD-10-CM

## 2024-04-24 PROCEDURE — 20610 DRAIN/INJ JOINT/BURSA W/O US: CPT | Performed by: ORTHOPAEDIC SURGERY

## 2024-04-24 PROCEDURE — 73030 X-RAY EXAM OF SHOULDER: CPT | Performed by: ORTHOPAEDIC SURGERY

## 2024-04-24 PROCEDURE — 3008F BODY MASS INDEX DOCD: CPT | Performed by: ORTHOPAEDIC SURGERY

## 2024-04-24 PROCEDURE — 3077F SYST BP >= 140 MM HG: CPT | Performed by: ORTHOPAEDIC SURGERY

## 2024-04-24 PROCEDURE — 3080F DIAST BP >= 90 MM HG: CPT | Performed by: ORTHOPAEDIC SURGERY

## 2024-04-24 PROCEDURE — 99204 OFFICE O/P NEW MOD 45 MIN: CPT | Performed by: ORTHOPAEDIC SURGERY

## 2024-04-24 RX ORDER — TRIAMCINOLONE ACETONIDE 40 MG/ML
40 INJECTION, SUSPENSION INTRA-ARTICULAR; INTRAMUSCULAR ONCE
Status: COMPLETED | OUTPATIENT
Start: 2024-04-24 | End: 2024-04-24

## 2024-04-24 RX ORDER — MELOXICAM 15 MG/1
15 TABLET ORAL DAILY
Qty: 30 TABLET | Refills: 0 | Status: SHIPPED | OUTPATIENT
Start: 2024-04-24

## 2024-04-24 RX ADMIN — TRIAMCINOLONE ACETONIDE 40 MG: 40 INJECTION, SUSPENSION INTRA-ARTICULAR; INTRAMUSCULAR at 10:18:00

## 2024-04-24 NOTE — PROGRESS NOTES
Per verbal order from Dr. Morgan, draw up and 4ml of 0.5% Marcaine and 1ml of Kenalog 40 for injection into left shoulder Cheryl Starr  Patient provided education handout for cortisone injection.

## 2024-04-24 NOTE — PROGRESS NOTES
NURSING INTAKE COMMENTS:   Chief Complaint   Patient presents with    Consult     Pt here for left shoulder pain. Pt recently had mri done and would like to discuss results. Rates pain between 7-8.        HPI: This 53 year old female presents today with complaints of left shoulder pain.  She has been seeing another orthopedic surgeon but did not care for his bedside management and decided to change physicians.  Her treatment today does include a Medrol Dosepak, a short course of NSAIDs, a month of physical therapy, and a cortisone injection.  Therapy may have aggravated her symptoms.  The cortisone injection helped for about a week.  She has a dull throbbing pain in the shoulder but sharp pains in the shoulder with certain motions such as pulling up her blankets or reaching.  She is right-hand dominant teacher.    Past Medical History:    Clot in the renal vein (HCC)    left lower extremity    Cyst of skin    right abdominal skin cyst January 2016    Deep vein thrombosis (HCC)    hx of DVT    Disorder of thyroid    High blood pressure    Hx of motion sickness    Hypercholesteremia    Hypothyroidism    PONV (postoperative nausea and vomiting)    Unspecified essential hypertension    Visual impairment     Past Surgical History:   Procedure Laterality Date    Ankle fracture surgery Left 2014    left ankle stress fracture 2014    Arthroscopy of joint unlisted      left knee screw    Colonoscopy N/A 04/28/2022    Procedure: COLONOSCOPY;  Surgeon: Flex Rubi MD;  Location: [facility] ENDOSCOPY    Knee surgery  2001    [facility]     Other surgical history  2004    vaginal delivery    Other surgical history  2006    vaginal delivery     Current Outpatient Medications   Medication Sig Dispense Refill    losartan-hydroCHLOROthiazide 100-25 MG Oral Tab Take 1 tablet by mouth daily. 90 tablet 3    levothyroxine 100 MCG Oral Tab Take 1 tablet (100 mcg total) by mouth before breakfast. 90 tablet 0    Phentermine HCl 37.5 MG Oral Tab  Take 1 tablet (37.5 mg total) by mouth every morning before breakfast. 30 tablet 3    topiramate 25 MG Oral Tab Take 1 tablet (25 mg total) by mouth 2 (two) times daily. 180 tablet 1    diclofenac 1 % External Gel Apply 2 g topically 4 (four) times daily. (Patient not taking: Reported on 4/24/2024) 1 each 0     Allergies   Allergen Reactions    Azithromycin      Other reaction(s): AZITHROMYCIN    Hydrocodone      Other reaction(s): feels dazed     Family History   Problem Relation Age of Onset    Hypertension Father         overweight     Other (alzheimers) Father     Diabetes Mother         borderline, overweight     Other (Other) Maternal Grandmother         Karie's Granulomatosis    Cancer Maternal Grandfather         LUNG    Other (Other) Sister         Rheumatoid Arthritis    Colon Cancer Paternal Uncle        Social History     Occupational History    Not on file   Tobacco Use    Smoking status: Never    Smokeless tobacco: Never   Vaping Use    Vaping status: Never Used   Substance and Sexual Activity    Alcohol use: Yes     Alcohol/week: 4.0 standard drinks of alcohol     Types: 4 Standard drinks or equivalent per week     Comment: rarely    Drug use: No    Sexual activity: Not on file        Review of Systems:  GENERAL: feels generally well, no significant weight loss or weight gain  SKIN: no ulcerated or worrisome skin lesions  EYES:denies blurred vision or double vision  HEENT: denies new nasal congestion, sinus pain or ST  LUNGS: denies shortness of breath  CARDIOVASCULAR: denies chest pain  GI: no hematemesis, no worsening heartburn, no diarrhea  : no dysuria, no blood in urine, no difficulty urinating, no incontinence  MUSCULOSKELETAL: no other musculoskeletal complaints other than in HPI  NEURO: no numbness or tingling, no weakness or balance disorder  PSYCHE: no depression or anxiety  HEMATOLOGIC: no hx of blood dyscrasia  ENDOCRINE: no thyroid or diabetes issues  ALL/ASTHMA: no new hx of severe  allergy or asthma    Physical Examination:    Ht 5' 2\" (1.575 m)   Wt 204 lb (92.5 kg)   LMP 12/15/2023 (Within Days)   BMI 37.31 kg/m²   Constitutional: appears well hydrated, alert and responsive, no acute distress noted  Extremities: Antalgic motion of the left shoulder with a full range of motion.  Positive impingement sign.  Positive crossover test.  Tenderness over the AC joint.  Tenderness over the long head of the biceps tendon.  Negative apprehension.  Positive impingement test.  No weakness to resisted external rotation at neutral.      Imaging: XR SHOULDER, COMPLETE (MIN 2 VIEWS), LEFT (CPT=73030)    Result Date: 4/24/2024  PROCEDURE: XR SHOULDER, COMPLETE (MIN 2 VIEWS), LEFT (CPT=73030)  COMPARISON: None.  INDICATIONS: Chronic left shouler pain.  TECHNIQUE: 3 views were obtained.   FINDINGS:  BONES: Joint space narrowing with marginal sclerosis and osteophyte formation at the acromioclavicular joint.  Glenohumeral alignment is anatomic.  Punctate calcification along the inferior lip of the glenoid.  No acute fracture. SOFT TISSUES: No visible soft tissue swelling. EFFUSION: None visible. OTHER: Negative.         CONCLUSION:  1.  Mild osteoarthritis in the left acromioclavicular joint. 2.  Punctate calcification adjacent to the inferior lip of the glenoid which may represent sequela of prior labral injury or intra-articular loose body. 3.  No acute osseous abnormality of the left shoulder   Dictated by (CST): Lj Perkins MD on 4/24/2024 at 9:26 AM     Finalized by (CST): Lj Perkins MD on 4/24/2024 at 9:27 AM             Lab Results   Component Value Date    WBC 5.9 03/09/2024    HGB 15.5 03/09/2024    .0 03/09/2024      Lab Results   Component Value Date    GLU 96 03/09/2024    BUN 22 03/09/2024    CREATSERUM 1.03 (H) 03/09/2024    GFRNAA 81 02/21/2022    GFRAA 94 02/21/2022        Assessment and Plan:  Diagnoses and all orders for this visit:    Pain  -     XR SHOULDER, COMPLETE (MIN 2  VIEWS), LEFT (CPT=73030); Future    Disorder of left rotator cuff  -     arthrocentesis major joint  -     triamcinolone acetonide (Kenalog-40) 40 MG/ML injection 40 mg        Assessment: She has early osteoarthritis of her left shoulder and quite a bit of degenerative pathology in her cuff and biceps tendon with  partial articular sided  Cuff tears.   Procedure: The risks and benefits of a cortisone injection were discussed with the patient.  An informational sheet was also provided and the patient had ample time to review it.  Under sterile preparation, the left shoulder was injected with 40 mg of Kenalog and 4 cc 0.5% marcaine.  The patient tolerated the procedure well.    Plan: I injected the shoulder with cortisone, prescribed meloxicam and physical therapy.  We talked about the possibility of surgical intervention.  If she does not respond to conservative treatment she might benefit from an arthroscopic acromioplasty, posterior repair, and distal clavicle resection as well as biceps tenodesis.  I told her that there is no guarantee that surgical intervention will fully alleviate her symptoms.  I suggested that she consult with my associate, Dr. Smith, for an opinion regarding arthroscopic shoulder surgery if she does not respond to the above conservative measures.    The above note was creating using Dragon speech recognition technology. Please excuse any typos.    REBA WHARTON MD

## 2024-04-30 NOTE — TELEPHONE ENCOUNTER
Patient is requesting refill of medications LEVOTHYROXINE SODIUM 100 MCG Oral Tab and LOSARTAN POTASSIUM-HCTZ 100-25 MG Oral Tab.  Patient is a former patient of ZOË Esteves, however, is scheduled for an establish care appointment with Dr. Rahat Ewing Detail Level: Simple Patient Specific Counseling (Will Not Stick From Patient To Patient): She requests LN2 & cantharidin today. Discussed use of Wart Stick.

## 2024-05-01 ENCOUNTER — TELEPHONE (OUTPATIENT)
Dept: PHYSICAL THERAPY | Facility: HOSPITAL | Age: 54
End: 2024-05-01

## 2024-05-01 ENCOUNTER — OFFICE VISIT (OUTPATIENT)
Dept: PHYSICAL THERAPY | Age: 54
End: 2024-05-01
Attending: ORTHOPAEDIC SURGERY
Payer: COMMERCIAL

## 2024-05-01 DIAGNOSIS — M67.912 DISORDER OF LEFT ROTATOR CUFF: Primary | ICD-10-CM

## 2024-05-01 PROCEDURE — 97161 PT EVAL LOW COMPLEX 20 MIN: CPT | Performed by: PHYSICAL THERAPIST

## 2024-05-01 PROCEDURE — 97110 THERAPEUTIC EXERCISES: CPT | Performed by: PHYSICAL THERAPIST

## 2024-05-01 NOTE — PROGRESS NOTES
SHOULDER EVALUATION:     Diagnosis:   Disorder of left rotator cuff (M67.912)  Referring Provider: Mike Morgan  Date of Evaluation:    5/1/2024    Precautions:  None Next MD visit:   none scheduled  Date of Surgery: n/a     PATIENT SUMMARY   Nunu Guan is a 53 year old female who presents to therapy today with complaints of L shoulder pain starting insidiously ~ 6 months ago.  She initially consulted Dr. Chatman: injection (reduction in c/o x ~ 3 days), PT referral (pt reports made c/o worse). She changed her care to Dr. Morgan 4/2024: injection (reduction in c/o), PT referral, referral to Dr. Smith if surgery necessary.  Pt reports constant L shoulder pain prior to injection. 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 Pt reports L shoulder pain no longer waking her up from sleeping.  Pt is R hand dominant.     Pt describes pain level current 1-2/10, at best 1-2/10, at worst 6-7/10.   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 Pt goals include: want L shoulder not to hurt, sleep on L UE  Past medical history was reviewed with Nunu. Significant findings include:     Stress 12/30/2021   Anxiety and depression 12/20/2021   Obesity (BMI 30-39.9) 7/10/2020   Vitamin D deficiency 2/27/2020   Hypercholesteremia 1/30/2020   Prediabetes 1/30/2020   Weight gain 1/30/2020   History of DVT (deep vein thrombosis) 1/30/2020   Snoring 1/30/2020   Morbid obesity with BMI of 40.0-44.9, adult ([facility]) 1/30/2020   Encounter for therapeutic drug monitoring 10/8/2018    Obesity 1/19/2015   Hypothyroidism 4/1/2014   Hypertension 12/16/201   Surgeries: L ankle surgery for torn tendon, L knee MCL repair    ASSESSMENT  Nunu presents to physical therapy evaluation with primary c/o L shoulder pain. The results of the objective tests and measures show L shoulder A/PROM deficits associated with pain, pec tightness, hypomobile posterior GH glide, L shoulder weakness, c/o with attempt to complete L shoulder Empty Can test.  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  Signs and symptoms are consistent with diagnosis of disorder of L rotator cuff. Pt and PT discussed evaluation findings, pathology, POC and HEP.  Pt voiced understanding and performs HEP correctly without reported pain. Skilled Physical Therapy is medically necessary to address the above impairments and reach functional goals.     OBJECTIVE:   Observation/Posture: protracted, forward rounded shoulders    AROM: (* denotes performed with pain)  Shoulder    Flexion: R 164; L 142*  Abduction: R 180; L 160*  ER: R T2; L T2   IR: R T5; L T5*   L shoulder PROM: flex/abd 160 deg*, ER 80 deg*, IR 60deg *; R each WNL    Accessory motion: hypomobile L/R GH posterior glide    Flexibility: + B pectoralis tightness    Strength/MMT: (* denotes performed with pain)  Shoulder   Flexion: R 4+/5; L 4/5  Abduction: R 4+/5; L 4/5  ER: R 4+/5; L 4/5  IR: R 4+/5; L 4/5     Special tests:   Negative Speed's tests  Pt c/o with with attempt to assume L Empty Can test     Today’s Treatment and Response:   Pt education was provided on exam findings, treatment diagnosis, treatment plan, expectations, and prognosis. Pt was also provided recommendations for postural corrections.  Patient was instructed in and issued a HEP for:  supine shoulder AAROM flexion (shoulder width and wider), s/l L shoulder  ER 0#, Posterior capsule stretch- ineffective (not for HEP), pec stretch doorway, B shoulder ER    Charges: PT Eval Low Complexity, 1 TE (15 min)      Total Timed Treatment: 15 min     Total Treatment Time: 45 min     Based on clinical rationale and outcome measures, this evaluation involved Low Complexity decision making.  PLAN OF CARE:    Goals: (to be met in 8 visits)   Patient to report independence with PT HEP to facilitate self management and to maintain progress made in PT.   Patient to have at least 160 deg active L shoulder AROM flex/abd with min - no c/o to facilitate overhead ADL.   Patient to have L shoulder flex, abd, ER MMT grossly 4+/5 in order to lift/lower 3# overhead using L UE.   Patient to report at least 50% improvement in L shoulder to facilitate reaching, ADL.     Frequency / Duration: Patient will be seen for 1-2 x/week or a total of 8 visits over a 90 day period. Treatment will include: Manual Therapy, Neuromuscular Re-education, Self-Care Home Management, Therapeutic Activities, Therapeutic Exercise, Home Exercise Program instruction, and Modalities to include: Electrical stimulation (unattended), Ultrasound, and MHP, cold pack    Education or treatment limitation: None  Rehab Potential: fair - good given response to prior PT, inections    QuickDASH Outcome Score  Score: 27.27 % (4/30/2024  7:12 PM)      Patient/Family/Caregiver was advised of these findings, precautions, and treatment options and has agreed to actively participate in planning and for this course of care.    Thank you for your referral. Please co-sign or sign and return this letter via fax as soon as possible to [phone]. If you have any questions, please contact me at Dept: [phone]    Sincerely,  Electronically signed by therapist: Isha Burton, PT  Physician's certification required: Yes  I certify the need for these services furnished under this plan of treatment and while under my  care.    X___________________________________________________ Date____________________    Certification From: 5/1/2024  To:7/30/2024

## 2024-05-06 ENCOUNTER — TELEPHONE (OUTPATIENT)
Dept: PHYSICAL THERAPY | Facility: HOSPITAL | Age: 54
End: 2024-05-06

## 2024-05-06 ENCOUNTER — OFFICE VISIT (OUTPATIENT)
Dept: PHYSICAL THERAPY | Age: 54
End: 2024-05-06
Attending: ORTHOPAEDIC SURGERY
Payer: COMMERCIAL

## 2024-05-06 PROCEDURE — 97110 THERAPEUTIC EXERCISES: CPT | Performed by: PHYSICAL THERAPIST

## 2024-05-06 NOTE — PROGRESS NOTES
Diagnosis:   Disorder of left rotator cuff (M67.912)    Referring Provider: Mike Morgan  Date of Evaluation:    5/1/2024    Precautions:  None Next MD visit:   none scheduled  Date of Surgery: n/a   Insurance Primary/Secondary[place] / N/A     # Auth Visits: 5 through 7/24/2024         Subjective: Completing HEP.  No new c/o re: L shoulder.    Pain: ~2/10      Objective:   + c/o L shoulder pain with overhead A/PROM flex, abd      Assessment: Pt with fair tolerance for PT. Pt with continued c/o L shoulder pain suggestive of rotator cuff dysfunction. Will monitor pt for consistent progress and refer back to physician accordingly.       Goals:    (to be met in 8 visits)   Patient to report independence with PT HEP to facilitate self management and to maintain progress made in PT.   Patient to have at least 160 deg active L shoulder AROM flex/abd with min - no c/o to facilitate overhead ADL.   Patient to have L shoulder flex, abd, ER MMT grossly 4+/5 in order to lift/lower 3# overhead using L UE.   Patient to report at least 50% improvement in L shoulder to facilitate reaching, ADL.     Plan: Continue PT 1-2 x weekly for 8 visits. See Assessment. F/u on response to today's session. Progress L shoulder A/AAROM, rotator cuff/periscapular strengthening, posture.   Date: 5/6/2024  TX#: 2/8 (5/8 auth sessions) Date:                 TX#: 3/ Date:                 TX#: 4/ Date:                 TX#: 5/ Date:   Tx#: 6/   There Ex:   Hep review/practice; see below  S/L L shoulder abduction 0 - 90 deg 0# x 10 reps  Seated thoracic spine ext 5 x 5 sec each, with pec stretch 5 x 5 sec each  B row using blue tubing x 10 reps - pt reported fatigue  Standing L shoulder IR using YTB x 10 reps  Standing L shoulder ER using YTB x 10 reps  Seated shoulder pulleys: flex, scap, abd, IRx 10 reps each  L shoulder pendulums x 30 sec   Supine B shoulder horiz abd using YTB x 10 reps  W wall slides - not completed, consider next  session           Manual:   B pec stretch - pt c/o L anterior shoulder pain, stopped  L shoulder/GH posterior glides - pt c/o anterior shoulder discomfort, stopped  L GH inferior glides grade II  Attempted PROM L shoulder - pt c/o, stopped       Cold pack - pt declined, reported would complete at home              HEP: supine shoulder AAROM flexion (shoulder width and wider), s/l L shoulder ER 0#, pec stretch doorway, B shoulder ER    Charges: 3 TE (35 min), 0 manual (5 min)      Total Timed Treatment: 40 min  Total Treatment Time: 42 min

## 2024-05-09 ENCOUNTER — TELEPHONE (OUTPATIENT)
Dept: PHYSICAL THERAPY | Facility: HOSPITAL | Age: 54
End: 2024-05-09

## 2024-05-10 DIAGNOSIS — E66.01 MORBID OBESITY WITH BMI OF 40.0-44.9, ADULT (HCC): ICD-10-CM

## 2024-05-11 DIAGNOSIS — E03.9 ACQUIRED HYPOTHYROIDISM: ICD-10-CM

## 2024-05-13 ENCOUNTER — OFFICE VISIT (OUTPATIENT)
Dept: PHYSICAL THERAPY | Age: 54
End: 2024-05-13
Attending: ORTHOPAEDIC SURGERY
Payer: COMMERCIAL

## 2024-05-13 PROCEDURE — 97110 THERAPEUTIC EXERCISES: CPT | Performed by: PHYSICAL THERAPIST

## 2024-05-13 PROCEDURE — 97014 ELECTRIC STIMULATION THERAPY: CPT | Performed by: PHYSICAL THERAPIST

## 2024-05-13 RX ORDER — LEVOTHYROXINE SODIUM 0.1 MG/1
100 TABLET ORAL
Qty: 90 TABLET | Refills: 3 | Status: SHIPPED | OUTPATIENT
Start: 2024-05-13

## 2024-05-13 RX ORDER — PHENTERMINE HYDROCHLORIDE 37.5 MG/1
37.5 TABLET ORAL
Qty: 30 TABLET | Refills: 0 | Status: SHIPPED | OUTPATIENT
Start: 2024-05-13

## 2024-05-13 NOTE — TELEPHONE ENCOUNTER
Refill passed per [facility] protocol.  Requested Prescriptions   Pending Prescriptions Disp Refills    levothyroxine 100 MCG Oral Tab 90 tablet 0     Sig: Take 1 tablet (100 mcg total) by mouth before breakfast.       Thyroid Medication Protocol Passed - 5/11/2024  8:34 AM        Passed - TSH in past 12 months        Passed - Last TSH value is normal     Lab Results   Component Value Date    TSH 2.271 03/09/2024    THYROIDFUNC 2.14 04/17/2016                 Passed - In person appointment or virtual visit in the past 12 mos or appointment in next 3 mos     Recent Outpatient Visits              1 week ago     [facility] Isha Burton, PT    Office Visit    1 week ago Disorder of left rotator cuff    [facility] Isha Burton, PT    Office Visit    2 weeks ago Pain    [place] Mike Morgan MD    Office Visit    2 months ago     [facility] [place] Sorensen-BrittichAngella PT    Office Visit    2 months ago Annual physical exam    [facility] Angu[facility] APR[facility]    Office Visit          Future Appointments         Provider Department Appt Notes    Today Isha Burton, PT [facility] 5 visits 4/24 to 7/24  [employer] HMO  no c/p, 60v pcy    In 1 week Isha Burton PT [facility] 5 visits 4/24 to 7/24  [employer] HMO  no c/p, 60v pcy    In 2 weeks Sangeeta Marcum APRN [place]     In 2 weeks Igor Adler, PT [facility] LAST VISIT APPROVED   5 visits 4/24 to 7/24  [employer] HMO  no c/p, 60v pcy    In 3 weeks Igor Adler, PT [facility] AUTH.???  [employer] HMO  no c/p, 60v pcy    In 4 weeks Igor Adler, PT  [facility] AUTH???  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 HMO  no c/p, 60v pcy                       Recent Outpatient Visits              1 week ago     [facility] Isha Burton, PT    Office Visit    1 week ago Disorder of left rotator cuff    [facility] MichealIsha mazariegos, PT    Office Visit    2 weeks ago Pain    [facility] [place], Mike Guadarrama MD    Office Visit    2 months ago     [facility] [place] Sorensen-Brittich, Angella, PT    Office Visit    2 months ago Annual physical exam    [facility] [place]    Office Visit          Future Appointments         Provider Department Appt Notes    Today Isha Burton, PT [facility] 5 visits 4/24 to 7/24  [employer]BS HMO  no c/p, 60v pcy    In 1 week Isha Burton, PT [facility] 5 visits 4/24 to 7/24  [employer]BS HMO  no c/p, 60v pcy    In 2 weeks Sangeeta Marcum APRN [facility]     In 2 weeks Igor Adler, PT [facility] LAST VISIT APPROVED   5 visits 4/24 to 7/24  [employer]BS HMO  no c/p, 60v pcy    In 3 weeks Igor Adler, PT [facility] AUTH.???  [employer]BS HMO  no c/p, 60v pcy    In 4 weeks Igor Adler, PT [facility] AUTH???  BCBS HMO  no c/p, 60v pcy

## 2024-05-13 NOTE — PROGRESS NOTES
Diagnosis:   Disorder of left rotator cuff (M67.912)    Referring Provider: Mike Morgan  Date of Evaluation:    5/1/2024    Precautions:  None Next MD visit:   none scheduled  Date of Surgery: n/a   Insurance Primary/Secondary[place] / N/[place]     # Auth Visits: 5 through 7/24/2024         Subjective: Completing HEP.  No new c/o re: L shoulder; it is improving.    Pain: ~2/10      Objective:   + c/o L shoulder pain with overhead A/PROM flex, abd    ROM: (* denotes performed with pain)  Shoulder    Flexion: R 164; L 160*  Abduction: R 180; L 180*       Assessment:   Pt with subjective report of progress, improved L shoulder AROM.  Pt with improved tolerance for manual therapy but then c/o following.  Completed pendulums and IFC/ice pack in effort to reduce c/o. Pt with continued c/o L shoulder pain suggestive of rotator cuff dysfunction.       Goals:    (to be met in 8 visits)   Patient to report independence with PT HEP to facilitate self management and to maintain progress made in PT.   Patient to have at least 160 deg active L shoulder AROM flex/abd with min - no c/o to facilitate overhead ADL.   Patient to have L shoulder flex, abd, ER MMT grossly 4+/5 in order to lift/lower 3# overhead using L UE.   Patient to report at least 50% improvement in L shoulder to facilitate reaching, ADL.     Plan: Continue PT 1-2 x weekly for 8 visits. See Assessment. F/u on response to today's session. Progress L shoulder A/AAROM, rotator cuff/periscapular strengthening, posture.   Date: 5/6/2024  TX#: 2/8 (2/5 auth sessions) Date: 5/13/2024                TX#: 3/5 (3/5 auth) Date:                 TX#: 4/ Date:                 TX#: 5/ Date:   Tx#: 6/ There Ex:   Hep review/practice; see below  S/L L shoulder abduction 0 - 90 deg 0# x 10 reps  Seated thoracic spine ext 5 x 5 sec each, with pec stretch 5 x 5 sec each  B row using blue tubing x 10 reps - pt reported fatigue  Standing L shoulder IR using YTB x 10  reps  Standing L shoulder ER using YTB x 10 reps  Seated shoulder pulleys: flex, scap, abd, IRx 10 reps each  L shoulder pendulums x 30 sec   Supine B shoulder horiz abd using YTB x 10 reps  W wall slides - not completed, consider next session     There Ex:   UBE: ~ 1.5 min forward/~ 1.5 min backward (3 min total), level 1  B row using yellow tubing x 15 reps  L shoulder ER using YTB x 10 rep  L shoulder IR using YTB x 10 reps  Supine B shoulder horiz abd using YTB x 10 reps  Seated shoulder pulleys: flex, scap, abd, IRx 10 reps each  L shoulder pendulums x 30 sec   W wall slides x 5 reps (HEP)  Shoulder pulleys: flexion, scaption, abduction x 10 reps each  Seated thoracic spine ext/pec stretch 5 x 5 sec each (HEP)          Manual:   See Daily Note Manual:   B pec stretch 3 x 20 sec each  L shoulder/GH posterior glide - pt c/o, stopped  L GH inferior glides grade II  PROM L shoulder flex, abd, ER - pt c/o, stopped. IR PROM      Cold pack - pt declined, reported would complete at home IFC/cold pack L shoulder x 10 min supine             HEP: supine shoulder AAROM flexion (shoulder width and wider) - progress to wall slide , s/l L shoulder ER 0#, pec stretch doorway - modify to seated thoracic spine ext/pec stretch, B shoulder ER - modify to L shoulder ER using band    Charges: 3 TE (35 min), 0 manual (5 min), Estim    Total Timed Treatment: 40 min  Total Treatment Time: 55 min

## 2024-05-21 ENCOUNTER — TELEPHONE (OUTPATIENT)
Dept: PHYSICAL THERAPY | Age: 54
End: 2024-05-21

## 2024-05-22 ENCOUNTER — OFFICE VISIT (OUTPATIENT)
Dept: PHYSICAL THERAPY | Age: 54
End: 2024-05-22
Attending: ORTHOPAEDIC SURGERY

## 2024-05-22 PROCEDURE — 97110 THERAPEUTIC EXERCISES: CPT | Performed by: PHYSICAL THERAPIST

## 2024-05-22 NOTE — PROGRESS NOTES
Diagnosis:   Disorder of left rotator cuff (M67.912)    Referring Provider: Mike Morgan  Date of Evaluation:    5/1/2024    Precautions:  None Next MD visit:   none scheduled  Date of Surgery: n/a   Insurance Primary/Secondary: [employer] [place] / N/A     # Auth Visits: 5 through 7/24/2024         Subjective: Much better since shoulder injection under Dr. Morgan' care.  Limited Hep compliance.     Pain: ~2/10      Objective:   + c/o L shoulder pain with overhead A/PROM flex, abd    ROM: (* denotes performed with pain)  Shoulder    Flexion: R 164; L 150*  Abduction: R 180; L 170*  ER: R T2; L T2   IR: R T5; L T5   Shoulder mechanics WNL with AROM shoulder flex, abd    L Shoulder MMT:   Flexion: 4/5  Abduction: 4/5* c/o  ER: 5-/5  IR: 5-/5      Assessment:   Patient progressing but with residual L shoulder impingement with overhead AROM related to rotator cuff dysfunction, posture. Pt reports non- compliant with PT HEP due to busy schedule. Advised pt to complete s/l shoulder ER and wall slides at minimum.  Pt with improved L shoulder AROM flex/abd following L GH posterior and inferior mobilization. Avoided manual therapy due to pt with hx of poor tolerance.       Goals:    (to be met in 8 visits)   Patient to report independence with PT HEP to facilitate self management and to maintain progress made in PT.   Patient to have at least 160 deg active L shoulder AROM flex/abd with min - no c/o to facilitate overhead ADL.   Patient to have L shoulder flex, abd, ER MMT grossly 4+/5 in order to lift/lower 3# overhead using L UE.   Patient to report at least 50% improvement in L shoulder to facilitate reaching, ADL.     Plan: Continue PT 1-2 x weekly for 8 visits, pending authorization.  F/u on response to today's session. Progress L shoulder A/AAROM, rotator cuff/periscapular strengthening, posture.   Date: 5/6/2024  TX#: 2/8 (2/5 auth sessions) Date: 5/13/2024                TX#: 3/5 (3/5 auth) Date: 5/22/2024                 TX#: 4/5 (4/5 auth sessions) Date:                 TX#: 5/ Date:   Tx#: 6/ There Ex:   Hep review/practice; see below  S/L L shoulder abduction 0 - 90 deg 0# x 10 reps  Seated thoracic spine ext 5 x 5 sec each, with pec stretch 5 x 5 sec each  B row using blue tubing x 10 reps - pt reported fatigue  Standing L shoulder IR using YTB x 10 reps  Standing L shoulder ER using YTB x 10 reps  Seated shoulder pulleys: flex, scap, abd, IRx 10 reps each  L shoulder pendulums x 30 sec   Supine B shoulder horiz abd using YTB x 10 reps  W wall slides - not completed, consider next session     There Ex:   UBE: ~ 1.5 min forward/~ 1.5 min backward (3 min total), level 1  B row using yellow tubing x 15 reps  L shoulder ER using YTB x 10 rep  L shoulder IR using YTB x 10 reps  Supine B shoulder horiz abd using YTB x 10 reps  Seated shoulder pulleys: flex, scap, abd, IRx 10 reps each  L shoulder pendulums x 30 sec   W wall slides x 5 reps (HEP)  Shoulder pulleys: flexion, scaption, abduction x 10 reps each  Seated thoracic spine ext/pec stretch 5 x 5 sec each (HEP)     There   HEP: may focus one s/l shoulder ER, wall slides if facilitates compliance   UBE: 2 min retro, 1 min forward (3 min total), level 1  B row using yellow tubing x 15 reps  L shoulder ER using YTB x 10 rep  L shoulder IR using YTB x 10 reps  B shoulder ER x 10 reps   Seated thoracic spine ext/pec stretch 5 x 5 sec each  Seated B shoulder horiz abd using YTB x 10 reps  Seated L GH posterior capsule stretch 3 x 20 sec each  Seated L GH inferior glide 3 x 15 sec each (HEP)  Doorway low pec stretch 2 x 20 sec each  Seated shoulder pulleys: flex, scap, abd, IRx 10 reps each     Manual:   See Daily Note Manual:   B pec stretch 3 x 20 sec each  L shoulder/GH posterior glide - pt c/o, stopped  L GH inferior glides grade II  PROM L shoulder flex, abd, ER - pt c/o, stopped. IR PROM Manual:   Not completed; pt with poor tolerated in past     Cold pack - pt declined,  reported would complete at home IFC/cold pack L shoulder x 10 min supine             HEP: supine shoulder AAROM flexion (shoulder width and wider) - progress to wall slide , s/l L shoulder ER 0#, pec stretch doorway - modify to seated thoracic spine ext/pec stretch, B shoulder ER, seated GH inferior glides    Charges: 3 TE (40 min)    Total Timed Treatment: 40 min  Total Treatment Time: 45 min

## 2024-05-30 ENCOUNTER — OFFICE VISIT (OUTPATIENT)
Dept: PHYSICAL THERAPY | Age: 54
End: 2024-05-30
Attending: ORTHOPAEDIC SURGERY

## 2024-05-30 ENCOUNTER — OFFICE VISIT (OUTPATIENT)
Dept: SURGERY | Facility: CLINIC | Age: 54
End: 2024-05-30

## 2024-05-30 VITALS
HEART RATE: 87 BPM | WEIGHT: 199 LBS | OXYGEN SATURATION: 97 % | DIASTOLIC BLOOD PRESSURE: 80 MMHG | BODY MASS INDEX: 35.7 KG/M2 | SYSTOLIC BLOOD PRESSURE: 110 MMHG | HEIGHT: 62.5 IN

## 2024-05-30 DIAGNOSIS — E78.00 HYPERCHOLESTEREMIA: Primary | ICD-10-CM

## 2024-05-30 DIAGNOSIS — Z51.81 ENCOUNTER FOR THERAPEUTIC DRUG MONITORING: ICD-10-CM

## 2024-05-30 DIAGNOSIS — Z86.718 HISTORY OF DVT (DEEP VEIN THROMBOSIS): ICD-10-CM

## 2024-05-30 DIAGNOSIS — R73.03 PREDIABETES: ICD-10-CM

## 2024-05-30 DIAGNOSIS — E03.9 ACQUIRED HYPOTHYROIDISM: ICD-10-CM

## 2024-05-30 DIAGNOSIS — F43.9 STRESS: ICD-10-CM

## 2024-05-30 DIAGNOSIS — E55.9 VITAMIN D DEFICIENCY: ICD-10-CM

## 2024-05-30 DIAGNOSIS — I10 PRIMARY HYPERTENSION: ICD-10-CM

## 2024-05-30 DIAGNOSIS — E66.9 OBESITY (BMI 30-39.9): ICD-10-CM

## 2024-05-30 DIAGNOSIS — I10 HYPERTENSION, UNSPECIFIED TYPE: ICD-10-CM

## 2024-05-30 PROCEDURE — 99214 OFFICE O/P EST MOD 30 MIN: CPT | Performed by: NURSE PRACTITIONER

## 2024-05-30 PROCEDURE — 97530 THERAPEUTIC ACTIVITIES: CPT

## 2024-05-30 PROCEDURE — 97110 THERAPEUTIC EXERCISES: CPT

## 2024-05-30 PROCEDURE — 3074F SYST BP LT 130 MM HG: CPT | Performed by: NURSE PRACTITIONER

## 2024-05-30 PROCEDURE — 3008F BODY MASS INDEX DOCD: CPT | Performed by: NURSE PRACTITIONER

## 2024-05-30 PROCEDURE — 3079F DIAST BP 80-89 MM HG: CPT | Performed by: NURSE PRACTITIONER

## 2024-05-30 RX ORDER — PHENTERMINE HYDROCHLORIDE 37.5 MG/1
37.5 TABLET ORAL
Qty: 30 TABLET | Refills: 3 | Status: SHIPPED | OUTPATIENT
Start: 2024-05-30

## 2024-05-30 RX ORDER — TOPIRAMATE 25 MG/1
25 TABLET ORAL 2 TIMES DAILY
Qty: 180 TABLET | Refills: 1 | Status: SHIPPED | OUTPATIENT
Start: 2024-05-30

## 2024-05-30 NOTE — H&P
[facility], [facility], [place]  [address]  [facility] [address]  Dept: [phone]       Patient:  Nunu Guan  :      1970  MRN:      [id number]    Chief Complaint:  Weight Management, Obesity, Follow up     SUBJECTIVE     History of Present Illness:  Nunu is being seen today for a follow-up for non surgical weight loss.           Initial HPI: 2020  48 yo female presents to clinic for medically supported weight loss.      Patient is considering medications and is not considering bariatric surgery for weight loss.     Patient denies any history of eating disorder(s).     Patient is employed: .  Patient lives with, spouse and 2 children (13 and 15).     Patient's goal weight: Unsure   Biggest weight loss in the past: 100 lbs  How weight loss was achieved: Exercise   Heaviest weight ever: Current   Previous use of medical weight loss medications:     Physical activity: None      Sleep: 5-6 hours/night    Sleep screening: +snores, consider sleep study     Barriers: Motivation      Values: Breathing well, Fitting on Furniture, Children, Health      Past Medical History:   Past Medical History:    Clot in the renal vein (HCC)    left lower extremity    Cyst of skin    right abdominal skin cyst 2016    Deep vein thrombosis (HCC)    hx of DVT    Disorder of thyroid    High blood pressure    Hx of motion sickness    Hypercholesteremia    Hypothyroidism    PONV (postoperative nausea and vomiting)    Unspecified essential hypertension    Visual impairment        Comorbidities:  Back pain-Improvement?  yes, Hypertension-Improvement?  yes and Hyperlipidemia-Improvement?  no    OBJECTIVE     Vitals: /80 (BP Location: Right arm, Patient Position: Sitting, Cuff Size: adult)   Pulse 87   Ht 5' 2.5\" (1.588 m)   Wt 199 lb (90.3 kg)   LMP 12/15/2023 (Within Days)   SpO2 97%   BMI 35.82 kg/m²     Initial weight  Alvino Chicas MD FACS        FACILITY: [id number] [place], [place]    PATIENT:  Mark Obregon  MRN: [id number]  YOB: 1980   DATE: 8/15/2023     SURGEON:  Surgeon(s):  Bravo Summers MD   ASSISTANT: [facility], , PGY-4     PREOPERATIVE DIAGNOSIS: Urge urinary incontinence       POSTOPERATIVE DIAGNOSIS: Urge urinary incontinence       PROCEDURES PERFORMED:  1. Cystoscopy with transvesical injection of Botox 100 Units      ANESTHESIA: Monitor Anesthesia Care   COMPLICATIONS: none  DRAINS: none  SPECIMEN: none  ESTIMATED BLOOD LOSS: Minimal     INDICATIONS:  This is a 44 y.o. female presents today for a cystoscopy and transvesical Botox injection to treat medically refractory urge urinary incontinence. After risks, benefits and alternatives of the procedure were discussed with the patient, informed consent was obtained and the patient elected to proceed. The patient was given Ancef 2 g IV on call to OR for antibiotic prophylaxis. Patient had EPC cuffs placed for VTE prophylaxis. DETAILS OF PROCEDURE:  The patient was brought back to the operating room. She was prepped and draped in usual sterile surgical fashion after being placed in dorsal lithotomy position. A timeout was taken per protocol with everyone in agreement. The 22F rigid cystoscope was assembled using a 30 degree lens and passed per the urethra. Patient was noted to have a cystocele, as well as a clitoral chow piercing. The urethra was normal without any lesions although somewhat narrow. The bladder was entered with ease and inspected thoroughly and systematically which did not show any lesions or tumors, stone, fistulous tracts, diverticula. Both ureteral orifices were normal with clear efflux of urine. Once within the bladder the injection needle was advanced and purged of air. A total of 100 I. U. units of Botox were injected into the bladder wall via 20 injection sites (5 units/injection) using a loss: -24   Total weight loss: -59   Start weight: 267    Wt Readings from Last 6 Encounters:   05/30/24 199 lb (90.3 kg)   04/24/24 204 lb (92.5 kg)   03/09/24 204 lb (92.5 kg)   01/22/24 208 lb (94.3 kg)   12/21/23 212 lb (96.2 kg)   10/02/23 232 lb (105.2 kg)       Patient Medications:    Current Outpatient Medications   Medication Sig Dispense Refill    Phentermine HCl 37.5 MG Oral Tab Take 1 tablet (37.5 mg total) by mouth every morning before breakfast. 30 tablet 0    levothyroxine 100 MCG Oral Tab Take 1 tablet (100 mcg total) by mouth before breakfast. 90 tablet 3    Meloxicam 15 MG Oral Tab Take 1 tablet (15 mg total) by mouth daily. 30 tablet 0    losartan-hydroCHLOROthiazide 100-25 MG Oral Tab Take 1 tablet by mouth daily. 90 tablet 3    topiramate 25 MG Oral Tab Take 1 tablet (25 mg total) by mouth 2 (two) times daily. 180 tablet 1    diclofenac 1 % External Gel Apply 2 g topically 4 (four) times daily. (Patient not taking: Reported on 4/24/2024) 1 each 0     Allergies:  Azithromycin and Hydrocodone     Social History:  Reviewed    Surgical History:    Past Surgical History:   Procedure Laterality Date    Ankle fracture surgery Left 2014    left ankle stress fracture 2014    Arthroscopy of joint unlisted      left knee screw    Colonoscopy N/A 04/28/2022    Procedure: COLONOSCOPY;  Surgeon: Flex Rubi MD;  Location: [facility] ENDOSCOPY    Knee surgery  2001    [facility]     Other surgical history  2004    vaginal delivery    Other surgical history  2006    vaginal delivery     Family History:    Family History   Problem Relation Age of Onset    Hypertension Father         overweight     Other (alzheimers) Father     Diabetes Mother         borderline, overweight     Other (Other) Maternal Grandmother         Karie's Granulomatosis    Cancer Maternal Grandfather         LUNG    Other (Other) Sister         Rheumatoid Arthritis    Colon Cancer Paternal Uncle      Initial Intake:  After dinner behavior: chips,  volume of 0.5 mL at each site. The ureteral orifices were avoided. Once completed, the bladder was surveyed. There was no evidence of active bleeding from the injection sites. The patient tolerated the procedure well. The patient was taken to recovery period and discharged home in stable condition. Dr. Ghada Lazcano was present and scrubbed throughout the entire duration of the procedure. Plan:  Follow up in 3 weeks to reassess symptoms and check Postvoid Residual.    Patricia Oden DO, PGY-4  Urology Resident    I was present and scrubbed for the critical portions or entire case.   Electronically signed by Harjit Patrick MD on 8/15/2023 at 11:24 AM popcorn cakes   Night eating: -  Portion sizes: at times   Binge: BED 7-  Emotional: +  Depression: Total PHQ Score: 1  Grazing: -  Sweet tooth: at times   Crunchy/salty: +  Etoh: Wine, a few times a week (1-2 glasses)   Soda Drinker: Yes                 If yes, how much?:  Occasional  Sports Drinks:  No                  Juice:  No     Food Journal  Reviewed and Discussed:       Patient has a Food Journal?: yes   [employer]  Patient is reading nutrition labels?  yes  Average Caloric Intake:      Average CHO Intake:    Is patient exercising? no Minimal   Type of exercise? PT shoulder     Eating Habits  Patient states the following:  Eats 3 meal(s) per day  # of snacks per day: 1-2   Type of snacks:  fruit    Amount of soda consumption per day:  1 every couple of weeks   Amount of water (in ounces) per day:  Not adequate   Toughest challenge:  Family stress   Sleep: Good       Nutritional Goals  Eat 3-4 cups of fresh fruits or vegetables daily    Behavior Modifications Reviewed and Discussed  Eat breakfast, Eat 3 meals per day, Plan meals in advance, Read nutrition labels, Drink 64 oz of water per day, Maintain a daily food journal, Utlize portion control strategies to reduce calorie intake, Identify triggers for eating and manage cues and Eat slowly and take 20 to 30 minutes to complete each meal    Exercise Goals Reviewed and Discussed    Other:  Aim for 150 minutes exercise/week    Step challenge    ROS:    Constitutional: negative  Respiratory: negative  Cardiovascular: negative  Gastrointestinal: negative  Genitourinary:negative  Integument/breast: negative  Hematologic/lymphatic: negative  Musculoskeletal:positive for back pain and improved   Neurological: positive for headaches  Behavioral/Psych: negative  Endocrine: negative  All other systems were reviewed and are negative      Physical Exam:   General appearance: alert, appears stated age, cooperative and obese   Head: Normocephalic, without obvious  abnormality, atraumatic  Neck: no adenopathy, no carotid bruit, no JVD, supple, symmetrical, trachea midline and thyroid not enlarged, symmetric, no tenderness/mass/nodules  Lungs: clear to auscultation bilaterally  Heart: S1, S2 normal, no murmur, click, rub or gallop, regular rate and rhythm  Abdomen: soft, non-tender; bowel sounds normal; no masses,  no organomegaly and abdomen obese   Extremities: extremities normal, atraumatic, no cyanosis or edema  Pulses: 2+ and symmetric  Skin: Skin color, texture, turgor normal. No rashes or lesions  Neurologic: Grossly normal    ASSESSMENT       Encounter Diagnosis(ses):   No diagnosis found.      PLAN         There are no diagnoses linked to this encounter.    HYPERCHOLESTEROLEMIA:  Recommend dietary changes and lifestyle modifications as discussed below. Monitor.       Lab Results   Component Value Date/Time    CHOLEST 233 (H) 03/09/2024 09:28 AM     (H) 03/09/2024 09:28 AM    HDL 56 03/09/2024 09:28 AM    TRIG 89 03/09/2024 09:28 AM    VLDL 17 03/09/2024 09:28 AM       HYPERTENSION: Blood pressure controlled on the above medications. No interval change in antihypertensive medication. Monitor closely.       HYPOTHYROIDISM: On levothyroxine. Continue present management.     History Vitamin D deficiency.     OBESITY/WEIGHT GAIN:  PREDIABETES:     Discussed starting weight:  267 lbs; BMI: 48.1; WC: 51 in.  Patient's goal weight: Unsure  Values: Breathing well, Fitting on Furniture, Children, Health       Recommended patient continue intensive lifestyle and behavioral modifications at this time for weight loss.     Reviewed lifestyle modifications: Whole Food/Plant Strong/Low Glycemic Index diet, moderate alcohol consumption, reduced sodium intake to no more than 2,400 mg/day, and at least 150 minutes of moderate physical activity per week.   Avoid processed, poor quality carbohydrates, refined grains, flour, sugar.     Goals for next month:  1. Keep a food log.   2.  Drink 64 ounces of non-caloric beverages per day. No fruit juices or regular soda.  3. Aim for 150 minutes moderate exercise per week.    4. Increase fruit and vegetable servings to 5-6 per day.    5. Improve sleep and stress.      Reviewed labs: 10/14/19  Renal/liver function intact; CBC in range  TSH 3.790 on levothyroxine  2/22/2020 Updated TSH, T4 in range.  B 12 in range.  Vitamin D low- continue 50,000 IU capsule weekly.   11/28/2020: CMP and CBC in range.  Vitamin D remains low.  2/21/22- Blood sugar elevated, CMP otherwise notes no significant abnormalities.   LDL and triglycerides elevated.   Vitamin D low- will need to supplement.    4/19/23- CBC in range.  , CMP otherwise in range.   Vitamin D 36.8. Supplement OTC daily.   3/9/24- A1c 5.7%. CMP with slightly elevated Creatinine. Elevated cholesterol.   CBC, vit D, TSH in range.     Reviewed medication options for weight loss in detail with patient.      +cravings, emotional eating, evening snacking- improved.     Leptin elevated.  Denies hx cardiac disease or substance abuse.    Previous successful treatment with phentermine/topiramate combination (topiramate 25 mg BID, phentermine 37.5 mg/day).     Hold off on Qsymia.    Continue phentermine 37.5 mg in the AM.    Continue 25 mg topiramate BID.    Prefers not to take injectable, Saxenda or Wegovy.    Consider Contrave.       Discussed risks, benefits, rationale, and side effects of medication(s) including hypertension, palpitations, tachycardia, dizziness, constipation, dry mouth, and anxiety among others.     2/23/2021 EKG within normal limits. EKG done 2/15/22, no change.  4/19/23- EKG NSR.    Recommend magnesium glycinate to improve sleep.      IF, 12 hours.     Hold off on food tracking for now, causes significant anxiety- follow [employer].     Continue counseling.     Referral to integrative center previously provided: acupuncture, massage, mindfulness therapy.    Goal weight: < 200  renee.    RTC: 4 months.   Sangeeta Marcum APRN

## 2024-05-30 NOTE — PROGRESS NOTES
[facility], [facility], [place]  [address] [facility] [address]  [facility] [address]  Dept: [phone]       Patient:  Nunu Guan  :      1970  MRN:      [id number]    Chief Complaint:  Weight Management, Obesity, Follow up     SUBJECTIVE     History of Present Illness:  Nunu is being seen today for a follow-up for non surgical weight loss.     Met goal weight in clinic today.     Initial HPI: 2020  48 yo female presents to clinic for medically supported weight loss.      Patient is considering medications and is not considering bariatric surgery for weight loss.     Patient denies any history of eating disorder(s).     Patient is employed: .  Patient lives with, spouse and 2 children (13 and 15).     Patient's goal weight: Unsure   Biggest weight loss in the past: 100 lbs  How weight loss was achieved: Exercise   Heaviest weight ever: Current   Previous use of medical weight loss medications:     Physical activity: None      Sleep: 5-6 hours/night    Sleep screening: +snores, consider sleep study     Barriers: Motivation      Values: Breathing well, Fitting on Furniture, Children, Health      Past Medical History:   Past Medical History:    Clot in the renal vein (HCC)    left lower extremity    Cyst of skin    right abdominal skin cyst 2016    Deep vein thrombosis (HCC)    hx of DVT    Disorder of thyroid    High blood pressure    Hx of motion sickness    Hypercholesteremia    Hypothyroidism    PONV (postoperative nausea and vomiting)    Unspecified essential hypertension    Visual impairment        Comorbidities:  Back pain-Improvement?  yes, Hypertension-Improvement?  yes and Hyperlipidemia-Improvement?  no    OBJECTIVE     Vitals: /80 (BP Location: Right arm, Patient Position: Sitting, Cuff Size: adult)   Pulse 87   Ht 5' 2.5\" (1.588 m)   Wt 199 lb (90.3 kg)   LMP 12/15/2023 (Within Days)   SpO2 97%   BMI  35.82 kg/m²     Initial weight loss: -09   Total weight loss: -68   Start weight: 267    Wt Readings from Last 6 Encounters:   05/30/24 199 lb (90.3 kg)   04/24/24 204 lb (92.5 kg)   03/09/24 204 lb (92.5 kg)   01/22/24 208 lb (94.3 kg)   12/21/23 212 lb (96.2 kg)   10/02/23 232 lb (105.2 kg)       Patient Medications:    Current Outpatient Medications   Medication Sig Dispense Refill    Phentermine HCl 37.5 MG Oral Tab Take 1 tablet (37.5 mg total) by mouth every morning before breakfast. 30 tablet 3    topiramate 25 MG Oral Tab Take 1 tablet (25 mg total) by mouth 2 (two) times daily. 180 tablet 1    levothyroxine 100 MCG Oral Tab Take 1 tablet (100 mcg total) by mouth before breakfast. 90 tablet 3    Meloxicam 15 MG Oral Tab Take 1 tablet (15 mg total) by mouth daily. 30 tablet 0    losartan-hydroCHLOROthiazide 100-25 MG Oral Tab Take 1 tablet by mouth daily. 90 tablet 3    diclofenac 1 % External Gel Apply 2 g topically 4 (four) times daily. (Patient not taking: Reported on 4/24/2024) 1 each 0     Allergies:  Azithromycin and Hydrocodone     Social History:  Reviewed    Surgical History:    Past Surgical History:   Procedure Laterality Date    Ankle fracture surgery Left 2014    left ankle stress fracture 2014    Arthroscopy of joint unlisted      left knee screw    Colonoscopy N/A 04/28/2022    Procedure: COLONOSCOPY;  Surgeon: Flex Rubi MD;  Location: [facility] ENDOSCOPY    Knee surgery  2001    [facility]     Other surgical history  2004    vaginal delivery    Other surgical history  2006    vaginal delivery     Family History:    Family History   Problem Relation Age of Onset    Hypertension Father         overweight     Other (alzheimers) Father     Diabetes Mother         borderline, overweight     Other (Other) Maternal Grandmother         Karie's Granulomatosis    Cancer Maternal Grandfather         LUNG    Other (Other) Sister         Rheumatoid Arthritis    Colon Cancer Paternal Uncle      Initial  Intake:  After dinner behavior: chips, popcorn cakes   Night eating: -  Portion sizes: at times   Binge: BED 7-  Emotional: +  Depression: Total PHQ Score: 1  Grazing: -  Sweet tooth: at times   Crunchy/salty: +  Etoh: Wine, a few times a week (1-2 glasses)   Soda Drinker: Yes                 If yes, how much?:  Occasional  Sports Drinks:  No                  Juice:  No     Food Journal  Reviewed and Discussed:       Patient has a Food Journal?: yes   Fit Bit  Patient is reading nutrition labels?  yes  Average Caloric Intake:      Average CHO Intake:    Is patient exercising? no Minimal s/p ankle surgery 5/2023  Type of exercise?     Eating Habits  Patient states the following:  Eats 3 meal(s) per day  # of snacks per day: 1-2   Type of snacks:  fruit    Amount of soda consumption per day:  1 every couple of weeks   Amount of water (in ounces) per day: Improved  Toughest challenge:  Family stress-son unable to get a job  Sleep: Improved    Nutritional Goals  Eat 3-4 cups of fresh fruits or vegetables daily    Behavior Modifications Reviewed and Discussed  Eat breakfast, Eat 3 meals per day, Plan meals in advance, Read nutrition labels, Drink 64 oz of water per day, Maintain a daily food journal, Utlize portion control strategies to reduce calorie intake, Identify triggers for eating and manage cues and Eat slowly and take 20 to 30 minutes to complete each meal    Exercise Goals Reviewed and Discussed    Other:  Aim for 150 minutes exercise/week    Step challenge    ROS:    Constitutional: negative  Respiratory: negative  Cardiovascular: negative  Gastrointestinal: negative  Genitourinary:negative  Integument/breast: negative  Hematologic/lymphatic: negative  Musculoskeletal:positive for back pain and improved   Neurological: positive for headaches  Behavioral/Psych: negative  Endocrine: negative  All other systems were reviewed and are negative      Physical Exam:   General appearance: alert, appears stated age,  cooperative and obese   Head: Normocephalic, without obvious abnormality, atraumatic  Neck: no adenopathy, no carotid bruit, no JVD, supple, symmetrical, trachea midline and thyroid not enlarged, symmetric, no tenderness/mass/nodules  Lungs: clear to auscultation bilaterally  Heart: S1, S2 normal, no murmur, click, rub or gallop, regular rate and rhythm  Abdomen: soft, non-tender; bowel sounds normal; no masses,  no organomegaly and abdomen obese   Extremities: extremities normal, atraumatic, no cyanosis or edema  Pulses: 2+ and symmetric  Skin: Skin color, texture, turgor normal. No rashes or lesions  Neurologic: Grossly normal    ASSESSMENT     Encounter Diagnosis(ses):   Encounter Diagnoses   Name Primary?    Obesity (BMI 30-39.9)     Primary hypertension     Hypercholesteremia Yes    Hypertension, unspecified type     Prediabetes     History of DVT (deep vein thrombosis)     Acquired hypothyroidism     Stress     Vitamin D deficiency     Encounter for therapeutic drug monitoring        PLAN         Diagnoses and all orders for this visit:    Hypercholesteremia    Obesity (BMI 30-39.9)  -     Phentermine HCl 37.5 MG Oral Tab; Take 1 tablet (37.5 mg total) by mouth every morning before breakfast.  -     topiramate 25 MG Oral Tab; Take 1 tablet (25 mg total) by mouth 2 (two) times daily.    Primary hypertension    Hypertension, unspecified type    Prediabetes    History of DVT (deep vein thrombosis)    Acquired hypothyroidism    Stress    Vitamin D deficiency    Encounter for therapeutic drug monitoring      HYPERCHOLESTEROLEMIA:  Recommend dietary changes and lifestyle modifications as discussed below. Monitor.     Lab Results   Component Value Date/Time    CHOLEST 233 (H) 03/09/2024 09:28 AM     (H) 03/09/2024 09:28 AM    HDL 56 03/09/2024 09:28 AM    TRIG 89 03/09/2024 09:28 AM    VLDL 17 03/09/2024 09:28 AM       HYPERTENSION: Blood pressure controlled on the above medications. No interval change in  antihypertensive medication. Monitor closely.       HYPOTHYROIDISM: On levothyroxine. Continue present management.     History Vitamin D deficiency.     OBESITY/WEIGHT GAIN:  PREDIABETES:     Discussed starting weight:  267 lbs; BMI: 48.1; WC: 51 in.  Patient's goal weight: <200, met goal this visit.   Values: Breathing well, Fitting on Furniture, Children, Health       Recommended patient continue intensive lifestyle and behavioral modifications at this time for weight loss.     Reviewed lifestyle modifications: Whole Food/Plant Strong/Low Glycemic Index diet, moderate alcohol consumption, reduced sodium intake to no more than 2,400 mg/day, and at least 150 minutes of moderate physical activity per week.   Avoid processed, poor quality carbohydrates, refined grains, flour, sugar.     Goals for next month:  1. Keep a food log.   2. Drink 64 ounces of non-caloric beverages per day. No fruit juices or regular soda.  3. Aim for 150 minutes moderate exercise per week.    4. Increase fruit and vegetable servings to 5-6 per day.    5. Improve sleep and stress.      Reviewed labs: 10/14/19  Renal/liver function intact; CBC in range  TSH 3.790 on levothyroxine  2/22/2020 Updated TSH, T4 in range.  B 12 in range.  Vitamin D low- continue 50,000 IU capsule weekly.   11/28/2020: CMP and CBC in range.  Vitamin D remains low.  2/21/22- Blood sugar elevated, CMP otherwise notes no significant abnormalities.   LDL and triglycerides elevated.   Vitamin D low- will need to supplement.    4/19/23- CBC in range.  , CMP otherwise in range.   Vitamin D 36.8. Supplement OTC daily.   3/9/2024- a1c 5.7%. Elevated cholesterol. Slight elevated Creatinine.   CBC, TSH, vitamin D in range.     Reviewed medication options for weight loss in detail with patient.      +cravings, emotional eating, evening snacking- improved.     Leptin elevated.  Denies hx cardiac disease or substance abuse.    Previous successful treatment with  phentermine/topiramate combination (topiramate 25 mg BID, phentermine 37.5 mg/day).     Hold off on Qsymia due to high price at this time.     Continue phentermine 37.5 mg in the AM.    Continue 25 mg topiramate BID.    Prefers not to take injectable, Saxenda or Wegovy.    Discussed risks, benefits, rationale, and side effects of medication(s) including hypertension, palpitations, tachycardia, dizziness, constipation, dry mouth, and anxiety among others.     2/23/2021 EKG within normal limits. EKG done 2/15/22, no change.  4/19/23- EKG NSR.  Discuss updated EKG at next visit.     Recommend magnesium glycinate to improve sleep.      IF, 12 hours.     Hold off on food tracking for now, causes significant anxiety- follow Healthy Plate.     Continue counseling.     Referral to integrative center previously provided: acupuncture, massage, mindfulness therapy.    Goal weight: < 200 lbs. Goal met in clinic today.    RTC: 4 months.   CELESTE [employer] Student

## 2024-05-30 NOTE — PROGRESS NOTES
[facility], [facility], [place]  [address] [facility] [address]  [facility] [address]  Dept: [phone]       Patient:  Nunu Guan  :      1970  MRN:      [id number]    Chief Complaint:  Weight Management, Obesity, Follow up     SUBJECTIVE     History of Present Illness:  Nunu is being seen today for a follow-up for non surgical weight loss.     Met goal weight in clinic today.     Initial HPI: 2020  50 yo female presents to clinic for medically supported weight loss.      Patient is considering medications and is not considering bariatric surgery for weight loss.     Patient denies any history of eating disorder(s).     Patient is employed: .  Patient lives with, spouse and 2 children (13 and 15).     Patient's goal weight: Unsure   Biggest weight loss in the past: 100 lbs  How weight loss was achieved: Exercise   Heaviest weight ever: Current   Previous use of medical weight loss medications:     Physical activity: None      Sleep: 5-6 hours/night    Sleep screening: +snores, consider sleep study     Barriers: Motivation      Values: Breathing well, Fitting on Furniture, Children, Health      Past Medical History:   Past Medical History:    Clot in the renal vein (HCC)    left lower extremity    Cyst of skin    right abdominal skin cyst 2016    Deep vein thrombosis (HCC)    hx of DVT    Disorder of thyroid    High blood pressure    Hx of motion sickness    Hypercholesteremia    Hypothyroidism    PONV (postoperative nausea and vomiting)    Unspecified essential hypertension    Visual impairment        Comorbidities:  Back pain-Improvement?  yes, Hypertension-Improvement?  yes and Hyperlipidemia-Improvement?  no    OBJECTIVE     Vitals: /80 (BP Location: Right arm, Patient Position: Sitting, Cuff Size: adult)   Pulse 87   Ht 5' 2.5\" (1.588 m)   Wt 199 lb (90.3 kg)   LMP 12/15/2023 (Within Days)   SpO2 97%   BMI  35.82 kg/m²     Initial weight loss: -09   Total weight loss: -68   Start weight: 267    Wt Readings from Last 6 Encounters:   05/30/24 199 lb (90.3 kg)   04/24/24 204 lb (92.5 kg)   03/09/24 204 lb (92.5 kg)   01/22/24 208 lb (94.3 kg)   12/21/23 212 lb (96.2 kg)   10/02/23 232 lb (105.2 kg)       Patient Medications:    Current Outpatient Medications   Medication Sig Dispense Refill    Phentermine HCl 37.5 MG Oral Tab Take 1 tablet (37.5 mg total) by mouth every morning before breakfast. 30 tablet 3    topiramate 25 MG Oral Tab Take 1 tablet (25 mg total) by mouth 2 (two) times daily. 180 tablet 1    levothyroxine 100 MCG Oral Tab Take 1 tablet (100 mcg total) by mouth before breakfast. 90 tablet 3    Meloxicam 15 MG Oral Tab Take 1 tablet (15 mg total) by mouth daily. 30 tablet 0    losartan-hydroCHLOROthiazide 100-25 MG Oral Tab Take 1 tablet by mouth daily. 90 tablet 3    diclofenac 1 % External Gel Apply 2 g topically 4 (four) times daily. (Patient not taking: Reported on 4/24/2024) 1 each 0     Allergies:  Azithromycin and Hydrocodone     Social History:  Reviewed    Surgical History:    Past Surgical History:   Procedure Laterality Date    Ankle fracture surgery Left 2014    left ankle stress fracture 2014    Arthroscopy of joint unlisted      left knee screw    Colonoscopy N/A 04/28/2022    Procedure: COLONOSCOPY;  Surgeon: Flex Rubi MD;  Location: [facility] ENDOSCOPY    Knee surgery  2001    [facility]     Other surgical history  2004    vaginal delivery    Other surgical history  2006    vaginal delivery     Family History:    Family History   Problem Relation Age of Onset    Hypertension Father         overweight     Other (alzheimers) Father     Diabetes Mother         borderline, overweight     Other (Other) Maternal Grandmother         Karie's Granulomatosis    Cancer Maternal Grandfather         LUNG    Other (Other) Sister         Rheumatoid Arthritis    Colon Cancer Paternal Uncle      Initial  Intake:  After dinner behavior: chips, popcorn cakes   Night eating: -  Portion sizes: at times   Binge: BED 7-  Emotional: +  Depression: Total PHQ Score: 1  Grazing: -  Sweet tooth: at times   Crunchy/salty: +  Etoh: Wine, a few times a week (1-2 glasses)   Soda Drinker: Yes                 If yes, how much?:  Occasional  Sports Drinks:  No                  Juice:  No     Food Journal  Reviewed and Discussed:       Patient has a Food Journal?: yes   Fit Bit  Patient is reading nutrition labels?  yes  Average Caloric Intake:      Average CHO Intake:    Is patient exercising? no Minimal s/p ankle surgery 5/2023  Type of exercise?     Eating Habits  Patient states the following:  Eats 3 meal(s) per day  # of snacks per day: 1-2   Type of snacks:  fruit    Amount of soda consumption per day:  1 every couple of weeks   Amount of water (in ounces) per day: Improved  Toughest challenge:  Family stress-son unable to get a job  Sleep: Improved    Nutritional Goals  Eat 3-4 cups of fresh fruits or vegetables daily    Behavior Modifications Reviewed and Discussed  Eat breakfast, Eat 3 meals per day, Plan meals in advance, Read nutrition labels, Drink 64 oz of water per day, Maintain a daily food journal, Utlize portion control strategies to reduce calorie intake, Identify triggers for eating and manage cues and Eat slowly and take 20 to 30 minutes to complete each meal    Exercise Goals Reviewed and Discussed    Other:  Aim for 150 minutes exercise/week    Step challenge    ROS:    Constitutional: negative  Respiratory: negative  Cardiovascular: negative  Gastrointestinal: negative  Genitourinary:negative  Integument/breast: negative  Hematologic/lymphatic: negative  Musculoskeletal:positive for back pain and improved   Neurological: positive for headaches  Behavioral/Psych: negative  Endocrine: negative  All other systems were reviewed and are negative      Physical Exam:   General appearance: alert, appears stated age,  cooperative and obese   Head: Normocephalic, without obvious abnormality, atraumatic  Neck: no adenopathy, no carotid bruit, no JVD, supple, symmetrical, trachea midline and thyroid not enlarged, symmetric, no tenderness/mass/nodules  Lungs: clear to auscultation bilaterally  Heart: S1, S2 normal, no murmur, click, rub or gallop, regular rate and rhythm  Abdomen: soft, non-tender; bowel sounds normal; no masses,  no organomegaly and abdomen obese   Extremities: extremities normal, atraumatic, no cyanosis or edema  Pulses: 2+ and symmetric  Skin: Skin color, texture, turgor normal. No rashes or lesions  Neurologic: Grossly normal    ASSESSMENT     Encounter Diagnosis(ses):   Encounter Diagnoses   Name Primary?    Obesity (BMI 30-39.9)     Primary hypertension     Hypercholesteremia Yes    Hypertension, unspecified type     Prediabetes     History of DVT (deep vein thrombosis)     Acquired hypothyroidism     Stress     Vitamin D deficiency     Encounter for therapeutic drug monitoring        PLAN         Diagnoses and all orders for this visit:    Hypercholesteremia    Obesity (BMI 30-39.9)  -     Phentermine HCl 37.5 MG Oral Tab; Take 1 tablet (37.5 mg total) by mouth every morning before breakfast.  -     topiramate 25 MG Oral Tab; Take 1 tablet (25 mg total) by mouth 2 (two) times daily.    Primary hypertension    Hypertension, unspecified type    Prediabetes    History of DVT (deep vein thrombosis)    Acquired hypothyroidism    Stress    Vitamin D deficiency    Encounter for therapeutic drug monitoring      HYPERCHOLESTEROLEMIA:  Recommend dietary changes and lifestyle modifications as discussed below. Monitor.     Lab Results   Component Value Date/Time    CHOLEST 233 (H) 03/09/2024 09:28 AM     (H) 03/09/2024 09:28 AM    HDL 56 03/09/2024 09:28 AM    TRIG 89 03/09/2024 09:28 AM    VLDL 17 03/09/2024 09:28 AM       HYPERTENSION: Blood pressure controlled on the above medications. No interval change in  antihypertensive medication. Monitor closely.       HYPOTHYROIDISM: On levothyroxine. Continue present management.     History Vitamin D deficiency.     OBESITY/WEIGHT GAIN:  PREDIABETES:     Discussed starting weight:  267 lbs; BMI: 48.1; WC: 51 in.  Patient's goal weight: <200, met goal this visit.   Values: Breathing well, Fitting on Furniture, Children, Health       Recommended patient continue intensive lifestyle and behavioral modifications at this time for weight loss.     Reviewed lifestyle modifications: Whole Food/Plant Strong/Low Glycemic Index diet, moderate alcohol consumption, reduced sodium intake to no more than 2,400 mg/day, and at least 150 minutes of moderate physical activity per week.   Avoid processed, poor quality carbohydrates, refined grains, flour, sugar.     Goals for next month:  1. Keep a food log.   2. Drink 64 ounces of non-caloric beverages per day. No fruit juices or regular soda.  3. Aim for 150 minutes moderate exercise per week.    4. Increase fruit and vegetable servings to 5-6 per day.    5. Improve sleep and stress.      Reviewed labs: 10/14/19  Renal/liver function intact; CBC in range  TSH 3.790 on levothyroxine  2/22/2020 Updated TSH, T4 in range.  B 12 in range.  Vitamin D low- continue 50,000 IU capsule weekly.   11/28/2020: CMP and CBC in range.  Vitamin D remains low.  2/21/22- Blood sugar elevated, CMP otherwise notes no significant abnormalities.   LDL and triglycerides elevated.   Vitamin D low- will need to supplement.    4/19/23- CBC in range.  , CMP otherwise in range.   Vitamin D 36.8. Supplement OTC daily.   3/9/2024- a1c 5.7%. Elevated cholesterol. Slight elevated Creatinine.   CBC, TSH, vitamin D in range.     Reviewed medication options for weight loss in detail with patient.      +cravings, emotional eating, evening snacking- improved.     Leptin elevated.  Denies hx cardiac disease or substance abuse.    Previous successful treatment with  phentermine/topiramate combination (topiramate 25 mg BID, phentermine 37.5 mg/day).     Hold off on Qsymia due to high price at this time.     Continue phentermine 37.5 mg in the AM.    Continue 25 mg topiramate BID.    Prefers not to take injectable, Saxenda or Wegovy.    Discussed risks, benefits, rationale, and side effects of medication(s) including hypertension, palpitations, tachycardia, dizziness, constipation, dry mouth, and anxiety among others.     2/23/2021 EKG within normal limits. EKG done 2/15/22, no change.  4/19/23- EKG NSR.  Discuss updated EKG at next visit.     Recommend magnesium glycinate to improve sleep.      IF, 12 hours.     Hold off on food tracking for now, causes significant anxiety- follow Healthy Plate.     Continue counseling.     Referral to integrative center previously provided: acupuncture, massage, mindfulness therapy.    Goal weight: < 200 lbs. Goal met in clinic today.    RTC: 4 months.   CELESTE Mckee

## 2024-06-04 ENCOUNTER — APPOINTMENT (OUTPATIENT)
Dept: PHYSICAL THERAPY | Age: 54
End: 2024-06-04
Attending: ORTHOPAEDIC SURGERY
Payer: COMMERCIAL

## 2024-06-10 ENCOUNTER — APPOINTMENT (OUTPATIENT)
Dept: PHYSICAL THERAPY | Age: 54
End: 2024-06-10
Attending: ORTHOPAEDIC SURGERY
Payer: COMMERCIAL

## 2024-06-10 ENCOUNTER — OFFICE VISIT (OUTPATIENT)
Dept: PHYSICAL THERAPY | Age: 54
End: 2024-06-10
Attending: ORTHOPAEDIC SURGERY
Payer: COMMERCIAL

## 2024-06-10 PROCEDURE — 97140 MANUAL THERAPY 1/> REGIONS: CPT | Performed by: PHYSICAL THERAPIST

## 2024-06-10 PROCEDURE — 97110 THERAPEUTIC EXERCISES: CPT | Performed by: PHYSICAL THERAPIST

## 2024-06-10 NOTE — PROGRESS NOTES
Diagnosis:   Disorder of left rotator cuff (M67.912)    Referring Provider: Mike Morgan  Date of Evaluation:    5/1/2024    Precautions:  None Next MD visit:   none scheduled  Date of Surgery: n/a   Insurance Primary/Secondary: [employer] [place] / N/A     # Auth Visits: 10 through 7/24/2024         Subjective: L shoulder is better but still have c/o with yardwork. C/o L upper trap discomfort.     Pain: ~2/10       Objective: AROM: (* denotes performed with pain)  Shoulder    Flexion: R/L  165* \"feels it\" L UE  Abduction: R 180; L 180  ER: R T2; L T2   IR: R T5; L T5   Increased L upper trap recruitment with overhead flexion    + increased tone L upper trap with c/o    Flexibility: + B pectoralis tightness    Strength/MMT: (* denotes performed with pain)  Shoulder   Flexion: R 4+/5; L 4/5  Abduction: R 4+/5; L 4/5  ER: R 4+/5; L 4/5  IR: R 4+/5; L 4/5       Assessment:   Patient with improved L shoulder AROM, although end range c/o persist. Reviewed rotator cuff strengthening, effort to reduce L upper trap compensation to facilitate normal reaching mechanics. Pt reports improved ADL tolerance but c/o L shoulder pain with more strenuous ADL of longer duration.  Modified Hep to facilitate compliance; see below.     Goals:    (to be met in 8 visits)   Patient to report independence with PT HEP to facilitate self management and to maintain progress made in PT.   Patient to have at least 160 deg active L shoulder AROM flex/abd with min - no c/o to facilitate overhead ADL.   Patient to have L shoulder flex, abd, ER MMT grossly 4+/5 in order to lift/lower 3# overhead using L UE.   Patient to report at least 50% improvement in L shoulder to facilitate reaching, ADL.     Plan: Continue PT 1-2 x weekly for 8 visits (10 auth).  F/u on response to today's session, HEP modification. Progress L shoulder A/AAROM, rotator cuff/periscapular strengthening, posture.   Date: 5/13/2024                TX#: 3/5 (3/5 auth) Date:  5/22/2024                TX#: 4/5 (4/5 auth sessions) Date: 5/30/24                TX#: 5/5 (5/10 auth sessions) Date: 6/10/2024  Tx#: 6/10 (6/10 auth)   There Ex:   UBE: ~ 1.5 min forward/~ 1.5 min backward (3 min total), level 1  B row using yellow tubing x 15 reps  L shoulder ER using YTB x 10 rep  L shoulder IR using YTB x 10 reps  Supine B shoulder horiz abd using YTB x 10 reps  Seated shoulder pulleys: flex, scap, abd, IRx 10 reps each  L shoulder pendulums x 30 sec   W wall slides x 5 reps (HEP)  Shoulder pulleys: flexion, scaption, abduction x 10 reps each  Seated thoracic spine ext/pec stretch 5 x 5 sec each (HEP)     There   HEP: may focus one s/l shoulder ER, wall slides if facilitates compliance   UBE: 2 min retro, 1 min forward (3 min total), level 1  B row using yellow tubing x 15 reps  L shoulder ER using YTB x 10 rep  L shoulder IR using YTB x 10 reps  B shoulder ER x 10 reps   Seated thoracic spine ext/pec stretch 5 x 5 sec each  Seated B shoulder horiz abd using YTB x 10 reps  Seated L GH posterior capsule stretch 3 x 20 sec each  Seated L GH inferior glide 3 x 15 sec each (HEP)  Doorway low pec stretch 2 x 20 sec each  Seated shoulder pulleys: flex, scap, abd, IRx 10 reps each TE:  B shoulder ER x 25 reps  Wall horizontal posterior capsule stretch, 30x 3  B shoulder ER YTB seated, 3x15  Scapular wall slide, 3x10    TA:  - Pain science education on graded exposure to build tissue tolerance and resilience.   - Discussed load management and likelihood of overuse from lifting leaves into a bad repetitively that aggravated her shoulder. There Ex:   Standing L/R shoulder ER using YTB x ~ 20 reps (HEP progression, pt reports s/l shoulder ER too easy) or B UE using YTB x 10 reps  HEP review/modification: B or unilateral shoulder Er using YTB, seated thoracic spine/pec stretch, Wall slides ( may consider W)  Supine B shoulder horizontal abduction x 10 reps YTB, standing x 10 reps  Diagonal pull aparts  using YTB  - pt c/o, stopped  W wall slides - pt c/o stopped   Manual:   B pec stretch 3 x 20 sec each  L shoulder/GH posterior glide - pt c/o, stopped  L GH inferior glides grade II  PROM L shoulder flex, abd, ER - pt c/o, stopped. IR PROM Manual:   Not completed; pt with poor tolerated in past  Manual:   STM L upper trap  No other manual therapy completed; pt with poor tolerance in past   IFC/cold pack L shoulder x 10 min supine            HEP:  wall slide , seated thoracic spine ext/pec stretch, B or unilateral shoulder ER using YTB    Charges: 2 TE (30 min), 1 manual (10 min) Total Timed Treatment: 40 min  Total Treatment Time: 45 min

## 2024-06-17 ENCOUNTER — OFFICE VISIT (OUTPATIENT)
Dept: PHYSICAL THERAPY | Age: 54
End: 2024-06-17
Attending: ORTHOPAEDIC SURGERY
Payer: COMMERCIAL

## 2024-06-17 PROCEDURE — 97110 THERAPEUTIC EXERCISES: CPT | Performed by: PHYSICAL THERAPIST

## 2024-06-17 NOTE — PROGRESS NOTES
Diagnosis:   Disorder of left rotator cuff (M67.912)    Referring Provider: Mike Morgan  Date of Evaluation:    5/1/2024    Precautions:  None Next MD visit:   none scheduled  Date of Surgery: n/a   Insurance Primary/Secondary: [employer] [place] / N/A     # Auth Visits: 10 through 7/24/2024         Subjective: C/o increased L ankle pain over weekend; had surgery for a fracture a year ago. L ankle pain is 3/10 today. Do you have some general exercises that I can do for mobility in the morning?  L upper trap is better.     Pain: ~2/10       Objective: AROM: (* denotes performed with pain)  Shoulder    Flexion: R/L  165* \"feels it\" L UE  Abduction: R 180; L 180  ER: R T2; L T2   IR: R T5; L T5   Increased L upper trap recruitment with overhead flexion    + increased tone/c/o L upper trap      Flexibility: + B pectoralis tightness    Strength/MMT: (* denotes performed with pain)  Shoulder   Flexion: R 4+/5; L 4/5  Abduction: R 4+/5; L 4/5  ER: R 4+/5; L 4/5  IR: R 4+/5; L 4/5       Assessment:   Reviewed some general ROM, including for ankle, with pt, per her request but advised have not completed a formal evaluation of these regions, complete as helpful/well tolerated. Pt with less shoulder c/o but overhead impingement persists. Addressing with interventions.     Goals:    (to be met in 8 visits)   Patient to report independence with PT HEP to facilitate self management and to maintain progress made in PT.   Patient to have at least 160 deg active L shoulder AROM flex/abd with min - no c/o to facilitate overhead ADL.   Patient to have L shoulder flex, abd, ER MMT grossly 4+/5 in order to lift/lower 3# overhead using L UE.   Patient to report at least 50% improvement in L shoulder to facilitate reaching, ADL.     Plan: Continue PT 1-2 x weekly for 8 visits (10 auth).  F/u on general ROM/stretches.  Progress L shoulder A/AAROM, rotator cuff/periscapular strengthening, posture.   Date: 5/22/2024                TX#:  4/5 (4/5 auth sessions) Date: 5/30/24                TX#: 5/5 (5/10 auth sessions) Date: 6/10/2024  Tx#: 6/10 (6/10 auth) Date: 6/17/2024  Tx #: 7/10 (7/10 auth)   There   HEP: may focus one s/l shoulder ER, wall slides if facilitates compliance   UBE: 2 min retro, 1 min forward (3 min total), level 1  B row using yellow tubing x 15 reps  L shoulder ER using YTB x 10 rep  L shoulder IR using YTB x 10 reps  B shoulder ER x 10 reps   Seated thoracic spine ext/pec stretch 5 x 5 sec each  Seated B shoulder horiz abd using YTB x 10 reps  Seated L GH posterior capsule stretch 3 x 20 sec each  Seated L GH inferior glide 3 x 15 sec each (HEP)  Doorway low pec stretch 2 x 20 sec each  Seated shoulder pulleys: flex, scap, abd, IRx 10 reps each TE:  B shoulder ER x 25 reps  Wall horizontal posterior capsule stretch, 30x 3  B shoulder ER YTB seated, 3x15  Scapular wall slide, 3x10    TA:  - Pain science education on graded exposure to build tissue tolerance and resilience.   - Discussed load management and likelihood of overuse from lifting leaves into a bad repetitively that aggravated her shoulder. There Ex:   Standing L/R shoulder ER using YTB x ~ 20 reps (HEP progression, pt reports s/l shoulder ER too easy) or B UE using YTB x 10 reps  HEP review/modification: B or unilateral shoulder Er using YTB, seated thoracic spine/pec stretch, Wall slides ( may consider W)  Supine B shoulder horizontal abduction x 10 reps YTB, standing x 10 reps  Diagonal pull aparts using YTB  - pt c/o, stopped  W wall slides - pt c/o stopped There Ex:   Pt may consider the following for morning stretches: cat/cow spinal mobilization x 5 reps, s/l spinal rotation with pec stretch 5 x 5 sec each, supine hamstrings stretches 2 x 20 sec each, supine L/R figure 4 stretch 2 x 20 sec; advised may consider ankle circles, inversion/eversion/PF/DF AROM x 5 reps each  Seated thoracic spine ext  5 x 5 sec each  Thread thread the needle at wall - pt c/o,  stopped  B shoulder ER using YTB x 10 reps  B shoulder horiz abd using YTB x 10 reps  Diagonal pull aparts using YTB x 10 reps   UBE: 1 min backward/1 min forward (2 min total), level 1   Seated shoulder pulleys: flex, scap, abd x 10 reps each   Manual:   Not completed; pt with poor tolerated in past  Manual:   STM L upper trap  No other manual therapy completed; pt with poor tolerance in past Manual:   STM L upper trap  No other manual therapy completed; pt with poor tolerance in past               HEP:  wall slide, seated thoracic spine ext/pec stretch ( may consider cat cow as alternative, pt reports better tolerated), B or unilateral shoulder ER using YTB    Charges: 3 TE (36 min), 1 manual (4 min) Total Timed Treatment: 40 min  Total Treatment Time: 45 min

## 2024-06-20 ENCOUNTER — TELEPHONE (OUTPATIENT)
Dept: PHYSICAL THERAPY | Facility: HOSPITAL | Age: 54
End: 2024-06-20

## 2024-06-24 ENCOUNTER — OFFICE VISIT (OUTPATIENT)
Dept: PHYSICAL THERAPY | Age: 54
End: 2024-06-24
Attending: ORTHOPAEDIC SURGERY
Payer: COMMERCIAL

## 2024-06-24 PROCEDURE — 97110 THERAPEUTIC EXERCISES: CPT | Performed by: PHYSICAL THERAPIST

## 2024-06-24 PROCEDURE — 97140 MANUAL THERAPY 1/> REGIONS: CPT | Performed by: PHYSICAL THERAPIST

## 2024-06-24 NOTE — PROGRESS NOTES
Diagnosis:   Disorder of left rotator cuff (M67.912)    Referring Provider: Mike Morgan  Date of Evaluation:    5/1/2024    Precautions:  None Next MD visit:   none scheduled  Date of Surgery: n/a   Insurance Primary/Secondary: [employer] [place] / N/A     # Auth Visits: 10 through 7/24/2024         Subjective:  C/o L UE pain with laying on it, less frequent c/o with reaching, drying hair, pulling blanket. L shoulder significantly better.   L ankle c/o resolved.     Pain: 0/10       Objective: AROM: (* denotes performed with pain)  Shoulder    Flexion: R/L  165*  Abduction: R 180; L 180  ER: R T2; L T2   IR: R T5; L T5     + increased tone/c/o L upper, mid trap    Flexibility: + B pectoralis tightness    Assessment:   Pt with improved L shoulder AROM with less c/o.  Pt with L upper > mid trap muscular c/o; addressed with STM, Theracane, MHP; pt tolerated well.  Pt progressing well, some residual L shoulder c/o with end ROM overhead flex/abd related to rotator cuff, impingement, but progress noted.  Pt tolerated session well, declined there ex beyond that listed below, reported fatigue.     Goals:    (to be met in 8 visits)   Patient to report independence with PT HEP to facilitate self management and to maintain progress made in PT.   Patient to have at least 160 deg active L shoulder AROM flex/abd with min - no c/o to facilitate overhead ADL.   Patient to have L shoulder flex, abd, ER MMT grossly 4+/5 in order to lift/lower 3# overhead using L UE.   Patient to report at least 50% improvement in L shoulder to facilitate reaching, ADL.     Plan: Continue PT 1-2 x weekly for 8 visits (10 auth).  Progress L shoulder A/AAROM, rotator cuff/periscapular strengthening, posture.   Date: 5/30/24                TX#: 5/5 (5/10 auth sessions) Date: 6/10/2024  Tx#: 6/10 (6/10 auth) Date: 6/17/2024  Tx #: 7/10 (7/10 auth) Date: 6/24/2024  Tx #: 8/10 (8/10 auth)   TE:  B shoulder ER x 25 reps  Wall horizontal posterior capsule  stretch, 30x 3  B shoulder ER YTB seated, 3x15  Scapular wall slide, 3x10    TA:  - Pain science education on graded exposure to build tissue tolerance and resilience.   - Discussed load management and likelihood of overuse from lifting leaves into a bad repetitively that aggravated her shoulder. There Ex:   Standing L/R shoulder ER using YTB x ~ 20 reps (HEP progression, pt reports s/l shoulder ER too easy) or B UE using YTB x 10 reps  HEP review/modification: B or unilateral shoulder Er using YTB, seated thoracic spine/pec stretch, Wall slides ( may consider W)  Supine B shoulder horizontal abduction x 10 reps YTB, standing x 10 reps  Diagonal pull aparts using YTB  - pt c/o, stopped  W wall slides - pt c/o stopped There Ex:   Pt may consider the following for morning stretches: cat/cow spinal mobilization x 5 reps, s/l spinal rotation with pec stretch 5 x 5 sec each, supine hamstrings stretches 2 x 20 sec each, supine L/R figure 4 stretch 2 x 20 sec; advised may consider ankle circles, inversion/eversion/PF/DF AROM x 5 reps each  Seated thoracic spine ext  5 x 5 sec each  Thread thread the needle at wall - pt c/o, stopped  B shoulder ER using YTB x 10 reps  B shoulder horiz abd using YTB x 10 reps  Diagonal pull aparts using YTB x 10 reps   UBE: 1 min backward/1 min forward (2 min total), level 1   Seated shoulder pulleys: flex, scap, abd x 10 reps each There Ex:  Seated thoracic spine ext/pec stretch 5 x 5 sec each  Standing B shoulder ER using YTB x 10 reps  Standing B shoulder horiz abd using YTB x 10 reps  Diagonal pull aparts using YTB x 10 reps each L/R  UBE: 1 min backward/1 min forward (2 min total), level 1   Seated shoulder pulleys: flex, scap, abd x 10 reps each  Reviewed form for seated thoracic ext/pec stretch due to pt reports chair backs are too high at home; practiced completing using basketball as fulcrum x 5 reps    Manual:   STM L upper trap  No other manual therapy completed; pt with poor  tolerance in past Manual:   STM L upper trap  No other manual therapy completed; pt with poor tolerance in past Manual:   STM L upper trap  No other manual therapy completed; pt with poor tolerance in past      TA:   Theracane L > R mid and upper trap; pt may consider for home      MHP L upper/mid trap seated x 10 min   HEP:  wall slide, seated thoracic spine ext/pec stretch ( may consider cat cow as alternative, pt reports better tolerated or using basketball), B or unilateral shoulder ER using YTB    Charges: 2 TE (30 min), 1 manual (10 min) Total Timed Treatment: 40 min  Total Treatment Time: 50 min

## 2024-06-25 ENCOUNTER — OFFICE VISIT (OUTPATIENT)
Dept: OBGYN CLINIC | Facility: CLINIC | Age: 54
End: 2024-06-25

## 2024-06-25 VITALS
HEIGHT: 62 IN | DIASTOLIC BLOOD PRESSURE: 84 MMHG | BODY MASS INDEX: 36.44 KG/M2 | WEIGHT: 198 LBS | SYSTOLIC BLOOD PRESSURE: 126 MMHG | HEART RATE: 106 BPM

## 2024-06-25 DIAGNOSIS — N92.0 EXCESSIVE OR FREQUENT MENSTRUATION: ICD-10-CM

## 2024-06-25 DIAGNOSIS — N93.8 DUB (DYSFUNCTIONAL UTERINE BLEEDING): Primary | ICD-10-CM

## 2024-06-25 DIAGNOSIS — Z12.4 SCREENING FOR CERVICAL CANCER: ICD-10-CM

## 2024-06-25 PROCEDURE — 3079F DIAST BP 80-89 MM HG: CPT | Performed by: ADVANCED PRACTICE MIDWIFE

## 2024-06-25 PROCEDURE — 3008F BODY MASS INDEX DOCD: CPT | Performed by: ADVANCED PRACTICE MIDWIFE

## 2024-06-25 PROCEDURE — 58100 BIOPSY OF UTERUS LINING: CPT | Performed by: ADVANCED PRACTICE MIDWIFE

## 2024-06-25 PROCEDURE — 3074F SYST BP LT 130 MM HG: CPT | Performed by: ADVANCED PRACTICE MIDWIFE

## 2024-06-25 PROCEDURE — 99213 OFFICE O/P EST LOW 20 MIN: CPT | Performed by: ADVANCED PRACTICE MIDWIFE

## 2024-06-25 NOTE — PROGRESS NOTES
Subjective:   Patient ID: Nunu Guan is a 54 year old female who presents with frequent menstruation.  Pt reports that since Sept she has been having longer irregular menses.  LMP was end of May x 7 days then 14 days later had another menses lasting 14 days.  Denies any pain.  Denies any hot flashes or other perimenopausal symptoms.  Reports recently sexually active new partner declines STI screening    HPI    History/Other:   Review of Systems   Constitutional: Negative.    Respiratory: Negative.     Cardiovascular: Negative.    Endocrine: Negative.    Genitourinary:  Positive for menstrual problem. Negative for difficulty urinating, genital sores, pelvic pain, vaginal bleeding, vaginal discharge and vaginal pain.   Psychiatric/Behavioral: Negative.       Current Outpatient Medications   Medication Sig Dispense Refill    Phentermine HCl 37.5 MG Oral Tab Take 1 tablet (37.5 mg total) by mouth every morning before breakfast. 30 tablet 3    topiramate 25 MG Oral Tab Take 1 tablet (25 mg total) by mouth 2 (two) times daily. 180 tablet 1    levothyroxine 100 MCG Oral Tab Take 1 tablet (100 mcg total) by mouth before breakfast. 90 tablet 3    Meloxicam 15 MG Oral Tab Take 1 tablet (15 mg total) by mouth daily. 30 tablet 0    losartan-hydroCHLOROthiazide 100-25 MG Oral Tab Take 1 tablet by mouth daily. 90 tablet 3    diclofenac 1 % External Gel Apply 2 g topically 4 (four) times daily. (Patient not taking: Reported on 4/24/2024) 1 each 0     Allergies:  Allergies   Allergen Reactions    Azithromycin      Other reaction(s): AZITHROMYCIN    Hydrocodone      Other reaction(s): feels dazed       Objective:   Physical Exam  Vitals reviewed.   Constitutional:       Appearance: Normal appearance. She is obese.   Cardiovascular:      Rate and Rhythm: Normal rate.   Pulmonary:      Effort: Pulmonary effort is normal.   Genitourinary:     General: Normal vulva.      Exam position: Lithotomy position.      Labia:          Right: No rash, tenderness, lesion or injury.         Left: No rash, tenderness, lesion or injury.       Vagina: Normal.      Cervix: Normal.   Neurological:      Mental Status: She is alert and oriented to person, place, and time.         Assessment & Plan:   1. DUB (dysfunctional uterine bleeding)    2. Screening for cervical cancer    3. Excessive or frequent menstruation    Pt counseled on possible etiologies of heavy periods and/or irregular bleeding including uterine fibroids, DUB/anovulatory bleeding and other benign and less common malignant etiologies.  Discussed possible work-up options including exam, pelvic US and endometrial biopsy.  Discussed treatment options including medical and surgical.  Will await results for further recommendations  Pt counseled on risks/benefits of each of the appropriate options.   Pt consents to emb  Total time spent 20 minutes this calendar day which includes preparing to see the patient including chart review, obtaining and/or reviewing additional medical history, performing a physical exam and evaluation, documenting clinical information in the electronic medical record, independently interpreting results, counseling the patient, communicating results to the patient/family/caregiver and coordinating care.        Orders Placed This Encounter   Procedures    Hpv Dna  High Risk , Thin Prep Collect    BIOPSY OF UTERUS LINING (08112)    ThinPrep PAP Smear    Specimen to Pathology, Tissue       Meds This Visit:  Requested Prescriptions      No prescriptions requested or ordered in this encounter       Imaging & Referrals:  US PELVIS (TRANSABDOMINAL AND TRANSVAGINAL) (CPT=76856/[id number])

## 2024-06-25 NOTE — PROCEDURES
Endometrial Biopsy    Pre-Procedure Care:   Consent was obtained.  Procedure/risks were explained.  Questions were answered.  Correct patient was identified.  Correct side and site were confirmed.    Pregnancy Results: negative from urine test   Birth control method(s) used:  ;none     Pre-Medications:    The patient was premedicated with Ibuprofen .    Description of Procedure:  Under satisfactory analgesia, the patient was prepped and draped in the dorsal lithotomy position.   A bivalve speculum was placed in the vagina and the cervix was prepped with Betadine solution.   Single tooth tenaculum placed at the 6 o'clock position.   Cervical stenosis encountered.    The cervix was dilated sound  The uterine cavity was sounded at 8 cm.   The endometrial cavity was curetted for pipelle tissue sampling, 1 passes.  Specimen was sent to pathology.   The single tooth tenaculum was removed.   Pressure was applied at the site of tenaculum application   Good hemostasis was noted.  There were no complications.    There was no blood loss.      Discharge instructions were provided to the patient.    Visit Plan:  Await final pathology prior to treatment.

## 2024-06-26 LAB — HPV E6+E7 MRNA CVX QL NAA+PROBE: NEGATIVE

## 2024-06-27 ENCOUNTER — TELEPHONE (OUTPATIENT)
Dept: OBGYN CLINIC | Facility: CLINIC | Age: 54
End: 2024-06-27

## 2024-06-27 NOTE — TELEPHONE ENCOUNTER
----- Message from Cheryl Stockton sent at 6/27/2024  9:53 AM CDT -----  Endometrial biopsy normal

## 2024-07-01 ENCOUNTER — OFFICE VISIT (OUTPATIENT)
Dept: PHYSICAL THERAPY | Age: 54
End: 2024-07-01
Attending: ORTHOPAEDIC SURGERY
Payer: COMMERCIAL

## 2024-07-01 PROCEDURE — 97035 APP MDLTY 1+ULTRASOUND EA 15: CPT | Performed by: PHYSICAL THERAPIST

## 2024-07-01 PROCEDURE — 97110 THERAPEUTIC EXERCISES: CPT | Performed by: PHYSICAL THERAPIST

## 2024-07-01 PROCEDURE — 97140 MANUAL THERAPY 1/> REGIONS: CPT | Performed by: PHYSICAL THERAPIST

## 2024-07-01 NOTE — PROGRESS NOTES
ProgressSummary  Pt has attended 9 visits in Physical Therapy.     Diagnosis:   Disorder of left rotator cuff (M67.912)    Referring Provider: Mike Morgan  Date of Evaluation:    5/1/2024    Precautions:  None Next MD visit:   none scheduled  Date of Surgery: n/a   Insurance Primary/Secondary: [employer] [place] / N/A     # Auth Visits: 10 through 7/24/2024         Subjective:   C/o L > R upper trap tightness. Non-compliant with HEP which I think is contributing to increased c/o.  Overall, L shoulder is much better; L shoulder pain used to be constant, including at rest.     Pain: 0/10       Objective: AROM: (* denotes performed with pain)  Shoulder    Flexion: R/L  170 * impingement end range  Abduction: R 180; L 180  ER: R T2; L T2   IR: R T5; L T5     + increased tone/c/o L upper    Flexibility: + B pectoralis tightness    ccessory motion: hypomobile L/R GH posterior glide    Flexibility: + B pectoralis tightness    Strength/MMT: (* denotes performed with pain)  Shoulder   Flexion: R 4+/5; L 4+/5  Abduction: R 4+/5; L 4/5  ER: R 4+/5; L 4/5*  IR: R 4+/5; L 4+/5     Special tests:   Positive L Speed's test, negative R  Negative L/R Empty Can test     Assessment:   Pt with both subjective and objective progress with PT. Pt expresses that she would like to continue with PT. Advised that may complete a few more sessions based on progress/residual deficits but that she will need to continue with PT HEP after discharge, that ER strengthening is most important exercise.  Pt with L upper > mid trap muscular c/o likely related to upper trap compensation for rotator cuff weakness. Treated with ultrasound, STM, rotator cuff strengthening. Pt tolerated well.   L shoulder c/o with end ROM overhead flex/abd related to rotator cuff, impingement.     Goals:    (to be met in 8 visits)   Patient to report independence with PT HEP to facilitate self management and to maintain progress made in PT.  - MET  Patient to have at  least 160 deg active L shoulder AROM flex/abd with min - no c/o to facilitate overhead ADL.  - PROGRESS  Patient to have L shoulder flex, abd, ER MMT grossly 4+/5 in order to lift/lower 3# overhead using L UE. - PROGRESS  Patient to report at least 50% improvement in L shoulder to facilitate reaching, ADL. - MET    Plan: Continue PT 1-2 x weekly for 12-15 sessions, pending authorization (10 currently auth).  Progress L shoulder A/AAROM, rotator cuff/periscapular strengthening, posture.   Date: 6/10/2024  Tx#: 6/10 (6/10 auth) Date: 6/17/2024  Tx #: 7/10 (7/10 auth) Date: 6/24/2024  Tx #: 8/10 (8/10 auth) Date: 7/1/2024  Tx #: 9/10    There Ex:   Standing L/R shoulder ER using YTB x ~ 20 reps (HEP progression, pt reports s/l shoulder ER too easy) or B UE using YTB x 10 reps  HEP review/modification: B or unilateral shoulder Er using YTB, seated thoracic spine/pec stretch, Wall slides ( may consider W)  Supine B shoulder horizontal abduction x 10 reps YTB, standing x 10 reps  Diagonal pull aparts using YTB  - pt c/o, stopped  W wall slides - pt c/o stopped There Ex:   Pt may consider the following for morning stretches: cat/cow spinal mobilization x 5 reps, s/l spinal rotation with pec stretch 5 x 5 sec each, supine hamstrings stretches 2 x 20 sec each, supine L/R figure 4 stretch 2 x 20 sec; advised may consider ankle circles, inversion/eversion/PF/DF AROM x 5 reps each  Seated thoracic spine ext  5 x 5 sec each  Thread thread the needle at wall - pt c/o, stopped  B shoulder ER using YTB x 10 reps  B shoulder horiz abd using YTB x 10 reps  Diagonal pull aparts using YTB x 10 reps   UBE: 1 min backward/1 min forward (2 min total), level 1   Seated shoulder pulleys: flex, scap, abd x 10 reps each There Ex:  Seated thoracic spine ext/pec stretch 5 x 5 sec each  Standing B shoulder ER using YTB x 10 reps  Standing B shoulder horiz abd using YTB x 10 reps  Diagonal pull aparts using YTB x 10 reps each L/R  UBE: 1 min  backward/1 min forward (2 min total), level 1   Seated shoulder pulleys: flex, scap, abd x 10 reps each  Reviewed form for seated thoracic ext/pec stretch due to pt reports chair backs are too high at home; practiced completing using basketball as fulcrum x 5 reps There Ex:   UBE: 1 min backward/1 min forward (2 min total), level 1   Standing B shoulder ER using YTB x 10 reps  Standing B shoulder horiz abd using YTB x 10 reps  Diagonal pull aparts using YTB x 10 reps each L/R  Standing B shoulder flexion using wand x 5 reps, wider /abd x 5 reps  Doorway pec stretch: low x 20 sec, in 90 deg abd x 20    HEP review: advised standing or s/l resisted shoulder ER should be priority exercise to facilitate compliance.   Manual:   STM L upper trap  No other manual therapy completed; pt with poor tolerance in past Manual:   STM L upper trap  No other manual therapy completed; pt with poor tolerance in past Manual:   STM L upper trap  No other manual therapy completed; pt with poor tolerance in past Manual:   STM L upper trap       TA:   Theracane L > R mid and upper trap; pt may consider for home TA:   Upper trap considerations: rotator cuff strenghtening, STM, MHP     MHP L upper/mid trap seated x 10 min Ultrasound:   L upper trap: %, 1.5 w/cm2, 3.3-1 MHz x 8 min  MHP - pt declined   HEP:  wall slide, seated thoracic spine ext/pec stretch ( may consider cat cow as alternative, pt reports better tolerated or using basketball), B or unilateral shoulder ER using YTB    Charges: 1 TE (20 min), 1 manual (10 min), 1 US (8 min), 0 TA (4 min) Total Timed Treatment: 42 min  Total Treatment Time: 45 min  QUICKDASH TO BE RE-ADMINISTERED AT DISCHARGE    Plan: Continue skilled Physical Therapy 1-2  x/week or a total of 12-15 visits over a 90 day period. Treatment will include: there ex, there activities, manual therapy, NM Re-ed, and modalities as needed.        Patient/Family/Caregiver was advised of these findings,  precautions, and treatment options and has agreed to actively participate in planning and for this course of care.    Thank you for your referral. If you have any questions, please contact me at Dept: [phone].    Sincerely,  Electronically signed by therapist: Isha Burton PT     Physician's certification required:  Yes  Please co-sign or sign and return this letter via fax as soon as possible to [phone].   I certify the need for these services furnished under this plan of treatment and while under my care.    X___________________________________________________ Date____________________    Certification From: 7/1/2024  To:9/29/2024

## 2024-07-08 ENCOUNTER — OFFICE VISIT (OUTPATIENT)
Dept: PHYSICAL THERAPY | Age: 54
End: 2024-07-08
Attending: ORTHOPAEDIC SURGERY
Payer: COMMERCIAL

## 2024-07-08 PROCEDURE — 97110 THERAPEUTIC EXERCISES: CPT

## 2024-07-08 PROCEDURE — 97140 MANUAL THERAPY 1/> REGIONS: CPT

## 2024-07-08 NOTE — PROGRESS NOTES
ProgressSummary  Pt has attended 9 visits in Physical Therapy.     Diagnosis:   Disorder of left rotator cuff (M67.912)    Referring Provider: Mike Morgan  Date of Evaluation:    5/1/2024    Precautions:  None Next MD visit:   none scheduled  Date of Surgery: n/a   Insurance Primary/Secondary: [employer] [place] / N/A     # Auth Visits: 10 through 7/24/2024         Subjective:   Compliant with exercises and upper trap tightness has been better. Overall, L shoulder is much better; L shoulder pain used to be constant, including at rest, but now with only at end range.    Pain: 0/10      Objective: AROM: (* denotes performed with pain)  Shoulder    Flexion: R/L  170 * impingement end range  Abduction: R 180; L 180  ER: R T2; L T2   IR: R T5; L T5     + increased tone/c/o L upper    Flexibility: + B pectoralis tightness    ccessory motion: hypomobile L/R GH posterior glide    Flexibility: + B pectoralis tightness    Strength/MMT: (* denotes performed with pain)  Shoulder   Flexion: R 4+/5; L 4+/5  Abduction: R 4+/5; L 4/5  ER: R 4+/5; L 4/5*  IR: R 4+/5; L 4+/5     Special tests:   Positive L Speed's test, negative R  Negative L/R Empty Can test     Assessment:   Pt with both subjective and objective progress with PT. Pt expresses that she would like to continue with PT. Discussed continuing with home exercises to build scapular mobility and strength    Goals:    (to be met in 8 visits)   Patient to report independence with PT HEP to facilitate self management and to maintain progress made in PT.  - MET  Patient to have at least 160 deg active L shoulder AROM flex/abd with min - no c/o to facilitate overhead ADL.  - PROGRESS  Patient to have L shoulder flex, abd, ER MMT grossly 4+/5 in order to lift/lower 3# overhead using L UE. - PROGRESS  Patient to report at least 50% improvement in L shoulder to facilitate reaching, ADL. - MET    Plan: Continue PT 1-2 x weekly for 12-15 sessions, pending authorization (10  currently auth).  Progress L shoulder A/AAROM, rotator cuff/periscapular strengthening, posture.   Date: 6/10/2024  Tx#: 6/10 (6/10 auth) Date: 6/17/2024  Tx #: 7/10 (7/10 auth) Date: 6/24/2024  Tx #: 8/10 (8/10 auth) Date: 7/1/2024  Tx #: 9/10  Date: 7/8/2024  Tx #: 10/10    There Ex:   Standing L/R shoulder ER using YTB x ~ 20 reps (HEP progression, pt reports s/l shoulder ER too easy) or B UE using YTB x 10 reps  HEP review/modification: B or unilateral shoulder Er using YTB, seated thoracic spine/pec stretch, Wall slides ( may consider W)  Supine B shoulder horizontal abduction x 10 reps YTB, standing x 10 reps  Diagonal pull aparts using YTB  - pt c/o, stopped  W wall slides - pt c/o stopped There Ex:   Pt may consider the following for morning stretches: cat/cow spinal mobilization x 5 reps, s/l spinal rotation with pec stretch 5 x 5 sec each, supine hamstrings stretches 2 x 20 sec each, supine L/R figure 4 stretch 2 x 20 sec; advised may consider ankle circles, inversion/eversion/PF/DF AROM x 5 reps each  Seated thoracic spine ext  5 x 5 sec each  Thread thread the needle at wall - pt c/o, stopped  B shoulder ER using YTB x 10 reps  B shoulder horiz abd using YTB x 10 reps  Diagonal pull aparts using YTB x 10 reps   UBE: 1 min backward/1 min forward (2 min total), level 1   Seated shoulder pulleys: flex, scap, abd x 10 reps each There Ex:  Seated thoracic spine ext/pec stretch 5 x 5 sec each  Standing B shoulder ER using YTB x 10 reps  Standing B shoulder horiz abd using YTB x 10 reps  Diagonal pull aparts using YTB x 10 reps each L/R  UBE: 1 min backward/1 min forward (2 min total), level 1   Seated shoulder pulleys: flex, scap, abd x 10 reps each  Reviewed form for seated thoracic ext/pec stretch due to pt reports chair backs are too high at home; practiced completing using basketball as fulcrum x 5 reps There Ex:   UBE: 1 min backward/1 min forward (2 min total), level 1   Standing B shoulder ER using YTB  x 10 reps  Standing B shoulder horiz abd using YTB x 10 reps  Diagonal pull aparts using YTB x 10 reps each L/R  Standing B shoulder flexion using wand x 5 reps, wider /abd x 5 reps  Doorway pec stretch: low x 20 sec, in 90 deg abd x 20    HEP review: advised standing or s/l resisted shoulder ER should be priority exercise to facilitate compliance. There ex:  Standing B shoulder ER using YTB x 10 reps  Dowel shoulder flexion overhead on wall, 3x10  Shoulder ER with scapular retraction yellow TB, 3x10  Sidelying shoulder ER with #1 weight, cues for positioning, 3x10   Manual:   STM L upper trap  No other manual therapy completed; pt with poor tolerance in past Manual:   STM L upper trap  No other manual therapy completed; pt with poor tolerance in past Manual:   STM L upper trap  No other manual therapy completed; pt with poor tolerance in past Manual:   STM L upper trap   Manual:   STM L upper trap     TA:   Theracane L > R mid and upper trap; pt may consider for home TA:   Upper trap considerations: rotator cuff strenghtening, STM, MHP      MHP L upper/mid trap seated x 10 min Ultrasound:   L upper trap: %, 1.5 w/cm2, 3.3-1 MHz x 8 min  MHP - pt declined    HEP:  wall slide, seated thoracic spine ext/pec stretch ( may consider cat cow as alternative, pt reports better tolerated or using basketball), B or unilateral shoulder ER using YTB    Charges: 1 TE (30 min), 1 manual (10 min)  Total Timed Treatment: 40 min  Total Treatment Time: 40 min  QUICKDASH TO BE RE-ADMINISTERED AT DISCHARGE    Plan: Continue skilled Physical Therapy 1-2  x/week or a total of 12-15 visits over a 90 day period. Treatment will include: there ex, there activities, manual therapy, NM Re-ed, and modalities as needed.        Patient/Family/Caregiver was advised of these findings, precautions, and treatment options and has agreed to actively participate in planning and for this course of care.    Thank you for your referral. If you  have any questions, please contact me at Dept: [phone].    Sincerely,  Electronically signed by therapist: Igor Adler PT     Physician's certification required:  Yes  Please co-sign or sign and return this letter via fax as soon as possible to [phone].   I certify the need for these services furnished under this plan of treatment and while under my care.    X___________________________________________________ Date____________________    Certification From: 7/1/2024  To:9/29/2024

## 2024-07-15 ENCOUNTER — OFFICE VISIT (OUTPATIENT)
Dept: PHYSICAL THERAPY | Age: 54
End: 2024-07-15
Attending: ORTHOPAEDIC SURGERY
Payer: COMMERCIAL

## 2024-07-15 PROCEDURE — 97035 APP MDLTY 1+ULTRASOUND EA 15: CPT | Performed by: PHYSICAL THERAPIST

## 2024-07-15 PROCEDURE — 97110 THERAPEUTIC EXERCISES: CPT | Performed by: PHYSICAL THERAPIST

## 2024-07-15 PROCEDURE — 97014 ELECTRIC STIMULATION THERAPY: CPT | Performed by: PHYSICAL THERAPIST

## 2024-07-15 PROCEDURE — 97140 MANUAL THERAPY 1/> REGIONS: CPT | Performed by: PHYSICAL THERAPIST

## 2024-07-16 ENCOUNTER — HOSPITAL ENCOUNTER (OUTPATIENT)
Dept: ULTRASOUND IMAGING | Facility: HOSPITAL | Age: 54
Discharge: HOME | End: 2024-07-16
Attending: ADVANCED PRACTICE MIDWIFE
Payer: COMMERCIAL

## 2024-07-16 DIAGNOSIS — N93.8 DUB (DYSFUNCTIONAL UTERINE BLEEDING): ICD-10-CM

## 2024-07-16 PROCEDURE — 76856 US EXAM PELVIC COMPLETE: CPT | Performed by: ADVANCED PRACTICE MIDWIFE

## 2024-07-16 PROCEDURE — 76830 TRANSVAGINAL US NON-OB: CPT | Performed by: ADVANCED PRACTICE MIDWIFE

## 2024-07-16 NOTE — PROGRESS NOTES
Diagnosis:   Disorder of left rotator cuff (M67.912)    Referring Provider: Mike Morgan  Date of Evaluation:    5/1/2024    Precautions:  None Next MD visit:   none scheduled  Date of Surgery: n/a   Insurance Primary/Secondary: [employer] [place] / N/A     # Auth Visits: 15 through 9/24/2024         Subjective:   C/o increased L soreness after last PT session with Krishna, PT for several days. Previously did not have any pain with reaching out to side or with pulling blankets in bed.   Pain: minimal/10      Objective: AROM: (* denotes performed with pain)    + c/o/restriction with L shoulder AROM abd and resisted ER    Assessment:   Pt reporting exacerbation of L shoulder c/o since last PT session. Modified today's session accordingly, included modalities, as pt reported helpful in the past.  Advised Hep as well tolerated, stop if c/o. Pt tolerated session well.     Goals:    (to be met in 8 visits)   Patient to report independence with PT HEP to facilitate self management and to maintain progress made in PT.  - MET  Patient to have at least 160 deg active L shoulder AROM flex/abd with min - no c/o to facilitate overhead ADL.  - PROGRESS  Patient to have L shoulder flex, abd, ER MMT grossly 4+/5 in order to lift/lower 3# overhead using L UE. - PROGRESS  Patient to report at least 50% improvement in L shoulder to facilitate reaching, ADL. - MET    Plan: Continue PT 1-2 x weekly for 12-15 sessions (15 currently auth).  F/u on symptoms. Progress L shoulder A/AAROM, rotator cuff/periscapular strengthening, posture.   Date: 6/24/2024  Tx #: 8/10 (8/10 auth) Date: 7/1/2024  Tx #: 9/10  Date: 7/8/2024  Tx #: 10/10  Date: 7/15/2024  Tx #: 11/15   There Ex:  Seated thoracic spine ext/pec stretch 5 x 5 sec each  Standing B shoulder ER using YTB x 10 reps  Standing B shoulder horiz abd using YTB x 10 reps  Diagonal pull aparts using YTB x 10 reps each L/R  UBE: 1 min backward/1 min forward (2 min total), level 1   Seated  shoulder pulleys: flex, scap, abd x 10 reps each  Reviewed form for seated thoracic ext/pec stretch due to pt reports chair backs are too high at home; practiced completing using basketball as fulcrum x 5 reps There Ex:   UBE: 1 min backward/1 min forward (2 min total), level 1   Standing B shoulder ER using YTB x 10 reps  Standing B shoulder horiz abd using YTB x 10 reps  Diagonal pull aparts using YTB x 10 reps each L/R  Standing B shoulder flexion using wand x 5 reps, wider /abd x 5 reps  Doorway pec stretch: low x 20 sec, in 90 deg abd x 20    HEP review: advised standing or s/l resisted shoulder ER should be priority exercise to facilitate compliance. There ex:  Standing B shoulder ER using YTB x 10 reps  Dowel shoulder flexion overhead on wall, 3x10  Shoulder ER with scapular retraction yellow TB, 3x10  Sidelying shoulder ER with #1 weight, cues for positioning, 3x10 There Ex:   Pendulums L shoulder flex/ext, horiz abd/add x ~ 30 sec  S/L L shoulder ER 0# x 3 reps - pt c/o shoulder pain, stopped  Standing isometric L shoulder ER x 10 reps   Seated shoulder pulleys: flex, scap, abd x 5 reps each  HEP review; see below.    Manual:   STM L upper trap  No other manual therapy completed; pt with poor tolerance in past Manual:   STM L upper trap   Manual:   STM L upper trap  Manual:   STM L upper trap   B pec stretch 3 x 15 sec   L GH inferior glides 4 x 10 sec each grade II - III  No other manual therapy completed; pt with poor tolerance in past     TA:   Theracane L > R mid and upper trap; pt may consider for home TA:   Upper trap considerations: rotator cuff strenghtening, STM, MHP  Ultrasound:   L upper trap: %, 1.5 w/cm2, 3.3-1 MHz x 8 min  IFC/cold pack L shoulder x 10 min supine   MHP L upper/mid trap seated x 10 min Ultrasound:   L upper trap: %, 1.5 w/cm2, 3.3-1 MHz x 8 min  MHP - pt declined     HEP:  wall slide, seated thoracic spine ext/pec stretch ( may consider cat cow as alternative,  pt reports better tolerated or using basketball), B or unilateral shoulder ER using YTB    Charges: 1 TE (15 min), 1 manual (15 min), 1 US (8 min), Estim  Total Timed Treatment: 38 min  Total Treatment Time: 55 min

## 2024-07-18 ENCOUNTER — APPOINTMENT (OUTPATIENT)
Dept: PHYSICAL THERAPY | Age: 54
End: 2024-07-18
Attending: ORTHOPAEDIC SURGERY
Payer: COMMERCIAL

## 2024-07-22 ENCOUNTER — OFFICE VISIT (OUTPATIENT)
Dept: PHYSICAL THERAPY | Age: 54
End: 2024-07-22
Attending: ORTHOPAEDIC SURGERY
Payer: COMMERCIAL

## 2024-07-22 PROCEDURE — 97110 THERAPEUTIC EXERCISES: CPT | Performed by: PHYSICAL THERAPIST

## 2024-07-22 PROCEDURE — 97014 ELECTRIC STIMULATION THERAPY: CPT | Performed by: PHYSICAL THERAPIST

## 2024-07-22 NOTE — PROGRESS NOTES
ProgressSummary  Pt has attended 12 visits in Physical Therapy.     Diagnosis:   Disorder of left rotator cuff (M67.912)    Referring Provider: Mike Morgan  Date of Evaluation:    5/1/2024    Precautions:  None Next MD visit:   none scheduled  Date of Surgery: n/a   Insurance Primary/Secondary: [employer] [place] / N/A     # Auth Visits: 15 through 9/24/2024         Subjective:   C/o continued L shoulder pain, taking Ibuprofen without reduction in c/o.  To see Dr. Smith on Friday; Dr. Morgan referred me to him.   Feel better when support L pec with my hand.    Pain:  3-4/10, constant/shooting      Objective: AROM: (* denotes performed with pain)      Objective: AROM: (* denotes performed with pain)  Shoulder    Flexion: R/L  170 * pec pain  Abduction: R 180; L 180*  ER: R T2; L T2 *  IR: R T5; L T5*      + mild increased tone/c/o L upper    Flexibility: + B pectoralis tightness    ccessory motion: hypomobile L/R GH posterior glide    Flexibility: + B pectoralis tightness    Strength/MMT: (* denotes performed with pain)  Shoulder   Flexion: R 4+/5; L 4+/5  Abduction: R 4+/5; L 4/5*  ER: R 4+/5; L 4/5*  IR: R 4+/5; L 4+/5     Special tests:   Negative L/R Speed's test, negative R  Negative L/R Empty Can test       Assessment:   Pt had been progressing well with PT, demonstrating both subjective and objective progress, improved ADL tolerance, until recently.  Pt with increased L shoulder c/o  limiting ADL over past 1-2 weeks. HEP revised based on pt's current status. Pt tolerated well. Advisable for pt to f/u with surgeon due to increased, persistent c/o over past 1-2 weeks. Pt with positive rotator cuff MRI findings (see MRI results).     Goals:    (to be met in 8 visits)   Patient to report independence with PT HEP to facilitate self management and to maintain progress made in PT.  - MET  Patient to have at least 160 deg active L shoulder AROM flex/abd with min - no c/o to facilitate overhead ADL.  -  PROGRESS  Patient to have L shoulder flex, abd, ER MMT grossly 4+/5 in order to lift/lower 3# overhead using L UE. - PROGRESS  Patient to report at least 50% improvement in L shoulder to facilitate reaching, ADL. - PREVIOUSLY MET, CURRENTLY NOT ,ET    Plan: Continue PT 1-2 x weekly for 12-15 sessions (15 currently auth), pending consultation with Dr. Smith this week.   Advisable for pt to f/u with surgeon due to increased, persistent c/o over past 1-2 weeks.   F/u on symptoms. Progress L shoulder A/AAROM, rotator cuff/periscapular strengthening, posture.   Date: 7/1/2024  Tx #: 9/10  Date: 7/8/2024  Tx #: 10/10  Date: 7/15/2024  Tx #: 11/15 Date:  7/22/2024  Tx #: 12/15   There Ex:   UBE: 1 min backward/1 min forward (2 min total), level 1   Standing B shoulder ER using YTB x 10 reps  Standing B shoulder horiz abd using YTB x 10 reps  Diagonal pull aparts using YTB x 10 reps each L/R  Standing B shoulder flexion using wand x 5 reps, wider /abd x 5 reps  Doorway pec stretch: low x 20 sec, in 90 deg abd x 20    HEP review: advised standing or s/l resisted shoulder ER should be priority exercise to facilitate compliance. There ex:  Standing B shoulder ER using YTB x 10 reps  Dowel shoulder flexion overhead on wall, 3x10  Shoulder ER with scapular retraction yellow TB, 3x10  Sidelying shoulder ER with #1 weight, cues for positioning, 3x10 There Ex:   Pendulums L shoulder flex/ext, horiz abd/add x ~ 30 sec  S/L L shoulder ER 0# x 3 reps - pt c/o shoulder pain, stopped  Standing isometric L shoulder ER x 10 reps   Seated shoulder pulleys: flex, scap, abd x 5 reps each  HEP review; see below.  There Ex:   HEP revision during symptom exacerbation:   Supine shoulder AAROM flexion x 5 reps, using wand x 10 reps (HEP)  Isometric  L shoulder ER x 10 reps (HEP)  Pendulums flex/ext, horiz abd/add, clockwise/counterclockwise x ~ 10 reps each (HEP)  Isometric L shoulder IR x 10 reps   B scapular retraction x 10 reps   (HEP)  Seated shoulder pulleys x 10 reps each flex, scap; attempted abd - pt c/o, stopped    ** HEP: may prioritize isometric ER and supine shoulder AAROM flexion using wand to facilitate compliance     Manual:   STM L upper trap   Manual:   STM L upper trap  Manual:   STM L upper trap   B pec stretch 3 x 15 sec   L GH inferior glides 4 x 10 sec each grade II - III  No other manual therapy completed; pt with poor tolerance in past   Manual:   STM L proximal pec, upper trap  No other manual therapy completed; pt with poor tolerance in past   TA:   Upper trap considerations: rotator cuff strenghtening, STM, MHP  Ultrasound:   L upper trap: %, 1.5 w/cm2, 3.3-1 MHz x 8 min  IFC/cold pack L shoulder x 10 min supine IFC/cold pack L shoulder x 10 min supine   Ultrasound:   L upper trap: %, 1.5 w/cm2, 3.3-1 MHz x 8 min  MHP - pt declined      HEP:  wall slide, seated thoracic spine ext/pec stretch ( may consider cat cow as alternative, pt reports better tolerated or using basketball), B or unilateral shoulder ER using YTB    Charges: 3 TE (35 min), 0 manual (5 min), Estim  Total Timed Treatment: 40 min  Total Treatment Time:  50 min  OUTCOME MEASURE TO BE RE-ADMINISTERED AT DISCHARGE    Plan: Continue skilled Physical Therapy 2 x/week or a total of up to 15 sessions visits over a 90 day period. Advisable for pt to f/u with surgeon due to increased, persistent c/o over past 1-2 weeks.  Treatment will include: there ex, there activities, manual therapy, NM Re-ed, and modalities as needed.        Patient/Family/Caregiver was advised of these findings, precautions, and treatment options and has agreed to actively participate in planning and for this course of care.    Thank you for your referral. If you have any questions, please contact me at Dept: [phone].    Sincerely,  Electronically signed by therapist: Isha Burton PT     Physician's certification required:  Yes  Please co-sign or sign and return  this letter via fax as soon as possible to [phone].   I certify the need for these services furnished under this plan of treatment and while under my care.    X___________________________________________________ Date____________________    Certification From: 7/22/2024  To:10/20/2024

## 2024-07-23 ENCOUNTER — TELEPHONE (OUTPATIENT)
Dept: CASE MANAGEMENT | Age: 54
End: 2024-07-23

## 2024-07-23 ENCOUNTER — TELEPHONE (OUTPATIENT)
Dept: INTERNAL MEDICINE CLINIC | Facility: CLINIC | Age: 54
End: 2024-07-23

## 2024-07-23 DIAGNOSIS — M67.912 DISORDER OF LEFT ROTATOR CUFF: Primary | ICD-10-CM

## 2024-07-23 NOTE — TELEPHONE ENCOUNTER
Last visit with Dr Morgan on 4/24/24   Last rx given on 4/24/24 for #30 no refill  Patient has scheduled appointment on 7/26/24 with Dr Smith

## 2024-07-23 NOTE — TELEPHONE ENCOUNTER
Dr. Youngblood,     Patient called requesting referral to Dr. Smith for rotator cuff.     Pended referral please review diagnosis and sign off if you agree.    Thank you.  Bere Briscoe  [facility]

## 2024-07-23 NOTE — TELEPHONE ENCOUNTER
I have not seen this pt in person since 2021 -- pls aspt pt to make apt as she is overdue ,ok to be scheduled as annual physical  Noted she did see dr ta and as per his note as below -- I have signed the referral       I suggested that she consult with my associate, Dr. Smith, for an opinion regarding arthroscopic shoulder surgery if she does not respond to the above conservative measures.

## 2024-07-23 NOTE — TELEPHONE ENCOUNTER
Patient is requesting referral.     Name of specialist and specialty department : Dr. Smith, orthopedic   Reason for visit with the specialist: left shoulder rotator cuff injury  Address of the specialist office: [place]   Appointment date: 07/26/24     Asking for referral for several visits.     [facility] informed patient the turnaround time for referral is 5-7 business days.  Patient was informed to check their [employer] account for referral status.

## 2024-07-24 RX ORDER — MELOXICAM 15 MG/1
15 TABLET ORAL DAILY
Qty: 30 TABLET | Refills: 0 | Status: SHIPPED | OUTPATIENT
Start: 2024-07-24

## 2024-07-26 ENCOUNTER — OFFICE VISIT (OUTPATIENT)
Dept: ORTHOPEDICS CLINIC | Facility: CLINIC | Age: 54
End: 2024-07-26
Payer: COMMERCIAL

## 2024-07-26 ENCOUNTER — TELEPHONE (OUTPATIENT)
Dept: ORTHOPEDICS CLINIC | Facility: CLINIC | Age: 54
End: 2024-07-26

## 2024-07-26 DIAGNOSIS — S43.432A SUPERIOR LABRUM ANTERIOR-TO-POSTERIOR (SLAP) TEAR OF LEFT SHOULDER: ICD-10-CM

## 2024-07-26 DIAGNOSIS — M75.112 NONTRAUMATIC INCOMPLETE RUPTURE OF ROTATOR CUFF, LEFT: Primary | ICD-10-CM

## 2024-07-26 PROCEDURE — 99215 OFFICE O/P EST HI 40 MIN: CPT | Performed by: STUDENT IN AN ORGANIZED HEALTH CARE EDUCATION/TRAINING PROGRAM

## 2024-07-26 RX ORDER — METHOCARBAMOL 500 MG/1
500 TABLET, FILM COATED ORAL 3 TIMES DAILY PRN
Qty: 30 TABLET | Refills: 0 | Status: SHIPPED | OUTPATIENT
Start: 2024-07-26

## 2024-07-27 NOTE — H&P (VIEW-ONLY)
Orthopaedic Surgery New Patient Visit  _____________________________________________________________________________________________________  _____________________________________________________________________________________________________    DATE OF VISIT: 7/26/2024     CHIEF COMPLAINT:   Chief Complaint   Patient presents with    Shoulder Pain     F/u left shoulder pain 5-8/10. Pain got worse last night when she was trying to catch a cookie tray. On  4/24/2024 LOV with Dr Morgan a cortisone injection was giving at that visit. Pain returned 2 weeks ago. Has MRI and XR done.        HISTORY OF PRESENT ILLNESS: Nunu Guan is a 54 year old female who presents to the clinic for evaluation of her left shoulder.  She was previously seen by my partner Dr. Morgan for her left shoulder.  With him, she underwent a left shoulder steroid injection and worked with physical therapy.  She got substantial amount of improvement from the injection however this wore off over the past few weeks.  She now feels that she has worsened to even worse than her previous baseline of shoulder pain which has been present for approximately 1 year in the left shoulder.  She reports that this pain and dysfunction make it difficult for her to lift her left arm particularly in an abducted fashion and substantially affects her quality of sleep and quality of life.  She has attempted several weeks of therapy, anti-inflammatories, cryotherapy and activity modification without a substantial amount of long-term improvement.  She is now interested in the possibility of operative intervention.  She denies any instability of the shoulder or any neurologic symptoms    SOCIAL HISTORY  Social History     Socioeconomic History    Marital status:      Spouse name: Not on file    Number of children: Not on file    Years of education: Not on file    Highest education level: Not on file   Occupational History    Not on file   Tobacco Use     Smoking status: Never    Smokeless tobacco: Never   Vaping Use    Vaping status: Never Used   Substance and Sexual Activity    Alcohol use: Yes     Alcohol/week: 4.0 standard drinks of alcohol     Types: 4 Standard drinks or equivalent per week     Comment: rarely    Drug use: No    Sexual activity: Not on file   Other Topics Concern     Service Not Asked    Blood Transfusions Not Asked    Caffeine Concern Yes     Comment: coffee, 1 cup daily    Occupational Exposure Not Asked    Hobby Hazards Not Asked    Sleep Concern Not Asked    Stress Concern Not Asked    Weight Concern Not Asked    Special Diet Not Asked    Back Care Not Asked    Exercise Not Asked    Bike Helmet Not Asked    Seat Belt Not Asked    Self-Exams Not Asked   Social History Narrative    Not on file     Social Determinants of Health     Financial Resource Strain: Not on file   Food Insecurity: Not on file   Transportation Needs: Not on file   Physical Activity: Not on file   Stress: Not on file   Social Connections: Not on file   Housing Stability: Not on file        PAST MEDICAL HISTORY  Past Medical History:    Clot in the renal vein (HCC)    left lower extremity    Cyst of skin    right abdominal skin cyst January 2016    Deep vein thrombosis (HCC)    hx of DVT    Disorder of thyroid    High blood pressure    Hx of motion sickness    Hypercholesteremia    Hypothyroidism    PONV (postoperative nausea and vomiting)    Unspecified essential hypertension    Visual impairment        PAST SURGICAL HISTORY  Past Surgical History:   Procedure Laterality Date    Ankle fracture surgery Left 2014    left ankle stress fracture 2014    Arthroscopy of joint unlisted      left knee screw    Colonoscopy N/A 04/28/2022    Procedure: COLONOSCOPY;  Surgeon: Flex Rubi MD;  Location: [facility] ENDOSCOPY    Knee surgery  2001    [facility]     Other surgical history  2004    vaginal delivery    Other surgical history  2006    vaginal delivery        MEDICATIONS  *  Reviewed   methocarbamol 500 MG Oral Tab Take 1 tablet (500 mg total) by mouth 3 (three) times daily as needed. 30 tablet 0    Acetaminophen 500 MG Oral Cap Take 2 capsules (1,000 mg total) by mouth every 8 (eight) hours as needed for Pain. Never exceed 3000 mg in a 24-hour period.  Many over-the-counter medications also have acetaminophen in them. 100 capsule 0    Meloxicam 15 MG Oral Tab Take 1 tablet (15 mg total) by mouth daily. 30 tablet 0    Phentermine HCl 37.5 MG Oral Tab Take 1 tablet (37.5 mg total) by mouth every morning before breakfast. 30 tablet 3    topiramate 25 MG Oral Tab Take 1 tablet (25 mg total) by mouth 2 (two) times daily. 180 tablet 1    levothyroxine 100 MCG Oral Tab Take 1 tablet (100 mcg total) by mouth before breakfast. 90 tablet 3    losartan-hydroCHLOROthiazide 100-25 MG Oral Tab Take 1 tablet by mouth daily. 90 tablet 3        ALLERGIES  Allergies   Allergen Reactions    Azithromycin      Other reaction(s): AZITHROMYCIN    Hydrocodone      Other reaction(s): feels dazed        FAMILY HISTORY  Family History   Problem Relation Age of Onset    Hypertension Father         overweight     Other (alzheimers) Father     Diabetes Mother         borderline, overweight     Other (Other) Maternal Grandmother         Karie's Granulomatosis    Cancer Maternal Grandfather         LUNG    Other (Other) Sister         Rheumatoid Arthritis    Colon Cancer Paternal Uncle         REVIEW OF SYSTEMS  A 14 point review of systems was performed. Pertinent positives and negatives noted in the HPI.    PHYSICAL EXAM  LMP 06/13/2024 (Within Days)      Constitutional: The patient is well-developed, well-nourished, in no acute distress.  Neurological: Alert and oriented to person, place, and time.  Psychiatric: Mood and affect normal.  Head: Normocephalic and atraumatic.  Cardiovascular: regular rate by palpation  Pulmonary/Chest: Effort normal. No respiratory distress. Breathing non-labored  Abdominal: Abdomen  exhibits no distension.   Left  Shoulder  Inspection/Palpation  No readily apparent visual abnormalities of shoulder.   No ecchymosis or erythema  No effusion   Focally tender to palpation to bicipital groove, ACJ, posterolateral acromion, and joint line  ROM  Forward Flexion: 0-180 degrees  Abduction: 0-160 degrees  Internal Rotation: 80 degrees  External Rotation: 80 degrees  Strength  Forward Flexion: 5/5  Abduction: 4/5  Internal Rotation: 5/5  External Rotation: 4+/5  POSITIVE Tests  Impingement: Neer and Robles  SS: Full Can Test and Drop Arm Test  IS: Infraspinatus Test  TM: None  SubS: None  ACJ: Cross Body Adduction Test and Scarf Test  SLAP: O'Briens  Biceps: None  NEGATIVE Tests  Impingement: None  SS: None  IS: None  TM: Hornblowers  SubS: Belly Press  ACJ: None  SLAP: None  Biceps: Speeds and Yergasons  SILT R/U/M/MSC/axillary  Radial pulse 2+, distally warm and well perfused, brisk capillary refill     RESULTS    Lab Results   Component Value Date    WBC 5.9 03/09/2024    HGB 15.5 03/09/2024    .0 03/09/2024      Lab Results   Component Value Date    GLU 96 03/09/2024    BUN 22 03/09/2024    CREATSERUM 1.03 (H) 03/09/2024    GFRNAA 81 02/21/2022    GFRAA 94 02/21/2022        IMAGING  I independently viewed and interpreted the imaging. Radiologist interpretation is available in the imaging report.  X-ray: Plain films of the left shoulder including were reviewed. These films demonstrate no acute osseous abnormalities. The humerus is  centered on the glenoid and there are minimal to no degenerative changes in the glenohumeral joint and moderate degenerative changes in the acromioclavicular joint space.   MRI: MRI of the left shoulder demonstrates incomplete partial-thickness tearing of the supraspinatus and infraspinatus, substantial subacromial bursitis, superior labral pathology with biceps tendinitis, a notable subacromial spur, and acromioclavicular arthritis which is mild to moderate in  severity     ASSESSMENT/PLAN: Nunu Guan is a 54 year old female who presents to the Orthopaedic surgery clinic today for a chronic history of left shoulder pain which has been refractory to conservative measures and is functionally and lifestyle limiting. Based on history, physical exam, and available imaging, the patient diagnosis is consistent with partial articular sided rotator cuff tearing, biceps tendinitis, subacromial bursitis/impingement, and acromioclavicular arthropathy. The management options for this process were discussed with the patient to include conservative and surgical options. Given the patient's age, functional status, and findings, I recommend arthroscopic intervention.      - General risks of surgery reviewed including risks of pain, bleeding, infection, scarring, damage to surrounding structures, need for further procedures, blood transfusion, VTE, risks of anesthesia, failure to improve symptoms, and recurrence of disease.    -  Patient will be placed on ERAS pathway. Written and verbal pre-op instructions given, including information regarding pre-op diet, utilization of chlorhexidine, and expectations for post-op activity. Discussed expected course of recovery and post-op activity restrictions.  - Discussed discharge pathway.  - Discussed postoperative pain management and  expectation that patient may require narcotic medication as an adjunct to multi-modal analgesic therapy.  - All questions were answered and patient desires to proceed.   - Plan to sign consents on the day of surgery.   - Preoperative testing needed: UPT on day of surgery  - See below for specific surgical planning details:      Surgical Planning:  Procedure: Left shoulder arthroscopic assisted rotator cuff repair, subpectoral biceps tenodesis, subacromial decompression, distal clavicle excision  PMH considerations: Noncontributory  PSH considerations: Noncontributory  Drug Allergies: Azithromycin,  hydrocodone  Anesthesia issues:  no FHx or personal problems with anesthesia  Planned positioning: Beachchair  Bleeding Issues: Personal history of DVT  Pre-op consultations needed: None  Additional pre-op imaging needed: None  Counseled for smoking cessation to improve OR outcome: N/A  Post-op pain regimen:  icing, elevation, Tylenol, celebrex, gabapentin, methocarbamol, oxyodone  Informed Consent: to be signed on day of surgery    I have personally seen Nunu Guan and discussed in detail their plan of care. Prior to departure, they indicated agreement with and understanding of their plan of care and their follow-up as documented herein this note. Please note that this note was written in combination with voice recognition/dictation software and there is a possibility of transcription errors which were not identified at the time of note submission. If clarification is necessary, please contact the author or clinic staff.    Igor Smith MD  Orthopaedic Surgery  7/26/2024      Rationale for 57 Modifier Addendum    Decision for Major Surgery  The decision for a major surgery was made during this visit/consultation based on review and discussion of the history of presentation, examination, available labs/imaging, and the patient's functional status. The medical and surgical history were considered with regards to risk and potential modifications needed.

## 2024-07-27 NOTE — TELEPHONE ENCOUNTER
Ortho Surgery Request  Surgery: Left shoulder arthroscopic assisted rotator cuff repair, subpectoral biceps tenodesis, distal clavicle excision, subacromial decompression      Surgical Assistant: Sanchez Perdue  Surgery Day Request: 8/22 anytime  Estimated Procedure Length: 2 hours  Anesthesia type: General + Block interscalene  Position: Beachchair   Special Needs:Spyder  Equipment:  Arthrex rotator cuff repair knotless options  Location:[facility]  Post Op Visit Date: 2 weeks  CPT Code: arthroscopic assisted biceps tenodesis 57160, arthroscopic assisted rotator cuff repair 11131, subacromial decompression 83858, and distal clavicle excision 51462 (open) or 78595 (arthroscopic)     ICD Code:M75.112, S43.432  Medical Clearance Needed: Medical  Preadmission Testing Orders:  Anesthesia testing protocols and anesthesia pre op orders will be used  Additional PAT orders:  Pre OP Orders:  Use [facility] procedure driven order set in addition to anesthesia protocol  Additional Pre Op Orders:  PCN Allergy:  No  If Yes:   __X__  Admit:  Hospital outpatient surgery

## 2024-07-27 NOTE — PROGRESS NOTES
Orthopaedic Surgery New Patient Visit  _____________________________________________________________________________________________________  _____________________________________________________________________________________________________    DATE OF VISIT: 7/26/2024     CHIEF COMPLAINT:   Chief Complaint   Patient presents with    Shoulder Pain     F/u left shoulder pain 5-8/10. Pain got worse last night when she was trying to catch a cookie tray. On  4/24/2024 LOV with Dr Morgan a cortisone injection was giving at that visit. Pain returned 2 weeks ago. Has MRI and XR done.        HISTORY OF PRESENT ILLNESS: Nunu Guan is a 54 year old female who presents to the clinic for evaluation of her left shoulder.  She was previously seen by my partner Dr. Morgan for her left shoulder.  With him, she underwent a left shoulder steroid injection and worked with physical therapy.  She got substantial amount of improvement from the injection however this wore off over the past few weeks.  She now feels that she has worsened to even worse than her previous baseline of shoulder pain which has been present for approximately 1 year in the left shoulder.  She reports that this pain and dysfunction make it difficult for her to lift her left arm particularly in an abducted fashion and substantially affects her quality of sleep and quality of life.  She has attempted several weeks of therapy, anti-inflammatories, cryotherapy and activity modification without a substantial amount of long-term improvement.  She is now interested in the possibility of operative intervention.  She denies any instability of the shoulder or any neurologic symptoms    SOCIAL HISTORY  Social History     Socioeconomic History    Marital status:      Spouse name: Not on file    Number of children: Not on file    Years of education: Not on file    Highest education level: Not on file   Occupational History    Not on file   Tobacco Use     Smoking status: Never    Smokeless tobacco: Never   Vaping Use    Vaping status: Never Used   Substance and Sexual Activity    Alcohol use: Yes     Alcohol/week: 4.0 standard drinks of alcohol     Types: 4 Standard drinks or equivalent per week     Comment: rarely    Drug use: No    Sexual activity: Not on file   Other Topics Concern     Service Not Asked    Blood Transfusions Not Asked    Caffeine Concern Yes     Comment: coffee, 1 cup daily    Occupational Exposure Not Asked    Hobby Hazards Not Asked    Sleep Concern Not Asked    Stress Concern Not Asked    Weight Concern Not Asked    Special Diet Not Asked    Back Care Not Asked    Exercise Not Asked    Bike Helmet Not Asked    Seat Belt Not Asked    Self-Exams Not Asked   Social History Narrative    Not on file     Social Determinants of Health     Financial Resource Strain: Not on file   Food Insecurity: Not on file   Transportation Needs: Not on file   Physical Activity: Not on file   Stress: Not on file   Social Connections: Not on file   Housing Stability: Not on file        PAST MEDICAL HISTORY  Past Medical History:    Clot in the renal vein (HCC)    left lower extremity    Cyst of skin    right abdominal skin cyst January 2016    Deep vein thrombosis (HCC)    hx of DVT    Disorder of thyroid    High blood pressure    Hx of motion sickness    Hypercholesteremia    Hypothyroidism    PONV (postoperative nausea and vomiting)    Unspecified essential hypertension    Visual impairment        PAST SURGICAL HISTORY  Past Surgical History:   Procedure Laterality Date    Ankle fracture surgery Left 2014    left ankle stress fracture 2014    Arthroscopy of joint unlisted      left knee screw    Colonoscopy N/A 04/28/2022    Procedure: COLONOSCOPY;  Surgeon: Flex Rubi MD;  Location: [facility] ENDOSCOPY    Knee surgery  2001    [facility]     Other surgical history  2004    vaginal delivery    Other surgical history  2006    vaginal delivery        MEDICATIONS  *  Reviewed   methocarbamol 500 MG Oral Tab Take 1 tablet (500 mg total) by mouth 3 (three) times daily as needed. 30 tablet 0    Acetaminophen 500 MG Oral Cap Take 2 capsules (1,000 mg total) by mouth every 8 (eight) hours as needed for Pain. Never exceed 3000 mg in a 24-hour period.  Many over-the-counter medications also have acetaminophen in them. 100 capsule 0    Meloxicam 15 MG Oral Tab Take 1 tablet (15 mg total) by mouth daily. 30 tablet 0    Phentermine HCl 37.5 MG Oral Tab Take 1 tablet (37.5 mg total) by mouth every morning before breakfast. 30 tablet 3    topiramate 25 MG Oral Tab Take 1 tablet (25 mg total) by mouth 2 (two) times daily. 180 tablet 1    levothyroxine 100 MCG Oral Tab Take 1 tablet (100 mcg total) by mouth before breakfast. 90 tablet 3    losartan-hydroCHLOROthiazide 100-25 MG Oral Tab Take 1 tablet by mouth daily. 90 tablet 3        ALLERGIES  Allergies   Allergen Reactions    Azithromycin      Other reaction(s): AZITHROMYCIN    Hydrocodone      Other reaction(s): feels dazed        FAMILY HISTORY  Family History   Problem Relation Age of Onset    Hypertension Father         overweight     Other (alzheimers) Father     Diabetes Mother         borderline, overweight     Other (Other) Maternal Grandmother         Karie's Granulomatosis    Cancer Maternal Grandfather         LUNG    Other (Other) Sister         Rheumatoid Arthritis    Colon Cancer Paternal Uncle         REVIEW OF SYSTEMS  A 14 point review of systems was performed. Pertinent positives and negatives noted in the HPI.    PHYSICAL EXAM  LMP 06/13/2024 (Within Days)      Constitutional: The patient is well-developed, well-nourished, in no acute distress.  Neurological: Alert and oriented to person, place, and time.  Psychiatric: Mood and affect normal.  Head: Normocephalic and atraumatic.  Cardiovascular: regular rate by palpation  Pulmonary/Chest: Effort normal. No respiratory distress. Breathing non-labored  Abdominal: Abdomen  exhibits no distension.   Left  Shoulder  Inspection/Palpation  No readily apparent visual abnormalities of shoulder.   No ecchymosis or erythema  No effusion   Focally tender to palpation to bicipital groove, ACJ, posterolateral acromion, and joint line  ROM  Forward Flexion: 0-180 degrees  Abduction: 0-160 degrees  Internal Rotation: 80 degrees  External Rotation: 80 degrees  Strength  Forward Flexion: 5/5  Abduction: 4/5  Internal Rotation: 5/5  External Rotation: 4+/5  POSITIVE Tests  Impingement: Neer and Robles  SS: Full Can Test and Drop Arm Test  IS: Infraspinatus Test  TM: None  SubS: None  ACJ: Cross Body Adduction Test and Scarf Test  SLAP: O'Briens  Biceps: None  NEGATIVE Tests  Impingement: None  SS: None  IS: None  TM: Hornblowers  SubS: Belly Press  ACJ: None  SLAP: None  Biceps: Speeds and Yergasons  SILT R/U/M/MSC/axillary  Radial pulse 2+, distally warm and well perfused, brisk capillary refill     RESULTS    Lab Results   Component Value Date    WBC 5.9 03/09/2024    HGB 15.5 03/09/2024    .0 03/09/2024      Lab Results   Component Value Date    GLU 96 03/09/2024    BUN 22 03/09/2024    CREATSERUM 1.03 (H) 03/09/2024    GFRNAA 81 02/21/2022    GFRAA 94 02/21/2022        IMAGING  I independently viewed and interpreted the imaging. Radiologist interpretation is available in the imaging report.  X-ray: Plain films of the left shoulder including were reviewed. These films demonstrate no acute osseous abnormalities. The humerus is  centered on the glenoid and there are minimal to no degenerative changes in the glenohumeral joint and moderate degenerative changes in the acromioclavicular joint space.   MRI: MRI of the left shoulder demonstrates incomplete partial-thickness tearing of the supraspinatus and infraspinatus, substantial subacromial bursitis, superior labral pathology with biceps tendinitis, a notable subacromial spur, and acromioclavicular arthritis which is mild to moderate in  severity     ASSESSMENT/PLAN: Nunu Guan is a 54 year old female who presents to the Orthopaedic surgery clinic today for a chronic history of left shoulder pain which has been refractory to conservative measures and is functionally and lifestyle limiting. Based on history, physical exam, and available imaging, the patient diagnosis is consistent with partial articular sided rotator cuff tearing, biceps tendinitis, subacromial bursitis/impingement, and acromioclavicular arthropathy. The management options for this process were discussed with the patient to include conservative and surgical options. Given the patient's age, functional status, and findings, I recommend arthroscopic intervention.      - General risks of surgery reviewed including risks of pain, bleeding, infection, scarring, damage to surrounding structures, need for further procedures, blood transfusion, VTE, risks of anesthesia, failure to improve symptoms, and recurrence of disease.    -  Patient will be placed on ERAS pathway. Written and verbal pre-op instructions given, including information regarding pre-op diet, utilization of chlorhexidine, and expectations for post-op activity. Discussed expected course of recovery and post-op activity restrictions.  - Discussed discharge pathway.  - Discussed postoperative pain management and  expectation that patient may require narcotic medication as an adjunct to multi-modal analgesic therapy.  - All questions were answered and patient desires to proceed.   - Plan to sign consents on the day of surgery.   - Preoperative testing needed: UPT on day of surgery  - See below for specific surgical planning details:      Surgical Planning:  Procedure: Left shoulder arthroscopic assisted rotator cuff repair, subpectoral biceps tenodesis, subacromial decompression, distal clavicle excision  PMH considerations: Noncontributory  PSH considerations: Noncontributory  Drug Allergies: Azithromycin,  hydrocodone  Anesthesia issues:  no FHx or personal problems with anesthesia  Planned positioning: Beachchair  Bleeding Issues: Personal history of DVT  Pre-op consultations needed: None  Additional pre-op imaging needed: None  Counseled for smoking cessation to improve OR outcome: N/A  Post-op pain regimen:  icing, elevation, Tylenol, celebrex, gabapentin, methocarbamol, oxyodone  Informed Consent: to be signed on day of surgery    I have personally seen Nunu Guan and discussed in detail their plan of care. Prior to departure, they indicated agreement with and understanding of their plan of care and their follow-up as documented herein this note. Please note that this note was written in combination with voice recognition/dictation software and there is a possibility of transcription errors which were not identified at the time of note submission. If clarification is necessary, please contact the author or clinic staff.    Igor Smith MD  Orthopaedic Surgery  7/26/2024      Rationale for 57 Modifier Addendum    Decision for Major Surgery  The decision for a major surgery was made during this visit/consultation based on review and discussion of the history of presentation, examination, available labs/imaging, and the patient's functional status. The medical and surgical history were considered with regards to risk and potential modifications needed.

## 2024-07-27 NOTE — PATIENT INSTRUCTIONS
SHOULDER PATHOLOGY INFORMATION PACKET  Dr. Igor Smith  Note: this information packet goes into detail regarding surgical treatment, but is also relevant to understanding your diagnosis and non-operative treatment options    THE SHOULDER JOINT  The shoulder is a ball-and-socket joint. It is made up of the humeral head (the ball) and the glenoid (the socket). The glenoid is a shallow concavity of the scapula (shoulder blade). In order to permit a large range of motion, this socket is very small (often compared to a golf ball on a neel), which makes it inherently unstable. Therefore, in addition to the bony support, the humeral head is held into this socket by multiple structures:  Capsule (the lining of the joint)   Ligaments (thickenings in the capsule)   Labrum (cartilage rim that acts like a bumper/gasket)    Rotator cuff muscles    SHOULDER PAIN  Because of the many important components contributing to shoulder stability and motion, injury to any of these structures may cause pain and loss of shoulder function. Common injuries and reasons for shoulder pain include tears in the glenoid labrum, tendinitis and tears in the rotator cuff, bursitis, and arthritis. Often, more than one of these will occur at the same time.  Labral Tears  Tears of the glenoid labrum can occur anywhere (front, back, or top). For people with shoulder pain, most often these tears are at the top. These are called superior labral tears. Sometimes the tear will include the entire upper region, and these are referred to as superior labrum anterior to posterior or SLAP tears. Superior labral tears are particularly painful because one of the tendons from the bicep (see figure above) are attached to it, so use of the bicep pulls on this tear    Superior Labral Tear  (Humerus Removed)  Bursitis  Anywhere in the body where structures move relative to one another, there are lubricating sacs (bursa) that allow free gliding movement. One of the  largest of these is the subacromial bursa located between the rotator cuff and the acromion (bony roof of the scapula). Bursitis can occur by itself or when there is inflammation from tendinitis or tearing of the rotator cuff.     Subacromial Bursitis  Rotator Cuff Tears  The rotator cuff is made up of four muscles which start on the shoulder blade and cover the humerus. These muscles help to stabilize the shoulder, balance against the pull of the large deltoid muscle, and lift and rotate the arm. The tendons of these muscles can become irritated and inflamed (tendinitis) or they can tear. Tendinitis and incomplete tears are painful and can cause shoulder weakness. Complete tears can cause loss of the ability to perform certain motions altogether or may even allow the humerus to migrate upwards, eventually progressing to arthritis. The most common of the tendons to tear is the supraspinatus, followed by the infraspinatus.     Rotator Cuff Pathology  Types of Rotator Cuff Tear  Rotator cuff tears can involve one or several of the rotator cuff muscles and tendons. Partial tears of the rotator cuff involve only part of the thickness (top-to-bottom) of the tendon. The tendon still functions but is thinner and the tearing may progress over time to a full thickness tear.  Full thickness tears of the rotator cuff involve the entire thickness of the tendon but do not necessarily involve the entire width of the tendon. When tears involve the entire width, they are referred to as complete tears. When some fibers are still attached, they are called incomplete. A complete tear is no longer able to function and often retracts (pulls away) from the humerus.                 Partial Thickness Tears     Full Thickness Incomplete Tears Full Thickness Complete Tear  Arthritis  As with any other joint in the body, the shoulder can develop arthritis. Arthritis occurs when the cartilage between bones is lost, and the bones are directly  in contact. The two main joints in the shoulder that can develop arthritis are between the humerus and scapula (glenohumeral joint) and between the acromion of the scapula and collarbone (acromioclavicular joint). Acromioclavicular arthritis is common but does not always cause pain. The amount of pain caused by this joint can be evaluated by specific exam maneuvers and/or diagnostic injections. Glenohumeral arthritis is less common but is more often very painful. This occurs due to wear and tear over time, prior injuries, or severe damage to the rotator cuff, which causes a separate pattern of arthritis in the shoulder joint called rotator cuff arthropathy.          Rotator Cuff Arthropathy     Glenohumeral (GH) Arthritis       Acromioclavicular (AC) Arthritis    CAUSES OF SHOULDER PAIN  There are multiple potential causes of shoulder pain. These can be divided between acute causes and degenerative causes.  Acute   Acute shoulder pain typically occurs from a trauma such as a fall, shoulder dislocation, lifting something very heavy, or an accident. When these events occur, the structures that stabilize the shoulder are challenged and can become injured. These may occur with other injuries including broken bones (fractures). If the rotator cuff is torn during these injuries, patients may experience temporary inability to actively move the shoulder.   Degenerative   Degenerative shoulder injuries may be a result of normal aging or from overuse. With age and use, tendons, joints, and labral tissue break down. In younger age, these structures have better blood supply and are more likely to heal. However, with repeated stresses and overuse such as frequent overhead lifting or sports participation, these tendinitis and tears may also occur at any age. Because these issues are associated with use, they tend to occur more frequently in the dominant arm.  In an athletic population, degenerative injuries are very common.  Specific activities such as frequent overhead lifting and overhead throwing motions all place substantial pressure on the structures of the shoulder.     SYMPTOMS   Shoulder injuries have many symptoms that can be specific to a certain cause or overlap. Symptoms that are common to many types of shoulder issues include:  Pain and/or weakness when lifting and lowering the arm  Crepitus (crackling sound or sensation) when moving the arm  Pain when sleeping on the affected side  There are also some common associations with specific shoulder issues though you may experience these symptoms for any of these shoulder issues. Degenerative rotator cuff tears frequently cause pain at rest, difficulty sleeping, and/or weakness in specific shoulder motions. Superior labral tears frequently cause sharp pinching in the front of the shoulder with overhead lifting and shoulder “shifting” with weighted overhead activities. Acromioclavicular arthritis often causes pain in the upper part of the shoulder when reaching across the body.  Symptoms in the shoulder are often mild at first and associated with the activities that caused them. Over time, these symptoms may become more severe, become more noticeable at rest or with more activities, and stop responding to over-the-counter pain relievers.    DIAGNOSIS OF SHOULDER INJURIES  Your physician will perform a history and physical examination. This examination can identify other injuries or identify other sources of pain. Shoulder examination will include assessment of you motion, strength, and the function of your nerves. Your physician will perform specific tests to try to assess for specific injuries. Examination will also often include the neck as some degeneration or compression in the spine at the neck (cervical spine) can cause symptoms in the shoulder.   You will often have X-rays performed before you see your Orthopaedic surgeon. These X-rays will help us to assess the position  of the shoulder, whether there are any fractures, and whether there is any arthritis. While X-rays do not show the muscles and tendons, some signs on these X-rays can also be used to identify an injury to these structures. Your physician may also order a magnetic resonance imaging (MRI) scan to evaluate the soft tissue structures in your shoulder. This can show labral tears, rotator cuff injuries, bursitis, and subtle bone injury.  These scans are not required in all patients.     TREATMENT  The treatment for shoulder pain is often dependent on the specifics and duration of pain and symptoms.   The main, early treatment method for most shoulder pain is physical/occupational therapy for range of motion exercises and strengthening of the rotator cuff and shoulder stabilizers. We will often recommend physical therapy in combination with activity modification (avoiding painful activities) and anti-inflammatory medications (e.g., acetaminophen, ibuprofen). There is no specific duration of therapy, though in most cases you should be able to notice meaningful improvement within 2-3 months. If you do not notice any improvement by this time, it is unlikely that additional therapy alone will be effective.   Your physician may recommend a corticosteroid injection for relief of pain symptoms. This works by decreasing the body's inflammatory and pain response and may help relieve pain enough to make therapy easier to perform. However, there are some downsides to injections into the shoulder. Your physician should discuss the risks and benefits with you beforehand. In general, you should not undergo multiple shoulder injections as these may lead to compromise of the rotator cuff tendon.  In patients with symptoms that have not improved with the above management options, we will often recommend surgery. We also strongly recommend surgery for patients with acute, complete rotator cuff tears as these are unlikely to improve without  surgery and treatment for these is less successful with prolonged delays.     ________________________________________________________________________________________  ________________________________________________________________________________________  OPERATIVE INTERVENTION  ARTHROSCOPIC SHOULDER SURGERY  For many causes of shoulder pain, the main treatment is a procedure called a shoulder arthroscopy. This procedure involves using a camera placed inside your joint to guide tools to address the damaged structures inside the shoulder. During the surgery, your surgeon will inspect the structures inside of the shoulder and take pictures to discuss with you at your first post-operative appointment. Depending on the source of your pain, specific procedures will be included in your surgery, the most common of which are:     Rotator cuff repair - Your surgeon will use a very strong type of permanent suture and anchors which are placed in the bone to repair the torn tendons back to the bone. Depending on the severity of the tear and quality of the tissue, different types of repairs will be performed. “Double row” repairs can achieve restoration of the anatomic footprint. “Single row” repairs are better for larger or more retracted tears and may be performed in combination with an augmentation.     Rotator Cuff Repair (Single Row)  Rotator Cuff Repair (Double Row)  Rotator Cuff Repair with Augmentation - Your surgeon will perform a rotator cuff repair and will use graft tissue to provide additional strength to increase the likelihood of repair success. This procedure is typically performed when you have had a tear for a long time to take stress off your tissue while it heals (Patch) or have a very large tear to bridge a gap in your tissue (Bridge).      Rotator Cuff Repair with Bridge Augment Rotator Cuff Repair with Patch Augment    Biceps tenodesis - Your surgeon will move the biceps tendon from its attachment on the  superior labrum, remove/resect the inflamed portion, and attach it to your humerus at a lower point along its normal course. This procedure also involves an additional, slightly larger incision near the armpit.       Biceps Tenodesis    Resected Biceps Between Lines   (Humerus Removed)  Subacromial debridement/decompression - Your surgeon will use a shaving instrument to remove the inflamed subacromial bursa and sometimes reshape the bottom of the acromion. This may relieve pressure and irritation on the rotator cuff muscle/tendons.  Distal clavicle excision - Your surgeon will remove the end of the clavicle to increase the space in the joint and prevent painful contact. This may be performed arthroscopically from the inside or open from directly over top of the joint. This decision is usually made between you and your surgeon beforehand. If you have symptoms completely isolated to the AC joint (not requiring a shoulder arthroscopy for other purposes) or if you have large bone spurs on the top of the joint, open resection may be ideal.      Acromioclavicular (AC) Arthritis Distal Clavicle Excision (Arrow)    SHOULDER ARTHROPLASTY  For patients with severe symptoms from arthritis or those with irreparable rotator cuff tears and moderate to severe arthritis, a joint replacement surgery (arthroplasty) may be an option. This procedure involves making an incision over the front of the shoulder, removing the joint surfaces, and replacing them with metal and polyethylene (plastic) components. The lifespan of these components is estimated to be at least 15-20 years, though some will last longer.   There are two main types of shoulder replacement--these are an anatomic shoulder replacement and a reverse shoulder replacement. The anatomic replacement directly replaces the parts of the joint as they originally were. Because this replacement is structurally similar to a normal shoulder, you will need to have a healthy rotator  cuff, making this ideal for standard glenohumeral arthritis. The reverse shoulder replacement changes the rotational axis of the shoulder, making it so that your rotator cuff is not needed. This replacement is ideal for patients with rotator cuff arthropathy, multiple cuff repair failures, or glenohumeral arthritis with an unhealthy rotator cuff. Your surgeon will help advise which is a better option for your arthritis.  Your surgeon can provide you with greater detail about these surgeries if they are needed.       Reverse Shoulder Arthroplasty  Anatomic Shoulder Arthroplasty    RISKS OF SURGERY  Arthroscopic shoulder surgeries are overall very safe, but there are some risks to be aware of. Some of these risks depend on the type of anesthesia used as some patients may react to certain types of anesthesia. Often these surgeries can be done with a nerve block to decrease the amount of anesthetic required. Other potential risks of the surgery include:  Pain - Some amount of pain is normal after any surgery but should be manageable with your post-operative pain protocol. Rotator cuff repairs are particularly painful for many patients during the first weeks after surgery. While this pain almost always resolves within the first few weeks, some activities may remain bothersome and painful until your rehabilitation process is nearly complete. If you have severe pain not controlled on your postoperative pain regimen, you should let your surgeon know.  Infection - Infection after shoulder surgery is very uncommon and, when it does occur, is typically superficial (only involving the outside region of the wound). Most often this can be managed with a brief course of antibiotics. In very rare cases, the shoulder may need to undergo another arthroscopic procedure to wash it out. To avoid this risk, you will be given a wash for skin decontamination, which removes some of the more problematic bacteria. On the day of surgery you  will also be given antibiotics and your skin will be thoroughly cleaned in the operating room.  Bleeding - Any time an incision is made to the skin, there will be some bleeding. You will typically lose a very minimal amount during this surgery  Nerve Injuries - Injuries to the nerves of the shoulder are uncommon but very serious. Many nerves pass through your shoulder. The nerves are delicate and sensitive and can become injured even without cutting them, such as when traction is put on the arm to see better inside the joint. While many times, injured nerves may recover to some extent, you may be referred to a nerve specialist if an injury occurs to check on the healing and anticipated recovery.  Stiffness - Any time a surgery is performed, the body attempts to heal by creating scar tissue. While this is very important as this process drives the healing of your repaired structures, it also sets the shoulder up for stiffness. This stiffness is exacerbated by the use of the sling to protect the repaired structures during early healing. Too much scar tissue in the shoulder will cause pain and loss of motion. It will be important to work with your therapist to progress motion to prevent stiffness while protecting your surgical repair. If substantial stiffness does occur, additional interventions such as steroid injections and/or shoulder manipulation can be performed.   Persistent Symptoms - Occasionally symptoms may persist or even recur after surgery. This may be due to failure of the repair anchors, poor structural quality of the tissue (common with chronic rotator cuff tears), injuries (e.g., a fall onto the shoulder during recovery), or progression of arthritis. If this occurs, you may work with your surgeon and therapist to determine the cause of these issues and whether additional surgical interventions are necessary. To minimize the risk, you should carefully adhere to the rehabilitation  protocol.    REHABILITATION AFTER SURGERY  The course of recovery following surgery depends somewhat upon the type of procedure the surgeon performs. You will be in a sling full time for up to 6 weeks following the surgery. Patients can move their wrist and hand immediately after surgery and the worst of the post-operative pain will begin to subside three to seven days after surgery. A supervised therapy program is initiated one to four weeks after the operation. Full range of motion usually returns after six to eight weeks. Strength usually returns in three months to six months. You may return to driving as soon as you are not taking any narcotics and you feel you can control the vehicle in case of an emergency. Return to work depends on the specific nature and demands of that activity but can take three months or longer for heavy laborers.     LONG-TERM SUCCESS  Recurrence rates are highly variable, depending on the issue being treated. Biceps tenodesis for superior labral tears and distal clavicle excision for acromioclavicular arthropathy are extremely reliable. Biceps tenodesis is associated with a 96% chance of successful symptom relief and a high rate of return to activities at the same level. Distal clavicle excision provides good long-term symptom relief in over 90% of patients. Most patients will not appreciate a meaningful loss of strength and nearly all patients will experience improvement in the pain caused by these issues.     Successful healing after rotator cuff repair depends on the size and chronicity of the tear and the age of the patient. Smaller traumatic tears in healthy individuals will heal without issues in upwards of 94% of cases. In patients over the age of 65 with chronic, large or massive tears that show muscle atrophy on MRI, the rate of successful healing can be as low as 6-20%. Your surgeon will use augments as mentioned above in addition to specialized techniques to increase the  chance of healing to well above 50%. Despite the high rate of incomplete healing and incomplete restoration of strength/function, the pain relief from repair is more reliable (often >90% during the first five years after surgery). The reasons for this are not clear. In cases of painful failure, graft reconstruction or a joint replacement may be an option.    In all cases, your surgeon will attempt to limit the failure risks by using modern, evidence-based techniques and modifying them based on your shoulder anatomy and occupational demands. In some advanced cases, your surgeon may discuss newer, advanced options, though these may have less long-term evidence.      BATHING & SHAVING BEFORE SURGERY  Do not shave your shoulder or armpit in the days leading up to your surgery. The skin irritation from a razer actually increases the likelihood of infection. Instead, your surgical team will use a set of clippers to trim hair in the operating room. To decrease your infection risk further, you will be provided with special cleaning soap that you should use the nights prior to surgery during bathing. Be sure to thoroughly apply this to your shoulder and armpit. On the day of surgery, do not apply deodorant or antiperspirant.      BATHING AFTER SURGERY   You will generally be able to remove your shoulder dressings after three to four days from surgery. There will be tape and/or purple glue underneath and this may have a small amount of blood in it, which is normal. Do not remove the tape or glue - they will eventually come off on their own. You may shower/bathe starting as soon as the day of surgery provided you are able to safely maneuver into the shower/bath and you are able to keep your dressings mostly dry. If they do become saturated, you should remove them. The glue underneath is designed to be water resistant. Once the dressings are removed, you may shower like normal, but you should not allow direct pressure from the  shower onto the shoulder until after your first follow-up visit. You should also avoid scrubbing the wound. For showering, you should obtain a simple mesh sling or use a platform to rest your elbow/forearm on during bathing until you are allowed to remove your sling.    MEDICATIONS AFTER SURGERY  Your post-operative regimen will include medications for pain, nausea, and bowel health. Blood clots after arthroscopic shoulder surgery are exceedingly rare, however if you have a history of blood clots, your surgeon will also start you on a course of anticoagulation for 2-4 weeks after surgery. Blood clots after shoulder replacement surgery are slightly more common so your surgeon will typically put you on a course of anticoagulation for 4 weeks after surgery to further decrease this risk. You will be given a medication to help protect your stomach while taking aspirin unless you are already taking one. To minimize narcotic use and establish better pain control, we employ a multimodal pain approach. This protocol combines the use of scheduled acetaminophen, gabapentin, muscle relaxants, and narcotics.  We will often use anti-inflammatories (NSAID's such as ibuprofen, celecoxib, and/or naproxen) to further assist with pain control thus allowing for a lower dose of narcotic to be used. Dosing of medications will vary from patient to patient based on their needs and past medical history, so please refer to your discharge medication list. You will also be given vitamin D unless you are already taking it. This is a supplement that helps your body store and use calcium for your bones. Recent data suggests that vitamin D may help improve bone health and bone density after shoulder surgery, making it more likely the implants will heal solidly into the bone.    If you take any other medications at baseline, please discuss these with your surgeon, pharmacist, or primary care manager. In general, you can restart most of your home  medications once you are discharged. If you take any pain medications on a regular basis, discontinue these during the early postoperative period and resume them as needed after you are done with your post-operative medications. If you are diabetic, you may resume your normal glucose control regimen at home if you are eating without issues. If you have issues tolerating oral intake, you should decrease your medication by half unless specifically instructed otherwise.      ICE PACK AFTER SURGERY   You will usually receive either an ice machine or several ice packs after surgery. Ice machines require a large amount of ice, so you may want to “prepare” for surgery by purchasing 20 pounds of ice cubes from the store or stocking up on frozen water bottles that you can re-freeze between uses. You should use ice frequently in the first week after surgery. If you receive a nerve block from anesthesia, ensure you can feel the skin (the block has worn off) to prevent frostbite injuries. Ice bags/packs/PolarCare/IceMan should be used for about 20 minutes every 3-4 hours during waking hours. It may be used more often at first if pain/inflammation are intense. It is often helpful to protect your skin from frostbite with a washcloth, towel, or ace wrap between the ice bag/pack and your skin.    ________________________________________________________________________________________  ________________________________________________________________________________________  ADDITIONAL RESOURCES  If you have additional questions about shoulder arthritis surgery, you may contact your surgeon directly or you may consider reading more online. Some useful and accurate websites include:    [email or web address].aaos.org  [email or web address]    INFORMATION ABOUT YOUR SURGEON  Dr. Igor Smith is an Orthopaedic surgeon with advanced skills in orthopaedic sports, trauma, and upper limb surgery. He completed his  training at [facility] and a traveling fellowship in arthroscopic shoulder surgery. He has authored over 50 peer-review papers and several book chapters advancing surgical techniques, surgeon education, and patient treatment. 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 and [employer] [facility] [employer] [facility].

## 2024-07-29 ENCOUNTER — TELEPHONE (OUTPATIENT)
Dept: INTERNAL MEDICINE CLINIC | Facility: CLINIC | Age: 54
End: 2024-07-29

## 2024-07-29 NOTE — TELEPHONE ENCOUNTER
Type of surgery:  Left shoulder arthroscopic assisted rotator cuff repair, subpectoral biceps tenodesis, distal clavicle excision, subacromial decompression  Date: 8/22/24  Location: [facility]  Medical Clearance:      *Medical: Yes      *Dental:      *Other:  Prior Authorization Status: Pending   Workers Comp:  Medacta/Abraham:  [place]: Yes  POV:  9/6/24

## 2024-07-29 NOTE — TELEPHONE ENCOUNTER
Spoke with patient. She will take 8/22 for her surgery date. She was informed that she needs to have medical clearance for this surgery.     A Pre-Op visit with your Primary Care Doctor  needs to be completed within 30 days of   surgery.  The following tests will need be completed:  ? EKG - Good for 1 year  ? CBC/CMP - Good for 3 months

## 2024-07-29 NOTE — TELEPHONE ENCOUNTER
Spoke with patient to offer surgery date of 8/22. Patient wants to have surgery sometime in September. Dr. Smith please let me know what days you are available to do surgery in September.

## 2024-07-29 NOTE — TELEPHONE ENCOUNTER
Patient calling to request pre-op, has procedure on 08/22/24, needs to have medical clearance done by 08/15/24, stated is having shoulder surgery, no appointment until October, please advise.

## 2024-07-31 ENCOUNTER — APPOINTMENT (OUTPATIENT)
Dept: PHYSICAL THERAPY | Age: 54
End: 2024-07-31
Attending: ORTHOPAEDIC SURGERY
Payer: COMMERCIAL

## 2024-08-01 ENCOUNTER — TELEPHONE (OUTPATIENT)
Dept: ORTHOPEDICS CLINIC | Facility: CLINIC | Age: 54
End: 2024-08-01

## 2024-08-01 ENCOUNTER — TELEPHONE (OUTPATIENT)
Dept: INTERNAL MEDICINE CLINIC | Facility: CLINIC | Age: 54
End: 2024-08-01

## 2024-08-01 ENCOUNTER — LAB ENCOUNTER (OUTPATIENT)
Dept: LAB | Age: 54
End: 2024-08-01
Attending: NURSE PRACTITIONER
Payer: COMMERCIAL

## 2024-08-01 ENCOUNTER — OFFICE VISIT (OUTPATIENT)
Dept: INTERNAL MEDICINE CLINIC | Facility: CLINIC | Age: 54
End: 2024-08-01
Payer: COMMERCIAL

## 2024-08-01 ENCOUNTER — EKG ENCOUNTER (OUTPATIENT)
Dept: LAB | Age: 54
End: 2024-08-01
Attending: NURSE PRACTITIONER
Payer: COMMERCIAL

## 2024-08-01 VITALS
WEIGHT: 198 LBS | HEIGHT: 62 IN | HEART RATE: 98 BPM | DIASTOLIC BLOOD PRESSURE: 90 MMHG | BODY MASS INDEX: 36.44 KG/M2 | RESPIRATION RATE: 16 BRPM | SYSTOLIC BLOOD PRESSURE: 138 MMHG

## 2024-08-01 DIAGNOSIS — Z01.818 PRE-OPERATIVE EXAM: ICD-10-CM

## 2024-08-01 DIAGNOSIS — I10 PRIMARY HYPERTENSION: ICD-10-CM

## 2024-08-01 DIAGNOSIS — E03.9 ACQUIRED HYPOTHYROIDISM: ICD-10-CM

## 2024-08-01 DIAGNOSIS — E78.00 HYPERCHOLESTEREMIA: ICD-10-CM

## 2024-08-01 DIAGNOSIS — Z01.818 PRE-OPERATIVE EXAM: Primary | ICD-10-CM

## 2024-08-01 DIAGNOSIS — E66.9 OBESITY (BMI 30-39.9): ICD-10-CM

## 2024-08-01 LAB
ALBUMIN SERPL-MCNC: 4.3 G/DL (ref 3.2–4.8)
ALBUMIN/GLOB SERPL: 1.7 {RATIO} (ref 1–2)
ALP LIVER SERPL-CCNC: 49 U/L
ALT SERPL-CCNC: 21 U/L
ANION GAP SERPL CALC-SCNC: 7 MMOL/L (ref 0–18)
AST SERPL-CCNC: 19 U/L (ref ?–34)
ATRIAL RATE: 70 BPM
BILIRUB SERPL-MCNC: 0.7 MG/DL (ref 0.3–1.2)
BUN BLD-MCNC: 19 MG/DL (ref 9–23)
BUN/CREAT SERPL: 21.8 (ref 10–20)
CALCIUM BLD-MCNC: 9.4 MG/DL (ref 8.7–10.4)
CHLORIDE SERPL-SCNC: 106 MMOL/L (ref 98–112)
CO2 SERPL-SCNC: 27 MMOL/L (ref 21–32)
CREAT BLD-MCNC: 0.87 MG/DL
DEPRECATED RDW RBC AUTO: 42.2 FL (ref 35.1–46.3)
EGFRCR SERPLBLD CKD-EPI 2021: 80 ML/MIN/1.73M2 (ref 60–?)
ERYTHROCYTE [DISTWIDTH] IN BLOOD BY AUTOMATED COUNT: 13 % (ref 11–15)
FASTING STATUS PATIENT QL REPORTED: NO
GLOBULIN PLAS-MCNC: 2.6 G/DL (ref 2–3.5)
GLUCOSE BLD-MCNC: 95 MG/DL (ref 70–99)
HCT VFR BLD AUTO: 44.5 %
HGB BLD-MCNC: 15.1 G/DL
MCH RBC QN AUTO: 30 PG (ref 26–34)
MCHC RBC AUTO-ENTMCNC: 33.9 G/DL (ref 31–37)
MCV RBC AUTO: 88.3 FL
OSMOLALITY SERPL CALC.SUM OF ELEC: 292 MOSM/KG (ref 275–295)
P AXIS: 10 DEGREES
P-R INTERVAL: 152 MS
PLATELET # BLD AUTO: 328 10(3)UL (ref 150–450)
POTASSIUM SERPL-SCNC: 3.4 MMOL/L (ref 3.5–5.1)
PROT SERPL-MCNC: 6.9 G/DL (ref 5.7–8.2)
Q-T INTERVAL: 398 MS
QRS DURATION: 78 MS
QTC CALCULATION (BEZET): 429 MS
R AXIS: 11 DEGREES
RBC # BLD AUTO: 5.04 X10(6)UL
SODIUM SERPL-SCNC: 140 MMOL/L (ref 136–145)
T AXIS: 6 DEGREES
TSI SER-ACNC: 2.66 MIU/ML (ref 0.55–4.78)
VENTRICULAR RATE: 70 BPM
WBC # BLD AUTO: 7.2 X10(3) UL (ref 4–11)

## 2024-08-01 PROCEDURE — 99214 OFFICE O/P EST MOD 30 MIN: CPT | Performed by: NURSE PRACTITIONER

## 2024-08-01 PROCEDURE — 3075F SYST BP GE 130 - 139MM HG: CPT | Performed by: NURSE PRACTITIONER

## 2024-08-01 PROCEDURE — 3080F DIAST BP >= 90 MM HG: CPT | Performed by: NURSE PRACTITIONER

## 2024-08-01 PROCEDURE — 84443 ASSAY THYROID STIM HORMONE: CPT

## 2024-08-01 PROCEDURE — 93005 ELECTROCARDIOGRAM TRACING: CPT

## 2024-08-01 PROCEDURE — 3008F BODY MASS INDEX DOCD: CPT | Performed by: NURSE PRACTITIONER

## 2024-08-01 PROCEDURE — 93010 ELECTROCARDIOGRAM REPORT: CPT | Performed by: STUDENT IN AN ORGANIZED HEALTH CARE EDUCATION/TRAINING PROGRAM

## 2024-08-01 PROCEDURE — 80053 COMPREHEN METABOLIC PANEL: CPT

## 2024-08-01 PROCEDURE — 36415 COLL VENOUS BLD VENIPUNCTURE: CPT

## 2024-08-01 PROCEDURE — 85027 COMPLETE CBC AUTOMATED: CPT

## 2024-08-01 NOTE — TELEPHONE ENCOUNTER
Family Medical Leave Act forms received via [employer] message on 7/30/24. Kal[employer] message sent for Authorization/Release of Information. Logged for processing.

## 2024-08-01 NOTE — PROGRESS NOTES
Nunu Guan is a 54 year old female.  Chief Complaint   Patient presents with    Pre-Op Exam     Left shoulder August 22nd DR Smith     HPI:   Nunu Guan presents for preoperative clearance for: Left shoulder arthroscopic assisted rotator cuff repair, subpectoral biceps tenodesis, distal clavicle excision, subacromial decompression   Performing Physician: Dr Smith   Date of surgery: 8/22/2024  Elective or emergency: elective   Pre-operative forms provided: No    Current complaints:   Left shoulder pain    Active medical problems:   Obesity   Hypertension  Hypercholesteremia  Hypothyroidism  Prediabetes     Cervical/neck:   - Arthritis: No   - Neck pain: No  - Difficulty with ROM: No  - Prior neck injury/surgery: No    Cardiac:  - History of ischemic coronary disease: No   - History of heart failure: No  - Heart attack in past 90 days: No  - Chest pain in last 90 days: No  - History of Arrhythmia:  No  - History of stroke or TIA: No  - Diabetes/A1C: Last A1c value was 5.7% done 3/9/2024.      - Most recent echo/Stress test: None   - Cardiac Medications: No    Pulmonary:   - Smoker:  No  - Apnea/CPAP: No  - Asthma/COPD: No  - Difficulty laying flat for extended period of time: No  - Dyspnea/exertional: No  - Respiratory Medications: No    Functional Capacity:   Perform ADLs- eating, dress, toilet (1 METs)? Yes   Walk up flight of steps, hill, walk ground level 3-4mph (4 METs)? Yes  Perform heavy housework- scrubbing floors, move heavy furniture, climb 2 flights of stairs (4-10 METs)? Yes   Participate in strenuous sports- swimming, singles tennis, football, basketball, ski (+10 METs)? No         Current Outpatient Medications   Medication Sig Dispense Refill    Phentermine HCl 37.5 MG Oral Tab Take 1 tablet (37.5 mg total) by mouth every morning before breakfast. 30 tablet 3    topiramate 25 MG Oral Tab Take 1 tablet (25 mg total) by mouth 2 (two) times daily. 180 tablet 1    levothyroxine 100 MCG  Oral Tab Take 1 tablet (100 mcg total) by mouth before breakfast. 90 tablet 3    losartan-hydroCHLOROthiazide 100-25 MG Oral Tab Take 1 tablet by mouth daily. 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)    left lower extremity    Cyst of skin    right abdominal skin cyst January 2016    Deep vein thrombosis (HCC)    hx of DVT    Disorder of thyroid    High blood pressure    Hx of motion sickness    Hypercholesteremia    Hypothyroidism    PONV (postoperative nausea and vomiting)    Unspecified essential hypertension    Visual impairment      Past Surgical History:   Procedure Laterality Date    Ankle fracture surgery Left 2014    left ankle stress fracture 2014    Arthroscopy of joint unlisted      left knee screw    Colonoscopy N/A 04/28/2022    Procedure: COLONOSCOPY;  Surgeon: Flex Rubi MD;  Location: [facility] ENDOSCOPY    Knee surgery  2001    MCL     Other surgical history  2004    vaginal delivery    Other surgical history  2006    vaginal delivery      Social History:  Social History     Socioeconomic History    Marital status:    Tobacco Use    Smoking status: Never    Smokeless tobacco: Never   Vaping Use    Vaping status: Never Used   Substance and Sexual Activity    Alcohol use: Yes     Alcohol/week: 4.0 standard drinks of alcohol     Types: 4 Standard drinks or equivalent per week     Comment: rarely    Drug use: No   Other Topics Concern    Caffeine Concern Yes     Comment: coffee, 1 cup daily      Family History   Problem Relation Age of Onset    Hypertension Father         overweight     Other (alzheimers) Father     Diabetes Mother         borderline, overweight     Other (Other) Maternal Grandmother         Karie's Granulomatosis    Cancer Maternal Grandfather         LUNG    Other (Other) Sister         Rheumatoid Arthritis    Colon Cancer Paternal Uncle       Allergies   Allergen Reactions    Azithromycin      Other reaction(s): AZITHROMYCIN    Hydrocodone       Other reaction(s): feels dazed        REVIEW OF SYSTEMS:     Review of Systems   Constitutional:  Negative for fever.   HENT: Negative.     Respiratory:  Negative for cough, shortness of breath and wheezing.    Cardiovascular:  Negative for chest pain.   Gastrointestinal:  Negative for abdominal pain.   Genitourinary: Negative.    Musculoskeletal:  Positive for arthralgias (left shoulder).   Skin: Negative.    Neurological: Negative.    Psychiatric/Behavioral: Negative.        Wt Readings from Last 5 Encounters:   08/01/24 198 lb (89.8 kg)   06/25/24 198 lb (89.8 kg)   05/30/24 199 lb (90.3 kg)   04/24/24 204 lb (92.5 kg)   03/09/24 204 lb (92.5 kg)     Body mass index is 36.21 kg/m².      EXAM:   /90   Pulse 98   Resp 16   Ht 5' 2\" (1.575 m)   Wt 198 lb (89.8 kg)   LMP 06/13/2024 (Within Days)   BMI 36.21 kg/m²     Physical Exam  Vitals reviewed.   Constitutional:       Appearance: Normal appearance.   HENT:      Head: Normocephalic.      Right Ear: Tympanic membrane normal.      Left Ear: Tympanic membrane normal.   Cardiovascular:      Rate and Rhythm: Normal rate and regular rhythm.      Pulses: Normal pulses.   Pulmonary:      Breath sounds: Normal breath sounds. No wheezing.   Musculoskeletal:         General: No swelling. Normal range of motion.   Skin:     General: Skin is warm and dry.   Neurological:      Mental Status: She is alert and oriented to person, place, and time.   Psychiatric:         Mood and Affect: Mood normal.         Behavior: Behavior normal.        Component      Latest Ref Rng 8/1/2024   Glucose      70 - 99 mg/dL 95    Sodium      136 - 145 mmol/L 140    Potassium      3.5 - 5.1 mmol/L 3.4 (L)    Chloride      98 - 112 mmol/L 106    Carbon Dioxide, Total      21.0 - 32.0 mmol/L 27.0    ANION GAP      0 - 18 mmol/L 7    BUN      9 - 23 mg/dL 19    CREATININE      0.55 - 1.02 mg/dL 0.87    BUN/CREATININE RATIO      10.0 - 20.0  21.8 (H)    CALCIUM      8.7 - 10.4 mg/dL 9.4     CALCULATED OSMOLALITY      275 - 295 mOsm/kg 292    EGFR      >=60 mL/min/1.73m2 80    ALT (SGPT)      10 - 49 U/L 21    AST (SGOT)      <34 U/L 19    ALKALINE PHOSPHATASE      41 - 108 U/L 49    Total Bilirubin      0.3 - 1.2 mg/dL 0.7    PROTEIN, TOTAL      5.7 - 8.2 g/dL 6.9    Albumin      3.2 - 4.8 g/dL 4.3    Globulin      2.0 - 3.5 g/dL 2.6    A/G Ratio      1.0 - 2.0  1.7    Patient Fasting for CMP? No    WBC      4.0 - 11.0 x10(3) uL 7.2    RBC      3.80 - 5.30 x10(6)uL 5.04    Hemoglobin      12.0 - 16.0 g/dL 15.1    Hematocrit      35.0 - 48.0 % 44.5    MCV      80.0 - 100.0 fL 88.3    MCH      26.0 - 34.0 pg 30.0    MCHC      31.0 - 37.0 g/dL 33.9    RDW      11.0 - 15.0 % 13.0    RDW-SD      35.1 - 46.3 fL 42.2    Platelet Count      150.0 - 450.0 10(3)uL 328.0    TSH      0.550 - 4.780 mIU/mL 2.657       Legend:  (L) Low  (H) High      ASSESSMENT AND PLAN:   1. Pre-operative exam  - lab results within normal limits  - EKG shows NSR  - patient is cleared for surgery with low to moderate risk.    - CBC, Platelet; No Differential; Future  - Comp Metabolic Panel (14); Future  - EKG 12 Lead to be performed at [facility]; Future    2. Primary hypertension  - BP stable in office   - CPM    3. Acquired hypothyroidism  - TSH WNL  - CPM    4. Hypercholesteremia  - lipid panel completed 3/2024  - CPM     5. Obesity (BMI 30-39.9)   - currently taking topiramate and phentermine   - follows with Sangeeta Marcum       The patient indicates understanding of these issues and agrees to the plan.

## 2024-08-01 NOTE — TELEPHONE ENCOUNTER
GARFIELD LOPEZ [id number] ADDITIONAL FORMS EMAILED TO FORMS DEPT AND SENT VIA [facility] FOR [facility]

## 2024-08-07 ENCOUNTER — APPOINTMENT (OUTPATIENT)
Dept: PHYSICAL THERAPY | Age: 54
End: 2024-08-07
Attending: ORTHOPAEDIC SURGERY
Payer: COMMERCIAL

## 2024-08-08 ENCOUNTER — OFFICE VISIT (OUTPATIENT)
Dept: PHYSICAL THERAPY | Age: 54
End: 2024-08-08
Attending: ORTHOPAEDIC SURGERY
Payer: COMMERCIAL

## 2024-08-08 DIAGNOSIS — M75.112 INCOMPLETE ROTATOR CUFF TEAR OR RUPTURE OF LEFT SHOULDER, NOT SPECIFIED AS TRAUMATIC: Primary | ICD-10-CM

## 2024-08-08 DIAGNOSIS — M75.112 NONTRAUMATIC INCOMPLETE RUPTURE OF ROTATOR CUFF, LEFT: Primary | ICD-10-CM

## 2024-08-08 PROCEDURE — 97140 MANUAL THERAPY 1/> REGIONS: CPT | Performed by: PHYSICAL THERAPIST

## 2024-08-08 PROCEDURE — 97110 THERAPEUTIC EXERCISES: CPT | Performed by: PHYSICAL THERAPIST

## 2024-08-08 PROCEDURE — 97014 ELECTRIC STIMULATION THERAPY: CPT | Performed by: PHYSICAL THERAPIST

## 2024-08-08 NOTE — TELEPHONE ENCOUNTER
Invalid Release of Information (missing fax number) signed on 8/1/24 received. Scanned into chart.

## 2024-08-08 NOTE — PROGRESS NOTES
Diagnosis:   Disorder of left rotator cuff (M67.912)    Referring Provider: Mike Morgan  Date of Evaluation:    5/1/2024    Precautions:  None Next MD visit:   none scheduled  Date of Surgery: n/a   Insurance Primary/Secondary: [employer] [place] / N/A     # Auth Visits: 15 through 9/24/2024         Subjective:   Tweaked L shoulder moving a box at work yesterday.  Had f/u with Dr. Smith: to have surgery 8/22/2024.  Taking Tylenol, Ibuprofen for c/o; stopped muscle relaxant since was not helping.     Pain:  5-6/10 with movement, 0/10 at rest      Objective:   Pt c/o L shoulder pain with AROM. L shoulder IR/ER AROM WFL.    Below based on 7/22/2024 PT session:   Objective: AROM: (* denotes performed with pain)  Shoulder    Flexion: R/L  170 * pec pain  Abduction: R 180; L 180*  ER: R T2; L T2 *  IR: R T5; L T5*      + mild increased tone/c/o L upper    Flexibility: + B pectoralis tightness    ccessory motion: hypomobile L/R GH posterior glide    Flexibility: + B pectoralis tightness    Strength/MMT: (* denotes performed with pain)  Shoulder   Flexion: R 4+/5; L 4+/5  Abduction: R 4+/5; L 4/5*  ER: R 4+/5; L 4/5*  IR: R 4+/5; L 4+/5     Special tests:   Negative L/R Speed's test, negative R  Negative L/R Empty Can test       Assessment:   Pt returns to PT after last attending 7/22/2024. Pt has since had f/u with Dr. Smith and is scheduled to have L shoulder surgery 8/22/2024. HEP reviewed for prior to surgery only (not to complete postop), as well tolerated, in effort to maintain L shoulder ROM/strength. Pt with limited active participation in PT session today due to c/o. Pt requested IFC/cold pack, reported helpful.       Goals:    (to be met in 8 visits)   Patient to report independence with PT HEP to facilitate self management and to maintain progress made in PT.  - MET  Patient to have at least 160 deg active L shoulder AROM flex/abd with min - no c/o to facilitate overhead ADL.  - PROGRESS  Patient to have L  shoulder flex, abd, ER MMT grossly 4+/5 in order to lift/lower 3# overhead using L UE. - PROGRESS  Patient to report at least 50% improvement in L shoulder to facilitate reaching, ADL. - PREVIOUSLY MET, CURRENTLY NOT ,ET    Plan: Patient on hold from PT due to L shoulder surgery scheduled 8/22/2024. Pt to continue with HEP preop only as well itzel and to return to PT following surgery, per surgeon's instruction.  Date: 7/8/2024  Tx #: 10/10  Date: 7/15/2024  Tx #: 11/15 Date:  7/22/2024  Tx #: 12/15 Date: 8/8/2024  Tx#: 13/15    There ex:  Standing B shoulder ER using YTB x 10 reps  Dowel shoulder flexion overhead on wall, 3x10  Shoulder ER with scapular retraction yellow TB, 3x10  Sidelying shoulder ER with #1 weight, cues for positioning, 3x10 There Ex:   Pendulums L shoulder flex/ext, horiz abd/add x ~ 30 sec  S/L L shoulder ER 0# x 3 reps - pt c/o shoulder pain, stopped  Standing isometric L shoulder ER x 10 reps   Seated shoulder pulleys: flex, scap, abd x 5 reps each  HEP review; see below.  There Ex:   HEP revision during symptom exacerbation:   Supine shoulder AAROM flexion x 5 reps, using wand x 10 reps (HEP)  Isometric  L shoulder ER x 10 reps (HEP)  Pendulums flex/ext, horiz abd/add, clockwise/counterclockwise x ~ 10 reps each (HEP)  Isometric L shoulder IR x 10 reps   B scapular retraction x 10 reps  (HEP)  Seated shoulder pulleys x 10 reps each flex, scap; attempted abd - pt c/o, stopped    ** HEP: may prioritize isometric ER and supine shoulder AAROM flexion using wand to facilitate compliance   There Ex:   HEP considerations: shoulder pendulums, isometric ER/IR, AAROM flex as well tolerated.   Standing gentle isometric L shoulder IR x 10 reps  Standing L shoulder ER - attempted, pt c/o, stopped  Seated shoulder pulleys: flex, scap, abd x 10 reps     Manual:   STM L upper trap  Manual:   STM L upper trap   B pec stretch 3 x 15 sec   L GH inferior glides 4 x 10 sec each grade II - III  No other manual  therapy completed; pt with poor tolerance in past   Manual:   STM L proximal pec, upper trap  No other manual therapy completed; pt with poor tolerance in past Manual:   STM L upper trap, levator    Ultrasound:   L upper trap: %, 1.5 w/cm2, 3.3-1 MHz x 8 min  IFC/cold pack L shoulder x 10 min supine IFC/cold pack L shoulder x 10 min supine TA:     Post op planning/recovery: advised may consider recliner for sleeping temporarily post op, PT generally starts with PROM, sling donned for several weeks - all per surgeon's instruction      IFC/cold pack L shoulder x 10 min supine   HEP:  wall slide, seated thoracic spine ext/pec stretch ( may consider cat cow as alternative, pt reports better tolerated or using basketball), B or unilateral shoulder ER using YTB    Charges: 1 TE (15 min), 1 manual (10 min), 0 TA (5 min),  Estim  Total Timed Treatment: 30 min  Total Treatment Time:  45 min

## 2024-08-09 NOTE — TELEPHONE ENCOUNTER
Type of Leave: Family Medical Leave Act continuous  Reason for Leave: surgery 8/22 rotator cuff  Start date of leave: 8/22  How much time needed?: 6-12 weeks recovery  Forms Due Date:  Was Fee and Turnaround info Given?:  Patient called checking status on forms. Stated her employer is pressing her for paperwork. Offered delay letter, patient accepted and asked for the recovery time for surgery to be written on the letter. Patient wants forms uploaded to [employer].

## 2024-08-10 DIAGNOSIS — S43.432A SUPERIOR LABRUM ANTERIOR-TO-POSTERIOR (SLAP) TEAR OF LEFT SHOULDER: ICD-10-CM

## 2024-08-10 DIAGNOSIS — M75.112 NONTRAUMATIC INCOMPLETE RUPTURE OF ROTATOR CUFF, LEFT: Primary | ICD-10-CM

## 2024-08-12 ENCOUNTER — TELEPHONE (OUTPATIENT)
Dept: PHYSICAL THERAPY | Age: 54
End: 2024-08-12

## 2024-08-12 RX ORDER — ACETAMINOPHEN 500 MG
500 TABLET ORAL EVERY 6 HOURS PRN
COMMUNITY
End: 2024-08-22

## 2024-08-14 ENCOUNTER — TELEPHONE (OUTPATIENT)
Dept: PHYSICAL THERAPY | Age: 54
End: 2024-08-14

## 2024-08-14 NOTE — TELEPHONE ENCOUNTER
Called pt to offer her PT Eval on 8/28 at 12:30pm, per surgeon's orders to start week of 8/22. Pt accepted.

## 2024-08-15 NOTE — TELEPHONE ENCOUNTER
Please try to avoid signing forms in the corner as it is not visible when printing and forms are not accepted this way. Thank you!    Dr. Smith,    **The ACKNOWLEDGE button has been moved to the top right ribbon**    Please sign off on form if you agree to: Family Medical Leave Act   For post op recovery 6-12 weeks  (place your signature on the first page only)  -From your Inbasket, Highlight the patient and click Chart  -Double click the 8/1/24 Forms Completion telephone encounter  -Scroll down to the Media section  -Click the blue Hyperlink: Sanjeev Smith 8/14/24    -Click Acknowledge located in the top right ribbon/menu  -Drag the mouse into the blank space of the document and a + sign will appear. Left click to  electronically sign the document.    Thank you,    Lu

## 2024-08-21 NOTE — DISCHARGE INSTRUCTIONS
HOME INSTRUCTIONS  Patient Discharge Instructions  Shoulder Rotator Cuff Repair +/- Biceps Tenodesis      WEIGHT BEARING: You should not bear weight on your operative shoulder at first.You will be immobilized in a sling to protect your shoulder after surgery. This will typically be for a period of 4-6 weeks. See post-op therapy protocol timeline for additional details.     RANGE OF MOTION: Range of Motion about the shoulder should progress slowly and only as pain allows. You should not extend the elbow or actively flex it against resistance or gravity. Working on motion is important to avoid post-operative stiffness. You may remove the sling to shower, get dressed, and do your exercises. When the sling is off, you may let your arm hang straight down at the side. While your arm is hanging down, you are encouraged to bend your elbow open and closed to prevent stiffness. You may do this while seated or standing.    PAIN & PAIN MEDICATIONS:   The Anesthesiologist may have performed a Nerve Block, if you had agreed to it before the surgery. A combination of local anesthetics (numbing medicines) are used to numb your shoulder and arm so your brain will not receive any pain signals during and immediately after surgery.  The length of effect varies from person to person, but the nerve block usually provides 12-24 hours of pain relief.  You will notice a gradual increase in pain as this begins to wear off. You may feel tingling, burning, electric shocks, pins and needles or many other strange sensations as your arm is \"waking up\". You may want to take a pain medication pill before the nerve block completely wears off. People usually start to get their feeling back before they have return of ability to move their elbow, wrist and hand.  For many people, post-operative pain can be managed with simple measures, such as elevation of the operative extremity above the level of the heart, cryotherapy and ice, compression, etc.    In addition to these measures, we have prescribed pain medications. To minimize narcotic use and establish better pain control, we employ a multimodal pain approach. This protocol combines the use of scheduled acetaminophen, gabapentin, and narcotics.  We will often use anti-inflammatories (NSAID's such as ibuprofen, celecoxib, and/or naproxen) to further assist with pain control thus allowing for a lower dose of narcotic to be used. Dosing of medications will vary from patient to patient based on their needs and past medical history, so please refer to your discharge medication list.  Opiate pain medications (oxycodone) will only be refilled after an in-person visit. For all other medication refills, please contact us using the contact information below    You have been prescribed:    Acetaminophen (Tylenol) 500mg - Take 2 tabs by mouth every 8 hours for pain. Do not take more than 4000mg each day. Tylenol should be taken as a first-line, scheduled pain medication. You should only stop taking this once you have no pain or so little pain as to not need any medications. If you have liver problems, please discontinue and notify your surgeon.    Celecoxib (Celebrex) 100mg or 200mg (dosage will be determined by your age, weight, and/or kidney function) - Take 1 tablet by mouth every 12 hours for pain. NSAIDs (Ibuprofen, Naproxen, Celecoxib) should be taken as a first-line pain medication like acetaminophen. NSAIDs are non-steroidal anti-inflammatory medications. They reduce inflammation in the affected area. You should also plan to take this until you have essentially no pain. You may wean down to just celecoxib or just acetaminophen prior to completely discontinuing medicines, and we do not have a strong recommendation of which one to stop last. Please take NSAIDs as prescribed, with food, to avoid stomach ulcers. If you have a history of stomach ulcers or GI bleeding, please discontinue and notify your  surgeon.    Methocarbamol (Robaxin) 750mg - Take 1 tablet by mouth every 8 hours as needed for muscle pain and/or muscle spasms after surgery. This medication will help with muscle spasms, which can be particularly painful after shoulder surgery. You may discontinue this medicine when muscle pain is not particularly painful or limiting. You may also opt to take this medicine prior to sleep to help prevent muscle pains from waking you.    Gabapentin 300mg - Take 1 tab by mouth three times daily on a scheduled basis for the first ten days after surgery. This medication helps control nerve sensitivity after surgery. After ten days, we usually discontinue this medication. However, you may continue taking if you find it to be beneficial.     Oxycodone IR 5mg - Take one tablet by mouth as often as every 4 hours as needed for breakthrough pain. Take this medication as your breakthrough pain medication, after maximizing other pain medications. Opiate pain medications (Oxycodone, Hydrocodone, Oxycontin) are prescribed as a second-line pain medication for moderate to severe post-operative pain. Opiates are associated with dependency and addiction if taken for a prolonged period. For this reason, it is best to use opiates ONLY as needed.    Ondansetron (Zofran) 4mg - Dissolve 1 tab under your tongue every 8 hours as needed for nausea and/or vomiting. You do not need to swallow this medication.     Senna-Docusate 8.6mg-50mg - Take 2 tabs by mouth every evening for constipation prevention. Constipation is common after surgery and while taking pain medications. When taking this medication, you should be having a bowel movement every 2-3 days.  If not, then proceed with over-the-counter laxatives (Docusate/Miralax), enemas, or suppositories to fix the constipation.  All of these are over the counter and can be discussed in more detail with your pharmacist. If you are having multiple bowel movements daily, you should discontinue  this medication.    Cholecalciferol (Vitamin D3) 5000IU - Take 1 tab by mouth daily for 3 months after surgery. This is helpful to protect your other bones from losing bone mineral density and may encourage bone healing. We typically recommend taking the full 3-month course. It may be beneficial to continue vitamin D supplementation long term to promote your bone health.    Example Medication Schedule  Medication Morning   0600 Mid-Morning   1000 Early Afternoon   1400 Late Afternoon   1800 Evening   2200   Acetaminophen * x  x  x   Celecoxib * x    x   Methocarbamol  x  x  x   Gabapentin ^ x  x  x   Oxycodone  If needed If needed If needed If needed If needed   Ondansetron   If needed  If needed    Senna-Docusate *     x   Vitamin D ^ x       Ice  x x x x x   *Take as scheduled until essentially no pain (or until loose bowel movements for senna-docusate)  ^Take/use as scheduled until out  If no marking, you should use as needed    ICE PACK/CRYOTHERAPY: Use frequently over the next 7-10 days.  Ice bags/packs/bladder should be used for 20-30 minutes every 3-4 hours during waking hours. Protect your skin from frostbite with a washcloth, towel, or ace wrap between the ice bag/pack and your skin.     DRESSINGS/WOUND CARE:   You may remove your shoulder dressings after three to four days. There is a tape and/or purple glue underneath and this may have a small amount of blood in it. This is normal. Do not remove the tape or glue - they will eventually come off on their own  Follow the bathing instructions below.   If you have increased drainage, incision redness, foul smell, or fevers - inform the office.  You may need to be evaluated in the office earlier than your follow-up appointment.    BATHING:   You should keep your surgical site clean and dry when possible.   Do not peel off the glue/tape or scrub your wound site. You may allow warm soapy water to wash over the wound.  Do not soak the wound/incision, use hot tubs  or swimming pools, oceans, lakes, ponds until 4 weeks after surgery   Following these guidelines will help avoid any wound healing issues and/or infections.    PHYSICAL/OCCUPATIONAL THERAPY (PT/OT): To maximize surgical benefit, PT/OT is necessary. If you do not have an appointment, you should contact PT/OT to set up an appointment AS SOON AS POSSIBLE. If you have problems setting up PT, please contact our nursing team.    HOME EXERCISES  You are encouraged to begin home exercises the next day after surgery and continue them for 4 weeks  This will be IN ADDITION TO your therapy protocol  Perform 15 repetitions of each exercise, 3-5 times per day, or more, depending on your level of discomfort.  Keep your arm against your stomach, resting on your leg, or hanging at your side.  Do not rotate or lift your arm away from the body or behind the body.  ELBOW MOTION  Begin next day after surgery  Remove sling and allow arm to rest at your side (you may perform this sitting or standing).  Allow your arm to straighten at the side, then gently bend elbow up.   If you had a biceps tenodesis performed, you may use your other arm to gently support the wrist and passively bend/extend the elbow.    HAND AND WRIST MOTION  Begin next day after surgery  Can be performed while arm is in sling  Curl the fingers into the palm to make a tight fist and then straighten them out.  Move the wrist up and down as far as you can tolerate to prevent stiffness.                      PENDULUM EXERCISES  Begin 7-10 days after surgery  Bend forward at the waist, using a table for support. Rock your body in a circular pattern to move arm clockwise 10-15 times and then counter-clockwise. If you are unable to rock your body to make arm circles, you may rock back and forth and then side to side.  Your goal is to make small circles, no larger than 12 inches in diameter                                                                    DVT PREVENTION:    Deep vein thrombosis (DVT) or blood clots sometimes occur following surgery. It is important to understand the signs/symptoms and methods to avoid DVTs.   Symptoms of DVT include swelling, throbbing and cramping in the extremity, swollen veins that are hard or sore. DVTs can travel to the lungs and cause a pulmonary embolus (PE) and death. Symptoms of a pulmonary embolus include chest pain and shortness of breath. If you experience any of these symptoms you should contact the clinic immediately and/or go to the nearest emergency room.   To prevent blood clots, you should move your extremities and joints, limited by the restrictions above. Perform foot pumps, ankle circles, leg lifts, etc. to circulate blood in the extremity.   If you have been prescribed Aspirin, please take it as prescribed for DVT prophylaxis. If you plan to travel in a car or plane for long periods (> 1 hour), contact your surgeon regarding the need for DVT prophylaxis. If you have a history of blood clots, and have not been prescribed a medication, contact your surgeon immediately.     DRIVING: Driving after your surgery is dependent on several factors. You may not drive if you are taking opiate pain medications. You may not drive if you're physically unable to steer with 2 hands and press the brake with full force. If you are unsure whether you are safe to drive, you should visit your local DMV. We are not medically or legally responsible for an accident caused by unsafe driving.     POST-OPERATIVE APPOINTMENT: Your first post-operative appointment is scheduled for 10-14 days after the procedure. If you do not have an appointment scheduled yet, please contact our scheduling office.    SPECIAL INSTRUCTIONS: If you experience fevers greater than 100.4°F on two consecutive measurements or any fever over 102°F, chest pain, difficulty breathing, confusion, or an allergic reaction, return to your nearest emergency department as soon as possible.      CONTACT INFORMATION: For any questions or concerns, please contact our nursing staff. You may also contact your surgeon via [email or web address].      Igor Smith MD  Department of Orthopaedics     Shoulder Rotator Cuff Repair Therapy Protocol      The intent of this protocol is to provide the therapist with a guideline/treatment protocol for the postoperative rehabilitation management for a patient who has undergone surgery for rotator cuff tear. It is not intended to be a substitute for a therapist's clinical decision making regarding the progression of a patient's postoperative rehabilitation based on the individual patient's physical exam/findings, progress, and/or the presence of postoperative complications.     If the physical therapist requires assistance in the progression of a postoperative patient who has had shoulder replacement surgery, the therapist should consult with Dr. Smith. Please contact Dr. Smith with any concerns/questions.     This protocol is applicable for patients who have undergone rotator cuff repair with/without biceps tenodesis and/or distal clavicle excision.     Definitions  Active range of motion (AROM) is defined as motion performed by the muscles of that extremity such as bending your elbow while standing.  This type of motion releases increased force on the joint and activates the muscles around the joint.  Passive range of motion (PROM) is defined as motion achieved by an outside force such as when your therapist bends your shoulder for you.  This type of motion keeps the joint mobile but prevents activation of the muscles and forces across the joint.  Active-assisted range of motion (AAROM) is defined as motion performed by the muscles of that extremity but with assistance such as when you bend your shoulder and your therapist helps the motion. This type of motion restores normal activity of the muscles about the joint without greatly increasing the strain on the muscles or  joint.    Progression to the next phase based on Clinical Criteria and Time Frames as Appropriate.      Phase I - Immediate Post-Surgical Phase (Day 0 to 6 Weeks):  Goals:  Reduce postoperative pain and swelling  Promote healing of skin/muscles  Normalize elbow/wrist motion  Protect surgical repair  Begin early shoulder motion for small to medium tears     Precautions:  No weightbearing with the operative extremity  No active elbow flexion or passive elbow extension  Subscapularis tears: No passive external rotation beyond neutral  Sling (with pillow attachment) worn at all times except for therapy exercises  Support elbow with water resistant sling or shower  for hygiene/showering  No supporting of body weight with involved extremity.  Keep incision clean and dry; no submersion (beach, bath, swimming pool) for 4 weeks.  If BT Performed:   No active elbow flexion or passive elbow extension  Support elbow while out of brace for hygiene/showering    Phase I Breakdown  First Therapy Visits:  Therapist should check proper sling fit/use.  Therapist should instruct patient in proper positioning, posture, initial home exercise program  Ensure bandages are removed; sutures will be removed by Ortho at first post-op visit  Therapist should instruct patient in showering with appropriate modifications  Therapist should provide patient/ family with written home program including exercises     ROM & Sling Use:  Progress as pain allows  If a subscapularis repair was performed, avoid passive ER  Shoulder PROM for small and medium tears (see below) in hammer  position  Avoid impingement position for forward flexion    Tear Size Sling Use Begin PROM Begin AROM   Small <1 cm2 4 weeks Immediate 4 weeks   Medium 1-3 cm2 4-6 weeks Immediate 6 weeks   Large 3-5 cm2 6 weeks 4-6 weeks 8 weeks   Massive >5 cm2 6-8 weeks 6-8 weeks 8 weeks     Cryotherapy:  Frequent application of cryotherapy/ice (4-5 times a day for about 20  minutes).  Cryotherapy hourly for 15 minutes during the first 24 hours after sensation is restored until acute inflammation is controlled.  Once controlled, use cryotherapy 3 times daily for 15 minutes at a time.    Exercises/Progression:   strengthening with forearm and shoulder in neutral position  Shoulder PROM/AAROM in neutral position depending on tear size  Scapular mobilization including scapular retractions/depressions and shoulder shrugs  Modified pendulums for 5 days, progress to full pendulums after 5 days  Gentle sub-maximal shoulder isometrics except ER/IR  If Biceps Tenodesis Performed:  Elbow motion starting at 2 weeks post-operatively when biceps tenodesis performed  No resisted elbow flexion greater than 10 pounds during weeks 4-8  No resisted elbow flexion greater than 15 pounds during weeks 8-12   Other:   Cardiovascular training:  NO running or impact aerobics  Recumbent bicycle while weaning sling      Phase II - Intermediate Post-Surgical Phase (Week 6 to 8):  Goals:  Reduce postoperative pain and swelling  Improve shoulder motion:  Small and medium tears: AAROM for FLEX/ABD to 120  Large and massive tears: PROM for FLEX/ABD to 90  Progress passive ER  Normalize elbow motion   Protect biceps tendon repair     Phase II Precautions:  No resistance exercises for shoulder abduction/flexion  No active elbow flexion or forearm supination >5 pounds  Wean from sling   No supporting of body weight with involved extremity.  No submersion (beach, bath, swimming pool) for 4 weeks.  No running or high impact activity for aerobic training    Phase II Breakdown  Exercises/Progression:  Continue application of cryotherapy/ice as needed   strengthening with forearm in neutral position  Shoulder PROM/AAROM in neutral position  Scapular mobilization  Modified pendulums for 5 days, progress to full pendulums after 5 days.  Soft tissue mobilization/trigger point work to the kinetic chain  Gentle submaximal  shoulder isometrics    Other:  Blood flow restriction for elbow flexion/extension on nonoperative arm and lower extremities for physiologic benefits  Recumbent bicycle for cardiovascular training    Criteria for progression to the next phase (Phase III):  Minimal pain    Phase III - Late Post-Surgical Phase (Weeks 9 to 16):  Goals:  Full shoulder motion by 6 to 8 weeks  Initiate strengthening at 6 weeks  Unrestricted activity by 3 months  Enhance functional use of operative extremity and advance functional activities per patient specific goals.  Enhance shoulder mechanics, muscular strength, and endurance.  Precautions:  No resisted elbow flexion greater than 10 pounds during weeks 4-8   No resisted elbow flexion greater than 15 pounds during weeks 8-12   Avoid impingement positions, higher level exertional activities with operative extremity, and high impact aerobic activity     Phase III Breakdown  Exercises/Progression:  Shoulder range of motion exercises   Trunk stabilization exercises   Scapular strengthening emphasizing scapular retraction and upward rotators  Shoulder/rotator cuff strength & endurance progression within elbow flexion precautions   Increase functional activities   All other modalities as needed   Consider adjuncts including dry needling, manual therapy to glenohumeral joint and cervical thoracic regions, aquatic walking with water at chest  Continue cardiovascular training stationary bike, treadmill walking, elliptical with no push/pull of operative extremity    Criteria for progression to the next phase (Phase IV):  Full, painless active and passive motion at shoulder, elbow, wrist, and hand  Normal  strength restored  Postoperative assessment at 12-week visit with surgeon    Phase IV - Return to Sport/Full (Months 4-6):  This phase may last between 4 to 9 months depending on occupational requirements and patient's functional performance, endurance, and progression    Goals:  Shoulder  strength equal bilaterally  Meet occupational requirements by 4 to 6 months   If active duty , pass service fitness test at 9-12 months   Pain free function  Precautions (until rehabilitation is complete):  Avoid low rep/high weight bicep curls   Avoid heavy dead lifting (>185lbs)  Avoid preacher curls    Phase IV Breakdown  Exercises/Progression:  Advanced, specific, functional, and individualized training to achieve phase IV goals    Criteria for discharge from skilled therapy:  Full painless motion at shoulder, elbow, wrist, and hand  Independent with home exercise program     Igor Smith MD               [facility] [place]: POST-OP ANESTHESIA  The medication that you received for sedation or general anesthesia can last up to 24 hours. Your judgment and reflexes may be altered, even if you feel like your normal self.      We Recommend:   Do not drive any motor vehicle or bicycle   Avoid mowing the lawn, playing sports, or working with power tools/applicances (power saws, electric knives or mixers)   That you have someone stay with you on your first night home   Do not drink alcohol or take sleeping pills or tranquilizers   Do not sign legal documents within 24 hours of your procedure   If you had a nerve block for your surgery, take extra care not to put any pressure on your arm or hand for 24 hours    It is normal:  For you to have a sore throat if you had a breathing tube during surgery (while you were asleep!). The sore throat should get better within 48 hours. You can gargle with warm salt water (1/2 tsp in 4 oz warm water) or use a throat lozenge for comfort  To feel muscle aches or soreness especially in the abdomen, chest or neck. The achy feeling should go away in the next 24 hours  To feel weak, sleepy or \"wiped out\". Your should start feeling better in the next 24 hours.   To experience mild discomforts such as sore lip or tongue, headache, cramps, gas pains or a bloated feeling  in your abdomen.   To experience mild back pain or soreness for a day or two if you had spinal or epidural anesthesia.   If you had laparoscopic surgery, to feel shoulder pain or discomfort on the day of surgery.   For some patients to have nausea after surgery/anesthesia    If you feel nausea or experience vomiting:   Try to move around less.   Eat less than usual or drink only liquids until the next morning   Nausea should resolve in about 24 hours    If you have a problem when you are at home:    Call your surgeons office   Discharge Instructions: After Your Surgery  You’ve just had surgery. During surgery, you were given medicine called anesthesia to keep you relaxed and free of pain. After surgery, you may have some pain or nausea. This is common. Here are some tips for feeling better and getting well after surgery.   Going home  Your healthcare provider will show you how to take care of yourself when you go home. They'll also answer your questions. Have an adult family member or friend drive you home. For the first 24 hours after your surgery:   Don't drive or use heavy equipment.  Don't make important decisions or sign legal papers.  Take medicines as directed.  Don't drink alcohol.  Have someone stay with you, if needed. They can watch for problems and help keep you safe.  Be sure to go to all follow-up visits with your healthcare provider. And rest after your surgery for as long as your provider tells you to.   Coping with pain  If you have pain after surgery, pain medicine will help you feel better. Take it as directed, before pain becomes severe. Also, ask your healthcare provider or pharmacist about other ways to control pain. This might be with heat, ice, or relaxation. And follow any other instructions your surgeon or nurse gives you.      Stay on schedule with your medicine.     Tips for taking pain medicine  To get the best relief possible, remember these points:   Pain medicines can upset your  stomach. Taking them with a little food may help.  Most pain relievers taken by mouth need at least 20 to 30 minutes to start to work.  Don't wait till your pain becomes severe before you take your medicine. Try to time your medicine so that you can take it before starting an activity. This might be before you get dressed, go for a walk, or sit down for dinner.  Constipation is a common side effect of some pain medicines. Call your healthcare provider before taking any medicines such as laxatives or stool softeners to help ease constipation. Also ask if you should skip any foods. Drinking lots of fluids and eating foods such as fruits and vegetables that are high in fiber can also help. Remember, don't take laxatives unless your surgeon has prescribed them.  Drinking alcohol and taking pain medicine can cause dizziness and slow your breathing. It can even be deadly. Don't drink alcohol while taking pain medicine.  Pain medicine can make you react more slowly to things. Don't drive or run machinery while taking pain medicine.  Your healthcare provider may tell you to take acetaminophen to help ease your pain. Ask them how much you're supposed to take each day. Acetaminophen or other pain relievers may interact with your prescription medicines or other over-the-counter (OTC) medicines. Some prescription medicines have acetaminophen and other ingredients in them. Using both prescription and OTC acetaminophen for pain can cause you to accidentally overdose. Read the labels on your OTC medicines with care. This will help you to clearly know the list of ingredients, how much to take, and any warnings. It may also help you not take too much acetaminophen. If you have questions or don't understand the information, ask your pharmacist or healthcare provider to explain it to you before you take the OTC medicine.   Managing nausea  Some people have an upset stomach (nausea) after surgery. This is often because of anesthesia,  pain, or pain medicine, less movement of food in the stomach, or the stress of surgery. These tips will help you handle nausea and eat healthy foods as you get better. If you were on a special food plan before surgery, ask your healthcare provider if you should follow it while you get better. Check with your provider on how your eating should progress. It may depend on the surgery you had. These general tips may help:   Don't push yourself to eat. Your body will tell you when to eat and how much.  Start off with clear liquids and soup. They're easier to digest.  Next try semi-solid foods as you feel ready. These include mashed potatoes, applesauce, and gelatin.  Slowly move to solid foods. Don’t eat fatty, rich, or spicy foods at first.  Don't force yourself to have 3 large meals a day. Instead eat smaller amounts more often.  Take pain medicines with a small amount of solid food, such as crackers or toast. This helps prevent nausea.  When to call your healthcare provider  Call your healthcare provider right away if you have any of these:   You still have too much pain, or the pain gets worse, after taking the medicine. The medicine may not be strong enough. Or there may be a complication from the surgery.  You feel too sleepy, dizzy, or groggy. The medicine may be too strong.  Side effects such as nausea or vomiting. Your healthcare provider may advise taking other medicines to .  Skin changes such as rash, itching, or hives. This may mean you have an allergic reaction. Your provider may advise taking other medicines.  The incision looks different (for instance, part of it opens up).  Bleeding or fluid leaking from the incision site, and weren't told to expect that.  Fever of 100.4°F (38°C) or higher, or as directed by your provider.  Call 911  Call 911 right away if you have:   Trouble breathing  Facial swelling    If you have obstructive sleep apnea   You were given anesthesia medicine during surgery to keep you  comfortable and free of pain. After surgery, you may have more apnea spells because of this medicine and other medicines you were given. The spells may last longer than normal.    At home:  Keep using the continuous positive airway pressure (CPAP) device when you sleep. Unless your healthcare provider tells you not to, use it when you sleep, day or night. CPAP is a common device used to treat obstructive sleep apnea.  Talk with your provider before taking any pain medicine, muscle relaxants, or sedatives. Your provider will tell you about the possible dangers of taking these medicines.  Contact your provider if your sleeping changes a lot even when taking medicines as directed.  [employer] last reviewed this educational content on 10/1/2021  © 1915-9810 [employer]. All rights reserved. This information is not intended as a substitute for professional medical care. Always follow your healthcare professional's instructions.

## 2024-08-22 ENCOUNTER — ANESTHESIA (OUTPATIENT)
Dept: SURGERY | Facility: HOSPITAL | Age: 54
End: 2024-08-22
Payer: COMMERCIAL

## 2024-08-22 ENCOUNTER — HOSPITAL ENCOUNTER (OUTPATIENT)
Facility: HOSPITAL | Age: 54
Setting detail: HOSPITAL OUTPATIENT SURGERY
Discharge: HOME | End: 2024-08-22
Attending: STUDENT IN AN ORGANIZED HEALTH CARE EDUCATION/TRAINING PROGRAM | Admitting: STUDENT IN AN ORGANIZED HEALTH CARE EDUCATION/TRAINING PROGRAM
Payer: COMMERCIAL

## 2024-08-22 ENCOUNTER — ANESTHESIA EVENT (OUTPATIENT)
Dept: SURGERY | Facility: HOSPITAL | Age: 54
End: 2024-08-22
Payer: COMMERCIAL

## 2024-08-22 VITALS
OXYGEN SATURATION: 93 % | SYSTOLIC BLOOD PRESSURE: 125 MMHG | HEIGHT: 62 IN | TEMPERATURE: 98 F | DIASTOLIC BLOOD PRESSURE: 73 MMHG | WEIGHT: 198 LBS | BODY MASS INDEX: 36.44 KG/M2 | HEART RATE: 84 BPM | RESPIRATION RATE: 14 BRPM

## 2024-08-22 DIAGNOSIS — M75.112 NONTRAUMATIC INCOMPLETE RUPTURE OF ROTATOR CUFF, LEFT: Primary | ICD-10-CM

## 2024-08-22 DIAGNOSIS — S43.432A SUPERIOR LABRUM ANTERIOR-TO-POSTERIOR (SLAP) TEAR OF LEFT SHOULDER: ICD-10-CM

## 2024-08-22 LAB — B-HCG UR QL: NEGATIVE

## 2024-08-22 PROCEDURE — 0LM24ZZ REATTACHMENT OF LEFT SHOULDER TENDON, PERCUTANEOUS ENDOSCOPIC APPROACH: ICD-10-PCS | Performed by: STUDENT IN AN ORGANIZED HEALTH CARE EDUCATION/TRAINING PROGRAM

## 2024-08-22 PROCEDURE — 0RNK4ZZ RELEASE LEFT SHOULDER JOINT, PERCUTANEOUS ENDOSCOPIC APPROACH: ICD-10-PCS | Performed by: STUDENT IN AN ORGANIZED HEALTH CARE EDUCATION/TRAINING PROGRAM

## 2024-08-22 PROCEDURE — 81025 URINE PREGNANCY TEST: CPT

## 2024-08-22 PROCEDURE — 0PBB4ZZ EXCISION OF LEFT CLAVICLE, PERCUTANEOUS ENDOSCOPIC APPROACH: ICD-10-PCS | Performed by: STUDENT IN AN ORGANIZED HEALTH CARE EDUCATION/TRAINING PROGRAM

## 2024-08-22 PROCEDURE — 0LS44ZZ REPOSITION LEFT UPPER ARM TENDON, PERCUTANEOUS ENDOSCOPIC APPROACH: ICD-10-PCS | Performed by: STUDENT IN AN ORGANIZED HEALTH CARE EDUCATION/TRAINING PROGRAM

## 2024-08-22 PROCEDURE — 64415 NJX AA&/STRD BRCH PLXS IMG: CPT | Performed by: STUDENT IN AN ORGANIZED HEALTH CARE EDUCATION/TRAINING PROGRAM

## 2024-08-22 DEVICE — IMPLANTABLE DEVICE: Type: IMPLANTABLE DEVICE | Status: FUNCTIONAL

## 2024-08-22 DEVICE — KNOTLESS TENSIONABLE FIBERTAK BICEPS
Type: IMPLANTABLE DEVICE | Status: FUNCTIONAL
Brand: ARTHREX®

## 2024-08-22 RX ORDER — GABAPENTIN 300 MG/1
300 CAPSULE ORAL EVERY 8 HOURS
Qty: 30 CAPSULE | Refills: 0 | Status: SHIPPED | OUTPATIENT
Start: 2024-08-22

## 2024-08-22 RX ORDER — CELECOXIB 200 MG/1
200 CAPSULE ORAL 2 TIMES DAILY
Status: CANCELLED | OUTPATIENT
Start: 2024-08-22

## 2024-08-22 RX ORDER — ONDANSETRON 2 MG/ML
4 INJECTION INTRAMUSCULAR; INTRAVENOUS EVERY 6 HOURS PRN
Status: CANCELLED | OUTPATIENT
Start: 2024-08-22

## 2024-08-22 RX ORDER — METOCLOPRAMIDE 10 MG/1
10 TABLET ORAL ONCE
Status: COMPLETED | OUTPATIENT
Start: 2024-08-22 | End: 2024-08-22

## 2024-08-22 RX ORDER — PROCHLORPERAZINE EDISYLATE 5 MG/ML
5 INJECTION INTRAMUSCULAR; INTRAVENOUS ONCE
Status: COMPLETED | OUTPATIENT
Start: 2024-08-22 | End: 2024-08-22

## 2024-08-22 RX ORDER — CELECOXIB 200 MG/1
200 CAPSULE ORAL 2 TIMES DAILY
Qty: 28 CAPSULE | Refills: 0 | Status: SHIPPED | OUTPATIENT
Start: 2024-08-22

## 2024-08-22 RX ORDER — ONDANSETRON 2 MG/ML
4 INJECTION INTRAMUSCULAR; INTRAVENOUS ONCE
Status: COMPLETED | OUTPATIENT
Start: 2024-08-22 | End: 2024-08-22

## 2024-08-22 RX ORDER — HYDROMORPHONE HYDROCHLORIDE 1 MG/ML
0.2 INJECTION, SOLUTION INTRAMUSCULAR; INTRAVENOUS; SUBCUTANEOUS EVERY 5 MIN PRN
Status: DISCONTINUED | OUTPATIENT
Start: 2024-08-22 | End: 2024-08-22

## 2024-08-22 RX ORDER — OXYCODONE HYDROCHLORIDE 5 MG/1
5 TABLET ORAL EVERY 4 HOURS PRN
Status: CANCELLED | OUTPATIENT
Start: 2024-08-22

## 2024-08-22 RX ORDER — ACETAMINOPHEN 500 MG
1000 TABLET ORAL ONCE
Status: COMPLETED | OUTPATIENT
Start: 2024-08-22 | End: 2024-08-22

## 2024-08-22 RX ORDER — OXYCODONE HYDROCHLORIDE 5 MG/1
5 TABLET ORAL EVERY 4 HOURS PRN
Qty: 24 TABLET | Refills: 0 | Status: SHIPPED | OUTPATIENT
Start: 2024-08-22

## 2024-08-22 RX ORDER — HYDROMORPHONE HYDROCHLORIDE 1 MG/ML
0.4 INJECTION, SOLUTION INTRAMUSCULAR; INTRAVENOUS; SUBCUTANEOUS EVERY 2 HOUR PRN
Status: CANCELLED | OUTPATIENT
Start: 2024-08-22

## 2024-08-22 RX ORDER — OXYCODONE HYDROCHLORIDE 5 MG/1
10 TABLET ORAL EVERY 4 HOURS PRN
Status: CANCELLED | OUTPATIENT
Start: 2024-08-22

## 2024-08-22 RX ORDER — FAMOTIDINE 20 MG/1
20 TABLET, FILM COATED ORAL ONCE
Status: COMPLETED | OUTPATIENT
Start: 2024-08-22 | End: 2024-08-22

## 2024-08-22 RX ORDER — ROPIVACAINE HYDROCHLORIDE 5 MG/ML
INJECTION, SOLUTION EPIDURAL; INFILTRATION; PERINEURAL
Status: COMPLETED | OUTPATIENT
Start: 2024-08-22 | End: 2024-08-22

## 2024-08-22 RX ORDER — DEXAMETHASONE SODIUM PHOSPHATE 10 MG/ML
INJECTION, SOLUTION INTRAMUSCULAR; INTRAVENOUS
Status: COMPLETED | OUTPATIENT
Start: 2024-08-22 | End: 2024-08-22

## 2024-08-22 RX ORDER — SODIUM CHLORIDE, SODIUM LACTATE, POTASSIUM CHLORIDE, CALCIUM CHLORIDE 600; 310; 30; 20 MG/100ML; MG/100ML; MG/100ML; MG/100ML
INJECTION, SOLUTION INTRAVENOUS CONTINUOUS
Status: DISCONTINUED | OUTPATIENT
Start: 2024-08-22 | End: 2024-08-22

## 2024-08-22 RX ORDER — SODIUM CHLORIDE 9 MG/ML
INJECTION, SOLUTION INTRAMUSCULAR; INTRAVENOUS; SUBCUTANEOUS AS NEEDED
Status: DISCONTINUED | OUTPATIENT
Start: 2024-08-22 | End: 2024-08-22 | Stop reason: HOSPADM

## 2024-08-22 RX ORDER — METOCLOPRAMIDE HYDROCHLORIDE 5 MG/ML
10 INJECTION INTRAMUSCULAR; INTRAVENOUS ONCE
Status: COMPLETED | OUTPATIENT
Start: 2024-08-22 | End: 2024-08-22

## 2024-08-22 RX ORDER — MORPHINE SULFATE 4 MG/ML
2 INJECTION, SOLUTION INTRAMUSCULAR; INTRAVENOUS EVERY 10 MIN PRN
Status: DISCONTINUED | OUTPATIENT
Start: 2024-08-22 | End: 2024-08-22

## 2024-08-22 RX ORDER — LIDOCAINE HYDROCHLORIDE 10 MG/ML
INJECTION, SOLUTION INFILTRATION; PERINEURAL
Status: COMPLETED | OUTPATIENT
Start: 2024-08-22 | End: 2024-08-22

## 2024-08-22 RX ORDER — METHOCARBAMOL 750 MG/1
750 TABLET, FILM COATED ORAL 3 TIMES DAILY PRN
Qty: 42 TABLET | Refills: 0 | Status: SHIPPED | OUTPATIENT
Start: 2024-08-22

## 2024-08-22 RX ORDER — ACETAMINOPHEN 500 MG
1000 TABLET ORAL EVERY 8 HOURS SCHEDULED
Status: CANCELLED | OUTPATIENT
Start: 2024-08-22

## 2024-08-22 RX ORDER — SENNA AND DOCUSATE SODIUM 50; 8.6 MG/1; MG/1
2 TABLET, FILM COATED ORAL NIGHTLY
Qty: 28 TABLET | Refills: 0 | Status: SHIPPED | OUTPATIENT
Start: 2024-08-22

## 2024-08-22 RX ORDER — NALOXONE HYDROCHLORIDE 0.4 MG/ML
0.08 INJECTION, SOLUTION INTRAMUSCULAR; INTRAVENOUS; SUBCUTANEOUS AS NEEDED
Status: DISCONTINUED | OUTPATIENT
Start: 2024-08-22 | End: 2024-08-22

## 2024-08-22 RX ORDER — DEXAMETHASONE SODIUM PHOSPHATE 4 MG/ML
VIAL (ML) INJECTION AS NEEDED
Status: DISCONTINUED | OUTPATIENT
Start: 2024-08-22 | End: 2024-08-22 | Stop reason: SURG

## 2024-08-22 RX ORDER — HYDROMORPHONE HYDROCHLORIDE 1 MG/ML
0.4 INJECTION, SOLUTION INTRAMUSCULAR; INTRAVENOUS; SUBCUTANEOUS EVERY 5 MIN PRN
Status: DISCONTINUED | OUTPATIENT
Start: 2024-08-22 | End: 2024-08-22

## 2024-08-22 RX ORDER — ONDANSETRON 2 MG/ML
INJECTION INTRAMUSCULAR; INTRAVENOUS AS NEEDED
Status: DISCONTINUED | OUTPATIENT
Start: 2024-08-22 | End: 2024-08-22 | Stop reason: SURG

## 2024-08-22 RX ORDER — ONDANSETRON 4 MG/1
4 TABLET, ORALLY DISINTEGRATING ORAL EVERY 8 HOURS PRN
Qty: 10 TABLET | Refills: 0 | Status: SHIPPED | OUTPATIENT
Start: 2024-08-22

## 2024-08-22 RX ORDER — MORPHINE SULFATE 4 MG/ML
4 INJECTION, SOLUTION INTRAMUSCULAR; INTRAVENOUS EVERY 10 MIN PRN
Status: DISCONTINUED | OUTPATIENT
Start: 2024-08-22 | End: 2024-08-22

## 2024-08-22 RX ORDER — HYDROMORPHONE HYDROCHLORIDE 1 MG/ML
0.8 INJECTION, SOLUTION INTRAMUSCULAR; INTRAVENOUS; SUBCUTANEOUS EVERY 2 HOUR PRN
Status: CANCELLED | OUTPATIENT
Start: 2024-08-22

## 2024-08-22 RX ORDER — MAGNESIUM HYDROXIDE 1200 MG/15ML
LIQUID ORAL CONTINUOUS PRN
Status: DISCONTINUED | OUTPATIENT
Start: 2024-08-22 | End: 2024-08-22

## 2024-08-22 RX ORDER — FAMOTIDINE 10 MG/ML
20 INJECTION, SOLUTION INTRAVENOUS ONCE
Status: COMPLETED | OUTPATIENT
Start: 2024-08-22 | End: 2024-08-22

## 2024-08-22 RX ORDER — ROCURONIUM BROMIDE 10 MG/ML
INJECTION, SOLUTION INTRAVENOUS AS NEEDED
Status: DISCONTINUED | OUTPATIENT
Start: 2024-08-22 | End: 2024-08-22 | Stop reason: SURG

## 2024-08-22 RX ORDER — MORPHINE SULFATE 10 MG/ML
6 INJECTION, SOLUTION INTRAMUSCULAR; INTRAVENOUS EVERY 10 MIN PRN
Status: DISCONTINUED | OUTPATIENT
Start: 2024-08-22 | End: 2024-08-22

## 2024-08-22 RX ORDER — HYDROMORPHONE HYDROCHLORIDE 1 MG/ML
0.6 INJECTION, SOLUTION INTRAMUSCULAR; INTRAVENOUS; SUBCUTANEOUS EVERY 5 MIN PRN
Status: DISCONTINUED | OUTPATIENT
Start: 2024-08-22 | End: 2024-08-22

## 2024-08-22 RX ADMIN — LIDOCAINE HYDROCHLORIDE 5 ML: 10 INJECTION, SOLUTION INFILTRATION; PERINEURAL at 13:10:00

## 2024-08-22 RX ADMIN — DEXAMETHASONE SODIUM PHOSPHATE 10 MG: 10 INJECTION, SOLUTION INTRAMUSCULAR; INTRAVENOUS at 13:10:00

## 2024-08-22 RX ADMIN — ROCURONIUM BROMIDE 50 MG: 10 INJECTION, SOLUTION INTRAVENOUS at 13:31:00

## 2024-08-22 RX ADMIN — ONDANSETRON 4 MG: 2 INJECTION INTRAMUSCULAR; INTRAVENOUS at 13:31:00

## 2024-08-22 RX ADMIN — DEXAMETHASONE SODIUM PHOSPHATE 4 MG: 4 MG/ML VIAL (ML) INJECTION at 13:31:00

## 2024-08-22 RX ADMIN — ROPIVACAINE HYDROCHLORIDE 30 ML: 5 INJECTION, SOLUTION EPIDURAL; INFILTRATION; PERINEURAL at 13:10:00

## 2024-08-22 NOTE — ANESTHESIA PREPROCEDURE EVALUATION
Anesthesia PreOp Note    HPI:     Nunu Guan is a 54 year old female who presents for preoperative consultation requested by: Igor Smith MD    Date of Surgery: 8/22/2024    Procedure(s):  Left shoulder arthroscopic assisted rotator cuff repair, subpectoral biceps tenodesis, distal clavicle excision, subacromial decompression  Indication: Nontraumatic incomplete rupture of rotator cuff, left [M75.112]  Superior labrum anterior-to-posterior (SLAP) tear of left shoulder [S43.432A]    Relevant Problems   No relevant active problems       NPO:  Last Liquid Consumption Date: 08/21/24     Last Solid Consumption Date: 08/21/24  Last Solid Consumption Time: 2100  Last Liquid Consumption Date: 08/21/24          History Review:  Patient Active Problem List    Diagnosis Date Noted    Nontraumatic incomplete rupture of rotator cuff, left 08/10/2024    Incomplete rotator cuff tear or rupture of left shoulder, not specified as traumatic 07/26/2024    Superior labrum anterior-to-posterior (SLAP) tear of left shoulder 07/26/2024    Stress 12/30/2021    Anxiety and depression 12/20/2021    Obesity (BMI 30-39.9) 07/10/2020    Vitamin D deficiency 02/27/2020    Hypercholesteremia 01/30/2020    Prediabetes 01/30/2020    Weight gain 01/30/2020    History of DVT (deep vein thrombosis) 01/30/2020    Snoring 01/30/2020    Morbid obesity with BMI of 40.0-44.9, adult (HCC) 01/30/2020    Encounter for therapeutic drug monitoring 10/08/2018    Obesity 01/19/2015    Hypothyroidism 04/01/2014    Hypertension 12/16/2011       Past Medical History:    Clot in the renal vein (HCC)    left lower extremity    Cyst of skin    right abdominal skin cyst January 2016    Deep vein thrombosis (HCC)    hx of DVT    Disorder of thyroid    High blood pressure    Hx of motion sickness    Hypercholesteremia    Hypothyroidism    PONV (postoperative nausea and vomiting)    Unspecified essential hypertension    Visual impairment    wear glasses        Past Surgical History:   Procedure Laterality Date    Ankle fracture surgery Left 2014    left ankle stress fracture 2014    Arthroscopy of joint unlisted      left knee screw    Colonoscopy N/A 04/28/2022    Procedure: COLONOSCOPY;  Surgeon: Flex Rubi MD;  [facility] ENDOSCOPY    Knee surgery  2001    [facility]     Other surgical history  2004    vaginal delivery    Other surgical history  2006    vaginal delivery       Medications Prior to Admission   Medication Sig Dispense Refill Last Dose    acetaminophen 500 MG Oral Tab Take 1 tablet (500 mg total) by mouth every 6 (six) hours as needed for Pain.   8/20/2024    Phentermine HCl 37.5 MG Oral Tab Take 1 tablet (37.5 mg total) by mouth every morning before breakfast. 30 tablet 3 8/15/2024    topiramate 25 MG Oral Tab Take 1 tablet (25 mg total) by mouth 2 (two) times daily. 180 tablet 1 8/21/2024    levothyroxine 100 MCG Oral Tab Take 1 tablet (100 mcg total) by mouth before breakfast. 90 tablet 3 8/22/2024    losartan-hydroCHLOROthiazide 100-25 MG Oral Tab Take 1 tablet by mouth daily. 90 tablet 3 8/21/2024     Current Facility-Administered Medications Ordered in Epic   Medication Dose Route Frequency Provider Last Rate Last Admin    lactated ringers infusion   Intravenous Continuous Igor Smith MD 20 mL/hr at 08/22/24 1100 New Bag at 08/22/24 1100    ceFAZolin (Ancef) 2g in 10mL IV syringe premix  2 g Intravenous Once Igor Smith MD         No current [facility]-ordered outpatient medications on file.       Allergies   Allergen Reactions    Azithromycin DIZZINESS     Other reaction(s): AZITHROMYCIN    Hydrocodone DIZZINESS     Other reaction(s): feels dazed       Family History   Problem Relation Age of Onset    Hypertension Father         overweight     Other (alzheimers) Father     Diabetes Mother         borderline, overweight     Other (Other) Maternal Grandmother         Karie's Granulomatosis    Cancer Maternal Grandfather         LUNG     Other (Other) Sister         Rheumatoid Arthritis    Colon Cancer Paternal Uncle      Social History     Socioeconomic History    Marital status:    Tobacco Use    Smoking status: Never    Smokeless tobacco: Never   Vaping Use    Vaping status: Never Used   Substance and Sexual Activity    Alcohol use: Not Currently     Alcohol/week: 4.0 standard drinks of alcohol     Types: 4 Standard drinks or equivalent per week     Comment: rarely    Drug use: No   Other Topics Concern    Caffeine Concern Yes     Comment: coffee, 1 cup daily       Available pre-op labs reviewed.  Lab Results   Component Value Date    WBC 7.2 08/01/2024    RBC 5.04 08/01/2024    HGB 15.1 08/01/2024    HCT 44.5 08/01/2024    MCV 88.3 08/01/2024    MCH 30.0 08/01/2024    MCHC 33.9 08/01/2024    RDW 13.0 08/01/2024    .0 08/01/2024    URINEPREG Negative 08/22/2024     Lab Results   Component Value Date     08/01/2024    K 3.4 (L) 08/01/2024     08/01/2024    CO2 27.0 08/01/2024    BUN 19 08/01/2024    CREATSERUM 0.87 08/01/2024    GLU 95 08/01/2024    CA 9.4 08/01/2024          Vital Signs:  Body mass index is 36.21 kg/m².   height is 1.575 m (5' 2\") and weight is 89.8 kg (198 lb). Her oral temperature is 97.6 °F (36.4 °C). Her blood pressure is 152/93 (abnormal) and her pulse is 81. Her respiration is 16 and oxygen saturation is 95%.   Vitals:    08/12/24 1345 08/22/24 1050   BP:  (!) 152/93   Pulse:  81   Resp:  16   Temp:  97.6 °F (36.4 °C)   TempSrc:  Oral   SpO2:  95%   Weight: 89.4 kg (197 lb) 89.8 kg (198 lb)   Height: 1.575 m (5' 2\")         Anesthesia Evaluation     Patient summary reviewed and Nursing notes reviewed    History of anesthetic complications   Airway   Mallampati: I  TM distance: >3 FB  Neck ROM: full  Dental      Pulmonary - normal exam   Cardiovascular - normal exam  (+) hypertension    Neuro/Psych      GI/Hepatic/Renal    (+) chronic renal disease    Endo/Other    (+) arthritis    Comments:  HISTORY OF DVT  Abdominal                  Anesthesia Plan:   ASA:  3  Plan:   General and regional  Informed Consent Plan and Risks Discussed With:  Patient      I have informed Nunu Lancaster Roge and/or legal guardian or family member of the nature of the anesthetic plan, benefits, risks including possible dental damage if relevant, major complications, and any alternative forms of anesthetic management.   All of the patient's questions were answered to the best of my ability. The patient desires the anesthetic management as planned.  Rodney Pace MD  8/22/2024 11:54 AM  Present on Admission:  **None**

## 2024-08-22 NOTE — INTERVAL H&P NOTE
Pre-op Diagnosis: Nontraumatic incomplete rupture of rotator cuff, left [M75.112]  Superior labrum anterior-to-posterior (SLAP) tear of left shoulder [S43.432A]    The above referenced H&P was reviewed by Igor Smith MD on 8/22/2024, the patient was examined and no significant changes have occurred in the patient's condition since the H&P was performed.  I discussed with the patient and/or legal representative the potential benefits, risks and side effects of this procedure; the likelihood of the patient achieving goals; and potential problems that might occur during recuperation.  I discussed reasonable alternatives to the procedure, including risks, benefits and side effects related to the alternatives and risks related to not receiving this procedure.  We will proceed with procedure as planned.

## 2024-08-22 NOTE — OPERATIVE REPORT
PRE-OP DX: Left  shoulder superior labral tear, biceps tendinopathy, acromioclavicular arthritis, subacromial impingement, and incomplete/partial supraspinatus tear  POST-OP DX: Left  shoulder superior labral tear, biceps tendinopathy, acromioclavicular arthritis, subacromial impingement, complete supraspinatus tear, and complete infraspinatus tear     SURGEON: Igor Smith MD  ASSISTANT(S): Sanchez Perdue CSA    PROCEDURE(S):   Left shoulder arthroscopy with Rotator Cuff Repair, double row of supraspinatus and infraspinatus, Glenohumeral and Labral Debridement, Subacromial Decompression, Arthroscopic Distal Clavicle Excision, and Arthroscopic Assisted Subpectoral Biceps Tenodesis    ANESTHESIA:  General    EBL: 20cc  MATERIALS:  None  BLOOD:  None  SPECIMEN(S):  None  DRAINS:  None    DISPOSITION/CONDITIONS: Stable to PACU    IMPLANTS:  Implant Name Type Inv. Item Serial No.  Lot No. LRB No. Used Action   Knotless FiberTak SpeedBridge Implant System 4.5mm   N/A [employer] [id number] Left 1 Implanted   ANCHOR SUT FIBERTAK BICEP KNTLSS TENSIONABLE - SNA  ANCHOR SUT FIBERTAK BICEP KNTLSS TENSIONABLE NA [employer]  [id number] Left 1 Implanted      COMPLICATIONS:  None    INDICATIONS FOR PROCEDURE:  Nunu Guan is a 54 year old female who presented with the above diagnosis.  Patient reports shoulder pain and functional weakness that failed conservative measures. We discussed the risks and benefits of operative versus nonoperative management. The risks of nonoperative management specifically the risk of persistent pain were discussed and the patient elected to undergo operative treatment.  We discussed benefits of the surgery including return of function and pain management. We discussed alternative options including nonoperative management. We additionally discussed the risks of surgery, which include, but are not limited to bleeding, pain, infection, stiffness, damage to nerves/vessels, incomplete  relief of pain, incomplete return of function, failure of healing, need for additional surgery, blood clots, and death. The patient understands the above risks and elected to proceed.     DESCRIPTION OF PROCEDURE:   On the day of the procedure, the patient was identified in the preoperative holding area using three identifiers and the correct operative site was verified with the patient and marked by myself.  The patient was then transferred to the operating room.  Once in the operating room, they underwent anesthesia without complications. We then placed the patient in the supine position on the operating table with all bony prominences well padded. Preoperative antibiotics and TXA were administered within one hour of skin incision.  The patient was repositioned into the lateral position on a bean bag with all bony prominences appropriately padded. The neck was in neutral alignment.  The left  upper extremity was prepped and draped in standard sterile fashion and the arm was positioned in an extremity hensley with appropriate traction.  A surgical time-out was performed identifying the correct patient, procedure, and site. 20cc of normal saline with epinephrine were injected into the subacromial space for hemostasis.    Exam under anesthesia:  FE: 170      ABD: 160    ER at 90: 80        IR at 90: 60  25-50% anterior and 25% posterior translation on operative shoulder (compared with 25-50% anterior and 25% posterior translation on contralateral shoulder)    Diagnostic arthroscopy: We began the procedure with the standard posterolateral portal, entered the glenohumeral joint, and an anterior portal was made within the rotator cuff interval under direct visualization with the assistance of a spinal needle. A probe was used to assist with diagnostic arthroscopy and we visualized from both posteriorly and anteriorly.    Diagnostic Findings    Synovitis   moderate anterior and superior  SLAP    type II tear, labral  fraying, detached biceps anchor  Anterior/Inferior Labrum Mild fraying  Posterior/Inferior Labrum Mild fraying    Biceps tendon   Erythematous  Rotator Interval   Thickened  Axillary recess   empty    Rotator Cuff  Subscapularis   normal  Supraspinatus   full thickness complete tear, retracted to joint  Infraspinatus   full thickness complete tear, retracted to joint  Teres Minor   normal    Articular Surface  Glenoid    intact with minimal bone loss   Humeral Head   intact with minimal bone loss     Arthroscopic limited debridement of the glenohumeral joint:   We used a combination of the arthroscopic motorized shaver and radiofrequency device to perform a limited debridement inside the glenohumeral joint. The hyperemia, erythema, and synovitis of the joint and joint capsule was focally debrided. Labral fraying was debrided and thermally imbricated to a smooth margin. Synovitic fronds were thermally ablated. Chondral degeneration was debrided to a stable edge. Labral fraying and degeneration was also resected and debrided to a stable edge    Arthroscopic-assisted subpectoral biceps tenodesis:    We used a radiofrequency device to perform a biceps tenotomy at the anterior border of the superior labrum. Scar tissue was ablated to liberate tendon adhesions to the capsule. We made a 3 cm incision along Guzman's lines in the axillary fold. Sharp dissection was carried out down to the level of the pectoralis major. The plane between the pectoralis major and the short head of biceps tendon was developed bluntly down to the level of the humeral bone, where we identified the long head of biceps tendon and delivered it retrograde out of the wound. A FiberLoop was then used on a straight needle to secure the tendon, passing through the tendon in multiple planes starting at the myotendinous junction and moving proximally for a total of 3 stitches and one locked stich. The remaining length of the biceps tendon was resected and  the needle was removed. The underlying soft tissue overlying the bicipital groove was resected and the bone was frayed with a 1/4-inch periosteal elevator/osteotome. We then selected an Arthrex all suture anchor and placed it high within the bicipital groove underneath the pectoralis major tendon. The double loaded sutures of this anchor were then passed just proximal to the musculotendinous junction of the long head of biceps using tails of the pre-positioned loop. Tensioning these reapproximated the biceps tendon to the frayed bone surface. We achieved excellent fixation. We thoroughly irrigated the wound. The wound was closed in buried, deep 3-0 Vicryl stitches, followed by a 3-0 Monocryl subcuticular stitch and then Dermabond skin glue.    Arthroscopic subacromial decompression:  The subacromial space was entered from posteriorly. A separate posterior-lateral portal was made for additional instrumentation and visualization. We then performed our subacromial decompression in a systematic fashion from anterior to posterior and from lateral to medial, removing the bursal tissue carefully. This was done with a radiofrequency device and motorized shaver. The undersurface of the acromion was skeletonized. There was a moderate anterior inferior spur that appeared to impinge on the rotator cuff and was burred to a flat surface using a motorized gage. The CA ligament was visualized and preserved.    Rotator cuff mobilization:  Viewing from the posterior lateral and lateral portals, the rotator cuff was mobilized from the capsule and scar tissue.  Using a combination of labral elevator, shaver, and radiofrequency device, the capsular adhesions were identified and released, taking care to avoid injury to the suprascapular nerve at the medial aspect of the superior glenoid.       Having performed capsular release, the rotator cuff was then tensioned using a cuff grasper, and demonstrated that improved mobilization had been  achieved but not sufficient for low tension repair.  The decision was then made to perform an anterior interval slide, releasing the leading edge of the supraspinatus from the rotator interval to the base of the coracoid.  After this was performed, an additional 5 mm of rotator cuff excursion was achieved.  We then began the rotator cuff repair.    Arthroscopic rotator cuff repair:   We turned our attention to rotator cuff repair of the supraspinatus and infraspinatus. A separate michelle-lateral portal was made for additional instrumentation. We debrided the remnant tissue over the bicipital groove and greater tuberosity and debrided frayed tissue from the rotator cuff tendon edge. We debrided the greater tuberosity with a motorized bur to slightly decorticate it, preparing a bony bed to receive the rotator cuff tendon. We used three 2.6mm Arthrex all-suture anchor for repair of the rotator cuff and they were placed just lateral to the articular margin. These had excellent fixation within the bone. The sutures from each anchor were then passed through the supraspinatus and infraspinatus with the tissue-piercing scorpion device, approximately 1.5 cm from its torn edge in a crescentic pattern. Sutures were then shuttled appropriately through the cannulas and labral sutures were then also passed for passage through the knotless mechanism for a suture staple configuration medial row . One suture each from the medial row anchors were then passed through a 4.75mm knotless Arthrex PEEK suture anchor lateral row anchor. These anchors were then placed into the lateral aspect of the greater tuberosity, achieving a SpeedBridge transosseous equivalent configuration. This achieved good fixation of the rotator cuff into the greater tuberosity with good compression of the rotator cuff into the tuberosity footprint from medial to lateral. The suture staple, which had been previously passed through the other medial anchors, was then  tensioned to achieve medial row compression    Arthroscopic AC joint resection:   The AC joint space was narrowed, with arthritic changes including crissy-articular osteophytes and sclerosis. We used the anterior and lateral portals for instrumentation. Soft tissue within the AC joint was removed with a motorized shaver and radiofrequency device. We resected the joint by using a barrel gage 4 mm in diameter. 2-3 mm of the medial aspect of the acromion was burred to a flat surface and about 6-7 mm of the lateral end of the clavicle was similarly resected with the gage. The surrounding osteophytes were also resected. The AC joint was stable upon completion of the distal clavicleexcision. Approximately 1 cm of space was present between the acromion and clavicle after the resection.    Wound closure:   We thoroughly irrigated then drained the joint and subacromial space and we removed the arthroscopic equipment. The wounds were closed with 3-0 Monocryl sutures in buried deep fashion followed by Dermabond glue. The shoulder was sterilely dressed and placed in a shoulder immobilizer.     The patient was awoken from anesthesia and transferred to the PACU in stable condition. An Iceman/PolarCare device was applied over a skin protecting barrier in the PACU.    I spoke with the family regarding the operation after surgery.     Postoperative rehabilitation: large cuff tear protocol. The patient will remain in a sling for 6 weeks.     Surgical Assistant Necessity:  For this procedure, a surgical assistant was present for, and assisted with, the entirety of the case. The surgical assistant was involved with patient positioning, prepping and draping, directly assisting with key portions of the case including retracting and/or managing instrumentation, and closing. The surgical assistant was necessaryto ensure greater likelihood of a safe and efficient operation, and no Orthopaedic surgery resident was available to operate as an  assistant.

## 2024-08-22 NOTE — ANESTHESIA PROCEDURE NOTES
Airway  Date/Time: 8/22/2024 1:36 PM  Urgency: Elective    Airway not difficult    General Information and Staff    Patient location during procedure: OR  Anesthesiologist: Rodney Pace MD  Performed: anesthesiologist   Performed by: Rodney Pace MD  Authorized by: Rodney Pace MD      Indications and Patient Condition  Indications for airway management: anesthesia  Sedation level: deep  Preoxygenated: yes  Patient position: sniffing  Mask difficulty assessment: 1 - vent by mask    Final Airway Details  Final airway type: endotracheal airway      Successful airway: ETT  Cuffed: yes   Successful intubation technique: Video laryngoscopy  Facilitating devices/methods: intubating stylet  Endotracheal tube insertion site: oral  Blade: GlideScope  Blade size: #3  ETT size (mm): 7.0    Cormack-Lehane Classification: grade I - full view of glottis  Placement verified by: capnometry   Measured from: teeth  Number of attempts at approach: 1

## 2024-08-22 NOTE — ANESTHESIA PROCEDURE NOTES
Peripheral Block    Date/Time: 8/22/2024 1:10 PM    Performed by: Rodney Pace MD  Authorized by: Rodney Pace MD      General Information and Staff    Start Time:  8/22/2024 1:10 PM  End Time:  8/22/2024 1:20 PM  Anesthesiologist:  Rodney Pace MD  Performed by:  Anesthesiologist  Patient Location:  PACU    Block Placement: Pre Induction  Site Identification: real time ultrasound guided and image stored and retrievable      Reason for Block: at surgeon's request and post-op pain management    Preanesthetic Checklist: 2 patient identifers, IV checked, risks and benefits discussed, monitors and equipment checked, pre-op evaluation, timeout performed, anesthesia consent and sterile technique used      Procedure Details    Patient Position:  Sitting  Prep: ChloraPrep    Monitoring:  Cardiac monitor  Block Type:  Interscalene  Laterality:  Left  Injection Technique:  Single-shot    Needle    Needle Type:  Short-bevel  Needle Gauge:  22 G  Needle Length:  50 mm  Needle Localization:  Ultrasound guidance  Reason for Ultrasound Use: appropriate spread of the medication was noted in real time and no ultrasound evidence of intravascular and/or intraneural injection            Assessment    Injection Assessment:  Good spread noted, incremental injection, local visualized surrounding nerve on ultrasound, low pressure, negative aspiration for heme and no pain on injection  Heart Rate Change: No    - Patient tolerated block procedure well without evidence of immediate block related complications.     Medications  8/22/2024 1:10 PM  fentaNYL (SUBLIMAZE) injection 50mcg/ml - Intravenous   50 mcg - 8/22/2024 1:10:00 PM  lidocaine injection 1% - Intradermal   5 mL - 8/22/2024 1:10:00 PM  ropivacaine (NAROPIN) injection 0.5% - Infiltration   30 mL - 8/22/2024 1:10:00 PM  dexamethasone (DECADRON) PF injection 10 mg/ml - Injection   10 mg - 8/22/2024 1:10:00 PM    Additional Comments

## 2024-08-22 NOTE — ANESTHESIA POSTPROCEDURE EVALUATION
Patient: Nunu Guan    Procedure Summary       Date: 08/22/24 Room / Location: [facility] MAIN OR  / [facility] MAIN OR    Anesthesia Start: 1326 Anesthesia Stop: 1604    Procedure: Left shoulder arthroscopic assisted rotator cuff repair, subpectoral biceps tenodesis, distal clavicle excision, subacromial decompression (Left: Shoulder) Diagnosis:       Nontraumatic incomplete rupture of rotator cuff, left      Superior labrum anterior-to-posterior (SLAP) tear of left shoulder      (Nontraumatic incomplete rupture of rotator cuff, left [M75.112]Superior labrum anterior-to-posterior (SLAP) tear of left shoulder [S43.432A])    Surgeons: Igor Smith MD Anesthesiologist: Rodney Pace MD    Anesthesia Type: general, regional ASA Status: 3            Anesthesia Type: general, regional    Vitals Value Taken Time   /90 08/22/24 1603   Temp 98.8 08/22/24 1605   Pulse 89 08/22/24 1604   Resp 16 08/22/24 1604   SpO2 94 % 08/22/24 1604   Vitals shown include unfiled device data.    [facility] AN Post Evaluation:   Patient Evaluated in PACU  Patient Participation: complete - patient participated  Level of Consciousness: awake and alert  Pain Score: 2  Pain Management: adequate  Airway Patency:patent  Yes    Nausea/Vomiting: none  Cardiovascular Status: acceptable  Respiratory Status: acceptable  Postoperative Hydration acceptable      Whit Reed MD, PhD  8/22/2024 4:05 PM

## 2024-08-26 ENCOUNTER — TELEPHONE (OUTPATIENT)
Dept: ORTHOPEDICS CLINIC | Facility: CLINIC | Age: 54
End: 2024-08-26

## 2024-08-26 NOTE — TELEPHONE ENCOUNTER
Post-op call for Dr. FAN    Date: 8/26/2024      Time: 11:50 AM   Patient: Nunu Guan      number: [id number]   Surgery and surgery date:8/22/2024  Patient unavailable.  Left message on voicemail to call clinic with questions or concerns.  Number was provided./ SPOKE TO PATIENT  Patient's general feeling since discharge:Really good. Has not taken an oxycodone in 2 days  Pain control (0-10):20 % with movement  Pain Medication:  dose/strength    Medication Quantity Refills Start End   Acetaminophen 500 MG Oral Cap 180 capsule 0 8/22/2024 --   Sig:   Take 2 capsules (1,000 mg total) by mouth every 8 (eight) hours as needed for Pain. Never exceed 3000 mg in a 24-hour period.  Many over-the-counter medications also have acetaminophen in them.       Medication Quantity Refills Start End   celecoxib 200 MG Oral Cap          Start End      8/22/2024 --   Medication Quantity Refills Start End   methocarbamol 750 MG Oral Tab       (Not taking)       Medication Quantity Refills Start End   oxyCODONE 5 MG Oral Tab       Stopped taking  Medication Quantity Refills Start End   gabapentin 300 MG Oral Cap         Fever: no  Chills: no  SOB: no  Incision site appearance:  Redness   no  Drainage no  Clean/dry/Intact yes  Calf pain, redness or warmth:  no   Bowel Regimen: yes   If not, what are you taking stool softener/laxative?  Confirmed appointment date for post op:9/6/2024  Other concerns patients may ask: Patient has been showering. We went over ice protocol times and application  Bathing and bandages   Patient needs to keep incision clean and dry for the first week.DONE  Enforce rest, ice, compression elevation and use their pain medication accordinglyDONE.  If the patient needs more pain medication, please put in a communication.      1.87

## 2024-08-28 ENCOUNTER — OFFICE VISIT (OUTPATIENT)
Dept: PHYSICAL THERAPY | Age: 54
End: 2024-08-28
Attending: ORTHOPAEDIC SURGERY
Payer: COMMERCIAL

## 2024-08-28 DIAGNOSIS — S43.432A SUPERIOR LABRUM ANTERIOR-TO-POSTERIOR (SLAP) TEAR OF LEFT SHOULDER: ICD-10-CM

## 2024-08-28 DIAGNOSIS — M75.112 NONTRAUMATIC INCOMPLETE RUPTURE OF ROTATOR CUFF, LEFT: Primary | ICD-10-CM

## 2024-08-28 PROCEDURE — 97110 THERAPEUTIC EXERCISES: CPT

## 2024-08-28 PROCEDURE — 97530 THERAPEUTIC ACTIVITIES: CPT

## 2024-08-28 PROCEDURE — 97163 PT EVAL HIGH COMPLEX 45 MIN: CPT

## 2024-08-28 NOTE — PROGRESS NOTES
POST-OP SHOULDER EVALUATION:     Diagnosis:   Nontraumatic incomplete rupture of rotator cuff, left (M75.112)  Superior labrum anterior-to-posterior (SLAP) tear of left shoulder (S43.432A)      Referring Provider: Igor Smith  Date of Evaluation:    8/28/2024    Precautions:       - post surgical precautions  Next MD visit:   9/6/24  Date of Surgery: 8/22/2024     PATIENT SUMMARY   Nunu Guan is a 54 year old female who presents to therapy today s/p left RTC repair and bicep tenodesis on August 22, 2024 with complaints of shoulder mobility and strength deficits. She states that her pain has been well control with her medication and she has been compliant with surgical protocols. She states that instructions for elbow ROM was not clear, thus she has been performing active elbow flexion to 90 degrees and performing the wrist ROM exercises per post op discharge protocol. She states that she has been sleeping well with no post complications.  Pt describes pain level current 1/10, at best 0/10, at worst 0/10.   Current functional limitations include driving, ADLs, reaching, grabbing, lifting.   Nunu describes prior level of function IND with all ADLs and recreational activities. Pt goals include return to performing work activities with no pain.  Past medical history was reviewed with Nunu. Significant findings include   Past Medical History:    Clot in the renal vein (HCC)    left lower extremity    Cyst of skin    right abdominal skin cyst January 2016    Deep vein thrombosis (HCC)    hx of DVT    Disorder of thyroid    High blood pressure    Hx of motion sickness    Hypercholesteremia    Hypothyroidism    PONV (postoperative nausea and vomiting)    Unspecified essential hypertension    Visual impairment    wear glasses     ASSESSMENT  Nunu presents to physical therapy evaluation with primary c/o left shoulder stiffness and pain s/p RTC repair and bicep tenodesis. The results of the objective  tests and measures show limited shoulder A/PROM, moderate guarding, poor RTC strength, poor scapular strength.  Functional deficits include but are not limited to lifting, reaching, driving, lifting.  Signs and symptoms are consistent with diagnosis of left shoulder pain and stiffness s/p RTC repair. Pt and PT discussed evaluation findings, pathology, POC, surgical protocol, surgical precautions, and HEP.  Pt voiced understanding and performs HEP correctly without reported pain. Skilled Physical Therapy is medically necessary to address the above impairments and reach functional goals.     OBJECTIVE:   Observation/Posture: bandages on scars  Palpation: TTP at scar  Sensation: nt    AROM: (* denotes performed with pain)  Shoulder  Elbow   Flexion: R 175; L nt  Abduction: R 180; L nt  ER: R 112; L nt  IR: R 75; L nt Flexion: R 150; L nt  Extension: R 6; L nt     PROM: (* denotes performed with pain)  Shoulder  Elbow   Flexion: R 175; L 80  Abduction: R 180; L 75  ER: R 112; L nt  IR: R 75; L nt Flexion: R 150; L 145  Extension: R 6; L -30     Accessory motion: nt      Strength/MMT: (* denotes performed with pain)  Shoulder Scapular   Flexion: R nt/5; L nt/5  Abduction: R nt/5; L nt/5  ER: R nt/5; L nt/5  IR: R nt/5; L nt/5 Rhomboids: R nt/5, L nt/5  Mid trap: R nt/5; L nt/5  Lats: R nt/5, L nt/5  Low trap: R nt/5; L nt/5     Today’s Treatment and Response:   Pt education was provided on exam findings, treatment diagnosis, treatment plan, expectations, and prognosis. Pt was also provided recommendations for activity modifications, possible soreness after evaluation, modalities as needed [ice/heat], postural corrections, ergonomics, pain science education , detrimental fear avoidance behaviors, and importance of remaining active.   Reviewed current HEP for wrist and elbow and added pendulum exercise per surgical protocol.  Tolerated well with no increase in pain.    Charges: PT Eval High Complexity, TE 1, TA 1       Total Timed Treatment: 45 min     Total Treatment Time: 45 min     Based on clinical rationale and outcome measures, this evaluation involved High Complexity decision making due to 3+ personal factors/comorbidities, 3 body structures involved/activity limitations, and unstable symptoms including changing pain levels.  PLAN OF CARE:    Goals: (To be met in 18 visits)   Pt will report improved ability to sleep without waking due to shoulder pain  Pt will improve shoulder flexion AROM to >160 degrees to be able to reach into overhead cabinets without pain or restriction  Pt will improve shoulder abduction AROM to >160 degrees to improve ability to don deodorant, don/doff shirts, and wash hair  Pt will increase shoulder AROM ER to 90 to reach and fasten seatbelt   Pt will increase shoulder AROM IR to 70 to be able to reach in back pocket, tuck in shirt, and turn steering wheel without pain  Pt will improve shoulder strength throughout to 4+/5 to improve function with ADLs including reaching and lifting  Pt will demonstrate increased mid/low trap strength to 4/5 to promote improved shoulder mechanics and stabilization with ADL such as lifting and reaching   Pt will be independent and compliant with comprehensive HEP to maintain progress achieved in PT     Frequency / Duration: Patient will be seen for 2 x/week or a total of 12-18 visits over a 90 day period.  Treatment will include: Manual Therapy, Neuromuscular Re-education, Therapeutic Activities, Therapeutic Exercise, and Home Exercise Program instruction    Education or treatment limitation: None  Rehab Potential:excellent    QuickDASH Outcome Score  Score: 52.27 % (8/27/2024  5:48 PM)      Patient/Family/Caregiver was advised of these findings, precautions, and treatment options and has agreed to actively participate in planning and for this course of care.    Thank you for your referral. Please co-sign or sign and return this letter via fax as soon as possible to  [phone]. If you have any questions, please contact me at Dept: [phone]    Sincerely,  Electronically signed by therapist: Igor Adler PT  Physician's certification required: Yes  I certify the need for these services furnished under this plan of treatment and while under my care.    X___________________________________________________ Date____________________    Certification From: 8/28/2024  To:11/26/2024         Progression to the next phase based on Clinical Criteria and Time Frames as Appropriate.        Phase I - Immediate Post-Surgical Phase (Day 0 to 6 Weeks):  Goals:  Reduce postoperative pain and swelling  Promote healing of skin/muscles  Normalize elbow/wrist motion  Protect surgical repair  Begin early shoulder motion for small to medium tears      Precautions:  No weightbearing with the operative extremity  No active elbow flexion or passive elbow extension  Subscapularis tears: No passive external rotation beyond neutral  Sling (with pillow attachment) worn at all times except for therapy exercises  Support elbow with water resistant sling or shower  for hygiene/showering  No supporting of body weight with involved extremity.  Keep incision clean and dry; no submersion (beach, bath, swimming pool) for 4 weeks.  If BT Performed:   No active elbow flexion or passive elbow extension  Support elbow while out of brace for hygiene/showering     Phase I Breakdown  First Therapy Visits:  Therapist should check proper sling fit/use.  Therapist should instruct patient in proper positioning, posture, initial home exercise program  Ensure bandages are removed; sutures will be removed by Ortho at first post-op visit  Therapist should instruct patient in showering with appropriate modifications  Therapist should provide patient/ family with written home program including exercises      ROM & Sling Use:  Progress as pain allows  If a subscapularis repair was performed, avoid passive ER  Shoulder PROM  for small and medium tears (see below) in hammer  position  Avoid impingement position for forward flexion     Tear Size Sling Use Begin PROM Begin AROM   Small <1 cm2 4 weeks Immediate 4 weeks   Medium 1-3 cm2 4-6 weeks Immediate 6 weeks   Large 3-5 cm2 6 weeks 4-6 weeks 8 weeks   Massive >5 cm2 6-8 weeks 6-8 weeks 8 weeks      Cryotherapy:  Frequent application of cryotherapy/ice (4-5 times a day for about 20 minutes).  Cryotherapy hourly for 15 minutes during the first 24 hours after sensation is restored until acute inflammation is controlled.  Once controlled, use cryotherapy 3 times daily for 15 minutes at a time.     Exercises/Progression:   strengthening with forearm and shoulder in neutral position  Shoulder PROM/AAROM in neutral position depending on tear size  Scapular mobilization including scapular retractions/depressions and shoulder shrugs  Modified pendulums for 5 days, progress to full pendulums after 5 days  Gentle sub-maximal shoulder isometrics except ER/IR  If Biceps Tenodesis Performed:  Elbow motion starting at 2 weeks post-operatively when biceps tenodesis performed  No resisted elbow flexion greater than 10 pounds during weeks 4-8  No resisted elbow flexion greater than 15 pounds during weeks 8-12   Other:   Cardiovascular training:  NO running or impact aerobics  Recumbent bicycle while weaning sling        Phase II - Intermediate Post-Surgical Phase (Week 6 to 8):  Goals:  Reduce postoperative pain and swelling  Improve shoulder motion:  Small and medium tears: AAROM for FLEX/ABD to 120  Large and massive tears: PROM for FLEX/ABD to 90  Progress passive ER  Normalize elbow motion   Protect biceps tendon repair      Phase II Precautions:  No resistance exercises for shoulder abduction/flexion  No active elbow flexion or forearm supination >5 pounds  Wean from sling   No supporting of body weight with involved extremity.  No submersion (beach, bath, swimming pool) for 4 weeks.  No  running or high impact activity for aerobic training     Phase II Breakdown  Exercises/Progression:  Continue application of cryotherapy/ice as needed   strengthening with forearm in neutral position  Shoulder PROM/AAROM in neutral position  Scapular mobilization  Modified pendulums for 5 days, progress to full pendulums after 5 days.  Soft tissue mobilization/trigger point work to the kinetic chain  Gentle submaximal shoulder isometrics     Other:  Blood flow restriction for elbow flexion/extension on nonoperative arm and lower extremities for physiologic benefits  Recumbent bicycle for cardiovascular training     Criteria for progression to the next phase (Phase III):  Minimal pain     Phase III - Late Post-Surgical Phase (Weeks 9 to 16):  Goals:  Full shoulder motion by 6 to 8 weeks  Initiate strengthening at 6 weeks  Unrestricted activity by 3 months  Enhance functional use of operative extremity and advance functional activities per patient specific goals.  Enhance shoulder mechanics, muscular strength, and endurance.  Precautions:  No resisted elbow flexion greater than 10 pounds during weeks 4-8   No resisted elbow flexion greater than 15 pounds during weeks 8-12   Avoid impingement positions, higher level exertional activities with operative extremity, and high impact aerobic activity      Phase III Breakdown  Exercises/Progression:  Shoulder range of motion exercises   Trunk stabilization exercises   Scapular strengthening emphasizing scapular retraction and upward rotators  Shoulder/rotator cuff strength & endurance progression within elbow flexion precautions   Increase functional activities   All other modalities as needed   Consider adjuncts including dry needling, manual therapy to glenohumeral joint and cervical thoracic regions, aquatic walking with water at chest  Continue cardiovascular training stationary bike, treadmill walking, elliptical with no push/pull of operative extremity     Criteria  for progression to the next phase (Phase IV):  Full, painless active and passive motion at shoulder, elbow, wrist, and hand  Normal  strength restored  Postoperative assessment at 12-week visit with surgeon     Phase IV - Return to Sport/Full (Months 4-6):  This phase may last between 4 to 9 months depending on occupational requirements and patient's functional performance, endurance, and progression     Goals:  Shoulder strength equal bilaterally  Meet occupational requirements by 4 to 6 months   If active duty , pass service 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

## 2024-09-03 ENCOUNTER — OFFICE VISIT (OUTPATIENT)
Dept: PHYSICAL THERAPY | Age: 54
End: 2024-09-03
Attending: STUDENT IN AN ORGANIZED HEALTH CARE EDUCATION/TRAINING PROGRAM
Payer: COMMERCIAL

## 2024-09-03 PROCEDURE — 97140 MANUAL THERAPY 1/> REGIONS: CPT

## 2024-09-03 PROCEDURE — 97112 NEUROMUSCULAR REEDUCATION: CPT

## 2024-09-03 PROCEDURE — 97110 THERAPEUTIC EXERCISES: CPT

## 2024-09-03 NOTE — PROGRESS NOTES
Diagnosis:   Nontraumatic incomplete rupture of rotator cuff, left (M75.112)  Superior labrum anterior-to-posterior (SLAP) tear of left shoulder (S43.834A)       Referring Provider: Igor Smith  Date of Evaluation:    8/28/2024    Precautions:       - post surgical precautions  Next MD visit:   9/6/24  Date of Surgery: 8/22/2024   Insurance Primary/Secondary: [employer] [place] HMO / N/A     # Auth Visits: 28 visits until 11/27    Subjective: having some tightness in the front of the shoulder but overall doing okay.    Assessment: provided patient with scapular retraction exercises and pt reported less trap pain post.  Progressed HEP adding table PROM for left shoulder flexion with in pain free range and scapular retraction. Pt progressing as expected per surgical protocol      Pain: 1-2/10      Objective:     PROM: (* denotes performed with pain)  Shoulder  Elbow   Flexion: L 110  Abduction: L 75  ER: R 112; L nt  IR: R 75; L nt Flexion: R 150; L 145  Extension: R 6; L 0       Goals: (To be met in 18 visits)   Pt will report improved ability to sleep without waking due to shoulder pain  Pt will improve shoulder flexion AROM to >160 degrees to be able to reach into overhead cabinets without pain or restriction  Pt will improve shoulder abduction AROM to >160 degrees to improve ability to don deodorant, don/doff shirts, and wash hair  Pt will increase shoulder AROM ER to 90 to reach and fasten seatbelt   Pt will increase shoulder AROM IR to 70 to be able to reach in back pocket, tuck in shirt, and turn steering wheel without pain  Pt will improve shoulder strength throughout to 4+/5 to improve function with ADLs including reaching and lifting  Pt will demonstrate increased mid/low trap strength to 4/5 to promote improved shoulder mechanics and stabilization with ADL such as lifting and reaching   Pt will be independent and compliant with comprehensive HEP to maintain progress achieved in PT       Plan: Continue skilled  Physical Therapy 1-2 x/week or a total of 18 visits over a 90 day period. Treatment will include: manual therapy, therapeutic exercise, therapeutic activity, neuromuscular re-ed.    Patient/Family/Caregiver was advised of these findings, precautions, and treatment options and has agreed to actively participate in planning and for this course of care.    Thank you for your referral. If you have any questions, please contact me at Dept: [phone].    Sincerely,  Electronically signed by therapist: Igor Adler PT     Physician's certification required:  No  Please co-sign or sign and return this letter via fax as soon as possible to [phone].   I certify the need for these services furnished under this plan of treatment and while under my care.    X___________________________________________________ Date____________________    Certification From: 9/3/2024  To:12/2/2024    Date: 9/3/2024  TX#: 2/18 Date:                 TX#: 3/ Date:                 TX#: 4/ Date:                 TX#: 5/ Date:   Tx#: 6/   TE (10 min)  - PROM assessment  - table seated PROM shoulder flexion, 2x6  - scapular retraction in sidelying, 2x5       NMRE (8 min)  - Scapular retraction isometric with tactile feed back 5s x 10         MT (10 min)  - Shoulder PROM assessment  - Passive shoulder abduction, flexion              HEP: pendulum, scapular retraction, passive shoulder flexion on table    Charges: MT 1, TE 1, NMRE 1       Total Timed Treatment: 28 min  Total Treatment Time: 28 min    OBJECTIVE at IE:   Observation/Posture: bandages on scars  Palpation: TTP at scar  Sensation: nt     AROM: (* denotes performed with pain)  Shoulder  Elbow   Flexion: R 175; L nt  Abduction: R 180; L nt  ER: R 112; L nt  IR: R 75; L nt Flexion: R 150; L nt  Extension: R 6; L nt      PROM: (* denotes performed with pain)  Shoulder  Elbow   Flexion: R 175; L 80  Abduction: R 180; L 75  ER: R 112; L nt  IR: R 75; L nt Flexion: R 150; L 145  Extension: R  6; L -30      Accessory motion: nt        Strength/MMT: (* denotes performed with pain)  Shoulder Scapular   Flexion: R nt/5; L nt/5  Abduction: R nt/5; L nt/5  ER: R nt/5; L nt/5  IR: R nt/5; L nt/5 Rhomboids: R nt/5, L nt/5  Mid trap: R nt/5; L nt/5  Lats: R nt/5, L nt/5  Low trap: R nt/5; L nt/5      Progression to the next phase based on Clinical Criteria and Time Frames as Appropriate.        Phase I - Immediate Post-Surgical Phase (Day 0 to 6 Weeks):  Goals:  Reduce postoperative pain and swelling  Promote healing of skin/muscles  Normalize elbow/wrist motion  Protect surgical repair  Begin early shoulder motion for small to medium tears      Precautions:  No weightbearing with the operative extremity  No active elbow flexion or passive elbow extension  Subscapularis tears: No passive external rotation beyond neutral  Sling (with pillow attachment) worn at all times except for therapy exercises  Support elbow with water resistant sling or shower  for hygiene/showering  No supporting of body weight with involved extremity.  Keep incision clean and dry; no submersion (beach, bath, swimming pool) for 4 weeks.  If BT Performed:   No active elbow flexion or passive elbow extension  Support elbow while out of brace for hygiene/showering     Phase I Breakdown  First Therapy Visits:  Therapist should check proper sling fit/use.  Therapist should instruct patient in proper positioning, posture, initial home exercise program  Ensure bandages are removed; sutures will be removed by Ortho at first post-op visit  Therapist should instruct patient in showering with appropriate modifications  Therapist should provide patient/ family with written home program including exercises      ROM & Sling Use:  Progress as pain allows  If a subscapularis repair was performed, avoid passive ER  Shoulder PROM for small and medium tears (see below) in hammer  position  Avoid impingement position for forward flexion     Tear Size  Sling Use Begin PROM Begin AROM   Small <1 cm2 4 weeks Immediate 4 weeks   Medium 1-3 cm2 4-6 weeks Immediate 6 weeks   Large 3-5 cm2 6 weeks 4-6 weeks 8 weeks   Massive >5 cm2 6-8 weeks 6-8 weeks 8 weeks      Cryotherapy:  Frequent application of cryotherapy/ice (4-5 times a day for about 20 minutes).  Cryotherapy hourly for 15 minutes during the first 24 hours after sensation is restored until acute inflammation is controlled.  Once controlled, use cryotherapy 3 times daily for 15 minutes at a time.     Exercises/Progression:   strengthening with forearm and shoulder in neutral position  Shoulder PROM/AAROM in neutral position depending on tear size  Scapular mobilization including scapular retractions/depressions and shoulder shrugs  Modified pendulums for 5 days, progress to full pendulums after 5 days  Gentle sub-maximal shoulder isometrics except ER/IR  If Biceps Tenodesis Performed:  Elbow motion starting at 2 weeks post-operatively when biceps tenodesis performed  No resisted elbow flexion greater than 10 pounds during weeks 4-8  No resisted elbow flexion greater than 15 pounds during weeks 8-12   Other:   Cardiovascular training:  NO running or impact aerobics  Recumbent bicycle while weaning sling        Phase II - Intermediate Post-Surgical Phase (Week 6 to 8):  Goals:  Reduce postoperative pain and swelling  Improve shoulder motion:  Small and medium tears: AAROM for FLEX/ABD to 120  Large and massive tears: PROM for FLEX/ABD to 90  Progress passive ER  Normalize elbow motion   Protect biceps tendon repair      Phase II Precautions:  No resistance exercises for shoulder abduction/flexion  No active elbow flexion or forearm supination >5 pounds  Wean from sling   No supporting of body weight with involved extremity.  No submersion (beach, bath, swimming pool) for 4 weeks.  No running or high impact activity for aerobic training     Phase II Breakdown  Exercises/Progression:  Continue application of  cryotherapy/ice as needed   strengthening with forearm in neutral position  Shoulder PROM/AAROM in neutral position  Scapular mobilization  Modified pendulums for 5 days, progress to full pendulums after 5 days.  Soft tissue mobilization/trigger point work to the kinetic chain  Gentle submaximal shoulder isometrics     Other:  Blood flow restriction for elbow flexion/extension on nonoperative arm and lower extremities for physiologic benefits  Recumbent bicycle for cardiovascular training     Criteria for progression to the next phase (Phase III):  Minimal pain     Phase III - Late Post-Surgical Phase (Weeks 9 to 16):  Goals:  Full shoulder motion by 6 to 8 weeks  Initiate strengthening at 6 weeks  Unrestricted activity by 3 months  Enhance functional use of operative extremity and advance functional activities per patient specific goals.  Enhance shoulder mechanics, muscular strength, and endurance.  Precautions:  No resisted elbow flexion greater than 10 pounds during weeks 4-8   No resisted elbow flexion greater than 15 pounds during weeks 8-12   Avoid impingement positions, higher level exertional activities with operative extremity, and high impact aerobic activity      Phase III Breakdown  Exercises/Progression:  Shoulder range of motion exercises   Trunk stabilization exercises   Scapular strengthening emphasizing scapular retraction and upward rotators  Shoulder/rotator cuff strength & endurance progression within elbow flexion precautions   Increase functional activities   All other modalities as needed   Consider adjuncts including dry needling, manual therapy to glenohumeral joint and cervical thoracic regions, aquatic walking with water at chest  Continue cardiovascular training stationary bike, treadmill walking, elliptical with no push/pull of operative extremity     Criteria for progression to the next phase (Phase IV):  Full, painless active and passive motion at shoulder, elbow, wrist, and  hand  Normal  strength restored  Postoperative assessment at 12-week visit with surgeon     Phase IV - Return to Sport/Full (Months 4-6):  This phase may last between 4 to 9 months depending on occupational requirements and patient's functional performance, endurance, and progression     Goals:  Shoulder strength equal bilaterally  Meet occupational requirements by 4 to 6 months   If active duty , pass service 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

## 2024-09-04 ENCOUNTER — APPOINTMENT (OUTPATIENT)
Dept: PHYSICAL THERAPY | Age: 54
End: 2024-09-04
Attending: STUDENT IN AN ORGANIZED HEALTH CARE EDUCATION/TRAINING PROGRAM
Payer: COMMERCIAL

## 2024-09-06 ENCOUNTER — OFFICE VISIT (OUTPATIENT)
Dept: ORTHOPEDICS CLINIC | Facility: CLINIC | Age: 54
End: 2024-09-06
Payer: COMMERCIAL

## 2024-09-06 DIAGNOSIS — Z47.89 ORTHOPEDIC AFTERCARE: Primary | ICD-10-CM

## 2024-09-06 PROCEDURE — 99024 POSTOP FOLLOW-UP VISIT: CPT | Performed by: STUDENT IN AN ORGANIZED HEALTH CARE EDUCATION/TRAINING PROGRAM

## 2024-09-06 RX ORDER — METHOCARBAMOL 750 MG/1
750 TABLET, FILM COATED ORAL 4 TIMES DAILY PRN
Qty: 30 TABLET | Refills: 0 | Status: SHIPPED | OUTPATIENT
Start: 2024-09-06

## 2024-09-06 RX ORDER — CELECOXIB 200 MG/1
200 CAPSULE ORAL 2 TIMES DAILY
Qty: 60 CAPSULE | Refills: 0 | Status: SHIPPED | OUTPATIENT
Start: 2024-09-06

## 2024-09-06 NOTE — PROGRESS NOTES
Orthopaedic Surgery Postop Patient Visit  _______________________________________________________________________________________________________________  _______________________________________________________________________________________________________________    DATE OF VISIT: 9/6/2024     DATE OF SURGERY: 8/22/2024     CHIEF COMPLAINT: Follow-up Left  shoulder rotator cuff repair, subacromial decompression, distal clavicle excision, and biceps tenodesis  Chief Complaint   Patient presents with    Post-Op     1st L shoulder  RCR on 8/22 -  Pt denies any pain, fever or chills.  Would like refill on Celecobix.         HISTORY OF PRESENT ILLNESS: Nunu Guan is a 54 year old female who presents to the clinic for follow-up after Left  shoulder surgery. The patient has started working with therapy. Pain is well controlled on current regimen.    The patient denies fevers, chills, and wound issues.     MEDICATIONS  Reviewed   celecoxib 200 MG Oral Cap Take 1 capsule (200 mg total) by mouth 2 (two) times daily. 60 capsule 0    methocarbamol 750 MG Oral Tab Take 1 tablet (750 mg total) by mouth 4 (four) times daily as needed (muscle spasms). 30 tablet 0    Acetaminophen 500 MG Oral Cap Take 2 capsules (1,000 mg total) by mouth every 8 (eight) hours as needed for Pain. Never exceed 3000 mg in a 24-hour period.  Many over-the-counter medications also have acetaminophen in them. 180 capsule 0    celecoxib 200 MG Oral Cap Take 1 capsule (200 mg total) by mouth 2 (two) times daily. 28 capsule 0    methocarbamol 750 MG Oral Tab Take 1 tablet (750 mg total) by mouth 3 (three) times daily as needed. 42 tablet 0    cholecalciferol 125 MCG (5000 UT) Oral Tab Take 1 tablet (5,000 Units total) by mouth daily. 90 tablet 0    Phentermine HCl 37.5 MG Oral Tab Take 1 tablet (37.5 mg total) by mouth every morning before breakfast. 30 tablet 3    topiramate 25 MG Oral Tab Take 1 tablet (25 mg total) by mouth 2 (two) times  daily. 180 tablet 1    levothyroxine 100 MCG Oral Tab Take 1 tablet (100 mcg total) by mouth before breakfast. 90 tablet 3    losartan-hydroCHLOROthiazide 100-25 MG Oral Tab Take 1 tablet by mouth daily. 90 tablet 3        ALLERGIES  Allergies   Allergen Reactions    Azithromycin DIZZINESS     Other reaction(s): AZITHROMYCIN    Hydrocodone DIZZINESS     Other reaction(s): feels dazed        REVIEW OF SYSTEMS  A 14 point review of systems was performed. Pertinent positives and negatives noted in the HPI.    PHYSICAL EXAM  LMP 08/01/2024 (Within Days)      Constitutional: The patient is well-developed, well-nourished, in no acute distress.  Neurological: Alert and oriented to person, place, and time.  Psychiatric: Mood and affect normal.  Head: Normocephalic and atraumatic.  Cardiovascular: regular rate by palpation  Pulmonary/Chest: Effort normal. No respiratory distress. Breathing non-labored  Abdominal: Abdomen exhibits no distension.   Left shoulder  Wound well appearing. No erythema or dehiscence  Swelling resolving  ROM: Deferred  Motor/Strength:  Motor intact Ax/Msc/M/U/R  Forward Flexion: Deferred  Abduction: Deferred  Internal Rotation: Deferred  External Rotation: Deferred  SILT R/U/M/MSC/axillary  Radial pulse 2+, distally warm and well perfused, brisk capillary refill      ASSESSMENT/PLAN: Nunu Guan is a 54 year old female who presents to the Orthopaedic surgery clinic today for follow-up 2 weeks after rotator cuff repair, subacromial decompression, distal clavicle excision, and biceps tenodesis.  She will be progressing on the large cuff tear protocol.  She is doing very well thus far    We discussed the relevant radiographs and arthroscopic imaging at today's visit  We reviewed the PT protocol and adjustments were made as needed  Protocol (2-6 weeks):  Continue shoulder immobilization until 4 weeks, discontinue based on tear size  Normalize elbow and wrist motion  Prevent motion stretching  repair   Progress motion as per protocol. For small and medium tears, begin to progress shoulder motion  Weightbearing: Progress per protocol  Protocol (6-8 weeks):  Discontinue shoulder immobilization if not already done  Progress motion as per protocol  Small and medium tears: AAROM for FLEX/ABD to 120  Large and massive tears: PROM for FLEX/ABD to 120  Weightbearing: Limit elbow flexion to <10 pounds during weeks 7-8 and to <15 pounds during weeks 8-12  Protocol (9-16 weeks):  Normalize shoulder motion  Avoid push-ups, pull-ups, bench press, and overhead weightlifting activities  Weightbearing: Progress per protocol  Protocol (16 weeks-8 months):  Meet occupational requirements by 4 months  Restore overhead strength by 6-8 months  No weightlifting restrictions       RANGE-OF-MOTION LIMITATIONS: Per schedule below    Tear Size Sling Use Begin PROM Begin AROM   Small <1 cm2 4 weeks Immediate 4 weeks   Medium 1-3 cm2 4-6 weeks Immediate 6 weeks   Large 3-5 cm2 6 weeks 2 weeks 8 weeks   Massive >5 cm2 6-8 weeks 4 weeks 8 weeks       NEW PRESCRIPTIONS:  Celecoxib, methocarbamol  IMAGING ORDERED: none  CONSULTS PLACED: none    FOLLOW-UP: 6 weeks    RADIOGRAPHS AT NEXT VISIT: none    I have personally seen Nunu Guan and discussed in detail their plan of care. Prior to departure, they indicated agreement with and understanding of their plan of care and their follow-up as documented herein this note. Please note that this note was written in combination with voice recognition/dictation software and there is a possibility of transcription errors which were not identified at the time of note submission. If clarification is necessary, please contact the author or clinic staff.    Igor Smith MD  Orthopaedic Surgery  9/6/2024

## 2024-09-12 ENCOUNTER — OFFICE VISIT (OUTPATIENT)
Dept: PHYSICAL THERAPY | Age: 54
End: 2024-09-12
Attending: STUDENT IN AN ORGANIZED HEALTH CARE EDUCATION/TRAINING PROGRAM
Payer: COMMERCIAL

## 2024-09-12 PROCEDURE — 97110 THERAPEUTIC EXERCISES: CPT | Performed by: PHYSICAL THERAPIST

## 2024-09-12 PROCEDURE — 97140 MANUAL THERAPY 1/> REGIONS: CPT | Performed by: PHYSICAL THERAPIST

## 2024-09-12 NOTE — PROGRESS NOTES
Diagnosis:   Nontraumatic incomplete rupture of rotator cuff, left (M75.112)  Superior labrum anterior-to-posterior (SLAP) tear of left shoulder (S43.467A)       Referring Provider: Igor Smith  Date of Evaluation:    8/28/2024    Precautions:       - post surgical precautions  Next MD visit:   10/11/2024  Date of Surgery: 8/22/2024   Insurance Primary/Secondary: [employer] [place] HMO / N/A     # Auth Visits: 28 visits until 11/27    Subjective: L shoulder feels better since surgery.  Wearing sling. Sometimes accidentally try to reach for objects, get a twinge, but have sling on when doing so.  Pt is R hand dominant.       Assessment: Pt reporting less pain since L shoulder surgery. Reviewed/progressed HEP; see below. Educated pt on HEP in effort to reduce shoulder/elbow stiffness, hand weakness.  Pt with limited, poor tolerance for PROM L shoulder.  Will progress next session. Pt reported ~ 1/10 L shoulder pain at close of session, declined cold pack, reported would apply at home.       Pain: 0/10 at rest      Objective:     PROM (degrees): (* denotes performed with pain)  Shoulder  Elbow   Flexion: L 30*  Abduction: L 30*  ER:  L 20*  IR: L at least 40* Flexion: R 150; L 145  Extension: R 6; L 0     L shoulder surgical sites appear WNL    Goals: (To be met in 18 visits)   Pt will report improved ability to sleep without waking due to shoulder pain  Pt will improve shoulder flexion AROM to >160 degrees to be able to reach into overhead cabinets without pain or restriction  Pt will improve shoulder abduction AROM to >160 degrees to improve ability to don deodorant, don/doff shirts, and wash hair  Pt will increase shoulder AROM ER to 90 to reach and fasten seatbelt   Pt will increase shoulder AROM IR to 70 to be able to reach in back pocket, tuck in shirt, and turn steering wheel without pain  Pt will improve shoulder strength throughout to 4+/5 to improve function with ADLs including reaching and lifting  Pt will  demonstrate increased mid/low trap strength to 4/5 to promote improved shoulder mechanics and stabilization with ADL such as lifting and reaching   Pt will be independent and compliant with comprehensive HEP to maintain progress achieved in PT       Plan: Continue skilled Physical Therapy 1-2 x/week or a total of 18 visits over a 90 day period. Treatment will include: manual therapy, therapeutic exercise, therapeutic activity, neuromuscular re-ed, modalities as needed all in accordance with post op protocol.    Date: 9/3/2024  TX#: 2/18 Date:  9/12/2024               TX#: 3/18 (3/28 auth) Date:                 TX#: 4/ Date:                 TX#: 5/ Date:   Tx#: 6/   TE (10 min)  - PROM assessment  - table seated PROM shoulder flexion, 2x6  - scapular retraction in sidelying, 2x5 There Ex:   L wrist flexor stretch 2 x 20 sec each (sling donned)  L wrist extensor stretch 2 x 20 sec each (sling donned)  L forearm passive supination/pronation x 10 reps   L hand digiflex: red x 15 reps, geen x 15 reps  L shoulder pendulums flex/ext, horiz abd-add x 30 sec each (HEP)  HEP instruction/handout; see below.              NMRE (8 min)  - Scapular retraction isometric with tactile feed back 5s x 10   Manual:   PROM L wrist flex/ext, radial/ulnar deviation  PROM L forearm supination/pronation, elbow flex/ext  PROM L shoulder ER, IR, flex, abd - limited/poor itzel for PROM        MT (10 min)  - Shoulder PROM assessment  - Passive shoulder abduction, flexion        TA:   Reviewed post op protocol      HEP: pendulums (flex/ext, horiz abd/add, circular), PROM L elbow flex/ext, L hand towel/stress ball     Charges: 2 TE (28 min), 1 manual (10 min), 0 TA (4 min)  Total Timed Treatment: 42 min  Total Treatment Time: 45 min    Post op protocol:  Protocol (2-6 weeks):  Continue shoulder immobilization until 4 weeks, discontinue based on tear size  Normalize elbow and wrist motion  Prevent motion stretching repair   Progress motion as per  protocol. For small and medium tears, begin to progress shoulder motion  Weightbearing: Progress per protocol  Protocol (6-8 weeks):  Discontinue shoulder immobilization if not already done  Progress motion as per protocol  Small and medium tears: AAROM for FLEX/ABD to 120  Large and massive tears: PROM for FLEX/ABD to 120  Weightbearing: Limit elbow flexion to <10 pounds during weeks 7-8 and to <15 pounds during weeks 8-12  Protocol (9-16 weeks):  Normalize shoulder motion  Avoid push-ups, pull-ups, bench press, and overhead weightlifting activities  Weightbearing: Progress per protocol  Protocol (16 weeks-8 months):  Meet occupational requirements by 4 months  Restore overhead strength by 6-8 months  No weightlifting restrictions        RANGE-OF-MOTION LIMITATIONS: Per schedule below     Tear Size Sling Use Begin PROM Begin AROM   Small <1 cm2 4 weeks Immediate 4 weeks   Medium 1-3 cm2 4-6 weeks Immediate 6 weeks   Large 3-5 cm2 6 weeks 2 weeks 8 weeks   Massive >5 cm2 6-8 weeks 4 weeks 8 weeks

## 2024-09-16 ENCOUNTER — OFFICE VISIT (OUTPATIENT)
Dept: PHYSICAL THERAPY | Age: 54
End: 2024-09-16
Attending: STUDENT IN AN ORGANIZED HEALTH CARE EDUCATION/TRAINING PROGRAM
Payer: COMMERCIAL

## 2024-09-16 PROCEDURE — 97140 MANUAL THERAPY 1/> REGIONS: CPT | Performed by: PHYSICAL THERAPIST

## 2024-09-16 PROCEDURE — 97110 THERAPEUTIC EXERCISES: CPT | Performed by: PHYSICAL THERAPIST

## 2024-09-16 NOTE — PROGRESS NOTES
Diagnosis:   Nontraumatic incomplete rupture of rotator cuff, left (M75.112)  Superior labrum anterior-to-posterior (SLAP) tear of left shoulder (S43.462A)       Referring Provider: Igor Smith  Date of Evaluation:    8/28/2024    Precautions:       - post surgical precautions/protocol  Next MD visit:   10/11/2024  Date of Surgery: 8/22/2024   Insurance Primary/Secondary: [employer] [place] HMO / N/A     # Auth Visits: 28 visits until 11/27    Subjective: Completing HEP. Deny any new c/o. C/o L upper trap discomfort.   Pain: 0/10 at rest    Assessment: Pt with improved L shoulder PROM tolerance and degrees of PROM today. Pt progressing well within protocol restrictions, tolerated session well. Briefly applied MHP to L upper trap for soft tissue c/o.         Objective:     PROM (degrees): (* denotes performed with pain)  Shoulder  Elbow   Flexion: L 115*  Abduction: L 90*  ER:  L 435*  IR: L at least 40* Flexion: R 150; L 145  Extension: R 6; L 0     L shoulder surgical sites appear WNL    Goals: (To be met in 18 visits)   Pt will report improved ability to sleep without waking due to shoulder pain  Pt will improve shoulder flexion AROM to >160 degrees to be able to reach into overhead cabinets without pain or restriction  Pt will improve shoulder abduction AROM to >160 degrees to improve ability to don deodorant, don/doff shirts, and wash hair  Pt will increase shoulder AROM ER to 90 to reach and fasten seatbelt   Pt will increase shoulder AROM IR to 70 to be able to reach in back pocket, tuck in shirt, and turn steering wheel without pain  Pt will improve shoulder strength throughout to 4+/5 to improve function with ADLs including reaching and lifting  Pt will demonstrate increased mid/low trap strength to 4/5 to promote improved shoulder mechanics and stabilization with ADL such as lifting and reaching   Pt will be independent and compliant with comprehensive HEP to maintain progress achieved in PT       Plan: Continue  skilled Physical Therapy 1-2 x/week or a total of 18 visits. Progress per post op protocol: PROM L UE.   Date: 9/3/2024  TX#: 2/18 Date:  9/12/2024               TX#: 3/18 (3/28 auth) Date: 9/16/2024               TX#: 4/18 (4/28 auth) Date:                 TX#: 5/ Date:   Tx#: 6/   TE (10 min)  - PROM assessment  - table seated PROM shoulder flexion, 2x6  - scapular retraction in sidelying, 2x5 There Ex:   L wrist flexor stretch 2 x 20 sec each (sling donned)  L wrist extensor stretch 2 x 20 sec each (sling donned)  L forearm passive supination/pronation x 10 reps   L hand digiflex: red x 15 reps, geen x 15 reps  L shoulder pendulums flex/ext, horiz abd-add x 30 sec each (HEP)  HEP instruction/handout; see below.         There Ex:   HEP review; see below.   L hand green digiflex x 20 reps  Seated scapular retraction x 20 reps  Seated gentle thoracic spine ext - not completed, next session     NMRE (8 min)  - Scapular retraction isometric with tactile feed back 5s x 10   Manual:   PROM L wrist flex/ext, radial/ulnar deviation  PROM L forearm supination/pronation, elbow flex/ext  PROM L shoulder ER, IR, flex, abd - limited/poor itzel for PROM   Manual:   PROM L wrist flex/ext, radial/ulnar deviation  PROM L forearm supination/pronation, elbow flex/ext  PROM L shoulder ER, IR, flex, abd   STM L upper trap     MT (10 min)  - Shoulder PROM assessment  - Passive shoulder abduction, flexion  MHP L upper trap x 5 min      TA:   Reviewed post op protocol      HEP: pendulums (flex/ext, horiz abd/add, circular), PROM L elbow flex/ext, L hand towel/stress ball     Charges: 1 TE (15 min), 2 manual (25 min) Total Timed Treatment: 40 min  Total Treatment Time: 45 min    Post op protocol:  Protocol (2-6 weeks):  Continue shoulder immobilization until 4 weeks, discontinue based on tear size  Normalize elbow and wrist motion  Prevent motion stretching repair   Progress motion as per protocol. For small and medium tears, begin to  progress shoulder motion  Weightbearing: Progress per protocol  Protocol (6-8 weeks):  Discontinue shoulder immobilization if not already done  Progress motion as per protocol  Small and medium tears: AAROM for FLEX/ABD to 120  Large and massive tears: PROM for FLEX/ABD to 120  Weightbearing: Limit elbow flexion to <10 pounds during weeks 7-8 and to <15 pounds during weeks 8-12  Protocol (9-16 weeks):  Normalize shoulder motion  Avoid push-ups, pull-ups, bench press, and overhead weightlifting activities  Weightbearing: Progress per protocol  Protocol (16 weeks-8 months):  Meet occupational requirements by 4 months  Restore overhead strength by 6-8 months  No weightlifting restrictions        RANGE-OF-MOTION LIMITATIONS: Per schedule below     Tear Size Sling Use Begin PROM Begin AROM   Small <1 cm2 4 weeks Immediate 4 weeks   Medium 1-3 cm2 4-6 weeks Immediate 6 weeks   Large 3-5 cm2 6 weeks 2 weeks 8 weeks   Massive >5 cm2 6-8 weeks 4 weeks 8 weeks

## 2024-09-18 ENCOUNTER — OFFICE VISIT (OUTPATIENT)
Dept: PHYSICAL THERAPY | Age: 54
End: 2024-09-18
Attending: STUDENT IN AN ORGANIZED HEALTH CARE EDUCATION/TRAINING PROGRAM
Payer: COMMERCIAL

## 2024-09-18 PROCEDURE — 97140 MANUAL THERAPY 1/> REGIONS: CPT | Performed by: PHYSICAL THERAPIST

## 2024-09-18 PROCEDURE — 97110 THERAPEUTIC EXERCISES: CPT | Performed by: PHYSICAL THERAPIST

## 2024-09-18 NOTE — PROGRESS NOTES
Diagnosis:   Nontraumatic incomplete rupture of rotator cuff, left (M75.112)  Superior labrum anterior-to-posterior (SLAP) tear of left shoulder (S43.558A)       Referring Provider: Igor Smith  Date of Evaluation:    8/28/2024    Precautions:       - post surgical precautions/protocol  Next MD visit:   10/11/2024  Date of Surgery: 8/22/2024   Insurance Primary/Secondary: [employer] [place] / N/A     # Auth Visits: 28 visits until 11/27    Subjective: Completing HEP.  C/o increased L shoulder stiffness with less HEP compliance yesterday due to visited school/work.   Pain: 0/10 at rest    Assessment:    Pt with progressive improvement in L shoulder PROM. Progressed there ex/Hep to include additional PROM/very light AAROM for shoulder flex and ER (see below). Pt tolerated session well overall, continues to have some c/o pain which seem WNL, given ~ 4 week post op status and ROM deficits.  Discussed wrist flexor stretches, STM medial L epicondyle, avoid excessive gripping exercises in effort to reduce medial L elbow c/o which may be suggestive of medial epicondyle irritation.       Objective:     PROM (degrees): (* denotes performed with pain)  Shoulder  Elbow   Flexion: L 120*  Abduction: L 130*  ER:  L 35*  IR: L at least 40* Flexion: R 150; L 145  Extension: R 6; L 0     Goals: (To be met in 18 visits)   Pt will report improved ability to sleep without waking due to shoulder pain  Pt will improve shoulder flexion AROM to >160 degrees to be able to reach into overhead cabinets without pain or restriction  Pt will improve shoulder abduction AROM to >160 degrees to improve ability to don deodorant, don/doff shirts, and wash hair  Pt will increase shoulder AROM ER to 90 to reach and fasten seatbelt   Pt will increase shoulder AROM IR to 70 to be able to reach in back pocket, tuck in shirt, and turn steering wheel without pain  Pt will improve shoulder strength throughout to 4+/5 to improve function with ADLs including  reaching and lifting  Pt will demonstrate increased mid/low trap strength to 4/5 to promote improved shoulder mechanics and stabilization with ADL such as lifting and reaching   Pt will be independent and compliant with comprehensive HEP to maintain progress achieved in PT       Plan: Continue skilled Physical Therapy 1-2 x/week or a total of 18 visits. Progress per post op protocol: PROM L UE, slowly progress to very light AAROM. F/u on HEP progressions.   Date: 9/3/2024  TX#: 2/18 Date:  9/12/2024               TX#: 3/18 (3/28 auth) Date: 9/16/2024               TX#: 4/18 (4/28 auth) Date:  9/18/2024                TX#: 5/18 ( 5/28 auth) Date:   Tx#: 6/   TE (10 min)  - PROM assessment  - table seated PROM shoulder flexion, 2x6  - scapular retraction in sidelying, 2x5 There Ex:   L wrist flexor stretch 2 x 20 sec each (sling donned)  L wrist extensor stretch 2 x 20 sec each (sling donned)  L forearm passive supination/pronation x 10 reps   L hand digiflex: red x 15 reps, geen x 15 reps  L shoulder pendulums flex/ext, horiz abd-add x 30 sec each (HEP)  HEP instruction/handout; see below.         There Ex:   HEP review; see below.   L hand green digiflex x 20 reps  Seated scapular retraction x 20 reps  Seated gentle thoracic spine ext - not completed, next session There Ex:   L shoulder pendulums: flex/ext, horiz abd-add   Supine L elbow ext ROM/stretch 2 x 20 sec each  Supine L shoulder PROM ER/IR using wand - pt c/o; completed standing 5 x 10 sec each - pt itzel well (HEP)  Seated L shoulder PROM/very gentle AAROM flexion/table slides x 5 reps facing forward, x 5 reps with side to table (HEP)  Standing L elbow ext with arm at side 2 x 30 sec each (HEP)  For medial elbow c/o: STM, wrist extexnsor stretches 2 x 20 sec each     NMRE (8 min)  - Scapular retraction isometric with tactile feed back 5s x 10   Manual:   PROM L wrist flex/ext, radial/ulnar deviation  PROM L forearm supination/pronation, elbow flex/ext  PROM  L shoulder ER, IR, flex, abd - limited/poor itzel for PROM   Manual:   PROM L wrist flex/ext, radial/ulnar deviation  PROM L forearm supination/pronation, elbow flex/ext  PROM L shoulder ER, IR, flex, abd   STM L upper trap Manual:   PROM L wrist flex/ext, radial/ulnar deviation  PROM L forearm supination/pronation, elbow flex/ext  PROM L shoulder ER, IR, flex, abd   STM L upper trap    MT (10 min)  - Shoulder PROM assessment  - Passive shoulder abduction, flexion  MHP L upper trap x 5 min      TA:   Reviewed post op protocol      HEP: pendulums (flex/ext, horiz abd/add, circular), PROM L elbow flex/ext, L hand towel/stress ball , PROM table slide shoulder flex, PROM ER/IR using wand standing    Charges: 2 TE (25 min), 1 manual (15 min) Total Timed Treatment: 40 min  Total Treatment Time: 45 min    Post op protocol:  Protocol (2-6 weeks):  Continue shoulder immobilization until 4 weeks, discontinue based on tear size  Normalize elbow and wrist motion  Prevent motion stretching repair   Progress motion as per protocol. For small and medium tears, begin to progress shoulder motion  Weightbearing: Progress per protocol  Protocol (6-8 weeks):  Discontinue shoulder immobilization if not already done  Progress motion as per protocol  Small and medium tears: AAROM for FLEX/ABD to 120  Large and massive tears: PROM for FLEX/ABD to 120  Weightbearing: Limit elbow flexion to <10 pounds during weeks 7-8 and to <15 pounds during weeks 8-12  Protocol (9-16 weeks):  Normalize shoulder motion  Avoid push-ups, pull-ups, bench press, and overhead weightlifting activities  Weightbearing: Progress per protocol  Protocol (16 weeks-8 months):  Meet occupational requirements by 4 months  Restore overhead strength by 6-8 months  No weightlifting restrictions        RANGE-OF-MOTION LIMITATIONS: Per schedule below     Tear Size Sling Use Begin PROM Begin AROM   Small <1 cm2 4 weeks Immediate 4 weeks   Medium 1-3 cm2 4-6 weeks Immediate 6  weeks   Large 3-5 cm2 6 weeks 2 weeks 8 weeks   Massive >5 cm2 6-8 weeks 4 weeks 8 weeks

## 2024-09-23 ENCOUNTER — OFFICE VISIT (OUTPATIENT)
Dept: PHYSICAL THERAPY | Age: 54
End: 2024-09-23
Attending: STUDENT IN AN ORGANIZED HEALTH CARE EDUCATION/TRAINING PROGRAM
Payer: COMMERCIAL

## 2024-09-23 PROCEDURE — 97140 MANUAL THERAPY 1/> REGIONS: CPT | Performed by: PHYSICAL THERAPIST

## 2024-09-23 PROCEDURE — 97110 THERAPEUTIC EXERCISES: CPT | Performed by: PHYSICAL THERAPIST

## 2024-09-23 NOTE — PROGRESS NOTES
Diagnosis:   Nontraumatic incomplete rupture of rotator cuff, left (M75.112)  Superior labrum anterior-to-posterior (SLAP) tear of left shoulder (S43.106A)       Referring Provider: Igor Smith  Date of Evaluation:    8/28/2024    Precautions:       - post surgical precautions/protocol  Next MD visit:   10/11/2024  Date of Surgery: 8/22/2024   Insurance Primary/Secondary: [employer] [place] HMO / N/A     # Auth Visits: 28 visits until 11/27    Subjective: Completing HEP.   C/o some shoulder discomfort this weekend - may be related to stopping muscle relaxants and sleeping in a different bed. Shoulder stiff mornings.  L elbow still hurts.       Pain: 0/10 at rest    Assessment:    Pt maintaining L shoulder PROM flex, abd ROM; some improvements in PROM ER/IR. Pt tolerating Hep progressions well. Pt with continued c/o medial L elbow c/o suggestive of epicondylitis.  Reviewed wrist flexor stretches, STM medial L epicondyle, avoid excessive gripping exercises in effort to reduce medial L elbow c/o.  Pt reports no longer completing  exercise at home. Pt with less L upper trap c/o. Pt will benefit from continued PT to address residual deficits.       Objective:     PROM (degrees): (* denotes performed with pain)  Shoulder  Elbow   Flexion: L 120*  Abduction: L 130*  ER:  L ~40*  IR: L at least 450* Flexion: R 150; L 145  Extension: R 6; L 0     Goals: (To be met in 18 visits)   Pt will report improved ability to sleep without waking due to shoulder pain  Pt will improve shoulder flexion AROM to >160 degrees to be able to reach into overhead cabinets without pain or restriction  Pt will improve shoulder abduction AROM to >160 degrees to improve ability to don deodorant, don/doff shirts, and wash hair  Pt will increase shoulder AROM ER to 90 to reach and fasten seatbelt   Pt will increase shoulder AROM IR to 70 to be able to reach in back pocket, tuck in shirt, and turn steering wheel without pain  Pt will improve shoulder  strength throughout to 4+/5 to improve function with ADLs including reaching and lifting  Pt will demonstrate increased mid/low trap strength to 4/5 to promote improved shoulder mechanics and stabilization with ADL such as lifting and reaching   Pt will be independent and compliant with comprehensive HEP to maintain progress achieved in PT       Plan: Continue skilled Physical Therapy 1-2 x/week or a total of 18 visits. Progress per post op protocol: PROM L UE, slowly progress to very light AAROM.  Date: 9/3/2024  TX#: 2/18 Date:  9/12/2024               TX#: 3/18 (3/28 auth) Date: 9/16/2024               TX#: 4/18 (4/28 auth) Date:  9/18/2024                TX#: 5/18 ( 5/28 auth) Date: 9/23/2023  Tx#: 6/18 (6/28 auth)   TE (10 min)  - PROM assessment  - table seated PROM shoulder flexion, 2x6  - scapular retraction in sidelying, 2x5 There Ex:   L wrist flexor stretch 2 x 20 sec each (sling donned)  L wrist extensor stretch 2 x 20 sec each (sling donned)  L forearm passive supination/pronation x 10 reps   L hand digiflex: red x 15 reps, geen x 15 reps  L shoulder pendulums flex/ext, horiz abd-add x 30 sec each (HEP)  HEP instruction/handout; see below.         There Ex:   HEP review; see below.   L hand green digiflex x 20 reps  Seated scapular retraction x 20 reps  Seated gentle thoracic spine ext - not completed, next session There Ex:   L shoulder pendulums: flex/ext, horiz abd-add   Supine L elbow ext ROM/stretch 2 x 20 sec each  Supine L shoulder PROM ER/IR using wand - pt c/o; completed standing 5 x 10 sec each - pt itzel well (HEP)  Seated L shoulder PROM/very gentle AAROM flexion/table slides x 5 reps facing forward, x 5 reps with side to table (HEP)  Standing L elbow ext with arm at side 2 x 30 sec each (HEP)  For medial elbow c/o: STM, wrist extexnsor stretches 2 x 20 sec each  There Ex:   HEP review; see below.   L shoulder mini W PROM using ball on table x 5 reps each  Standing L shoulder PROM ER/IR using  wand 5 x 10 sec each (HEP review)  Reviewed form for shoulder pendulums   NMRE (8 min)  - Scapular retraction isometric with tactile feed back 5s x 10   Manual:   PROM L wrist flex/ext, radial/ulnar deviation  PROM L forearm supination/pronation, elbow flex/ext  PROM L shoulder ER, IR, flex, abd - limited/poor itzel for PROM   Manual:   PROM L wrist flex/ext, radial/ulnar deviation  PROM L forearm supination/pronation, elbow flex/ext  PROM L shoulder ER, IR, flex, abd   STM L upper trap Manual:   PROM L wrist flex/ext, radial/ulnar deviation  PROM L forearm supination/pronation, elbow flex/ext  PROM L shoulder ER, IR, flex, abd   STM L upper trap Manual:   Surgical scar STM (not biceps or posterior shoulder - HEP)  PROM L wrist flex/ext, radial/ulnar deviation  PROM L forearm supination/pronation, elbow flex/ext  PROM L shoulder ER, IR, flex, abd   STM L upper trap   MT (10 min)  - Shoulder PROM assessment  - Passive shoulder abduction, flexion  MHP L upper trap x 5 min      TA:   Reviewed post op protocol      HEP: pendulums (flex/ext, horiz abd/add, circular), PROM L elbow flex/ext, L hand towel/stress ball , table slide flexion (PROM), shoulder ER PROM using wand    Charges: 1 TE (15 min), 2 manual (25 min) Total Timed Treatment: 40 min  Total Treatment Time: 45 min    Post op protocol:  Protocol (2-6 weeks):  Continue shoulder immobilization until 4 weeks, discontinue based on tear size  Normalize elbow and wrist motion  Prevent motion stretching repair   Progress motion as per protocol. For small and medium tears, begin to progress shoulder motion  Weightbearing: Progress per protocol  Protocol (6-8 weeks):  Discontinue shoulder immobilization if not already done  Progress motion as per protocol  Small and medium tears: AAROM for FLEX/ABD to 120  Large and massive tears: PROM for FLEX/ABD to 120  Weightbearing: Limit elbow flexion to <10 pounds during weeks 7-8 and to <15 pounds during weeks 8-12  Protocol  (9-16 weeks):  Normalize shoulder motion  Avoid push-ups, pull-ups, bench press, and overhead weightlifting activities  Weightbearing: Progress per protocol  Protocol (16 weeks-8 months):  Meet occupational requirements by 4 months  Restore overhead strength by 6-8 months  No weightlifting restrictions        RANGE-OF-MOTION LIMITATIONS: Per schedule below     Tear Size Sling Use Begin PROM Begin AROM   Small <1 cm2 4 weeks Immediate 4 weeks   Medium 1-3 cm2 4-6 weeks Immediate 6 weeks   Large 3-5 cm2 6 weeks 2 weeks 8 weeks   Massive >5 cm2 6-8 weeks 4 weeks 8 weeks

## 2024-09-25 ENCOUNTER — OFFICE VISIT (OUTPATIENT)
Dept: PHYSICAL THERAPY | Age: 54
End: 2024-09-25
Attending: STUDENT IN AN ORGANIZED HEALTH CARE EDUCATION/TRAINING PROGRAM
Payer: COMMERCIAL

## 2024-09-25 PROCEDURE — 97140 MANUAL THERAPY 1/> REGIONS: CPT | Performed by: PHYSICAL THERAPIST

## 2024-09-25 PROCEDURE — 97110 THERAPEUTIC EXERCISES: CPT | Performed by: PHYSICAL THERAPIST

## 2024-09-25 NOTE — PROGRESS NOTES
Diagnosis:   Nontraumatic incomplete rupture of rotator cuff, left (M75.112)  Superior labrum anterior-to-posterior (SLAP) tear of left shoulder (S43.650A)       Referring Provider: Igor Smith  Date of Evaluation:    8/28/2024    Precautions:       - post surgical precautions/protocol  Next MD visit:   10/11/2024  Date of Surgery: 8/22/2024   Insurance Primary/Secondary: [employer] [place] / N/A     # Auth Visits: 28 visits until 11/27    Subjective: Completing HEP. L elbow improving.     Pain: 0/10 at rest    Assessment: Pt with improvement in L shoulder PROM,  less L upper trap c/o. Progressing L UE PROM/gentle AAROM. Pt tolerating well. Pt will benefit from continued PT to address residual deficits.       Objective:     PROM (degrees): (* denotes performed with pain)  Shoulder  Elbow   Flexion: L 140*  Abduction: L 150*  ER:  L ~50*  IR: L at least 50* Flexion: R 150; L 145  Extension: R 6; L 0     Goals: (To be met in 18 visits)   Pt will report improved ability to sleep without waking due to shoulder pain  Pt will improve shoulder flexion AROM to >160 degrees to be able to reach into overhead cabinets without pain or restriction  Pt will improve shoulder abduction AROM to >160 degrees to improve ability to don deodorant, don/doff shirts, and wash hair  Pt will increase shoulder AROM ER to 90 to reach and fasten seatbelt   Pt will increase shoulder AROM IR to 70 to be able to reach in back pocket, tuck in shirt, and turn steering wheel without pain  Pt will improve shoulder strength throughout to 4+/5 to improve function with ADLs including reaching and lifting  Pt will demonstrate increased mid/low trap strength to 4/5 to promote improved shoulder mechanics and stabilization with ADL such as lifting and reaching   Pt will be independent and compliant with comprehensive HEP to maintain progress achieved in PT       Plan: Continue skilled Physical Therapy 1-2 x/week or a total of 18 visits. Progress per post op  protocol: PROM L UE, slowly progress to light AAROM.  Date:  9/12/2024               TX#: 3/18 (3/28 auth) Date: 9/16/2024               TX#: 4/18 (4/28 auth) Date:  9/18/2024                TX#: 5/18 ( 5/28 auth) Date: 9/23/2023  Tx#: 6/18 (6/28 auth) Date: 9/25/2024  Tx #: 7/18 (7/28 auth)   There Ex:   L wrist flexor stretch 2 x 20 sec each (sling donned)  L wrist extensor stretch 2 x 20 sec each (sling donned)  L forearm passive supination/pronation x 10 reps   L hand digiflex: red x 15 reps, geen x 15 reps  L shoulder pendulums flex/ext, horiz abd-add x 30 sec each (HEP)  HEP instruction/handout; see below.         There Ex:   HEP review; see below.   L hand green digiflex x 20 reps  Seated scapular retraction x 20 reps  Seated gentle thoracic spine ext - not completed, next session There Ex:   L shoulder pendulums: flex/ext, horiz abd-add   Supine L elbow ext ROM/stretch 2 x 20 sec each  Supine L shoulder PROM ER/IR using wand - pt c/o; completed standing 5 x 10 sec each - pt itzel well (HEP)  Seated L shoulder PROM/very gentle AAROM flexion/table slides x 5 reps facing forward, x 5 reps with side to table (HEP)  Standing L elbow ext with arm at side 2 x 30 sec each (HEP)  For medial elbow c/o: STM, wrist extexnsor stretches 2 x 20 sec each  There Ex:   HEP review; see below.   L shoulder mini W PROM using ball on table x 5 reps each  Standing L shoulder PROM ER/IR using wand 5 x 10 sec each (HEP review)  Reviewed form for shoulder pendulums There Ex:   L shoulder mini W PROM using ball on table x 5 reps each  L shoulder ER ROM PROM/stretch using wall 4 x 20 sec  Supine AAROM/PROM flex x 5 reps (HEP)       Manual:   PROM L wrist flex/ext, radial/ulnar deviation  PROM L forearm supination/pronation, elbow flex/ext  PROM L shoulder ER, IR, flex, abd - limited/poor itzel for PROM   Manual:   PROM L wrist flex/ext, radial/ulnar deviation  PROM L forearm supination/pronation, elbow flex/ext  PROM L shoulder ER, IR,  flex, abd   STM L upper trap Manual:   PROM L wrist flex/ext, radial/ulnar deviation  PROM L forearm supination/pronation, elbow flex/ext  PROM L shoulder ER, IR, flex, abd   STM L upper trap Manual:   Surgical scar STM (not biceps or posterior shoulder - HEP)  PROM L wrist flex/ext, radial/ulnar deviation  PROM L forearm supination/pronation, elbow flex/ext  PROM L shoulder ER, IR, flex, abd   STM L upper trap Manual:   Surgical scar STM (not biceps or posterior shoulder - HEP)  PROM L wrist flex/ext, radial/ulnar deviation  PROM L forearm supination/pronation, elbow flex/ext  PROM L shoulder ER, IR, flex, abd   STM L upper trap    MHP L upper trap x 5 min      TA:   Reviewed post op protocol       HEP: pendulums (flex/ext, horiz abd/add, circular), PROM L elbow flex/ext, L hand towel/stress ball , table slide flexion (PROM) - progress to supine P/AAROM flexion, shoulder ER PROM using wand    Charges: 1 TE (15 min), 2 manual (25 min) Total Timed Treatment: 40 min  Total Treatment Time: 45 min    Post op protocol:  Protocol (2-6 weeks):  Continue shoulder immobilization until 4 weeks, discontinue based on tear size  Normalize elbow and wrist motion  Prevent motion stretching repair   Progress motion as per protocol. For small and medium tears, begin to progress shoulder motion  Weightbearing: Progress per protocol  Protocol (6-8 weeks):  Discontinue shoulder immobilization if not already done  Progress motion as per protocol  Small and medium tears: AAROM for FLEX/ABD to 120  Large and massive tears: PROM for FLEX/ABD to 120  Weightbearing: Limit elbow flexion to <10 pounds during weeks 7-8 and to <15 pounds during weeks 8-12  Protocol (9-16 weeks):  Normalize shoulder motion  Avoid push-ups, pull-ups, bench press, and overhead weightlifting activities  Weightbearing: Progress per protocol  Protocol (16 weeks-8 months):  Meet occupational requirements by 4 months  Restore overhead strength by 6-8 months  No  weightlifting restrictions        RANGE-OF-MOTION LIMITATIONS: Per schedule below     Tear Size Sling Use Begin PROM Begin AROM   Small <1 cm2 4 weeks Immediate 4 weeks   Medium 1-3 cm2 4-6 weeks Immediate 6 weeks   Large 3-5 cm2 6 weeks 2 weeks 8 weeks   Massive >5 cm2 6-8 weeks 4 weeks 8 weeks

## 2024-09-30 ENCOUNTER — OFFICE VISIT (OUTPATIENT)
Dept: PHYSICAL THERAPY | Age: 54
End: 2024-09-30
Attending: STUDENT IN AN ORGANIZED HEALTH CARE EDUCATION/TRAINING PROGRAM
Payer: COMMERCIAL

## 2024-09-30 PROCEDURE — 97140 MANUAL THERAPY 1/> REGIONS: CPT | Performed by: PHYSICAL THERAPIST

## 2024-09-30 PROCEDURE — 97110 THERAPEUTIC EXERCISES: CPT | Performed by: PHYSICAL THERAPIST

## 2024-09-30 NOTE — PROGRESS NOTES
Diagnosis:   Nontraumatic incomplete rupture of rotator cuff, left (M75.112)  Superior labrum anterior-to-posterior (SLAP) tear of left shoulder (S43.889A)       Referring Provider: Igor Smith  Date of Evaluation:    8/28/2024    Precautions:       - post surgical precautions/protocol  Next MD visit:   10/11/2024  Date of Surgery: 8/22/2024   Insurance Primary/Secondary: [employer] [place] / N/A     # Auth Visits: 28 visits until 11/27    Subjective: Completing HEP.  L shoulder is a little stiff.  L elbow improving. Doing light dishes with the sling on.     Pain: 0/10 at rest    Assessment: Pt with  progressive improvement in L shoulder PROM,  less L elbow c/o. Pt with continued L upper trap c/o; pt continues to reports STM helpful.  Progressing L UE PROM/gentle AAROM. Pt tolerating well. Pt will benefit from continued PT to address residual deficits.       Objective:     PROM (degrees): (* denotes performed with pain)  Shoulder  Elbow   Flexion: L 160*  Abduction: L 160*  ER:  L ~60*  IR: L at least 60* Flexion: R 150; L 145  Extension: R 6; L 0     Goals: (To be met in 18 visits)   Pt will report improved ability to sleep without waking due to shoulder pain  Pt will improve shoulder flexion AROM to >160 degrees to be able to reach into overhead cabinets without pain or restriction  Pt will improve shoulder abduction AROM to >160 degrees to improve ability to don deodorant, don/doff shirts, and wash hair  Pt will increase shoulder AROM ER to 90 to reach and fasten seatbelt   Pt will increase shoulder AROM IR to 70 to be able to reach in back pocket, tuck in shirt, and turn steering wheel without pain  Pt will improve shoulder strength throughout to 4+/5 to improve function with ADLs including reaching and lifting  Pt will demonstrate increased mid/low trap strength to 4/5 to promote improved shoulder mechanics and stabilization with ADL such as lifting and reaching   Pt will be independent and compliant with  comprehensive HEP to maintain progress achieved in PT       Plan: Continue skilled Physical Therapy 1-2 x/week or a total of 18 visits. Progress per post op protocol: PROM L UE, slowly progress to light AAROM.  Date:  9/18/2024                TX#: 5/18 ( 5/28 auth) Date: 9/23/2023  Tx#: 6/18 (6/28 auth) Date: 9/25/2024  Tx #: 7/18 (7/28 auth) Date: 9/30/2024  Tx #: 8/18 (8/28)   There Ex:   L shoulder pendulums: flex/ext, horiz abd-add   Supine L elbow ext ROM/stretch 2 x 20 sec each  Supine L shoulder PROM ER/IR using wand - pt c/o; completed standing 5 x 10 sec each - pt itzel well (HEP)  Seated L shoulder PROM/very gentle AAROM flexion/table slides x 5 reps facing forward, x 5 reps with side to table (HEP)  Standing L elbow ext with arm at side 2 x 30 sec each (HEP)  For medial elbow c/o: STM, wrist extexnsor stretches 2 x 20 sec each  There Ex:   HEP review; see below.   L shoulder mini W PROM using ball on table x 5 reps each  Standing L shoulder PROM ER/IR using wand 5 x 10 sec each (HEP review)  Reviewed form for shoulder pendulums There Ex:   L shoulder mini W PROM using ball on table x 5 reps each  L shoulder ER ROM PROM/stretch using wall 4 x 20 sec  Supine AAROM/PROM flex x 5 reps (HEP)     There Ex:   L shoulder pendulums x ~ 2 min  L shoulder PROM/stretch using wall 5 x 20 sec  L shoulder mini W PROM using ball on table x 5 reps each  Supine AAROM/PROM flex x 5 reps (HEP review)  Seated scapular retraction x 10 reps     Manual:   PROM L wrist flex/ext, radial/ulnar deviation  PROM L forearm supination/pronation, elbow flex/ext  PROM L shoulder ER, IR, flex, abd   STM L upper trap Manual:   Surgical scar STM (not biceps or posterior shoulder - HEP)  PROM L wrist flex/ext, radial/ulnar deviation  PROM L forearm supination/pronation, elbow flex/ext  PROM L shoulder ER, IR, flex, abd   STM L upper trap Manual:   Surgical scar STM (not biceps or posterior shoulder - HEP)  PROM L wrist flex/ext, radial/ulnar  deviation  PROM L forearm supination/pronation, elbow flex/ext  PROM L shoulder ER, IR, flex, abd   STM L upper trap Manual:   Surgical scar STM (not biceps or posterior shoulder - HEP)  PROM L wrist flex/ext, radial/ulnar deviation  PROM L forearm supination/pronation, elbow flex/ext  PROM L shoulder ER, IR, flex, abd   STM L upper trap  Pt declined STM L medial epicondyle               HEP: pendulums (flex/ext, horiz abd/add, circular), PROM L elbow flex/ext - as needed, L hand towel/stress ball , table slide flexion (PROM) - progress to supine P/AAROM flexion, shoulder ER PROM using wand    Charges: 1 TE (15 min), 2 manual (25 min) Total Timed Treatment: 40 min  Total Treatment Time: 45 min    Post op protocol:  Protocol (2-6 weeks):  Continue shoulder immobilization until 4 weeks, discontinue based on tear size  Normalize elbow and wrist motion  Prevent motion stretching repair   Progress motion as per protocol. For small and medium tears, begin to progress shoulder motion  Weightbearing: Progress per protocol  Protocol (6-8 weeks):  Discontinue shoulder immobilization if not already done  Progress motion as per protocol  Small and medium tears: AAROM for FLEX/ABD to 120  Large and massive tears: PROM for FLEX/ABD to 120  Weightbearing: Limit elbow flexion to <10 pounds during weeks 7-8 and to <15 pounds during weeks 8-12  Protocol (9-16 weeks):  Normalize shoulder motion  Avoid push-ups, pull-ups, bench press, and overhead weightlifting activities  Weightbearing: Progress per protocol  Protocol (16 weeks-8 months):  Meet occupational requirements by 4 months  Restore overhead strength by 6-8 months  No weightlifting restrictions        RANGE-OF-MOTION LIMITATIONS: Per schedule below     Tear Size Sling Use Begin PROM Begin AROM   Small <1 cm2 4 weeks Immediate 4 weeks   Medium 1-3 cm2 4-6 weeks Immediate 6 weeks   Large 3-5 cm2 6 weeks 2 weeks 8 weeks   Massive >5 cm2 6-8 weeks 4 weeks 8 weeks

## 2024-10-03 ENCOUNTER — PATIENT MESSAGE (OUTPATIENT)
Dept: INTERNAL MEDICINE CLINIC | Facility: CLINIC | Age: 54
End: 2024-10-03

## 2024-10-03 ENCOUNTER — OFFICE VISIT (OUTPATIENT)
Dept: PHYSICAL THERAPY | Age: 54
End: 2024-10-03
Attending: STUDENT IN AN ORGANIZED HEALTH CARE EDUCATION/TRAINING PROGRAM
Payer: COMMERCIAL

## 2024-10-03 DIAGNOSIS — E66.9 OBESITY (BMI 30-39.9): Primary | ICD-10-CM

## 2024-10-03 PROCEDURE — 97110 THERAPEUTIC EXERCISES: CPT | Performed by: PHYSICAL THERAPIST

## 2024-10-03 PROCEDURE — 97140 MANUAL THERAPY 1/> REGIONS: CPT | Performed by: PHYSICAL THERAPIST

## 2024-10-03 NOTE — PROGRESS NOTES
Diagnosis:   Nontraumatic incomplete rupture of rotator cuff, left (M75.112)  Superior labrum anterior-to-posterior (SLAP) tear of left shoulder (S43.699A)       Referring Provider: Igor Smith  Date of Evaluation:    8/28/2024    Precautions:       - post surgical precautions/protocol  Next MD visit:   10/11/2024  Date of Surgery: 8/22/2024   Insurance Primary/Secondary: [employer] [place] / N/A     # Auth Visits: 28 visits until 11/27    Subjective: Completing HEP without c/o.  L shoulder is a little stiff.  Will discontinue sling tomorrow since ~ 6 weeks post op.      Pain: 1/10     Assessment: Pt with poorer tolerance for PROM and session in general today.  C/o improved following cold pack application x 10 min. Progressing L UE PROM/gentle AAROM. Pt tolerating well in general. Hep reviewed; see below. Pt will benefit from continued PT to address residual deficits.       Objective:     PROM (degrees): (* denotes performed with pain)  Shoulder  Elbow   Flexion: L 160*  Abduction: L 160*  ER:  L ~60*  IR: L at least 60* Flexion: R 150; L 145  Extension: R 6; L 0     Goals: (To be met in 18 visits)   Pt will report improved ability to sleep without waking due to shoulder pain  Pt will improve shoulder flexion AROM to >160 degrees to be able to reach into overhead cabinets without pain or restriction  Pt will improve shoulder abduction AROM to >160 degrees to improve ability to don deodorant, don/doff shirts, and wash hair  Pt will increase shoulder AROM ER to 90 to reach and fasten seatbelt   Pt will increase shoulder AROM IR to 70 to be able to reach in back pocket, tuck in shirt, and turn steering wheel without pain  Pt will improve shoulder strength throughout to 4+/5 to improve function with ADLs including reaching and lifting  Pt will demonstrate increased mid/low trap strength to 4/5 to promote improved shoulder mechanics and stabilization with ADL such as lifting and reaching   Pt will be independent and compliant  with comprehensive HEP to maintain progress achieved in PT       Plan: Continue skilled Physical Therapy 1-2 x/week or a total of 18 visits.  F/u on discontinuation of sling at 6 weeks, cleared by Dr. Smith via Epic message. Progress per post op protocol: PROM L UE, slowly progress to light AAROM.  Date:  9/18/2024                TX#: 5/18 ( 5/28 auth) Date: 9/23/2023  Tx#: 6/18 (6/28 auth) Date: 9/25/2024  Tx #: 7/18 (7/28 auth) Date: 9/30/2024  Tx #: 8/18 (8/28) Date: 10/3/2024  Tx #: 9/18 (9/28)   There Ex:   L shoulder pendulums: flex/ext, horiz abd-add   Supine L elbow ext ROM/stretch 2 x 20 sec each  Supine L shoulder PROM ER/IR using wand - pt c/o; completed standing 5 x 10 sec each - pt itzel well (HEP)  Seated L shoulder PROM/very gentle AAROM flexion/table slides x 5 reps facing forward, x 5 reps with side to table (HEP)  Standing L elbow ext with arm at side 2 x 30 sec each (HEP)  For medial elbow c/o: STM, wrist extexnsor stretches 2 x 20 sec each  There Ex:   HEP review; see below.   L shoulder mini W PROM using ball on table x 5 reps each  Standing L shoulder PROM ER/IR using wand 5 x 10 sec each (HEP review)  Reviewed form for shoulder pendulums There Ex:   L shoulder mini W PROM using ball on table x 5 reps each  L shoulder ER ROM PROM/stretch using wall 4 x 20 sec  Supine AAROM/PROM flex x 5 reps (HEP)     There Ex:   L shoulder pendulums x ~ 2 min  L shoulder PROM/stretch using wall 5 x 20 sec  L shoulder mini W PROM using ball on table x 5 reps each  Supine AAROM/PROM flex x 5 reps (HEP review)  Seated scapular retraction x 10 reps   There Ex:   L shoulder pendulums x ~ 2 min  Supine shoulder AAROM flexion using wand - pt c/o, stopped  Supine shoulder ER PROM using wand 5 x 10 sec   Standing L/R shoulder ext PROM using wand to posterior hip 5 x 10 sec each  B scapular retraction x 10 reps  Seated shoulder pulleys: flex PROM - pt c/o, stopped  HEP review/revision; see below.   Manual:   PROM L wrist  flex/ext, radial/ulnar deviation  PROM L forearm supination/pronation, elbow flex/ext  PROM L shoulder ER, IR, flex, abd   STM L upper trap Manual:   Surgical scar STM (not biceps or posterior shoulder - HEP)  PROM L wrist flex/ext, radial/ulnar deviation  PROM L forearm supination/pronation, elbow flex/ext  PROM L shoulder ER, IR, flex, abd   STM L upper trap Manual:   Surgical scar STM (not biceps or posterior shoulder - HEP)  PROM L wrist flex/ext, radial/ulnar deviation  PROM L forearm supination/pronation, elbow flex/ext  PROM L shoulder ER, IR, flex, abd   STM L upper trap Manual:   Surgical scar STM (not biceps or posterior shoulder - HEP)  PROM L wrist flex/ext, radial/ulnar deviation  PROM L forearm supination/pronation, elbow flex/ext  PROM L shoulder ER, IR, flex, abd   STM L upper trap  Pt declined STM L medial epicondyle Manual:   Surgical scar mobilization - improvement noted  PROM L wrist flex/ext - min due to WNL  PROM L forearm supination/pronation, elbow flex/ext - min due to WNL  Attempted light GH post and inf glides - pt c/o, stopped  PROM L shoulder ER, IR, flex, abd  - limited due to poor itzel  STM L upper trap         Cold pack L shoulder x 10 min seated          HEP: pendulums (flex/ext, horiz abd/add, circular)*, PROM L elbow flex/ext - as needed, L hand towel/stress ball , table slide flexion (PROM) - progress to supine P/AAROM flexion*, shoulder ER PROM using wand*, scap retraction*, * = priority exercises  Charges: 2 TE (28 min), 1 manual (10 min) Total Timed Treatment: 38 min  Total Treatment Time: 48 min    Post op protocol:  Protocol (2-6 weeks):  Continue shoulder immobilization until 4 weeks, discontinue based on tear size  Normalize elbow and wrist motion  Prevent motion stretching repair   Progress motion as per protocol. For small and medium tears, begin to progress shoulder motion  Weightbearing: Progress per protocol  Protocol (6-8 weeks):  Discontinue shoulder  immobilization if not already done  Progress motion as per protocol  Small and medium tears: AAROM for FLEX/ABD to 120  Large and massive tears: PROM for FLEX/ABD to 120  Weightbearing: Limit elbow flexion to <10 pounds during weeks 7-8 and to <15 pounds during weeks 8-12  Protocol (9-16 weeks):  Normalize shoulder motion  Avoid push-ups, pull-ups, bench press, and overhead weightlifting activities  Weightbearing: Progress per protocol  Protocol (16 weeks-8 months):  Meet occupational requirements by 4 months  Restore overhead strength by 6-8 months  No weightlifting restrictions        RANGE-OF-MOTION LIMITATIONS: Per schedule below     Tear Size Sling Use Begin PROM Begin AROM   Small <1 cm2 4 weeks Immediate 4 weeks   Medium 1-3 cm2 4-6 weeks Immediate 6 weeks   Large 3-5 cm2 6 weeks 2 weeks 8 weeks   Massive >5 cm2 6-8 weeks 4 weeks 8 weeks

## 2024-10-04 NOTE — TELEPHONE ENCOUNTER
Referral pended, if appropriate  Please respond directly to the patient if no additional staff support is required.

## 2024-10-07 ENCOUNTER — OFFICE VISIT (OUTPATIENT)
Dept: SURGERY | Facility: CLINIC | Age: 54
End: 2024-10-07
Payer: COMMERCIAL

## 2024-10-07 ENCOUNTER — OFFICE VISIT (OUTPATIENT)
Dept: PHYSICAL THERAPY | Age: 54
End: 2024-10-07
Attending: STUDENT IN AN ORGANIZED HEALTH CARE EDUCATION/TRAINING PROGRAM
Payer: COMMERCIAL

## 2024-10-07 ENCOUNTER — TELEPHONE (OUTPATIENT)
Dept: PHYSICAL THERAPY | Age: 54
End: 2024-10-07

## 2024-10-07 VITALS
WEIGHT: 199 LBS | HEART RATE: 96 BPM | DIASTOLIC BLOOD PRESSURE: 80 MMHG | SYSTOLIC BLOOD PRESSURE: 136 MMHG | HEIGHT: 62.5 IN | OXYGEN SATURATION: 98 % | BODY MASS INDEX: 35.7 KG/M2

## 2024-10-07 DIAGNOSIS — E55.9 VITAMIN D DEFICIENCY: ICD-10-CM

## 2024-10-07 DIAGNOSIS — R73.03 PREDIABETES: ICD-10-CM

## 2024-10-07 DIAGNOSIS — E03.9 ACQUIRED HYPOTHYROIDISM: ICD-10-CM

## 2024-10-07 DIAGNOSIS — E66.9 OBESITY (BMI 30-39.9): ICD-10-CM

## 2024-10-07 DIAGNOSIS — F43.9 STRESS: ICD-10-CM

## 2024-10-07 DIAGNOSIS — I10 PRIMARY HYPERTENSION: Primary | ICD-10-CM

## 2024-10-07 DIAGNOSIS — E78.00 HYPERCHOLESTEREMIA: ICD-10-CM

## 2024-10-07 DIAGNOSIS — Z51.81 ENCOUNTER FOR THERAPEUTIC DRUG MONITORING: ICD-10-CM

## 2024-10-07 DIAGNOSIS — Z86.718 HISTORY OF DVT (DEEP VEIN THROMBOSIS): ICD-10-CM

## 2024-10-07 PROCEDURE — 97140 MANUAL THERAPY 1/> REGIONS: CPT | Performed by: PHYSICAL THERAPIST

## 2024-10-07 PROCEDURE — 97110 THERAPEUTIC EXERCISES: CPT | Performed by: PHYSICAL THERAPIST

## 2024-10-07 PROCEDURE — 3079F DIAST BP 80-89 MM HG: CPT | Performed by: NURSE PRACTITIONER

## 2024-10-07 PROCEDURE — 3008F BODY MASS INDEX DOCD: CPT | Performed by: NURSE PRACTITIONER

## 2024-10-07 PROCEDURE — 3075F SYST BP GE 130 - 139MM HG: CPT | Performed by: NURSE PRACTITIONER

## 2024-10-07 PROCEDURE — 99214 OFFICE O/P EST MOD 30 MIN: CPT | Performed by: NURSE PRACTITIONER

## 2024-10-07 RX ORDER — TOPIRAMATE 25 MG/1
25 TABLET, FILM COATED ORAL 3 TIMES DAILY
Qty: 270 TABLET | Refills: 1 | Status: SHIPPED | OUTPATIENT
Start: 2024-10-07

## 2024-10-07 RX ORDER — PHENTERMINE HYDROCHLORIDE 37.5 MG/1
37.5 TABLET ORAL
Qty: 30 TABLET | Refills: 3 | Status: SHIPPED | OUTPATIENT
Start: 2024-10-07

## 2024-10-07 NOTE — PROGRESS NOTES
ProgressSummary  Pt has attended 10 visits in Physical Therapy.     Diagnosis:   Nontraumatic incomplete rupture of rotator cuff, left (M75.112)  Superior labrum anterior-to-posterior (SLAP) tear of left shoulder (S43.432A)       Referring Provider: Igor Smith  Date of Evaluation:    8/28/2024    Precautions:       - post surgical precautions/protocol  Next MD visit:   10/11/2024  Date of Surgery: 8/22/2024   Insurance Primary/Secondary: [employer] [place] / N/A     # Auth Visits: 28 visits until 11/27    Subjective: Discontinued sling at 6 weeks/last week.  Have been having to use some more medication, ice since. C/o some discomfort when sleeping. Deny c/o following last PT appointment. Completing HEP.    Pain: 2/10     Assessment: Pt progressing fairly well ~ 6.5 weeks s/p shoulder surgery (see dx above).  Pt with improved L shoulder/elbow PROM. Progressing to gentle AAROM; pt tolerating well. Pt with fair tolerance for PROM.  Pt generally tolerating PT sessions well. She will benefit from continued PT to address residual L shoulder ROM, strength, functional deficits.     Objective:     Palpation: L surgical portals WNL, mild thickening L biceps region scar    Surgical scars appear WNL    PROM (degrees): (* denotes performed with pain)  Shoulder  Elbow   Flexion: L 160*  Abduction: L 160*  ER:  L ~60*  IR: L at least 60* Flexion: R 150; L WNL  Extension: R 6; L WNL     Goals: (To be met in 18 visits) -  ALL GOALS IN PROGRESS  Pt will report improved ability to sleep without waking due to shoulder pain  Pt will improve shoulder flexion AROM to >160 degrees to be able to reach into overhead cabinets without pain or restriction  Pt will improve shoulder abduction AROM to >160 degrees to improve ability to don deodorant, don/doff shirts, and wash hair  Pt will increase shoulder AROM ER to 90 to reach and fasten seatbelt   Pt will increase shoulder AROM IR to 70 to be able to reach in back pocket, tuck in shirt, and turn  steering wheel without pain  Pt will improve shoulder strength throughout to 4+/5 to improve function with ADLs including reaching and lifting  Pt will demonstrate increased mid/low trap strength to 4/5 to promote improved shoulder mechanics and stabilization with ADL such as lifting and reaching   Pt will be independent and compliant with comprehensive HEP to maintain progress achieved in PT       Plan: Continue  skilled Physical Therapy 2 x/week or a total of 22-28 visits.  F/u on appointment with Dr. Smith.  Progress per post op protocol, P/AAROM, gentle isometrics.  Date: 9/25/2024  Tx #: 7/18 (7/28 auth) Date: 9/30/2024  Tx #: 8/18 (8/28) Date: 10/3/2024  Tx #: 9/18 (9/28) Date: 10/7/2024  Tx #: 10/18 (10/28)   There Ex:   L shoulder mini W PROM using ball on table x 5 reps each  L shoulder ER ROM PROM/stretch using wall 4 x 20 sec  Supine AAROM/PROM flex x 5 reps (HEP)     There Ex:   L shoulder pendulums x ~ 2 min  L shoulder PROM/stretch using wall 5 x 20 sec  L shoulder mini W PROM using ball on table x 5 reps each  Supine AAROM/PROM flex x 5 reps (HEP review)  Seated scapular retraction x 10 reps   There Ex:   L shoulder pendulums x ~ 2 min  Supine shoulder AAROM flexion using wand - pt c/o, stopped  Supine shoulder ER PROM using wand 5 x 10 sec   Standing L/R shoulder ext PROM using wand to posterior hip 5 x 10 sec each  B scapular retraction x 10 reps  Seated shoulder pulleys: flex PROM - pt c/o, stopped  HEP review/revision; see below. There Ex:   L shoulder pendulums x ~ 1 min  Seated shoulder pulleys: flex, scap  x ~ 10 reps  each  Gentle isometric L shoulder ER x 10 reps  Gentle L shoulder IR isometric x 10 reps  Standing scap retraction x 10 reps  Standing shoulder gentle ext AAROM using wand x 5 reps,  hand to sacrum using wand x 5 reps   Seated L shoulder horiz add across chest - next session. Pt declined further there ex, reported getting sore/tired     Manual:   Surgical scar STM (not biceps or  posterior shoulder - HEP)  PROM L wrist flex/ext, radial/ulnar deviation  PROM L forearm supination/pronation, elbow flex/ext  PROM L shoulder ER, IR, flex, abd   STM L upper trap Manual:   Surgical scar STM (not biceps or posterior shoulder - HEP)  PROM L wrist flex/ext, radial/ulnar deviation  PROM L forearm supination/pronation, elbow flex/ext  PROM L shoulder ER, IR, flex, abd   STM L upper trap  Pt declined STM L medial epicondyle Manual:   Surgical scar mobilization - improvement noted  PROM L wrist flex/ext - min due to WNL  PROM L forearm supination/pronation, elbow flex/ext - min due to WNL  Attempted light GH post and inf glides - pt c/o, stopped  PROM L shoulder ER, IR, flex, abd  - limited due to poor itzel  STM L upper trap   Manual:   Surgical scar mobilization - biceps scar only  PROM L elbow ext   Attempted light GH post and inf glides - pt c/o, stopped  PROM L shoulder ER, IR, flex, abd  - limited due to fair itzel  STM L upper trap     Cold pack L shoulder x 10 min seated Cold pack L shoulder x ~8 min seated         HEP: pendulums (flex/ext, horiz abd/add, circular), PROM L elbow flex/ext - as needed, L hand towel/stress ball , table slide flexion (PROM) - progress to supine P/AAROM flexion*, shoulder ER PROM using wand*, scap retraction, gentle shoulder ext/IR AAROM using wand*  = priority exercises  Charges: 2 TE (30 min), 1 manual (10 min) Total Timed Treatment: 40 min  Total Treatment Time: 48 min    Post op protocol:  Protocol (2-6 weeks):  Continue shoulder immobilization until 4 weeks, discontinue based on tear size  Normalize elbow and wrist motion  Prevent motion stretching repair   Progress motion as per protocol. For small and medium tears, begin to progress shoulder motion  Weightbearing: Progress per protocol  Protocol (6-8 weeks):  Discontinue shoulder immobilization if not already done  Progress motion as per protocol  Small and medium tears: AAROM for FLEX/ABD to 120  Large and  massive tears: PROM for FLEX/ABD to 120  Weightbearing: Limit elbow flexion to <10 pounds during weeks 7-8 and to <15 pounds during weeks 8-12  Protocol (9-16 weeks):  Normalize shoulder motion  Avoid push-ups, pull-ups, bench press, and overhead weightlifting activities  Weightbearing: Progress per protocol  Protocol (16 weeks-8 months):  Meet occupational requirements by 4 months  Restore overhead strength by 6-8 months  No weightlifting restrictions        RANGE-OF-MOTION LIMITATIONS: Per schedule below     Tear Size Sling Use Begin PROM Begin AROM   Small <1 cm2 4 weeks Immediate 4 weeks   Medium 1-3 cm2 4-6 weeks Immediate 6 weeks   Large 3-5 cm2 6 weeks 2 weeks 8 weeks   Massive >5 cm2 6-8 weeks 4 weeks 8 weeks       Quick Dash to be re-administered at later date    Plan: Continue skilled Physical Therapy 2 x/week or a total of 22-28 visits over a 90 day period. Treatment will include: there ex, there activities, manual therapy, NM RE-ed, and modalities as needed.       Patient/Family/Caregiver was advised of these findings, precautions, and treatment options and has agreed to actively participate in planning and for this course of care.    Thank you for your referral. If you have any questions, please contact me at Dept: [phone].    Sincerely,  Electronically signed by therapist: Isha Burton PT     Physician's certification required:  Yes  Please co-sign or sign and return this letter via fax as soon as possible to [phone].   I certify the need for these services furnished under this plan of treatment and while under my care.    X___________________________________________________ Date____________________    Certification From: 10/7/2024  To:1/5/2025

## 2024-10-07 NOTE — TELEPHONE ENCOUNTER
Called to offer pt earlier appointment time today at 3pm. Pt declined, confirmed that she will attend as scheduled at 6: 45pm.

## 2024-10-07 NOTE — PROGRESS NOTES
[facility], [facility], [place]  [address]  [facility] [address]  Dept: [phone]       Patient:  Nunu Guan  :      1970  MRN:      [id number]    Chief Complaint:  Weight Management, Obesity, Follow up     SUBJECTIVE     History of Present Illness:  Nunu is being seen today for a follow-up for non surgical weight loss.     S/p rotator cuff surgery 7 weeks ago.  Stopped phentermine 1 week prior.   Doing well.   In PT.    Initial HPI: 2020  50 yo female presents to clinic for medically supported weight loss.      Patient is considering medications and is not considering bariatric surgery for weight loss.     Patient denies any history of eating disorder(s).     Patient is employed: .  Patient lives with, spouse and 2 children (13 and 15).     Patient's goal weight: Unsure   Biggest weight loss in the past: 100 lbs  How weight loss was achieved: Exercise   Heaviest weight ever: Current   Previous use of medical weight loss medications:     Physical activity: None      Sleep: 5-6 hours/night    Sleep screening: +snores, consider sleep study     Barriers: Motivation      Values: Breathing well, Fitting on Furniture, Children, Health      Past Medical History:   Past Medical History:    Clot in the renal vein (HCC)    left lower extremity    Cyst of skin    right abdominal skin cyst 2016    Deep vein thrombosis (HCC)    hx of DVT    Disorder of thyroid    High blood pressure    Hx of motion sickness    Hypercholesteremia    Hypothyroidism    PONV (postoperative nausea and vomiting)    Unspecified essential hypertension    Visual impairment    wear glasses        Comorbidities:  Back pain-Improvement?  yes, Hypertension-Improvement?  yes and Hyperlipidemia-Improvement?  no    OBJECTIVE     Vitals: /80   Pulse 96   Ht 5' 2.5\" (1.588 m)   Wt 199 lb (90.3 kg)   LMP 2024 (Within Days)   SpO2  98%   BMI 35.82 kg/m²     Initial weight loss: -00   Total weight loss: -68   Start weight: 267    Wt Readings from Last 6 Encounters:   10/07/24 199 lb (90.3 kg)   08/22/24 198 lb (89.8 kg)   08/01/24 198 lb (89.8 kg)   06/25/24 198 lb (89.8 kg)   05/30/24 199 lb (90.3 kg)   04/24/24 204 lb (92.5 kg)       Patient Medications:    Current Outpatient Medications   Medication Sig Dispense Refill    topiramate 25 MG Oral Tab Take 1 tablet (25 mg total) by mouth in the morning, at noon, and at bedtime. 270 tablet 1    Phentermine HCl 37.5 MG Oral Tab Take 1 tablet (37.5 mg total) by mouth every morning before breakfast. 30 tablet 3    celecoxib 200 MG Oral Cap Take 1 capsule (200 mg total) by mouth 2 (two) times daily. 60 capsule 0    Acetaminophen 500 MG Oral Cap Take 2 capsules (1,000 mg total) by mouth every 8 (eight) hours as needed for Pain. Never exceed 3000 mg in a 24-hour period.  Many over-the-counter medications also have acetaminophen in them. 180 capsule 0    celecoxib 200 MG Oral Cap Take 1 capsule (200 mg total) by mouth 2 (two) times daily. 28 capsule 0    cholecalciferol 125 MCG (5000 UT) Oral Tab Take 1 tablet (5,000 Units total) by mouth daily. 90 tablet 0    levothyroxine 100 MCG Oral Tab Take 1 tablet (100 mcg total) by mouth before breakfast. 90 tablet 3    losartan-hydroCHLOROthiazide 100-25 MG Oral Tab Take 1 tablet by mouth daily. 90 tablet 3     Allergies:  Azithromycin and Hydrocodone     Social History:  Reviewed    Surgical History:    Past Surgical History:   Procedure Laterality Date    Ankle fracture surgery Left 2014    left ankle stress fracture 2014    Arthroscopy of joint unlisted      left knee screw    Colonoscopy N/A 04/28/2022    Procedure: COLONOSCOPY;  Surgeon: lFex Rubi MD;  Location: [facility] ENDOSCOPY    Knee surgery  2001    [facility]     Other surgical history  2004    vaginal delivery    Other surgical history  2006    vaginal delivery     Family History:    Family History    Problem Relation Age of Onset    Hypertension Father         overweight     Other (alzheimers) Father     Diabetes Mother         borderline, overweight     Other (Other) Maternal Grandmother         Karie's Granulomatosis    Cancer Maternal Grandfather         LUNG    Other (Other) Sister         Rheumatoid Arthritis    Colon Cancer Paternal Uncle      Initial Intake:  After dinner behavior: chips, popcorn cakes   Night eating: -  Portion sizes: at times   Binge: BED 7-  Emotional: +  Depression: Total PHQ Score: 1  Grazing: -  Sweet tooth: at times   Crunchy/salty: +  Etoh: Wine, a few times a week (1-2 glasses)   Soda Drinker: Yes                 If yes, how much?:  Occasional  Sports Drinks:  No                  Juice:  No     Food Journal  Reviewed and Discussed:       Patient has a Food Journal?: yes   [employer]  Patient is reading nutrition labels?  yes  Average Caloric Intake:      Average CHO Intake:    Is patient exercising? no Minimal s/p rotator cuff surgery  Type of exercise? In PT    Eating Habits  Patient states the following:  Eats 3 meal(s) per day  # of snacks per day: 1-2   Type of snacks:  fruit    Amount of soda consumption per day:  1 every couple of weeks   Amount of water (in ounces) per day: Improved  Toughest challenge:  Family stress-son unable to get a job  Sleep: Improved    Nutritional Goals  Eat 3-4 cups of fresh fruits or vegetables daily    Behavior Modifications Reviewed and Discussed  Eat breakfast, Eat 3 meals per day, Plan meals in advance, Read nutrition labels, Drink 64 oz of water per day, Maintain a daily food journal, Utlize portion control strategies to reduce calorie intake, Identify triggers for eating and manage cues and Eat slowly and take 20 to 30 minutes to complete each meal    Exercise Goals Reviewed and Discussed    Aim for 150 minutes moderate level exercise weekly with 2-3 days strength training as tolerated     ROS:    Constitutional: negative  Respiratory:  negative  Cardiovascular: negative  Gastrointestinal: negative  Genitourinary:negative  Integument/breast: negative  Hematologic/lymphatic: negative  Musculoskeletal:positive for back pain and improved   Neurological: positive for headaches  Behavioral/Psych: negative  Endocrine: negative  All other systems were reviewed and are negative      Physical Exam:   General appearance: alert, appears stated age, cooperative and obese   Head: Normocephalic, without obvious abnormality, atraumatic  Neck: no adenopathy, no carotid bruit, no JVD, supple, symmetrical, trachea midline and thyroid not enlarged, symmetric, no tenderness/mass/nodules  Lungs: clear to auscultation bilaterally  Heart: S1, S2 normal, no murmur, click, rub or gallop, regular rate and rhythm  Abdomen: soft, non-tender; bowel sounds normal; no masses,  no organomegaly and abdomen obese   Extremities: extremities normal, atraumatic, no cyanosis or edema  Pulses: 2+ and symmetric  Skin: Skin color, texture, turgor normal. No rashes or lesions  Neurologic: Grossly normal    ASSESSMENT     Encounter Diagnosis(ses):   Encounter Diagnoses   Name Primary?    Primary hypertension Yes    Hypercholesteremia     Obesity (BMI 30-39.9)     Prediabetes     Encounter for therapeutic drug monitoring     Stress     Acquired hypothyroidism     History of DVT (deep vein thrombosis)     Vitamin D deficiency          PLAN         Diagnoses and all orders for this visit:    Primary hypertension    Hypercholesteremia    Obesity (BMI 30-39.9)  -     topiramate 25 MG Oral Tab; Take 1 tablet (25 mg total) by mouth in the morning, at noon, and at bedtime.  -     Phentermine HCl 37.5 MG Oral Tab; Take 1 tablet (37.5 mg total) by mouth every morning before breakfast.    Prediabetes    Encounter for therapeutic drug monitoring    Stress    Acquired hypothyroidism    History of DVT (deep vein thrombosis)    Vitamin D deficiency        HYPERCHOLESTEROLEMIA:  Recommend dietary changes  and lifestyle modifications as discussed below. Monitor.     Lab Results   Component Value Date/Time    CHOLEST 233 (H) 03/09/2024 09:28 AM     (H) 03/09/2024 09:28 AM    HDL 56 03/09/2024 09:28 AM    TRIG 89 03/09/2024 09:28 AM    VLDL 17 03/09/2024 09:28 AM       HYPERTENSION: Blood pressure controlled on the above medications. No interval change in antihypertensive medication. Monitor closely.       HYPOTHYROIDISM: On levothyroxine. Continue present management.     History Vitamin D deficiency.     OBESITY/WEIGHT GAIN:  PREDIABETES:     Discussed starting weight:  267 lbs; BMI: 48.1; WC: 51 in.  Patient's goal weight: <200, met goal this visit.   Values: Breathing well, Fitting on Furniture, Children, Health       Recommended patient continue intensive lifestyle and behavioral modifications at this time for weight loss.     Reviewed lifestyle modifications: Whole Food/Plant Strong/Low Glycemic Index diet, moderate alcohol consumption, reduced sodium intake to no more than 2,400 mg/day, and at least 150 minutes of moderate physical activity per week.   Avoid processed, poor quality carbohydrates, refined grains, flour, sugar.     Goals for next month:  1. Keep a food log.   2. Drink 64 ounces of non-caloric beverages per day. No fruit juices or regular soda.  3. Aim for 150 minutes moderate exercise per week.    4. Increase fruit and vegetable servings to 5-6 per day.    5. Improve sleep and stress.      Reviewed labs: 10/14/19  Renal/liver function intact; CBC in range  TSH 3.790 on levothyroxine  2/22/2020 Updated TSH, T4 in range.  B 12 in range.  Vitamin D low- continue 50,000 IU capsule weekly.   11/28/2020: CMP and CBC in range.  Vitamin D remains low.  2/21/22- Blood sugar elevated, CMP otherwise notes no significant abnormalities.   LDL and triglycerides elevated.   Vitamin D low- will need to supplement.    4/19/23- CBC in range.  , CMP otherwise in range.   Vitamin D 36.8. Supplement OTC  daily.   3/9/2024- a1c 5.7%. Elevated cholesterol. Slight elevated Creatinine.   CBC, TSH, vitamin D in range.     Reviewed medication options for weight loss in detail with patient.      +cravings, emotional eating, evening snacking- improved.     Leptin elevated.  Denies hx cardiac disease or substance abuse.    Previous successful treatment with phentermine/topiramate combination (topiramate 25 mg BID, phentermine 37.5 mg/day).     Hold off on Qsymia due to high price at this time.     Continue phentermine 37.5 mg in the AM.    Continue 25 mg topiramate BID. Increase to 75 mg daily.     Prefers not to take injectable, Saxenda or Wegovy.    Discussed risks, benefits, rationale, and side effects of medication(s) including hypertension, palpitations, tachycardia, dizziness, constipation, dry mouth, and anxiety among others.     2/23/2021 EKG within normal limits. EKG done 2/15/22, no change.  4/19/23- EKG NSR.  Discuss updated EKG at next visit.     Recommend magnesium glycinate to improve sleep.      IF,     Hold off on food tracking for now, causes significant anxiety- follow Healthy Plate.     Recommend counseling as needed.     Goal weight: < 200 lbs. Goal met in clinic today.    RTC: 4 months.   CELESTE Mckee

## 2024-10-09 ENCOUNTER — OFFICE VISIT (OUTPATIENT)
Dept: PHYSICAL THERAPY | Age: 54
End: 2024-10-09
Attending: STUDENT IN AN ORGANIZED HEALTH CARE EDUCATION/TRAINING PROGRAM
Payer: COMMERCIAL

## 2024-10-09 PROCEDURE — 97140 MANUAL THERAPY 1/> REGIONS: CPT | Performed by: PHYSICAL THERAPIST

## 2024-10-09 PROCEDURE — 97110 THERAPEUTIC EXERCISES: CPT | Performed by: PHYSICAL THERAPIST

## 2024-10-09 NOTE — PROGRESS NOTES
Diagnosis:   Nontraumatic incomplete rupture of rotator cuff, left (M75.112)  Superior labrum anterior-to-posterior (SLAP) tear of left shoulder (S43.543A)       Referring Provider: Igor Smith  Date of Evaluation:    8/28/2024    Precautions:       - post surgical precautions/protocol  Next MD visit:   10/11/2024  Date of Surgery: 8/22/2024   Insurance Primary/Secondary: [employer] [place] / N/A     # Auth Visits: 28 visits until 11/27    Subjective:  Increased awareness of L shoulder, soreness. Completing HEP, just not hand behind back stretch.    Pain: 2/10     Assessment: Pt progressing fairly well ~ 7 weeks s/p shoulder surgery (see dx above).  Pt with improved L shoulder/elbow PROM. Progressing gentle AAROM, isometrics;  pt tolerating well. Pt with fair tolerance for PROM. Modified HEP for improved IR AAROM/PROM tolerance. She will benefit from continued PT to address residual L shoulder ROM, strength, functional deficits.     Objective:     Palpation: L surgical portals WNL, mild thickening L biceps region scar    Surgical scars appear WNL    PROM (degrees): (* denotes performed with pain)  Shoulder  Elbow   Flexion: L 160*  Abduction: L 160*  ER:  L ~60*  IR: L at least 60* Flexion: R 150; L WNL  Extension: R 6; L WNL     Goals: (To be met in 18 visits) -  ALL GOALS IN PROGRESS  Pt will report improved ability to sleep without waking due to shoulder pain  Pt will improve shoulder flexion AROM to >160 degrees to be able to reach into overhead cabinets without pain or restriction  Pt will improve shoulder abduction AROM to >160 degrees to improve ability to don deodorant, don/doff shirts, and wash hair  Pt will increase shoulder AROM ER to 90 to reach and fasten seatbelt   Pt will increase shoulder AROM IR to 70 to be able to reach in back pocket, tuck in shirt, and turn steering wheel without pain  Pt will improve shoulder strength throughout to 4+/5 to improve function with ADLs including reaching and  lifting  Pt will demonstrate increased mid/low trap strength to 4/5 to promote improved shoulder mechanics and stabilization with ADL such as lifting and reaching   Pt will be independent and compliant with comprehensive HEP to maintain progress achieved in PT       Plan: Continue  skilled Physical Therapy 2 x/week or a total of 22-28 visits.  F/u on appointment with Dr. Smith, HEP progressions.  Progress per post op protocol, P/AAROM, gentle isometrics.  Date: 9/25/2024  Tx #: 7/18 (7/28 auth) Date: 9/30/2024  Tx #: 8/18 (8/28) Date: 10/3/2024  Tx #: 9/18 (9/28) Date: 10/7/2024  Tx #: 10/18 (10/28) Date: 10/9/2024  Tx #: 11/18 (11/28)   There Ex:   L shoulder mini W PROM using ball on table x 5 reps each  L shoulder ER ROM PROM/stretch using wall 4 x 20 sec  Supine AAROM/PROM flex x 5 reps (HEP)     There Ex:   L shoulder pendulums x ~ 2 min  L shoulder PROM/stretch using wall 5 x 20 sec  L shoulder mini W PROM using ball on table x 5 reps each  Supine AAROM/PROM flex x 5 reps (HEP review)  Seated scapular retraction x 10 reps   There Ex:   L shoulder pendulums x ~ 2 min  Supine shoulder AAROM flexion using wand - pt c/o, stopped  Supine shoulder ER PROM using wand 5 x 10 sec   Standing L/R shoulder ext PROM using wand to posterior hip 5 x 10 sec each  B scapular retraction x 10 reps  Seated shoulder pulleys: flex PROM - pt c/o, stopped  HEP review/revision; see below. There Ex:   L shoulder pendulums x ~ 1 min  Seated shoulder pulleys: flex, scap  x ~ 10 reps  each  Gentle isometric L shoulder ER x 10 reps  Gentle L shoulder IR isometric x 10 reps  Standing scap retraction x 10 reps  Standing shoulder gentle ext AAROM using wand x 5 reps,  hand to sacrum using wand x 5 reps   Seated L shoulder horiz add across chest - next session. Pt declined further there ex, reported getting sore/tired   There Ex:   L shoulder pendulums x ~ 1 min   Attempted supine AAROM flex, ER using wand - pt c/o, stopped  W ball slides on  table x 5 reps  Gentle isometric L shoulder ER x 10 reps (HEP)  Gentle L shoulder IR isometric x 10 reps (HEP)  Standing scap retraction x 10 reps  Standing L shoulder ER ROM 0 deg abd using wall 4 x 20 sec each  Standing shoulder gentle ext AAROM using wand 4 reps x 10 sec each,  hand to sacrum using wand - pt c/o; completed using R hand gentle L shoulder AAROM IR 4 x 10 sec each (HEP)  Seated L shoulder horiz add across chest 4 x 10 sec each   Manual:   Surgical scar STM (not biceps or posterior shoulder - HEP)  PROM L wrist flex/ext, radial/ulnar deviation  PROM L forearm supination/pronation, elbow flex/ext  PROM L shoulder ER, IR, flex, abd   STM L upper trap Manual:   Surgical scar STM (not biceps or posterior shoulder - HEP)  PROM L wrist flex/ext, radial/ulnar deviation  PROM L forearm supination/pronation, elbow flex/ext  PROM L shoulder ER, IR, flex, abd   STM L upper trap  Pt declined STM L medial epicondyle Manual:   Surgical scar mobilization - improvement noted  PROM L wrist flex/ext - min due to WNL  PROM L forearm supination/pronation, elbow flex/ext - min due to WNL  Attempted light GH post and inf glides - pt c/o, stopped  PROM L shoulder ER, IR, flex, abd  - limited due to poor itzel  STM L upper trap   Manual:   Surgical scar mobilization - biceps scar only  PROM L elbow ext   Attempted light GH post and inf glides - pt c/o, stopped  PROM L shoulder ER, IR, flex, abd  - limited due to fair itzel  STM L upper trap Manual:   Surgical scar mobilization -unable, pt wearing long sleeves   Attempted light GH post and inf glides - pt c/o, stopped  PROM L shoulder ER, IR, flex, abd  - limited due to fair itzel  STM L upper trap     Cold pack L shoulder x 10 min seated Cold pack L shoulder x ~8 min seated Cold pack - pt declined, reported would complete at home          HEP: pendulums (flex/ext, horiz abd/add, circular), PROM L elbow flex/ext - as needed, supine P/AAROM flexion, shoulder ER PROM using wand,  scap retraction, gentle shoulder IR/hand behind back P/AAROM, gentle isometric L shoulder IR/ER using wall  Charges: 2 TE (30 min), 1 manual (10 min) Total Timed Treatment: 40 min  Total Treatment Time: 45 min    Post op protocol:  Protocol (2-6 weeks):  Continue shoulder immobilization until 4 weeks, discontinue based on tear size  Normalize elbow and wrist motion  Prevent motion stretching repair   Progress motion as per protocol. For small and medium tears, begin to progress shoulder motion  Weightbearing: Progress per protocol  Protocol (6-8 weeks):  Discontinue shoulder immobilization if not already done  Progress motion as per protocol  Small and medium tears: AAROM for FLEX/ABD to 120  Large and massive tears: PROM for FLEX/ABD to 120  Weightbearing: Limit elbow flexion to <10 pounds during weeks 7-8 and to <15 pounds during weeks 8-12  Protocol (9-16 weeks):  Normalize shoulder motion  Avoid push-ups, pull-ups, bench press, and overhead weightlifting activities  Weightbearing: Progress per protocol  Protocol (16 weeks-8 months):  Meet occupational requirements by 4 months  Restore overhead strength by 6-8 months  No weightlifting restrictions        RANGE-OF-MOTION LIMITATIONS: Per schedule below     Tear Size Sling Use Begin PROM Begin AROM   Small <1 cm2 4 weeks Immediate 4 weeks   Medium 1-3 cm2 4-6 weeks Immediate 6 weeks   Large 3-5 cm2 6 weeks 2 weeks 8 weeks   Massive >5 cm2 6-8 weeks 4 weeks 8 weeks

## 2024-10-11 ENCOUNTER — OFFICE VISIT (OUTPATIENT)
Dept: ORTHOPEDICS CLINIC | Facility: CLINIC | Age: 54
End: 2024-10-11
Payer: COMMERCIAL

## 2024-10-11 DIAGNOSIS — Z47.89 ORTHOPEDIC AFTERCARE: Primary | ICD-10-CM

## 2024-10-11 PROCEDURE — 99024 POSTOP FOLLOW-UP VISIT: CPT | Performed by: STUDENT IN AN ORGANIZED HEALTH CARE EDUCATION/TRAINING PROGRAM

## 2024-10-11 RX ORDER — CELECOXIB 200 MG/1
200 CAPSULE ORAL 2 TIMES DAILY
Qty: 60 CAPSULE | Refills: 0 | Status: SHIPPED | OUTPATIENT
Start: 2024-10-11 | End: 2024-11-10

## 2024-10-11 NOTE — PROGRESS NOTES
Orthopaedic Surgery Postop Patient Visit  _______________________________________________________________________________________________________________  _______________________________________________________________________________________________________________    DATE OF VISIT: 10/11/2024     DATE OF SURGERY: 8/22/2024     CHIEF COMPLAINT: Follow-up Left  shoulder rotator cuff repair, subacromial decompression, distal clavicle excision, and biceps tenodesis  Chief Complaint   Patient presents with    Post-Op     S/P L shoulder RCR -  Pt is healing well. States intermittent sore pain.  No numbness or tingling.        HISTORY OF PRESENT ILLNESS: Nunu Guan is a 54 year old female who presents to the clinic for follow-up after Left  shoulder surgery. The patient has started working with therapy. Pain is well controlled on current regimen.    The patient denies fevers, chills, and wound issues.     MEDICATIONS  Reviewed   celecoxib 200 MG Oral Cap Take 1 capsule (200 mg total) by mouth 2 (two) times daily. 60 capsule 0    topiramate 25 MG Oral Tab Take 1 tablet (25 mg total) by mouth in the morning, at noon, and at bedtime. 270 tablet 1    Phentermine HCl 37.5 MG Oral Tab Take 1 tablet (37.5 mg total) by mouth every morning before breakfast. 30 tablet 3    celecoxib 200 MG Oral Cap Take 1 capsule (200 mg total) by mouth 2 (two) times daily. 60 capsule 0    Acetaminophen 500 MG Oral Cap Take 2 capsules (1,000 mg total) by mouth every 8 (eight) hours as needed for Pain. Never exceed 3000 mg in a 24-hour period.  Many over-the-counter medications also have acetaminophen in them. 180 capsule 0    cholecalciferol 125 MCG (5000 UT) Oral Tab Take 1 tablet (5,000 Units total) by mouth daily. 90 tablet 0    levothyroxine 100 MCG Oral Tab Take 1 tablet (100 mcg total) by mouth before breakfast. 90 tablet 3    losartan-hydroCHLOROthiazide 100-25 MG Oral Tab Take 1 tablet by mouth daily. 90 tablet 3         ALLERGIES  Allergies[1]     REVIEW OF SYSTEMS  A 14 point review of systems was performed. Pertinent positives and negatives noted in the HPI.    PHYSICAL EXAM  LMP 08/01/2024 (Within Days)      Constitutional: The patient is well-developed, well-nourished, in no acute distress.  Neurological: Alert and oriented to person, place, and time.  Psychiatric: Mood and affect normal.  Head: Normocephalic and atraumatic.  Cardiovascular: regular rate by palpation  Pulmonary/Chest: Effort normal. No respiratory distress. Breathing non-labored  Abdominal: Abdomen exhibits no distension.   Left shoulder  Wound well healed  Swelling resolving  ROM: Forward flexion: 100, Abduction: 90, ER: 30, IR: 50  Motor/Strength:  Motor intact Ax/Msc/M/U/R  SILT R/U/M/MSC/axillary  Radial pulse 2+, distally warm and well perfused, brisk capillary refill      ASSESSMENT/PLAN: Nunu Guan is a 54 year old female who presents to the Orthopaedic surgery clinic today for follow-up 6 weeks after rotator cuff repair, subacromial decompression, distal clavicle excision, and biceps tenodesis.  She is healing appropriately    We discussed the relevant radiographs and arthroscopic imaging at today's visit  We reviewed the PT protocol and adjustments were made as needed  Protocol (6-8 weeks):  Discontinue shoulder immobilization if not already done  Progress motion as per protocol  Small and medium tears: AAROM for FLEX/ABD to 120  Large and massive tears: PROM for FLEX/ABD to 120  Weightbearing: Limit elbow flexion to <10 pounds during weeks 7-8 and to <15 pounds during weeks 8-12  Protocol (9-16 weeks):  Normalize shoulder motion  Avoid push-ups, pull-ups, bench press, and overhead weightlifting activities  Weightbearing: Progress per protocol  Protocol (16 weeks-8 months):  Meet occupational requirements by 4 months  Restore overhead strength by 6-8 months  No weightlifting restrictions       RANGE-OF-MOTION LIMITATIONS: Per schedule  below    Tear Size Sling Use Begin PROM Begin AROM   Small <1 cm2 4 weeks Immediate 4 weeks   Medium 1-3 cm2 4-6 weeks Immediate 6 weeks   Large 3-5 cm2 6 weeks 2 weeks 8 weeks   Massive >5 cm2 6-8 weeks 4 weeks 8 weeks       NEW PRESCRIPTIONS:  celecoxib  IMAGING ORDERED: none  CONSULTS PLACED: none    FOLLOW-UP: 6-8 weeks    RADIOGRAPHS AT NEXT VISIT: none    I have personally seen Nunu Guan and discussed in detail their plan of care. Prior to departure, they indicated agreement with and understanding of their plan of care and their follow-up as documented herein this note. Please note that this note was written in combination with voice recognition/dictation software and there is a possibility of transcription errors which were not identified at the time of note submission. If clarification is necessary, please contact the author or clinic staff.    Igor Smith MD  Orthopaedic Surgery  10/11/2024            [1]   Allergies  Allergen Reactions    Azithromycin DIZZINESS     Other reaction(s): AZITHROMYCIN    Hydrocodone DIZZINESS     Other reaction(s): feels dazed

## 2024-10-14 ENCOUNTER — APPOINTMENT (OUTPATIENT)
Dept: PHYSICAL THERAPY | Age: 54
End: 2024-10-14
Attending: STUDENT IN AN ORGANIZED HEALTH CARE EDUCATION/TRAINING PROGRAM
Payer: COMMERCIAL

## 2024-10-15 ENCOUNTER — OFFICE VISIT (OUTPATIENT)
Dept: PHYSICAL THERAPY | Age: 54
End: 2024-10-15
Attending: STUDENT IN AN ORGANIZED HEALTH CARE EDUCATION/TRAINING PROGRAM
Payer: COMMERCIAL

## 2024-10-15 PROCEDURE — 97140 MANUAL THERAPY 1/> REGIONS: CPT | Performed by: PHYSICAL THERAPIST

## 2024-10-15 PROCEDURE — 97110 THERAPEUTIC EXERCISES: CPT | Performed by: PHYSICAL THERAPIST

## 2024-10-15 NOTE — PROGRESS NOTES
Diagnosis:   Nontraumatic incomplete rupture of rotator cuff, left (M75.112)  Superior labrum anterior-to-posterior (SLAP) tear of left shoulder (S43.396A)       Referring Provider: Igor Smith  Date of Evaluation:    8/28/2024    Precautions:       - post surgical precautions/protocol  Next MD visit:   2/2024  Date of Surgery: 8/22/2024   Insurance Primary/Secondary: [employer] [place] / N/A     # Auth Visits: 28 visits until 11/27    Subjective:  Partial HEP compliance. F/u with Dr. Luis stubbs, f/u again 2/2025.     Pain: 1/10     Assessment: Pt progressing well overall, improved tolerance for AAROM today.  Progressed Hep/interventions; see below.  She will benefit from continued PT to address residual L shoulder ROM, strength, functional deficits.  Pt reported some L shoulder awareness at close of PT session; applied cold pack ~ 10 min at close of session.     Objective:     Palpation: L surgical portals WNL, mild thickening L biceps region scar    Surgical scars appear WNL    PROM (degrees): (* denotes performed with pain)  Shoulder  Elbow   Flexion: L 160*  Abduction: L 160*  ER:  L ~60*  IR: L at least 60* Flexion: R 150; L WNL  Extension: R 6; L WNL     Goals: (To be met in 18 visits) -  ALL GOALS IN PROGRESS  Pt will report improved ability to sleep without waking due to shoulder pain  Pt will improve shoulder flexion AROM to >160 degrees to be able to reach into overhead cabinets without pain or restriction  Pt will improve shoulder abduction AROM to >160 degrees to improve ability to don deodorant, don/doff shirts, and wash hair  Pt will increase shoulder AROM ER to 90 to reach and fasten seatbelt   Pt will increase shoulder AROM IR to 70 to be able to reach in back pocket, tuck in shirt, and turn steering wheel without pain  Pt will improve shoulder strength throughout to 4+/5 to improve function with ADLs including reaching and lifting  Pt will demonstrate increased mid/low trap strength to 4/5 to  promote improved shoulder mechanics and stabilization with ADL such as lifting and reaching   Pt will be independent and compliant with comprehensive HEP to maintain progress achieved in PT       Plan: Continue  skilled Physical Therapy 2 x/week or a total of 22-28 visits.  F/u on  HEP progressions.  Progress per post op protocol, P/AAROM, gentle strengthening.  Date: 9/30/2024  Tx #: 8/18 (8/28) Date: 10/3/2024  Tx #: 9/18 (9/28) Date: 10/7/2024  Tx #: 10/18 (10/28) Date: 10/9/2024  Tx #: 11/18 (11/28) Date: 10/15/2024  Tx #: 12/18 (12/28)   There Ex:   L shoulder pendulums x ~ 2 min  L shoulder PROM/stretch using wall 5 x 20 sec  L shoulder mini W PROM using ball on table x 5 reps each  Supine AAROM/PROM flex x 5 reps (HEP review)  Seated scapular retraction x 10 reps   There Ex:   L shoulder pendulums x ~ 2 min  Supine shoulder AAROM flexion using wand - pt c/o, stopped  Supine shoulder ER PROM using wand 5 x 10 sec   Standing L/R shoulder ext PROM using wand to posterior hip 5 x 10 sec each  B scapular retraction x 10 reps  Seated shoulder pulleys: flex PROM - pt c/o, stopped  HEP review/revision; see below. There Ex:   L shoulder pendulums x ~ 1 min  Seated shoulder pulleys: flex, scap  x ~ 10 reps  each  Gentle isometric L shoulder ER x 10 reps  Gentle L shoulder IR isometric x 10 reps  Standing scap retraction x 10 reps  Standing shoulder gentle ext AAROM using wand x 5 reps,  hand to sacrum using wand x 5 reps   Seated L shoulder horiz add across chest - next session. Pt declined further there ex, reported getting sore/tired   There Ex:   L shoulder pendulums x ~ 1 min   Attempted supine AAROM flex, ER using wand - pt c/o, stopped  W ball slides on table x 5 reps  Gentle isometric L shoulder ER x 10 reps (HEP)  Gentle L shoulder IR isometric x 10 reps (HEP)  Standing scap retraction x 10 reps  Standing L shoulder ER ROM 0 deg abd using wall 4 x 20 sec each  Standing shoulder gentle ext AAROM using wand 4  reps x 10 sec each,  hand to sacrum using wand - pt c/o; completed using R hand gentle L shoulder AAROM IR 4 x 10 sec each (HEP)  Seated L shoulder horiz add across chest 4 x 10 sec each There Ex:   L shoulder pendulums x ~ 1 min  Supine shoulder AAROM flexion using wand x 5 reps, hands wider x 5 reps  Supine hand behind head gentle ER ROM/stretch 4 x 10 sec each   Gentle isometric L shoulder ER x 10 reps (HEP)  Gentle L shoulder IR isometric x 10 reps (HEP)  Gentle L shoulder hand behind back IR AAROM using R hand 5 x 10 sec each  (HEP)  Attempted wall slides - pt c/o, stopped  Seated shoulder pulleys: flex, scap, abd x ~8 reps each   B row using yellow tubing 2 x 10 reps        Manual:   Surgical scar STM (not biceps or posterior shoulder - HEP)  PROM L wrist flex/ext, radial/ulnar deviation  PROM L forearm supination/pronation, elbow flex/ext  PROM L shoulder ER, IR, flex, abd   STM L upper trap  Pt declined STM L medial epicondyle Manual:   Surgical scar mobilization - improvement noted  PROM L wrist flex/ext - min due to WNL  PROM L forearm supination/pronation, elbow flex/ext - min due to WNL  Attempted light GH post and inf glides - pt c/o, stopped  PROM L shoulder ER, IR, flex, abd  - limited due to poor itzel  STM L upper trap   Manual:   Surgical scar mobilization - biceps scar only  PROM L elbow ext   Attempted light GH post and inf glides - pt c/o, stopped  PROM L shoulder ER, IR, flex, abd  - limited due to fair itzel  STM L upper trap Manual:   Surgical scar mobilization -unable, pt wearing long sleeves   Attempted light GH post and inf glides - pt c/o, stopped  PROM L shoulder ER, IR, flex, abd  - limited due to fair itzel  STM L upper trap Manual:   Surgical scar mobilization biceps region  L GH post/ant and inf glides grade II  PROM L shoulder ER, IR, flex, abd, scap    STM L > R pper trap    Cold pack L shoulder x 10 min seated Cold pack L shoulder x ~8 min seated Cold pack - pt declined, reported would  complete at home Cold pack L shoulder x 10 min seated          HEP: pendulums (flex/ext, horiz abd/add, circular), PROM L elbow flex/ext - as needed, supine AAROM flexion using wand, shoulder ER PROM using wand, scap retraction, gentle shoulder IR/hand behind back P/AAROM, gentle isometric L shoulder IR/ER using wall, gentle horiz add stretch   Charges: 2 TE (30 min), 1 manual (10 min) Total Timed Treatment: 40 min  Total Treatment Time: 50 min    Post op protocol:  Protocol (2-6 weeks):  Continue shoulder immobilization until 4 weeks, discontinue based on tear size  Normalize elbow and wrist motion  Prevent motion stretching repair   Progress motion as per protocol. For small and medium tears, begin to progress shoulder motion  Weightbearing: Progress per protocol  Protocol (6-8 weeks):  Discontinue shoulder immobilization if not already done  Progress motion as per protocol  Small and medium tears: AAROM for FLEX/ABD to 120  Large and massive tears: PROM for FLEX/ABD to 120  Weightbearing: Limit elbow flexion to <10 pounds during weeks 7-8 and to <15 pounds during weeks 8-12  Protocol (9-16 weeks):  Normalize shoulder motion  Avoid push-ups, pull-ups, bench press, and overhead weightlifting activities  Weightbearing: Progress per protocol  Protocol (16 weeks-8 months):  Meet occupational requirements by 4 months  Restore overhead strength by 6-8 months  No weightlifting restrictions        RANGE-OF-MOTION LIMITATIONS: Per schedule below     Tear Size Sling Use Begin PROM Begin AROM   Small <1 cm2 4 weeks Immediate 4 weeks   Medium 1-3 cm2 4-6 weeks Immediate 6 weeks   Large 3-5 cm2 6 weeks 2 weeks 8 weeks   Massive >5 cm2 6-8 weeks 4 weeks 8 weeks

## 2024-10-16 ENCOUNTER — OFFICE VISIT (OUTPATIENT)
Dept: PHYSICAL THERAPY | Age: 54
End: 2024-10-16
Attending: STUDENT IN AN ORGANIZED HEALTH CARE EDUCATION/TRAINING PROGRAM
Payer: COMMERCIAL

## 2024-10-16 PROCEDURE — 97110 THERAPEUTIC EXERCISES: CPT | Performed by: PHYSICAL THERAPIST

## 2024-10-16 PROCEDURE — 97140 MANUAL THERAPY 1/> REGIONS: CPT | Performed by: PHYSICAL THERAPIST

## 2024-10-16 NOTE — PROGRESS NOTES
Diagnosis:   Nontraumatic incomplete rupture of rotator cuff, left (M75.112)  Superior labrum anterior-to-posterior (SLAP) tear of left shoulder (S43.115A)       Referring Provider: Igor Smith  Date of Evaluation:    8/28/2024    Precautions:       - post surgical precautions/protocol  Next MD visit:   2/2024  Date of Surgery: 8/22/2024   Insurance Primary/Secondary: [employer] [place] / N/A     # Auth Visits: 28 visits until 11/27    Subjective:  No new c/o. Deny c/o following last PT session.     Pain: 1/10     Assessment: Pt progressing well overall, improved tolerance for AAROM today.  HEP reviewed/progressed  to address current deficits, ~ 8 weeks post op; pt tolerated well.  She will benefit from continued PT to address residual L shoulder ROM, strength, functional deficits.  Pt reported some L shoulder awareness at close of PT session; applied cold pack ~ 10 min at close of session.     Objective:     Palpation: L surgical portals WNL, mild thickening L biceps region scar    PROM (degrees): (* denotes performed with pain)  Shoulder  Elbow   Flexion: L 160*  Abduction: L 160*  ER:  L ~60*  IR: L at least 60 Flexion: R 150; L WNL  Extension: R 6; L WNL     Goals: (To be met in 18 visits) -  ALL GOALS IN PROGRESS  Pt will report improved ability to sleep without waking due to shoulder pain  Pt will improve shoulder flexion AROM to >160 degrees to be able to reach into overhead cabinets without pain or restriction  Pt will improve shoulder abduction AROM to >160 degrees to improve ability to don deodorant, don/doff shirts, and wash hair  Pt will increase shoulder AROM ER to 90 to reach and fasten seatbelt   Pt will increase shoulder AROM IR to 70 to be able to reach in back pocket, tuck in shirt, and turn steering wheel without pain  Pt will improve shoulder strength throughout to 4+/5 to improve function with ADLs including reaching and lifting  Pt will demonstrate increased mid/low trap strength to 4/5 to promote  improved shoulder mechanics and stabilization with ADL such as lifting and reaching   Pt will be independent and compliant with comprehensive HEP to maintain progress achieved in PT       Plan: Continue  skilled Physical Therapy 2 x/week or a total of 22-28 visits.  F/u on  HEP progressions.  Progress per post op protocol, P/AAROM, gentle strengthening.  Date: 10/3/2024  Tx #: 9/18 (9/28) Date: 10/7/2024  Tx #: 10/18 (10/28) Date: 10/9/2024  Tx #: 11/18 (11/28) Date: 10/15/2024  Tx #: 12/18 (12/28) Date: 10/16/2024  Tx #: 13/18 (13/28 auth)   There Ex:   L shoulder pendulums x ~ 2 min  Supine shoulder AAROM flexion using wand - pt c/o, stopped  Supine shoulder ER PROM using wand 5 x 10 sec   Standing L/R shoulder ext PROM using wand to posterior hip 5 x 10 sec each  B scapular retraction x 10 reps  Seated shoulder pulleys: flex PROM - pt c/o, stopped  HEP review/revision; see below. There Ex:   L shoulder pendulums x ~ 1 min  Seated shoulder pulleys: flex, scap  x ~ 10 reps  each  Gentle isometric L shoulder ER x 10 reps  Gentle L shoulder IR isometric x 10 reps  Standing scap retraction x 10 reps  Standing shoulder gentle ext AAROM using wand x 5 reps,  hand to sacrum using wand x 5 reps   Seated L shoulder horiz add across chest - next session. Pt declined further there ex, reported getting sore/tired   There Ex:   L shoulder pendulums x ~ 1 min   Attempted supine AAROM flex, ER using wand - pt c/o, stopped  W ball slides on table x 5 reps  Gentle isometric L shoulder ER x 10 reps (HEP)  Gentle L shoulder IR isometric x 10 reps (HEP)  Standing scap retraction x 10 reps  Standing L shoulder ER ROM 0 deg abd using wall 4 x 20 sec each  Standing shoulder gentle ext AAROM using wand 4 reps x 10 sec each,  hand to sacrum using wand - pt c/o; completed using R hand gentle L shoulder AAROM IR 4 x 10 sec each (HEP)  Seated L shoulder horiz add across chest 4 x 10 sec each There Ex:   L shoulder pendulums x ~ 1 min  Supine  shoulder AAROM flexion using wand x 5 reps, hands wider x 5 reps  Supine hand behind head gentle ER ROM/stretch 4 x 10 sec each   Gentle isometric L shoulder ER x 10 reps (HEP)  Gentle L shoulder IR isometric x 10 reps (HEP)  Gentle L shoulder hand behind back IR AAROM using R hand 5 x 10 sec each  (HEP)  Attempted wall slides - pt c/o, stopped  Seated shoulder pulleys: flex, scap, abd x ~8 reps each   B row using yellow tubing 2 x 10 reps      There Ex:   Seated shoulder pulleys: flex, scap, abd x ~10 reps each   L shoulder row using yellow band 2 x 10 reps (HEP)  L shoulder IR using yellow band 2 x 10 reps (HEP)  L shoulder ER using yellow band 2 x 10 reps - gentle (HEP)  Gentle L hand behind back IR AAROM using strap 5 x 10 sec each  L shoulder ER ROM using wall - pt declined  Supine hand behind head ER 5 x 15 sec each  Supine L shoulder flexion (elbow flex - ext, no straight arm) x 5 reps     Manual:   Surgical scar mobilization - improvement noted  PROM L wrist flex/ext - min due to WNL  PROM L forearm supination/pronation, elbow flex/ext - min due to WNL  Attempted light GH post and inf glides - pt c/o, stopped  PROM L shoulder ER, IR, flex, abd  - limited due to poor itzel  STM L upper trap   Manual:   Surgical scar mobilization - biceps scar only  PROM L elbow ext   Attempted light GH post and inf glides - pt c/o, stopped  PROM L shoulder ER, IR, flex, abd  - limited due to fair itzel  STM L upper trap Manual:   Surgical scar mobilization -unable, pt wearing long sleeves   Attempted light GH post and inf glides - pt c/o, stopped  PROM L shoulder ER, IR, flex, abd  - limited due to fair itzel  STM L upper trap Manual:   Surgical scar mobilization biceps region  L GH post/ant and inf glides grade II  PROM L shoulder ER, IR, flex, abd, scap    STM L > R upper trap Manual:   Surgical scar mobilization biceps region  L GH post/ant and inf glides grade II  PROM L shoulder ER, IR, flex, abd, scap    STM L > R upper trap    Cold pack L shoulder x 10 min seated Cold pack L shoulder x ~8 min seated Cold pack - pt declined, reported would complete at home Cold pack L shoulder x 10 min seated Cold pack L shoulder x 10 min seated          HEP: pendulums (flex/ext, horiz abd/add, circular), PROM L elbow flex/ext - as needed, supine AAROM flexion using wand, shoulder ER PROM using wand, scap retraction - progress to L UE using YTB, gentle shoulder IR/hand behind back P/AAROM using strap, gentle isometric L shoulder IR/ER using wall - progress to gentle using YTB, gentle horiz add stretch   Charges: 2 TE (30 min), 1 manual (10 min) Total Timed Treatment: 40 min  Total Treatment Time: 50 min    Post op protocol:  Protocol (2-6 weeks):  Continue shoulder immobilization until 4 weeks, discontinue based on tear size  Normalize elbow and wrist motion  Prevent motion stretching repair   Progress motion as per protocol. For small and medium tears, begin to progress shoulder motion  Weightbearing: Progress per protocol  Protocol (6-8 weeks):  Discontinue shoulder immobilization if not already done  Progress motion as per protocol  Small and medium tears: AAROM for FLEX/ABD to 120  Large and massive tears: PROM for FLEX/ABD to 120  Weightbearing: Limit elbow flexion to <10 pounds during weeks 7-8 and to <15 pounds during weeks 8-12  Protocol (9-16 weeks):  Normalize shoulder motion  Avoid push-ups, pull-ups, bench press, and overhead weightlifting activities  Weightbearing: Progress per protocol  Protocol (16 weeks-8 months):  Meet occupational requirements by 4 months  Restore overhead strength by 6-8 months  No weightlifting restrictions        RANGE-OF-MOTION LIMITATIONS: Per schedule below     Tear Size Sling Use Begin PROM Begin AROM   Small <1 cm2 4 weeks Immediate 4 weeks   Medium 1-3 cm2 4-6 weeks Immediate 6 weeks   Large 3-5 cm2 6 weeks 2 weeks 8 weeks   Massive >5 cm2 6-8 weeks 4 weeks 8 weeks

## 2024-10-21 ENCOUNTER — APPOINTMENT (OUTPATIENT)
Dept: PHYSICAL THERAPY | Age: 54
End: 2024-10-21
Attending: STUDENT IN AN ORGANIZED HEALTH CARE EDUCATION/TRAINING PROGRAM
Payer: COMMERCIAL

## 2024-10-23 ENCOUNTER — OFFICE VISIT (OUTPATIENT)
Dept: PHYSICAL THERAPY | Age: 54
End: 2024-10-23
Attending: STUDENT IN AN ORGANIZED HEALTH CARE EDUCATION/TRAINING PROGRAM
Payer: COMMERCIAL

## 2024-10-23 PROCEDURE — 97110 THERAPEUTIC EXERCISES: CPT | Performed by: PHYSICAL THERAPIST

## 2024-10-23 PROCEDURE — 97140 MANUAL THERAPY 1/> REGIONS: CPT | Performed by: PHYSICAL THERAPIST

## 2024-10-23 NOTE — PROGRESS NOTES
Diagnosis:   Nontraumatic incomplete rupture of rotator cuff, left (M75.112)  Superior labrum anterior-to-posterior (SLAP) tear of left shoulder (S43.174A)       Referring Provider: Igor Smith  Date of Evaluation:    8/28/2024    Precautions:       - post surgical precautions/protocol  Next MD visit:   2/2024  Date of Surgery: 8/22/2024   Insurance Primary/Secondary: [employer] [place] HMO / N/A     # Auth Visits: 28 visits until 11/27    Subjective:  L shoulder was sore after 2 consecutive days of PT. Limited HEP compliance. Shoulder is stiff today.     Pain: 1/10     Assessment: Pt with continued c/o intermittent L shoulder snapping, Rom, strength, functional deficits, but progressing. Progressed HEP supine AAROM flexion to seated or using wall. Pt challenged but tolerated session well. HEP reviewed.      Objective:   L shoulder AROM flexion: pt hesitant, will re-assess in future    Goals: (To be met in 18 visits) -  ALL GOALS IN PROGRESS  Pt will report improved ability to sleep without waking due to shoulder pain  Pt will improve shoulder flexion AROM to >160 degrees to be able to reach into overhead cabinets without pain or restriction  Pt will improve shoulder abduction AROM to >160 degrees to improve ability to don deodorant, don/doff shirts, and wash hair  Pt will increase shoulder AROM ER to 90 to reach and fasten seatbelt   Pt will increase shoulder AROM IR to 70 to be able to reach in back pocket, tuck in shirt, and turn steering wheel without pain  Pt will improve shoulder strength throughout to 4+/5 to improve function with ADLs including reaching and lifting  Pt will demonstrate increased mid/low trap strength to 4/5 to promote improved shoulder mechanics and stabilization with ADL such as lifting and reaching   Pt will be independent and compliant with comprehensive HEP to maintain progress achieved in PT       Plan: Continue  skilled Physical Therapy 2 x/week or a total of 22-28 visits.  F/u on  HEP  progressions.  Progress per post op protocol, P/AA/AROM, gentle strengthening.  Date: 10/7/2024  Tx #: 10/18 (10/28) Date: 10/9/2024  Tx #: 11/18 (11/28) Date: 10/15/2024  Tx #: 12/18 (12/28) Date: 10/16/2024  Tx #: 13/18 (13/28 auth) Date: 10/23/2024  Tx #: 14/18 (14/28 auth)   There Ex:   L shoulder pendulums x ~ 1 min  Seated shoulder pulleys: flex, scap  x ~ 10 reps  each  Gentle isometric L shoulder ER x 10 reps  Gentle L shoulder IR isometric x 10 reps  Standing scap retraction x 10 reps  Standing shoulder gentle ext AAROM using wand x 5 reps,  hand to sacrum using wand x 5 reps   Seated L shoulder horiz add across chest - next session. Pt declined further there ex, reported getting sore/tired   There Ex:   L shoulder pendulums x ~ 1 min   Attempted supine AAROM flex, ER using wand - pt c/o, stopped  W ball slides on table x 5 reps  Gentle isometric L shoulder ER x 10 reps (HEP)  Gentle L shoulder IR isometric x 10 reps (HEP)  Standing scap retraction x 10 reps  Standing L shoulder ER ROM 0 deg abd using wall 4 x 20 sec each  Standing shoulder gentle ext AAROM using wand 4 reps x 10 sec each,  hand to sacrum using wand - pt c/o; completed using R hand gentle L shoulder AAROM IR 4 x 10 sec each (HEP)  Seated L shoulder horiz add across chest 4 x 10 sec each There Ex:   L shoulder pendulums x ~ 1 min  Supine shoulder AAROM flexion using wand x 5 reps, hands wider x 5 reps  Supine hand behind head gentle ER ROM/stretch 4 x 10 sec each   Gentle isometric L shoulder ER x 10 reps (HEP)  Gentle L shoulder IR isometric x 10 reps (HEP)  Gentle L shoulder hand behind back IR AAROM using R hand 5 x 10 sec each  (HEP)  Attempted wall slides - pt c/o, stopped  Seated shoulder pulleys: flex, scap, abd x ~8 reps each   B row using yellow tubing 2 x 10 reps      There Ex:   Seated shoulder pulleys: flex, scap, abd x ~10 reps each   L shoulder row using yellow band 2 x 10 reps (HEP)  L shoulder IR using yellow band 2 x 10 reps  (HEP)  L shoulder ER using yellow band 2 x 10 reps - gentle (HEP)  Gentle L hand behind back IR AAROM using strap 5 x 10 sec each  L shoulder ER ROM using wall - pt declined  Supine hand behind head ER 5 x 15 sec each  Supine L shoulder flexion (elbow flex - ext, no straight arm) x 5 reps   There Ex:   HEP review; see below.   Seated shoulder pulleys: flex, scap, abd x ~10 reps each  Supine L shoulder flexion x  ~ 8 reps (start/end position elbow flexed)  S/L L shoulder Er 0# x 10 reps  S/L L shoulder abd 0# elbow flexed to 90 deg (pt c/o with elbow ext )  Attempted supine hand behind head ER ROM -4 x 15 sec each  Prone L shoulder row 0# x 15 reps  Supine shoulder ER AAROM in 90 deg abd using wand - pt c/o, stopped  Seated shoulder AAROM flexion with hands clasped x 10 reps (HEP)  Wall slides flexion x 5 reps (HEP)  Gentle L hand behind back IR AAROM using strap 5 x 10 sec each - not completed today       Manual:   Surgical scar mobilization -unable, pt wearing long sleeves   Attempted light GH post and inf glides - pt c/o, stopped  PROM L shoulder ER, IR, flex, abd  - limited due to fair itzel  STM L upper trap Manual:   Surgical scar mobilization biceps region  L GH post/ant and inf glides grade II  PROM L shoulder ER, IR, flex, abd, scap    STM L > R upper trap Manual:   Surgical scar mobilization biceps region  L GH post/ant and inf glides grade II  PROM L shoulder ER, IR, flex, abd, scap    STM L > R upper trap Manual:   Surgical scar mobilization biceps region  L GH post/ant and inf glides grade II  PROM L shoulder ER, IR, flex, abd, scap    STM L > R upper trap   Cold pack L shoulder x ~8 min seated Cold pack - pt declined, reported would complete at home Cold pack L shoulder x 10 min seated Cold pack L shoulder x 10 min seated Cold pack L shoulder - pt declined today          HEP: pendulums (flex/ext, horiz abd/add, circular), PROM L elbow flex/ext - as needed, supine AAROM flexion using wand, shoulder ER PROM  using wand, scap retraction - progress to L UE using YTB, gentle shoulder IR/hand behind back P/AAROM using strap, gentle isometric L shoulder IR/ER using wall - progress to gentle using YTB, gentle horiz add stretch   Charges: 2 TE (30 min), 1 manual (10 min) Total Timed Treatment: 40 min  Total Treatment Time: 45min    Post op protocol:  Protocol (2-6 weeks):  Continue shoulder immobilization until 4 weeks, discontinue based on tear size  Normalize elbow and wrist motion  Prevent motion stretching repair   Progress motion as per protocol. For small and medium tears, begin to progress shoulder motion  Weightbearing: Progress per protocol  Protocol (6-8 weeks):  Discontinue shoulder immobilization if not already done  Progress motion as per protocol  Small and medium tears: AAROM for FLEX/ABD to 120  Large and massive tears: PROM for FLEX/ABD to 120  Weightbearing: Limit elbow flexion to <10 pounds during weeks 7-8 and to <15 pounds during weeks 8-12  Protocol (9-16 weeks):  Normalize shoulder motion  Avoid push-ups, pull-ups, bench press, and overhead weightlifting activities  Weightbearing: Progress per protocol  Protocol (16 weeks-8 months):  Meet occupational requirements by 4 months  Restore overhead strength by 6-8 months  No weightlifting restrictions        RANGE-OF-MOTION LIMITATIONS: Per schedule below     Tear Size Sling Use Begin PROM Begin AROM   Small <1 cm2 4 weeks Immediate 4 weeks   Medium 1-3 cm2 4-6 weeks Immediate 6 weeks   Large 3-5 cm2 6 weeks 2 weeks 8 weeks   Massive >5 cm2 6-8 weeks 4 weeks 8 weeks

## 2024-10-28 ENCOUNTER — OFFICE VISIT (OUTPATIENT)
Dept: PHYSICAL THERAPY | Age: 54
End: 2024-10-28
Attending: STUDENT IN AN ORGANIZED HEALTH CARE EDUCATION/TRAINING PROGRAM
Payer: COMMERCIAL

## 2024-10-28 PROCEDURE — 97140 MANUAL THERAPY 1/> REGIONS: CPT | Performed by: PHYSICAL THERAPIST

## 2024-10-28 PROCEDURE — 97110 THERAPEUTIC EXERCISES: CPT | Performed by: PHYSICAL THERAPIST

## 2024-10-28 NOTE — PROGRESS NOTES
Diagnosis:   Nontraumatic incomplete rupture of rotator cuff, left (M75.112)  Superior labrum anterior-to-posterior (SLAP) tear of left shoulder (S43.496A)       Referring Provider: Igor Smith  Date of Evaluation:    8/28/2024    Precautions:       - post surgical precautions/protocol  Next MD visit:   2/2024  Date of Surgery: 8/22/2024   Insurance Primary/Secondary: [employer] [place] / N/A     # Auth Visits: 28 visits until 11/27    Subjective:  Shoulder is stiff today. 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    Pain: 3/10     Assessment:  Pt progressing, improved strength noted with increased ease with there ex, there ex progression. Pt with residual R shoulder P/AAROM. Progressing interventions to address residual ROM, strength, functional deficits. Pt reported pain no worse at close of PT session.       Objective:   L shoulder AROM flexion: ~ 90 deg: pt hesitant, will re-assess in future    Goals: (To be met in 18 visits) -  ALL GOALS IN PROGRESS  Pt will report improved ability to sleep without waking due to shoulder pain  Pt will improve shoulder flexion AROM to >160 degrees to be able to reach into overhead cabinets without pain or restriction  Pt will improve shoulder abduction AROM to >160 degrees to improve ability to don deodorant, don/doff shirts, and wash hair  Pt will increase shoulder AROM ER to 90 to reach and fasten seatbelt   Pt will increase shoulder AROM IR to 70 to be able to reach in back pocket, tuck in shirt, and turn steering wheel without pain  Pt will improve shoulder strength throughout to 4+/5 to improve function with ADLs including reaching and lifting  Pt will demonstrate increased mid/low trap strength to 4/5 to promote improved shoulder mechanics and stabilization with ADL such as lifting and reaching   Pt will be independent and compliant with comprehensive HEP to maintain progress achieved in PT       Plan: Continue  skilled Physical  Therapy 2 x/week or a total of 22-28 visits. Progress per post op protocol, P/AA/AROM, gentle strengthening.  Date: 10/9/2024  Tx #: 11/18 (11/28) Date: 10/15/2024  Tx #: 12/18 (12/28) Date: 10/16/2024  Tx #: 13/18 (13/28 auth) Date: 10/23/2024  Tx #: 14/18 (14/28 auth) Date: 10/28/2024  Tx #: 15/22-28 (15/28 auth)   There Ex:   L shoulder pendulums x ~ 1 min   Attempted supine AAROM flex, ER using wand - pt c/o, stopped  W ball slides on table x 5 reps  Gentle isometric L shoulder ER x 10 reps (HEP)  Gentle L shoulder IR isometric x 10 reps (HEP)  Standing scap retraction x 10 reps  Standing L shoulder ER ROM 0 deg abd using wall 4 x 20 sec each  Standing shoulder gentle ext AAROM using wand 4 reps x 10 sec each,  hand to sacrum using wand - pt c/o; completed using R hand gentle L shoulder AAROM IR 4 x 10 sec each (HEP)  Seated L shoulder horiz add across chest 4 x 10 sec each There Ex:   L shoulder pendulums x ~ 1 min  Supine shoulder AAROM flexion using wand x 5 reps, hands wider x 5 reps  Supine hand behind head gentle ER ROM/stretch 4 x 10 sec each   Gentle isometric L shoulder ER x 10 reps (HEP)  Gentle L shoulder IR isometric x 10 reps (HEP)  Gentle L shoulder hand behind back IR AAROM using R hand 5 x 10 sec each  (HEP)  Attempted wall slides - pt c/o, stopped  Seated shoulder pulleys: flex, scap, abd x ~8 reps each   B row using yellow tubing 2 x 10 reps      There Ex:   Seated shoulder pulleys: flex, scap, abd x ~10 reps each   L shoulder row using yellow band 2 x 10 reps (HEP)  L shoulder IR using yellow band 2 x 10 reps (HEP)  L shoulder ER using yellow band 2 x 10 reps - gentle (HEP)  Gentle L hand behind back IR AAROM using strap 5 x 10 sec each  L shoulder ER ROM using wall - pt declined  Supine hand behind head ER 5 x 15 sec each  Supine L shoulder flexion (elbow flex - ext, no straight arm) x 5 reps   There Ex:   HEP review; see below.   Seated shoulder pulleys: flex, scap, abd x ~10 reps  each  Supine L shoulder flexion x  ~ 8 reps (start/end position elbow flexed)  S/L L shoulder Er 0# x 10 reps  S/L L shoulder abd 0# elbow flexed to 90 deg (pt c/o with elbow ext )  Attempted supine hand behind head ER ROM -4 x 15 sec each  Prone L shoulder row 0# x 15 reps  Supine shoulder ER AAROM in 90 deg abd using wand - pt c/o, stopped  Seated shoulder AAROM flexion with hands clasped x 10 reps (HEP)  Wall slides flexion x 5 reps (HEP)  Gentle L hand behind back IR AAROM using strap 5 x 10 sec each - not completed today   There Ex:   HEP review; see below.   Seated shoulder pulleys: flex, scap, abd x ~10 reps each  Gentle L shoulder hand behind back IR AAROM using strap 5 x 20 sec each   Supine L shoulder flexion x  ~ 8 reps (start/end position elbow flexed) - pt progressed to with elbow ext)   S/L L shoulder Er 0# x 10 reps  S/L L shoulder abd 0# x 10 reps  Prone L shoulder row 0# x 15 reps  Supine shoulder ER AAROM in 90 deg abd using wand 4 x 15 sec each  Seated shoulder AAROM flexion with hands clasped x 10 reps (HEP review - progressively less R UE A)  Wall slides flexion 2  x 5 reps (HEP review)  Doorway ER ROM/stretch with towel between arm/side 4 x 15 sec each   Manual:   Surgical scar mobilization -unable, pt wearing long sleeves   Attempted light GH post and inf glides - pt c/o, stopped  PROM L shoulder ER, IR, flex, abd  - limited due to fair itzel  STM L upper trap Manual:   Surgical scar mobilization biceps region  L GH post/ant and inf glides grade II  PROM L shoulder ER, IR, flex, abd, scap    STM L > R upper trap Manual:   Surgical scar mobilization biceps region  L GH post/ant and inf glides grade II  PROM L shoulder ER, IR, flex, abd, scap    STM L > R upper trap Manual:   Surgical scar mobilization biceps region  L GH post/ant and inf glides grade II  PROM L shoulder ER, IR, flex, abd, scap    STM L > R upper trap Manual:   Surgical scar mobilization biceps region - not completed today,  re-assess next session  L GH post/ant and inf glides grade II  PROM L shoulder ER, IR, flex, abd, scap    STM L > R upper trap - not completed today   Cold pack - pt declined, reported would complete at home Cold pack L shoulder x 10 min seated Cold pack L shoulder x 10 min seated Cold pack L shoulder - pt declined today Cold pack L shoulder - pt declined today          HEP: pendulums (flex/ext, horiz abd/add, circular), PROM L elbow flex/ext - as needed, supine A/AAROM flexion using wand - progress to wall slides, shoulder ER PROM using wand, scap retraction - progress to L UE using YTB, gentle shoulder IR/hand behind back P/AAROM using strap, gentle isometric L shoulder IR/ER using wall - progress to gentle using YTB, gentle horiz add stretch   Charges: 2 TE (32 min), 1 manual (10 min) Total Timed Treatment: 42 min  Total Treatment Time: 45min    Post op protocol:  Protocol (2-6 weeks):  Continue shoulder immobilization until 4 weeks, discontinue based on tear size  Normalize elbow and wrist motion  Prevent motion stretching repair   Progress motion as per protocol. For small and medium tears, begin to progress shoulder motion  Weightbearing: Progress per protocol  Protocol (6-8 weeks):  Discontinue shoulder immobilization if not already done  Progress motion as per protocol  Small and medium tears: AAROM for FLEX/ABD to 120  Large and massive tears: PROM for FLEX/ABD to 120  Weightbearing: Limit elbow flexion to <10 pounds during weeks 7-8 and to <15 pounds during weeks 8-12  Protocol (9-16 weeks):  Normalize shoulder motion  Avoid push-ups, pull-ups, bench press, and overhead weightlifting activities  Weightbearing: Progress per protocol  Protocol (16 weeks-8 months):  Meet occupational requirements by 4 months  Restore overhead strength by 6-8 months  No weightlifting restrictions        RANGE-OF-MOTION LIMITATIONS: Per schedule below     Tear Size Sling Use Begin PROM Begin AROM   Small <1 cm2 4 weeks  Immediate 4 weeks   Medium 1-3 cm2 4-6 weeks Immediate 6 weeks   Large 3-5 cm2 6 weeks 2 weeks 8 weeks   Massive >5 cm2 6-8 weeks 4 weeks 8 weeks

## 2024-10-30 ENCOUNTER — OFFICE VISIT (OUTPATIENT)
Dept: PHYSICAL THERAPY | Age: 54
End: 2024-10-30
Attending: STUDENT IN AN ORGANIZED HEALTH CARE EDUCATION/TRAINING PROGRAM
Payer: COMMERCIAL

## 2024-10-30 PROCEDURE — 97110 THERAPEUTIC EXERCISES: CPT | Performed by: PHYSICAL THERAPIST

## 2024-10-30 PROCEDURE — 97140 MANUAL THERAPY 1/> REGIONS: CPT | Performed by: PHYSICAL THERAPIST

## 2024-10-30 NOTE — PROGRESS NOTES
Diagnosis:   Nontraumatic incomplete rupture of rotator cuff, left (M75.112)  Superior labrum anterior-to-posterior (SLAP) tear of left shoulder (S43.471A)       Referring Provider: Igor Smith  Date of Evaluation:    8/28/2024    Precautions:       - post surgical precautions/protocol  Next MD visit:   2/2024  Date of Surgery: 8/22/2024   Insurance Primary/Secondary: [employer] [place] H[place] / N/A     # Auth Visits: 10/30/2024: 8 auth    Subjective:  Shoulder is stiff today. Yesterday was pretty good. Having a hard time getting my HEP done.     Pain: 0/10, denies pain but c/o tightness     Assessment:  Pt with continued difficulty/hesitation/c/o with attempt to complete active L shoulder flexion. Continue to progress strengthening and AAROM to address. Reviewed various options for shoulder ER ROM; see below. Pt tolerated session well overall.         Objective:   L shoulder AROM flexion: ~ 90 deg: pt hesitant, will re-assess in future    Goals: (To be met in 18 visits) -  ALL GOALS IN PROGRESS  Pt will report improved ability to sleep without waking due to shoulder pain  Pt will improve shoulder flexion AROM to >160 degrees to be able to reach into overhead cabinets without pain or restriction  Pt will improve shoulder abduction AROM to >160 degrees to improve ability to don deodorant, don/doff shirts, and wash hair  Pt will increase shoulder AROM ER to 90 to reach and fasten seatbelt   Pt will increase shoulder AROM IR to 70 to be able to reach in back pocket, tuck in shirt, and turn steering wheel without pain  Pt will improve shoulder strength throughout to 4+/5 to improve function with ADLs including reaching and lifting  Pt will demonstrate increased mid/low trap strength to 4/5 to promote improved shoulder mechanics and stabilization with ADL such as lifting and reaching   Pt will be independent and compliant with comprehensive HEP to maintain progress achieved in PT       Plan: Continue  skilled Physical Therapy 2  x/week or a total of up to 28 visits . Progress per post op protocol, P/AA/AROM, strengthening.  Date: 10/9/2024  Tx #: 11/18 (11/28) Date: 10/15/2024  Tx #: 12/18 (12/28) Date: 10/16/2024  Tx #: 13/18 (13/28 auth) Date: 10/23/2024  Tx #: 14/18 (14/28 auth) Date: 10/28/2024  Tx #: 15/22-28 (15/28 auth) Date: 10/30/2024  Tx #: 16/22/28 auth (1/8 auth)   There Ex:   L shoulder pendulums x ~ 1 min   Attempted supine AAROM flex, ER using wand - pt c/o, stopped  W ball slides on table x 5 reps  Gentle isometric L shoulder ER x 10 reps (HEP)  Gentle L shoulder IR isometric x 10 reps (HEP)  Standing scap retraction x 10 reps  Standing L shoulder ER ROM 0 deg abd using wall 4 x 20 sec each  Standing shoulder gentle ext AAROM using wand 4 reps x 10 sec each,  hand to sacrum using wand - pt c/o; completed using R hand gentle L shoulder AAROM IR 4 x 10 sec each (HEP)  Seated L shoulder horiz add across chest 4 x 10 sec each There Ex:   L shoulder pendulums x ~ 1 min  Supine shoulder AAROM flexion using wand x 5 reps, hands wider x 5 reps  Supine hand behind head gentle ER ROM/stretch 4 x 10 sec each   Gentle isometric L shoulder ER x 10 reps (HEP)  Gentle L shoulder IR isometric x 10 reps (HEP)  Gentle L shoulder hand behind back IR AAROM using R hand 5 x 10 sec each  (HEP)  Attempted wall slides - pt c/o, stopped  Seated shoulder pulleys: flex, scap, abd x ~8 reps each   B row using yellow tubing 2 x 10 reps      There Ex:   Seated shoulder pulleys: flex, scap, abd x ~10 reps each   L shoulder row using yellow band 2 x 10 reps (HEP)  L shoulder IR using yellow band 2 x 10 reps (HEP)  L shoulder ER using yellow band 2 x 10 reps - gentle (HEP)  Gentle L hand behind back IR AAROM using strap 5 x 10 sec each  L shoulder ER ROM using wall - pt declined  Supine hand behind head ER 5 x 15 sec each  Supine L shoulder flexion (elbow flex - ext, no straight arm) x 5 reps   There Ex:   HEP review; see below.   Seated shoulder pulleys:  flex, scap, abd x ~10 reps each  Supine L shoulder flexion x  ~ 8 reps (start/end position elbow flexed)  S/L L shoulder Er 0# x 10 reps  S/L L shoulder abd 0# elbow flexed to 90 deg (pt c/o with elbow ext )  Attempted supine hand behind head ER ROM -4 x 15 sec each  Prone L shoulder row 0# x 15 reps  Supine shoulder ER AAROM in 90 deg abd using wand - pt c/o, stopped  Seated shoulder AAROM flexion with hands clasped x 10 reps (HEP)  Wall slides flexion x 5 reps (HEP)  Gentle L hand behind back IR AAROM using strap 5 x 10 sec each - not completed today   There Ex:   HEP review; see below.   Seated shoulder pulleys: flex, scap, abd x ~10 reps each  Gentle L shoulder hand behind back IR AAROM using strap 5 x 20 sec each   Supine L shoulder flexion x  ~ 8 reps (start/end position elbow flexed) - pt progressed to with elbow ext)   S/L L shoulder Er 0# x 10 reps  S/L L shoulder abd 0# x 10 reps  Prone L shoulder row 0# x 15 reps  Supine shoulder ER AAROM in 90 deg abd using wand 4 x 15 sec each  Seated shoulder AAROM flexion with hands clasped x 10 reps (HEP review - progressively less R UE A)  Wall slides flexion 2  x 5 reps (HEP review)  Doorway ER ROM/stretch with towel between arm/side 4 x 15 sec each There Ex:   Seated shoulder pulleys: flex, scap, abd x ~10 reps each  Supine L shoulder flexion with elbow ext x 10 reps 0#   S/L L shoulder ER 0# x 10 reps - pt declined more reps  S/L L shoulder abd 0# x ~ 18 reps  Prone L shoulder row 0# x 20  reps  Prone L shoulder horiz abd 0# x 10 reps   Standing shoulder AAROM flexion using wand - pt c/o weakness/c/o, stopped  W wall slides x 5 reps  L shoulder ER AAROM using wand - ineffective, stopped  L shoulder ER ROM/mobilization 0 deg abd using doorframe 3 x 20 sec each, pt reported not effective, stopped  Supine hand behind head ER ROM/stretch - pt reported ineffective, stopped  Supine shoulder ER AAROM in 90 deg abd using wand 5 x 15 sec each  Gentle L hand behind back IR  AAROM using strap 5 x 10 sec each   Seated L shoulder ER ROM in 90 deg abd 5 x 15 (HEP option for ER ROM)         Manual:   Surgical scar mobilization -unable, pt wearing long sleeves   Attempted light GH post and inf glides - pt c/o, stopped  PROM L shoulder ER, IR, flex, abd  - limited due to fair itzel  STM L upper trap Manual:   Surgical scar mobilization biceps region  L GH post/ant and inf glides grade II  PROM L shoulder ER, IR, flex, abd, scap    STM L > R upper trap Manual:   Surgical scar mobilization biceps region  L GH post/ant and inf glides grade II  PROM L shoulder ER, IR, flex, abd, scap    STM L > R upper trap Manual:   Surgical scar mobilization biceps region  L GH post/ant and inf glides grade II  PROM L shoulder ER, IR, flex, abd, scap    STM L > R upper trap Manual:   Surgical scar mobilization biceps region - not completed today, re-assess next session  L GH post/ant and inf glides grade II  PROM L shoulder ER, IR, flex, abd, scap    STM L > R upper trap - not completed today Manual:   Surgical scar mobilization L shoulder  L GH post/ant and inf glides grade II - pt declined post glides  PROM L shoulder ER, IR, flex, abd, scap    STM L upper trap    Cold pack - pt declined, reported would complete at home Cold pack L shoulder x 10 min seated Cold pack L shoulder x 10 min seated Cold pack L shoulder - pt declined today Cold pack L shoulder - pt declined today Cold pack L shoulder - pt declined today           HEP: pendulums (flex/ext, horiz abd/add, circular), PROM L elbow flex/ext - as needed, supine A/AAROM flexion using wand - progress to wall slides, shoulder ER PROM using wand or seated or doorfram, scap retraction - progress to L UE using YTB, gentle shoulder IR/hand behind back P/AAROM using strap, gentle isometric L shoulder IR/ER using wall - progress to gentle using YTB, gentle horiz add stretch   Charges: 3 TE (38 min), 1 manual (10 min) Total Timed Treatment: 48 min  Total Treatment  Time: 50 min    Post op protocol:  Protocol (2-6 weeks):  Continue shoulder immobilization until 4 weeks, discontinue based on tear size  Normalize elbow and wrist motion  Prevent motion stretching repair   Progress motion as per protocol. For small and medium tears, begin to progress shoulder motion  Weightbearing: Progress per protocol  Protocol (6-8 weeks):  Discontinue shoulder immobilization if not already done  Progress motion as per protocol  Small and medium tears: AAROM for FLEX/ABD to 120  Large and massive tears: PROM for FLEX/ABD to 120  Weightbearing: Limit elbow flexion to <10 pounds during weeks 7-8 and to <15 pounds during weeks 8-12  Protocol (9-16 weeks):  Normalize shoulder motion  Avoid push-ups, pull-ups, bench press, and overhead weightlifting activities  Weightbearing: Progress per protocol  Protocol (16 weeks-8 months):  Meet occupational requirements by 4 months  Restore overhead strength by 6-8 months  No weightlifting restrictions        RANGE-OF-MOTION LIMITATIONS: Per schedule below     Tear Size Sling Use Begin PROM Begin AROM   Small <1 cm2 4 weeks Immediate 4 weeks   Medium 1-3 cm2 4-6 weeks Immediate 6 weeks   Large 3-5 cm2 6 weeks 2 weeks 8 weeks   Massive >5 cm2 6-8 weeks 4 weeks 8 weeks

## 2024-11-04 ENCOUNTER — OFFICE VISIT (OUTPATIENT)
Dept: PHYSICAL THERAPY | Age: 54
End: 2024-11-04
Attending: STUDENT IN AN ORGANIZED HEALTH CARE EDUCATION/TRAINING PROGRAM
Payer: COMMERCIAL

## 2024-11-04 PROCEDURE — 97110 THERAPEUTIC EXERCISES: CPT | Performed by: PHYSICAL THERAPIST

## 2024-11-04 PROCEDURE — 97140 MANUAL THERAPY 1/> REGIONS: CPT | Performed by: PHYSICAL THERAPIST

## 2024-11-04 NOTE — PROGRESS NOTES
Diagnosis:   Nontraumatic incomplete rupture of rotator cuff, left (M75.112)  Superior labrum anterior-to-posterior (SLAP) tear of left shoulder (S43.261A)       Referring Provider: Igor Smith  Date of Evaluation:    8/28/2024    Precautions:       - post surgical precautions/protocol  Next MD visit:   2/2024  Date of Surgery: 8/22/2024   Insurance Primary/Secondary: [employer] [place] / N/A     # Auth Visits: 10/30/2024: 8 auth    Subjective:  Shoulder is stiff today. Having a hard time getting my HEP done.     Pain: low 1-2/10,  c/o tightness     Assessment:  Pt with c/o/restricted ability to reach using L UE, superior humeral glide noted.  Continue to progress strengthening and AAROM to address. Pt with improved tolerance for shoulder AAROM flex, scap in standing today. Encouraged HEP compliance for progress.  Pt tolerated session well overall.       Objective:   L shoulder AROM flexion: ~ 90 deg    Goals: (To be met in 18 visits) -  ALL GOALS IN PROGRESS  Pt will report improved ability to sleep without waking due to shoulder pain  Pt will improve shoulder flexion AROM to >160 degrees to be able to reach into overhead cabinets without pain or restriction  Pt will improve shoulder abduction AROM to >160 degrees to improve ability to don deodorant, don/doff shirts, and wash hair  Pt will increase shoulder AROM ER to 90 to reach and fasten seatbelt   Pt will increase shoulder AROM IR to 70 to be able to reach in back pocket, tuck in shirt, and turn steering wheel without pain  Pt will improve shoulder strength throughout to 4+/5 to improve function with ADLs including reaching and lifting  Pt will demonstrate increased mid/low trap strength to 4/5 to promote improved shoulder mechanics and stabilization with ADL such as lifting and reaching   Pt will be independent and compliant with comprehensive HEP to maintain progress achieved in PT       Plan: Continue  skilled Physical Therapy 2 x/week or a total of up to 28  visits . Progress per post op protocol, P/AA/AROM, strengthening, standing interventions.   Date: 10/16/2024  Tx #: 13/18 (13/28 auth) Date: 10/23/2024  Tx #: 14/18 (14/28 auth) Date: 10/28/2024  Tx #: 15/22-28 (15/28 auth) Date: 10/30/2024  Tx #: 16/22/28 auth (1/8 auth) Date: 11/4/2024  Tx #: 17/22-28 (2/8 auth)   There Ex:   Seated shoulder pulleys: flex, scap, abd x ~10 reps each   L shoulder row using yellow band 2 x 10 reps (HEP)  L shoulder IR using yellow band 2 x 10 reps (HEP)  L shoulder ER using yellow band 2 x 10 reps - gentle (HEP)  Gentle L hand behind back IR AAROM using strap 5 x 10 sec each  L shoulder ER ROM using wall - pt declined  Supine hand behind head ER 5 x 15 sec each  Supine L shoulder flexion (elbow flex - ext, no straight arm) x 5 reps   There Ex:   HEP review; see below.   Seated shoulder pulleys: flex, scap, abd x ~10 reps each  Supine L shoulder flexion x  ~ 8 reps (start/end position elbow flexed)  S/L L shoulder Er 0# x 10 reps  S/L L shoulder abd 0# elbow flexed to 90 deg (pt c/o with elbow ext )  Attempted supine hand behind head ER ROM -4 x 15 sec each  Prone L shoulder row 0# x 15 reps  Supine shoulder ER AAROM in 90 deg abd using wand - pt c/o, stopped  Seated shoulder AAROM flexion with hands clasped x 10 reps (HEP)  Wall slides flexion x 5 reps (HEP)  Gentle L hand behind back IR AAROM using strap 5 x 10 sec each - not completed today   There Ex:   HEP review; see below.   Seated shoulder pulleys: flex, scap, abd x ~10 reps each  Gentle L shoulder hand behind back IR AAROM using strap 5 x 20 sec each   Supine L shoulder flexion x  ~ 8 reps (start/end position elbow flexed) - pt progressed to with elbow ext)   S/L L shoulder Er 0# x 10 reps  S/L L shoulder abd 0# x 10 reps  Prone L shoulder row 0# x 15 reps  Supine shoulder ER AAROM in 90 deg abd using wand 4 x 15 sec each  Seated shoulder AAROM flexion with hands clasped x 10 reps (HEP review - progressively less R UE  A)  Wall slides flexion 2  x 5 reps (HEP review)  Doorway ER ROM/stretch with towel between arm/side 4 x 15 sec each There Ex:   Seated shoulder pulleys: flex, scap, abd x ~10 reps each  Supine L shoulder flexion with elbow ext x 10 reps 0#   S/L L shoulder ER 0# x 10 reps - pt declined more reps  S/L L shoulder abd 0# x ~ 18 reps  Prone L shoulder row 0# x 20  reps  Prone L shoulder horiz abd 0# x 10 reps   Standing shoulder AAROM flexion using wand - pt c/o weakness/c/o, stopped  W wall slides x 5 reps  L shoulder ER AAROM using wand - ineffective, stopped  L shoulder ER ROM/mobilization 0 deg abd using doorframe 3 x 20 sec each, pt reported not effective, stopped  Supine hand behind head ER ROM/stretch - pt reported ineffective, stopped  Supine shoulder ER AAROM in 90 deg abd using wand 5 x 15 sec each  Gentle L hand behind back IR AAROM using strap 5 x 10 sec each   Seated L shoulder ER ROM in 90 deg abd 5 x 15 (HEP option for ER ROM)       There Ex:   Seated shoulder pulleys: flex, scap, abd, IR x ~10 reps each  S/L L shoulder ER 0# : 2 x 10 reps   S/L L shoulder abd 0# x ~ 10 reps  Prone L shoulder row 0# x 20  reps  Prone L shoulder horiz abd 0# x 10 reps   Shoulder flexion wall slide x 5 reps (warm up prior to Ws)  W wall slides x 5 reps (HEP)  Circles using wall x 5 reps each clockwise/counterclockwise (HEP)  Completed multiple shoulder ER ROM; doorway in 0 deg abd most effective 4 x 20 sec each  L shoulder AAROM flex, scap, abd x 5 reps each     Manual:   Surgical scar mobilization biceps region  L GH post/ant and inf glides grade II  PROM L shoulder ER, IR, flex, abd, scap    STM L > R upper trap Manual:   Surgical scar mobilization biceps region  L GH post/ant and inf glides grade II  PROM L shoulder ER, IR, flex, abd, scap    STM L > R upper trap Manual:   Surgical scar mobilization biceps region - not completed today, re-assess next session  L GH post/ant and inf glides grade II  PROM L shoulder ER,  IR, flex, abd, scap    STM L > R upper trap - not completed today Manual:   Surgical scar mobilization L shoulder  L GH post/ant and inf glides grade II - pt declined post glides  PROM L shoulder ER, IR, flex, abd, scap    STM L upper trap  Manual:   Surgical scar mobilization L shoulder  L GH post/ant and inf glides grade II - pt declined post glides  PROM L shoulder ER, IR, flex, abd, scap    STM L upper trap    Cold pack L shoulder x 10 min seated Cold pack L shoulder - pt declined today Cold pack L shoulder - pt declined today Cold pack L shoulder - pt declined today Pt took cold pack for home use x 10 min          HEP: pendulums (flex/ext, horiz abd/add, circular), PROM L elbow flex/ext - as needed, wall slides - progress to W and circles, shoulder ER PROM using wand or seated or doorfram, scap retraction - progress to L UE using YTB, gentle shoulder IR/hand behind back P/AAROM using strap, gentle isometric L shoulder IR/ER using wall - progress to gentle using YTB, gentle horiz add stretch   Charges: 2 TE (32 min), 1 manual (10 min) Total Timed Treatment: 42 min  Total Treatment Time: 45 min    Post op protocol:  Protocol (2-6 weeks):  Continue shoulder immobilization until 4 weeks, discontinue based on tear size  Normalize elbow and wrist motion  Prevent motion stretching repair   Progress motion as per protocol. For small and medium tears, begin to progress shoulder motion  Weightbearing: Progress per protocol  Protocol (6-8 weeks):  Discontinue shoulder immobilization if not already done  Progress motion as per protocol  Small and medium tears: AAROM for FLEX/ABD to 120  Large and massive tears: PROM for FLEX/ABD to 120  Weightbearing: Limit elbow flexion to <10 pounds during weeks 7-8 and to <15 pounds during weeks 8-12  Protocol (9-16 weeks):  Normalize shoulder motion  Avoid push-ups, pull-ups, bench press, and overhead weightlifting activities  Weightbearing: Progress per protocol  Protocol (16 weeks-8  months):  Meet occupational requirements by 4 months  Restore overhead strength by 6-8 months  No weightlifting restrictions        RANGE-OF-MOTION LIMITATIONS: Per schedule below     Tear Size Sling Use Begin PROM Begin AROM   Small <1 cm2 4 weeks Immediate 4 weeks   Medium 1-3 cm2 4-6 weeks Immediate 6 weeks   Large 3-5 cm2 6 weeks 2 weeks 8 weeks   Massive >5 cm2 6-8 weeks 4 weeks 8 weeks

## 2024-11-06 ENCOUNTER — OFFICE VISIT (OUTPATIENT)
Dept: PHYSICAL THERAPY | Age: 54
End: 2024-11-06
Attending: STUDENT IN AN ORGANIZED HEALTH CARE EDUCATION/TRAINING PROGRAM
Payer: COMMERCIAL

## 2024-11-06 PROCEDURE — 97110 THERAPEUTIC EXERCISES: CPT | Performed by: PHYSICAL THERAPIST

## 2024-11-06 PROCEDURE — 97014 ELECTRIC STIMULATION THERAPY: CPT | Performed by: PHYSICAL THERAPIST

## 2024-11-06 PROCEDURE — 97140 MANUAL THERAPY 1/> REGIONS: CPT | Performed by: PHYSICAL THERAPIST

## 2024-11-06 NOTE — PROGRESS NOTES
Diagnosis:   Nontraumatic incomplete rupture of rotator cuff, left (M75.112)  Superior labrum anterior-to-posterior (SLAP) tear of left shoulder (S43.975A)       Referring Provider: Igor Smith  Date of Evaluation:    8/28/2024    Precautions:       - post surgical precautions/protocol  Next MD visit:   2/2024  Date of Surgery: 8/22/2024   Insurance Primary/Secondary: [employer] [place] / N/A     # Auth Visits: 10/30/2024: 8 auth    Subjective:  Shoulder is less stiff today.  Having a hard time completing hand behind back stretch. Tired from school field trip today.  Less pain than before surgery.    Pain:  0/10 at rest, ~ 1/10 with movement.     Objective:   L shoulder AROM flexion: ~ 90 deg    Assessment:    Pt with continued difficulty actively reaching using L UE, superior humeral glide. Pt noted particular increased difficulty today, unsure why, requested MHP/IFC at close of session, reported helpful. Continue to progress strengthening and AAROM to address deficits. Will monitor pt for consistent progress. Encouraged HEP compliance for progress.     Goals: (To be met in 18 visits) -  ALL GOALS IN PROGRESS  Pt will report improved ability to sleep without waking due to shoulder pain  Pt will improve shoulder flexion AROM to >160 degrees to be able to reach into overhead cabinets without pain or restriction  Pt will improve shoulder abduction AROM to >160 degrees to improve ability to don deodorant, don/doff shirts, and wash hair  Pt will increase shoulder AROM ER to 90 to reach and fasten seatbelt   Pt will increase shoulder AROM IR to 70 to be able to reach in back pocket, tuck in shirt, and turn steering wheel without pain  Pt will improve shoulder strength throughout to 4+/5 to improve function with ADLs including reaching and lifting  Pt will demonstrate increased mid/low trap strength to 4/5 to promote improved shoulder mechanics and stabilization with ADL such as lifting and reaching   Pt will be independent  and compliant with comprehensive HEP to maintain progress achieved in PT       Plan: Continue  skilled Physical Therapy 2 x/week or a total of up to 28 visits . Progress per post op protocol, P/AA/AROM, strengthening, standing interventions, reaching.   Date: 10/23/2024  Tx #: 14/18 (14/28 auth) Date: 10/28/2024  Tx #: 15/22-28 (15/28 auth) Date: 10/30/2024  Tx #: 16/22/28 auth (1/8 auth) Date: 11/4/2024  Tx #: 17/22-28 (2/8 auth) Date: 11/6/2024  Tx #: 18/22-28 (3/8 auth)   There Ex:   HEP review; see below.   Seated shoulder pulleys: flex, scap, abd x ~10 reps each  Supine L shoulder flexion x  ~ 8 reps (start/end position elbow flexed)  S/L L shoulder Er 0# x 10 reps  S/L L shoulder abd 0# elbow flexed to 90 deg (pt c/o with elbow ext )  Attempted supine hand behind head ER ROM -4 x 15 sec each  Prone L shoulder row 0# x 15 reps  Supine shoulder ER AAROM in 90 deg abd using wand - pt c/o, stopped  Seated shoulder AAROM flexion with hands clasped x 10 reps (HEP)  Wall slides flexion x 5 reps (HEP)  Gentle L hand behind back IR AAROM using strap 5 x 10 sec each - not completed today   There Ex:   HEP review; see below.   Seated shoulder pulleys: flex, scap, abd x ~10 reps each  Gentle L shoulder hand behind back IR AAROM using strap 5 x 20 sec each   Supine L shoulder flexion x  ~ 8 reps (start/end position elbow flexed) - pt progressed to with elbow ext)   S/L L shoulder Er 0# x 10 reps  S/L L shoulder abd 0# x 10 reps  Prone L shoulder row 0# x 15 reps  Supine shoulder ER AAROM in 90 deg abd using wand 4 x 15 sec each  Seated shoulder AAROM flexion with hands clasped x 10 reps (HEP review - progressively less R UE A)  Wall slides flexion 2  x 5 reps (HEP review)  Doorway ER ROM/stretch with towel between arm/side 4 x 15 sec each There Ex:   Seated shoulder pulleys: flex, scap, abd x ~10 reps each  Supine L shoulder flexion with elbow ext x 10 reps 0#   S/L L shoulder ER 0# x 10 reps - pt declined more reps  S/L L  shoulder abd 0# x ~ 18 reps  Prone L shoulder row 0# x 20  reps  Prone L shoulder horiz abd 0# x 10 reps   Standing shoulder AAROM flexion using wand - pt c/o weakness/c/o, stopped  W wall slides x 5 reps  L shoulder ER AAROM using wand - ineffective, stopped  L shoulder ER ROM/mobilization 0 deg abd using doorframe 3 x 20 sec each, pt reported not effective, stopped  Supine hand behind head ER ROM/stretch - pt reported ineffective, stopped  Supine shoulder ER AAROM in 90 deg abd using wand 5 x 15 sec each  Gentle L hand behind back IR AAROM using strap 5 x 10 sec each   Seated L shoulder ER ROM in 90 deg abd 5 x 15 (HEP option for ER ROM)       There Ex:   Seated shoulder pulleys: flex, scap, abd, IR x ~10 reps each  S/L L shoulder ER 0# : 2 x 10 reps   S/L L shoulder abd 0# x ~ 10 reps  Prone L shoulder row 0# x 20  reps  Prone L shoulder horiz abd 0# x 10 reps   Shoulder flexion wall slide x 5 reps (warm up prior to Ws)  W wall slides x 5 reps (HEP)  Circles using wall x 5 reps each clockwise/counterclockwise (HEP)  Completed multiple shoulder ER ROM; doorway in 0 deg abd most effective 4 x 20 sec each  L shoulder AAROM flex, scap, abd x 5 reps each   There Ex:  Seated shoulder pulleys: flex, scap, abd, IR x ~10 reps each  Supine shoulder AAROM flexion using wand 2# 5 x 5 sec each  S/L L shoulder ER 0# :  x 10 reps   S/L L shoulder abd 0# - pt c/o, stopped  Prone L shoulder row 0# x 20  reps  Prone L shoulder horiz abd 0#  - pt c/o discomfort, stopped  Supine B shoulder horiz abd using YTB x 15 reps   L shoulder AAROM flex, scap, abd using wand x 5 reps each  HEP review; see below.      Manual:   Surgical scar mobilization biceps region  L GH post/ant and inf glides grade II  PROM L shoulder ER, IR, flex, abd, scap    STM L > R upper trap Manual:   Surgical scar mobilization biceps region - not completed today, re-assess next session  L GH post/ant and inf glides grade II  PROM L shoulder ER, IR, flex, abd, scap     STM L > R upper trap - not completed today Manual:   Surgical scar mobilization L shoulder  L GH post/ant and inf glides grade II - pt declined post glides  PROM L shoulder ER, IR, flex, abd, scap    STM L upper trap  Manual:   Surgical scar mobilization L shoulder  L GH post/ant and inf glides grade II - pt declined post glides  PROM L shoulder ER, IR, flex, abd, scap    STM L upper trap  Manual:   Surgical scar mobilization L shoulder axilla area only  L GH post/ant and inf glides grade II - pt declined post glides  PROM L shoulder ER, IR, flex, abd, scap, ext    Cold pack L shoulder - pt declined today Cold pack L shoulder - pt declined today Cold pack L shoulder - pt declined today Pt took cold pack for home use x 10 min IFC/MHP L shoulder x 10 min seated          HEP: pendulums (flex/ext, horiz abd/add, circular), PROM L elbow flex/ext - as needed, wall slides - progress to W and circles, shoulder ER PROM using wand or seated or doorfram, scap retraction - progress to L UE using YTB, gentle shoulder IR/hand behind back P/AAROM using strap, L shoulder IR/ER  using YTB, gentle horiz add stretch   Charges: 2 TE (32 min), 1 manual (10 min), Estim Total Timed Treatment: 42 min  Total Treatment Time: 55 min    Post op protocol:  Protocol (2-6 weeks):  Continue shoulder immobilization until 4 weeks, discontinue based on tear size  Normalize elbow and wrist motion  Prevent motion stretching repair   Progress motion as per protocol. For small and medium tears, begin to progress shoulder motion  Weightbearing: Progress per protocol  Protocol (6-8 weeks):  Discontinue shoulder immobilization if not already done  Progress motion as per protocol  Small and medium tears: AAROM for FLEX/ABD to 120  Large and massive tears: PROM for FLEX/ABD to 120  Weightbearing: Limit elbow flexion to <10 pounds during weeks 7-8 and to <15 pounds during weeks 8-12  Protocol (9-16 weeks):  Normalize shoulder motion  Avoid push-ups,  pull-ups, bench press, and overhead weightlifting activities  Weightbearing: Progress per protocol  Protocol (16 weeks-8 months):  Meet occupational requirements by 4 months  Restore overhead strength by 6-8 months  No weightlifting restrictions        RANGE-OF-MOTION LIMITATIONS: Per schedule below     Tear Size Sling Use Begin PROM Begin AROM   Small <1 cm2 4 weeks Immediate 4 weeks   Medium 1-3 cm2 4-6 weeks Immediate 6 weeks   Large 3-5 cm2 6 weeks 2 weeks 8 weeks   Massive >5 cm2 6-8 weeks 4 weeks 8 weeks

## 2024-11-11 ENCOUNTER — OFFICE VISIT (OUTPATIENT)
Dept: PHYSICAL THERAPY | Age: 54
End: 2024-11-11
Attending: STUDENT IN AN ORGANIZED HEALTH CARE EDUCATION/TRAINING PROGRAM
Payer: COMMERCIAL

## 2024-11-11 PROCEDURE — 97140 MANUAL THERAPY 1/> REGIONS: CPT | Performed by: PHYSICAL THERAPIST

## 2024-11-11 PROCEDURE — 97014 ELECTRIC STIMULATION THERAPY: CPT | Performed by: PHYSICAL THERAPIST

## 2024-11-11 PROCEDURE — 97110 THERAPEUTIC EXERCISES: CPT | Performed by: PHYSICAL THERAPIST

## 2024-11-11 NOTE — PROGRESS NOTES
Diagnosis:   Nontraumatic incomplete rupture of rotator cuff, left (M75.112)  Superior labrum anterior-to-posterior (SLAP) tear of left shoulder (S43.046A)       Referring Provider: Igor Smith  Date of Evaluation:    8/28/2024    Precautions:       - post surgical precautions/protocol  Next MD visit:   2/2024  Date of Surgery: 8/22/2024   Insurance Primary/Secondary: [employer] [place] / N/A     # Auth Visits: 10/30/2024: 8 auth    Subjective:  c/o anterior shoulder discomfort, shoulder tightness. Took muscle relaxants which seems to help; does not affect my ability to drive, etc. Estim felt great last session.  Limited HEP compliance during work days, more compliant weekend.    Pain:   1-2/10    Objective:   L shoulder AROM flexion: ~ 130 deg  L shoulder PROM fle    Assessment:    Advised pt to consider MHP 10 min, pendulums for c/o tightness.  Follow precautions/guidelines with muscle relaxants use. Pt progressing but with continued difficulty/restrictions with L shoulder AROM. Progressing strengthening, AA/AROM to address.  Pt challenged but tolerated well. Encouraged HEP compliance for progress, particularly wall slides, ER using YTB, hand behind back IR AAROM. Pt continues to report IFC/MHP helpful.     Goals: (To be met in 18 visits) -  ALL GOALS IN PROGRESS  Pt will report improved ability to sleep without waking due to shoulder pain  Pt will improve shoulder flexion AROM to >160 degrees to be able to reach into overhead cabinets without pain or restriction  Pt will improve shoulder abduction AROM to >160 degrees to improve ability to don deodorant, don/doff shirts, and wash hair  Pt will increase shoulder AROM ER to 90 to reach and fasten seatbelt   Pt will increase shoulder AROM IR to 70 to be able to reach in back pocket, tuck in shirt, and turn steering wheel without pain  Pt will improve shoulder strength throughout to 4+/5 to improve function with ADLs including reaching and lifting  Pt will demonstrate  increased mid/low trap strength to 4/5 to promote improved shoulder mechanics and stabilization with ADL such as lifting and reaching   Pt will be independent and compliant with comprehensive HEP to maintain progress achieved in PT       Plan: Continue  skilled Physical Therapy 2 x/week or a total of up to 28 visits . F/u on c/o tightness. Progress per post op protocol, P/AA/AROM, strengthening, standing interventions, reaching.   Date: 10/28/2024  Tx #: 15/22-28 (15/28 auth) Date: 10/30/2024  Tx #: 16/22/28 auth (1/8 auth) Date: 11/4/2024  Tx #: 17/22-28 (2/8 auth) Date: 11/6/2024  Tx #: 18/22-28 (3/8 auth) Date: 11/11/2024  Tx #: 19/22-28 (4/8 auth)   There Ex:   HEP review; see below.   Seated shoulder pulleys: flex, scap, abd x ~10 reps each  Gentle L shoulder hand behind back IR AAROM using strap 5 x 20 sec each   Supine L shoulder flexion x  ~ 8 reps (start/end position elbow flexed) - pt progressed to with elbow ext)   S/L L shoulder Er 0# x 10 reps  S/L L shoulder abd 0# x 10 reps  Prone L shoulder row 0# x 15 reps  Supine shoulder ER AAROM in 90 deg abd using wand 4 x 15 sec each  Seated shoulder AAROM flexion with hands clasped x 10 reps (HEP review - progressively less R UE A)  Wall slides flexion 2  x 5 reps (HEP review)  Doorway ER ROM/stretch with towel between arm/side 4 x 15 sec each There Ex:   Seated shoulder pulleys: flex, scap, abd x ~10 reps each  Supine L shoulder flexion with elbow ext x 10 reps 0#   S/L L shoulder ER 0# x 10 reps - pt declined more reps  S/L L shoulder abd 0# x ~ 18 reps  Prone L shoulder row 0# x 20  reps  Prone L shoulder horiz abd 0# x 10 reps   Standing shoulder AAROM flexion using wand - pt c/o weakness/c/o, stopped  W wall slides x 5 reps  L shoulder ER AAROM using wand - ineffective, stopped  L shoulder ER ROM/mobilization 0 deg abd using doorframe 3 x 20 sec each, pt reported not effective, stopped  Supine hand behind head ER ROM/stretch - pt reported ineffective,  stopped  Supine shoulder ER AAROM in 90 deg abd using wand 5 x 15 sec each  Gentle L hand behind back IR AAROM using strap 5 x 10 sec each   Seated L shoulder ER ROM in 90 deg abd 5 x 15 (HEP option for ER ROM)       There Ex:   Seated shoulder pulleys: flex, scap, abd, IR x ~10 reps each  S/L L shoulder ER 0# : 2 x 10 reps   S/L L shoulder abd 0# x ~ 10 reps  Prone L shoulder row 0# x 20  reps  Prone L shoulder horiz abd 0# x 10 reps   Shoulder flexion wall slide x 5 reps (warm up prior to Ws)  W wall slides x 5 reps (HEP)  Circles using wall x 5 reps each clockwise/counterclockwise (HEP)  Completed multiple shoulder ER ROM; doorway in 0 deg abd most effective 4 x 20 sec each  L shoulder AAROM flex, scap, abd x 5 reps each   There Ex:  Seated shoulder pulleys: flex, scap, abd, IR x ~10 reps each  Supine shoulder AAROM flexion using wand 2# 5 x 5 sec each  S/L L shoulder ER 0# :  x 10 reps   S/L L shoulder abd 0# - pt c/o, stopped  Prone L shoulder row 0# x 20  reps  Prone L shoulder horiz abd 0#  - pt c/o discomfort, stopped  Supine B shoulder horiz abd using YTB x 15 reps   L shoulder AAROM flex, scap, abd using wand x 5 reps each  HEP review; see below.    There Ex:  Seated shoulder pulleys: flex, scap, abd, IR x ~10 reps each  Standing L shoulder IR using yellow tubing 2 x 10 reps   S/L L shoulder ER 0# :  x 10 reps   S/L L shoulder horiz abd 0# x 10 reps   S/L L shoulder horizontal abduction 0# x 10 reps   Prone L shoulder row 0# 2 x 10  Supine L shoulder flexion 0# x 10 reps  L shoulder wall slides AAROM: 9, 10, 12, 1, 3 o'clock x 4 reps each   Manual:   Surgical scar mobilization biceps region - not completed today, re-assess next session  L GH post/ant and inf glides grade II  PROM L shoulder ER, IR, flex, abd, scap    STM L > R upper trap - not completed today Manual:   Surgical scar mobilization L shoulder  L GH post/ant and inf glides grade II - pt declined post glides  PROM L shoulder ER, IR, flex, abd,  scap    STM L upper trap  Manual:   Surgical scar mobilization L shoulder  L GH post/ant and inf glides grade II - pt declined post glides  PROM L shoulder ER, IR, flex, abd, scap    STM L upper trap  Manual:   Surgical scar mobilization L shoulder axilla area only  L GH post/ant and inf glides grade II - pt declined post glides  PROM L shoulder ER, IR, flex, abd, scap, ext  Manual:   Surgical scar mobilization L shoulder axilla area only - not completed today  L GH post/ant and inf glides grade II - pt declined post glides  PROM L shoulder ER, IR, flex, abd, scap, ext    Cold pack L shoulder - pt declined today Cold pack L shoulder - pt declined today Pt took cold pack for home use x 10 min IFC/MHP L shoulder x 10 min seated IFC/MHP L shoulder x 10 min seated          HEP: pendulums (flex/ext, horiz abd/add, circular), PROM L elbow flex/ext - as needed, wall slides - progress to W and circles, shoulder ER PROM using wand or seated or doorframe, scap retraction - progress to L UE using YTB, gentle shoulder IR/hand behind back P/AAROM using strap, L shoulder IR/ER  using YTB, gentle horiz add stretch   Charges: 2 TE (32 min), 1 manual (10 min), Estim Total Timed Treatment: 42 min  Total Treatment Time: 55 min    Post op protocol:  Protocol (2-6 weeks):  Continue shoulder immobilization until 4 weeks, discontinue based on tear size  Normalize elbow and wrist motion  Prevent motion stretching repair   Progress motion as per protocol. For small and medium tears, begin to progress shoulder motion  Weightbearing: Progress per protocol  Protocol (6-8 weeks):  Discontinue shoulder immobilization if not already done  Progress motion as per protocol  Small and medium tears: AAROM for FLEX/ABD to 120  Large and massive tears: PROM for FLEX/ABD to 120  Weightbearing: Limit elbow flexion to <10 pounds during weeks 7-8 and to <15 pounds during weeks 8-12  Protocol (9-16 weeks):  Normalize shoulder motion  Avoid push-ups,  pull-ups, bench press, and overhead weightlifting activities  Weightbearing: Progress per protocol  Protocol (16 weeks-8 months):  Meet occupational requirements by 4 months  Restore overhead strength by 6-8 months  No weightlifting restrictions        RANGE-OF-MOTION LIMITATIONS: Per schedule below     Tear Size Sling Use Begin PROM Begin AROM   Small <1 cm2 4 weeks Immediate 4 weeks   Medium 1-3 cm2 4-6 weeks Immediate 6 weeks   Large 3-5 cm2 6 weeks 2 weeks 8 weeks   Massive >5 cm2 6-8 weeks 4 weeks 8 weeks

## 2024-11-13 ENCOUNTER — OFFICE VISIT (OUTPATIENT)
Dept: PHYSICAL THERAPY | Age: 54
End: 2024-11-13
Attending: STUDENT IN AN ORGANIZED HEALTH CARE EDUCATION/TRAINING PROGRAM
Payer: COMMERCIAL

## 2024-11-13 PROCEDURE — 97014 ELECTRIC STIMULATION THERAPY: CPT | Performed by: PHYSICAL THERAPIST

## 2024-11-13 PROCEDURE — 97140 MANUAL THERAPY 1/> REGIONS: CPT | Performed by: PHYSICAL THERAPIST

## 2024-11-13 PROCEDURE — 97110 THERAPEUTIC EXERCISES: CPT | Performed by: PHYSICAL THERAPIST

## 2024-11-13 NOTE — PROGRESS NOTES
Diagnosis:   Nontraumatic incomplete rupture of rotator cuff, left (M75.112)  Superior labrum anterior-to-posterior (SLAP) tear of left shoulder (S43.484A)       Referring Provider: Igor Smith  Date of Evaluation:    8/28/2024    Precautions:       - post surgical precautions/protocol  Next MD visit:   2/2024  Date of Surgery: 8/22/2024   Insurance Primary/Secondary: [employer] [place] / N/A     # Auth Visits: 10/30/2024: 8 auth    Subjective:  c/o L shoulder tightness, deny c/o otherwise.  Unable to completed Hep work days due to busy schedule. Estim/MHP help.  Pain:   1/10    Objective:   L shoulder AROM flexion: ~ 140 deg, ~ 160 deg post session  L shoulder PROM fle    Assessment:    Pt with improved L shoulder AROM following PT session. Pt with continued difficulty reaching using L UE, but progress noted. Progressing shoulder/periscapular strengthening, AAROM to address. Pt continues to report IFC/MHP helpful.     Goals: (To be met in 18 visits) -  ALL GOALS IN PROGRESS  Pt will report improved ability to sleep without waking due to shoulder pain  Pt will improve shoulder flexion AROM to >160 degrees to be able to reach into overhead cabinets without pain or restriction  Pt will improve shoulder abduction AROM to >160 degrees to improve ability to don deodorant, don/doff shirts, and wash hair  Pt will increase shoulder AROM ER to 90 to reach and fasten seatbelt   Pt will increase shoulder AROM IR to 70 to be able to reach in back pocket, tuck in shirt, and turn steering wheel without pain  Pt will improve shoulder strength throughout to 4+/5 to improve function with ADLs including reaching and lifting  Pt will demonstrate increased mid/low trap strength to 4/5 to promote improved shoulder mechanics and stabilization with ADL such as lifting and reaching   Pt will be independent and compliant with comprehensive HEP to maintain progress achieved in PT       Plan: Continue  skilled Physical Therapy 2 x/week or a  total of up to 28 visits . F/u on c/o tightness. Progress per post op protocol, P/AA/AROM, strengthening, standing interventions, reaching.   Date: 11/4/2024  Tx #: 17/22-28 (2/8 auth) Date: 11/6/2024  Tx #: 18/22-28 (3/8 auth) Date: 11/11/2024  Tx #: 19/22-28 (4/8 auth) Date: 11/13/2024  Tx #: 20/28 (5/8 auth)   There Ex:   Seated shoulder pulleys: flex, scap, abd, IR x ~10 reps each  S/L L shoulder ER 0# : 2 x 10 reps   S/L L shoulder abd 0# x ~ 10 reps  Prone L shoulder row 0# x 20  reps  Prone L shoulder horiz abd 0# x 10 reps   Shoulder flexion wall slide x 5 reps (warm up prior to Ws)  W wall slides x 5 reps (HEP)  Circles using wall x 5 reps each clockwise/counterclockwise (HEP)  Completed multiple shoulder ER ROM; doorway in 0 deg abd most effective 4 x 20 sec each  L shoulder AAROM flex, scap, abd x 5 reps each   There Ex:  Seated shoulder pulleys: flex, scap, abd, IR x ~10 reps each  Supine shoulder AAROM flexion using wand 2# 5 x 5 sec each  S/L L shoulder ER 0# :  x 10 reps   S/L L shoulder abd 0# - pt c/o, stopped  Prone L shoulder row 0# x 20  reps  Prone L shoulder horiz abd 0#  - pt c/o discomfort, stopped  Supine B shoulder horiz abd using YTB x 15 reps   L shoulder AAROM flex, scap, abd using wand x 5 reps each  HEP review; see below.    There Ex:  Seated shoulder pulleys: flex, scap, abd, IR x ~10 reps each  Standing L shoulder IR using yellow tubing 2 x 10 reps   S/L L shoulder ER 0# :  x 10 reps   S/L L shoulder horiz abd 0# x 10 reps   S/L L shoulder horizontal abduction 0# x 10 reps   Prone L shoulder row 0# 2 x 10  Supine L shoulder flexion 0# x 10 reps  L shoulder wall slides AAROM: 9, 10, 12, 1, 3 o'clock x 4 reps each There Ex:  Seated shoulder pulleys: flex, scap, abd, IR x ~10 reps each, ER in 90 deg abd 5 x 10 sec each  Supine L shoulder flexion 0# x 5 reps   Supine shoulder AAROM flexion using wand 2# 5 x 5 sec each  Standing L shoulder IR using yellow tubing 2 x 10 reps - not  completed today  S/L L shoulder ER 0# :  x 10 reps    S/L L shoulder abd 0# x 10 reps  S/L L shoulder horizontal abduction 0# x 10 reps   Prone L shoulder row 0# 2 x 10  L shoulder wall slides AAROM: 9, 10, 12, 1, 3 o'clock x 4 reps each  L shoulder AAROM flexion, abduction standing using cane x 10 reps each   Manual:   Surgical scar mobilization L shoulder  L GH post/ant and inf glides grade II - pt declined post glides  PROM L shoulder ER, IR, flex, abd, scap    STM L upper trap  Manual:   Surgical scar mobilization L shoulder axilla area only  L GH post/ant and inf glides grade II - pt declined post glides  PROM L shoulder ER, IR, flex, abd, scap, ext  Manual:   Surgical scar mobilization L shoulder axilla area only - not completed today  L GH post/ant and inf glides grade II - pt declined post glides  PROM L shoulder ER, IR, flex, abd, scap, ext  Manual:   Surgical scar mobilization L shoulder axilla area only   L GH post/ant and inf glides grade II - pt declined post glides  PROM L shoulder ER, IR, flex, abd, scap, ext   STM L upper trap   Pt took cold pack for home use x 10 min IFC/MHP L shoulder x 10 min seated IFC/MHP L shoulder x 10 min seated IFC/MHP L shoulder x 10 min seated         HEP: pendulums (flex/ext, horiz abd/add, circular), wall slides, shoulder ER PROM using wand or seated or doorframe, scap retraction using YTB - progress to  prone, shoulder IR/hand behind back AAROM using strap, L shoulder IR/ER  using YTB, gentle horiz add stretch   Charges: 2 TE (32 min), 1 manual (10 min), Estim Total Timed Treatment: 42 min  Total Treatment Time: 55 min    Post op protocol:  Protocol (2-6 weeks):  Continue shoulder immobilization until 4 weeks, discontinue based on tear size  Normalize elbow and wrist motion  Prevent motion stretching repair   Progress motion as per protocol. For small and medium tears, begin to progress shoulder motion  Weightbearing: Progress per protocol  Protocol (6-8  weeks):  Discontinue shoulder immobilization if not already done  Progress motion as per protocol  Small and medium tears: AAROM for FLEX/ABD to 120  Large and massive tears: PROM for FLEX/ABD to 120  Weightbearing: Limit elbow flexion to <10 pounds during weeks 7-8 and to <15 pounds during weeks 8-12  Protocol (9-16 weeks):  Normalize shoulder motion  Avoid push-ups, pull-ups, bench press, and overhead weightlifting activities  Weightbearing: Progress per protocol  Protocol (16 weeks-8 months):  Meet occupational requirements by 4 months  Restore overhead strength by 6-8 months  No weightlifting restrictions        RANGE-OF-MOTION LIMITATIONS: Per schedule below     Tear Size Sling Use Begin PROM Begin AROM   Small <1 cm2 4 weeks Immediate 4 weeks   Medium 1-3 cm2 4-6 weeks Immediate 6 weeks   Large 3-5 cm2 6 weeks 2 weeks 8 weeks   Massive >5 cm2 6-8 weeks 4 weeks 8 weeks

## 2024-11-18 ENCOUNTER — OFFICE VISIT (OUTPATIENT)
Dept: PHYSICAL THERAPY | Age: 54
End: 2024-11-18
Attending: STUDENT IN AN ORGANIZED HEALTH CARE EDUCATION/TRAINING PROGRAM
Payer: COMMERCIAL

## 2024-11-18 PROCEDURE — 97140 MANUAL THERAPY 1/> REGIONS: CPT | Performed by: PHYSICAL THERAPIST

## 2024-11-18 PROCEDURE — 97014 ELECTRIC STIMULATION THERAPY: CPT | Performed by: PHYSICAL THERAPIST

## 2024-11-18 PROCEDURE — 97110 THERAPEUTIC EXERCISES: CPT | Performed by: PHYSICAL THERAPIST

## 2024-11-18 NOTE — PROGRESS NOTES
ProgressSummary  Pt has attended 21 visits in Physical Therapy.     Diagnosis:   Nontraumatic incomplete rupture of rotator cuff, left (M75.112)  Superior labrum anterior-to-posterior (SLAP) tear of left shoulder (S43.432A)       Referring Provider: Igor Smith  Date of Evaluation:    8/28/2024    Precautions:       - post surgical precautions/protocol  Next MD visit:   2/2024  Date of Surgery: 8/22/2024   Insurance Primary/Secondary: [employer] [place] / N/A     # Auth Visits: 10/30/2024: 8 auth    Subjective:  c/o L shoulder tightness, deny pain. Have not been completing HEP.  Estim/MHP help.  Pain:   0/10 - pt c/o stiffness    Objective:   Surgical scars appear WNL, palpation WNL, mild thickening axillary scar    AROM (degrees): (* denotes performed with pain)  Shoulder    Flexion: L  (variable)  Abduction: L * (variable)  ER:  L unable  IR: L T9   Superior L humeral glide with active flex/abd    PROM (degrees): (* denotes performed with pain)  Shoulder    Flexion: L 160  Abduction: L 160*  ER:  L 75  IR: L 60     MMT: (* denotes performed with pain)  Shoulder    Flexion: L 2/5  Abduction: L 2/5  ER:  L 4/5  IR: L 4/5     Assessment:  Pt progressing with PT fairly s/p R RCR but with continued difficulty reaching using L UE. Progressing shoulder/periscapular strengthening, AAROM to address. Pt continues to report IFC/MHP helpful. Encouraged HEP compliance for progress. Pt will benefit from continued PT to address residual deficits, improve L shoulder function.    Goals: (To be met in 18 visits) -  ALL GOALS IN PROGRESS  Pt will report improved ability to sleep without waking due to shoulder pain  Pt will improve shoulder flexion AROM to >160 degrees to be able to reach into overhead cabinets without pain or restriction  Pt will improve shoulder abduction AROM to >160 degrees to improve ability to don deodorant, don/doff shirts, and wash hair  Pt will increase shoulder AROM ER to 90 to reach and fasten  seatbelt   Pt will increase shoulder AROM IR to 70 to be able to reach in back pocket, tuck in shirt, and turn steering wheel without pain  Pt will improve shoulder strength throughout to 4+/5 to improve function with ADLs including reaching and lifting  Pt will demonstrate increased mid/low trap strength to 4/5 to promote improved shoulder mechanics and stabilization with ADL such as lifting and reaching   Pt will be independent and compliant with comprehensive HEP to maintain progress achieved in PT       Plan: Continue  skilled Physical Therapy 2 x/week or a total of up to 31 visits . F/u on c/o tightness. Progress per post op protocol, P/AA/AROM, strengthening, standing interventions, reaching.   Date: 11/4/2024  Tx #: 17/22-28 (2/8 auth) Date: 11/6/2024  Tx #: 18/22-28 (3/8 auth) Date: 11/11/2024  Tx #: 19/22-28 (4/8 auth) Date: 11/13/2024  Tx #: 20/28 (5/8 auth) Date: 11/18/2024  Tx # 21/28 (6/8)          There Ex:   Seated shoulder pulleys: flex, scap, abd, IR x ~10 reps each  S/L L shoulder ER 0# : 2 x 10 reps   S/L L shoulder abd 0# x ~ 10 reps  Prone L shoulder row 0# x 20  reps  Prone L shoulder horiz abd 0# x 10 reps   Shoulder flexion wall slide x 5 reps (warm up prior to Ws)  W wall slides x 5 reps (HEP)  Circles using wall x 5 reps each clockwise/counterclockwise (HEP)  Completed multiple shoulder ER ROM; doorway in 0 deg abd most effective 4 x 20 sec each  L shoulder AAROM flex, scap, abd x 5 reps each   There Ex:  Seated shoulder pulleys: flex, scap, abd, IR x ~10 reps each  Supine shoulder AAROM flexion using wand 2# 5 x 5 sec each  S/L L shoulder ER 0# :  x 10 reps   S/L L shoulder abd 0# - pt c/o, stopped  Prone L shoulder row 0# x 20  reps  Prone L shoulder horiz abd 0#  - pt c/o discomfort, stopped  Supine B shoulder horiz abd using YTB x 15 reps   L shoulder AAROM flex, scap, abd using wand x 5 reps each  HEP review; see below.    There Ex:  Seated shoulder pulleys: flex, scap, abd, IR x ~10  reps each  Standing L shoulder IR using yellow tubing 2 x 10 reps   S/L L shoulder ER 0# :  x 10 reps   S/L L shoulder horiz abd 0# x 10 reps   S/L L shoulder horizontal abduction 0# x 10 reps   Prone L shoulder row 0# 2 x 10  Supine L shoulder flexion 0# x 10 reps  L shoulder wall slides AAROM: 9, 10, 12, 1, 3 o'clock x 4 reps each There Ex:  Seated shoulder pulleys: flex, scap, abd, IR x ~10 reps each, ER in 90 deg abd 5 x 10 sec each  Supine L shoulder flexion 0# x 5 reps   Supine shoulder AAROM flexion using wand 2# 5 x 5 sec each  Standing L shoulder IR using yellow tubing 2 x 10 reps - not completed today  S/L L shoulder ER 0# :  x 10 reps    S/L L shoulder abd 0# x 10 reps  S/L L shoulder horizontal abduction 0# x 10 reps   Prone L shoulder row 0# 2 x 10  L shoulder wall slides AAROM: 9, 10, 12, 1, 3 o'clock x 4 reps each  L shoulder AAROM flexion, abduction standing using cane x 10 reps each There Ex:   Seated shoulder pulleys: flex, scap, abd, IR 5 x 10 sec each  Standing L shoulder ER using YTB 2 x 10 reps  Standing L shoulder IR using YTB 2 x 10 reps  UBE: 2 min forward/2 min backward, level 1 (4 min)  Supine L shoulder flexion 0# x 10 reps, pt declined using 1#  S/L L shoulder abd 0# x  - not completed, insufficient time  S/L L shoulder horizontal abduction 0# x 10 reps - cuing form  Prone L shoulder row 0# - not completed, insufficient tiem  L shoulder wall slides AAROM: 9, 10, 12, 1, 3 o'clock x 4 reps each  L shoulder AAROM flexion, abduction standing using cane - not completed, insufficient time  HEP review; see below    Manual:   Surgical scar mobilization L shoulder  L GH post/ant and inf glides grade II - pt declined post glides  PROM L shoulder ER, IR, flex, abd, scap    STM L upper trap  Manual:   Surgical scar mobilization L shoulder axilla area only  L GH post/ant and inf glides grade II - pt declined post glides  PROM L shoulder ER, IR, flex, abd, scap, ext  Manual:   Surgical scar  mobilization L shoulder axilla area only - not completed today  L GH post/ant and inf glides grade II - pt declined post glides  PROM L shoulder ER, IR, flex, abd, scap, ext  Manual:   Surgical scar mobilization L shoulder axilla area only   L GH post/ant and inf glides grade II - pt declined post glides  PROM L shoulder ER, IR, flex, abd, scap, ext   STM L upper trap Manual:   Surgical scar mobilization L shoulder axilla area only   L GH post/ant and inf glides grade II - pt declined post glides  PROM L shoulder ER, IR, flex, abd, scap, ext   STM L upper trap   Pt took cold pack for home use x 10 min IFC/MHP L shoulder x 10 min seated IFC/MHP L shoulder x 10 min seated IFC/MHP L shoulder x 10 min seated IFC/MHP L shoulder x 10 min seated          HEP: pendulums (flex/ext, horiz abd/add, circular), wall slides, shoulder ER PROM using wand or seated or doorframe, scap retraction using YTB - progress to  prone, shoulder IR/hand behind back AAROM using strap, L shoulder IR/ER  using YTB, gentle horiz add stretch   Charges: 2 TE (30 min), 1 manual (10 min), Estim Total Timed Treatment: 40 min  Total Treatment Time: 55 min    Post op protocol:  Protocol (2-6 weeks):  Continue shoulder immobilization until 4 weeks, discontinue based on tear size  Normalize elbow and wrist motion  Prevent motion stretching repair   Progress motion as per protocol. For small and medium tears, begin to progress shoulder motion  Weightbearing: Progress per protocol  Protocol (6-8 weeks):  Discontinue shoulder immobilization if not already done  Progress motion as per protocol  Small and medium tears: AAROM for FLEX/ABD to 120  Large and massive tears: PROM for FLEX/ABD to 120  Weightbearing: Limit elbow flexion to <10 pounds during weeks 7-8 and to <15 pounds during weeks 8-12  Protocol (9-16 weeks):  Normalize shoulder motion  Avoid push-ups, pull-ups, bench press, and overhead weightlifting activities  Weightbearing: Progress per  protocol  Protocol (16 weeks-8 months):  Meet occupational requirements by 4 months  Restore overhead strength by 6-8 months  No weightlifting restrictions        RANGE-OF-MOTION LIMITATIONS: Per schedule below     Tear Size Sling Use Begin PROM Begin AROM   Small <1 cm2 4 weeks Immediate 4 weeks   Medium 1-3 cm2 4-6 weeks Immediate 6 weeks   Large 3-5 cm2 6 weeks 2 weeks 8 weeks   Massive >5 cm2 6-8 weeks 4 weeks 8 weeks      QUICKDASH to be re-administered at later date .     Plan: Continue skilled Physical Therapy 2 x/week or a total of 31 visits over a 90 day period. Treatment will include: there ex, there activities, manual therapy, NM Re-ed, and modalities as needed.        Patient/Family/Caregiver was advised of these findings, precautions, and treatment options and has agreed to actively participate in planning and for this course of care.    Thank you for your referral. If you have any questions, please contact me at Dept: [phone].    Sincerely,  Electronically signed by therapist: Isha Burton PT     Physician's certification required:  Yes  Please co-sign or sign and return this letter via fax as soon as possible to [phone].   I certify the need for these services furnished under this plan of treatment and while under my care.    X___________________________________________________ Date____________________    Certification From: 11/18/2024  To:2/16/2025

## 2024-11-20 ENCOUNTER — OFFICE VISIT (OUTPATIENT)
Dept: PHYSICAL THERAPY | Age: 54
End: 2024-11-20
Attending: STUDENT IN AN ORGANIZED HEALTH CARE EDUCATION/TRAINING PROGRAM
Payer: COMMERCIAL

## 2024-11-20 PROCEDURE — 97140 MANUAL THERAPY 1/> REGIONS: CPT | Performed by: PHYSICAL THERAPIST

## 2024-11-20 PROCEDURE — 97014 ELECTRIC STIMULATION THERAPY: CPT | Performed by: PHYSICAL THERAPIST

## 2024-11-20 PROCEDURE — 97110 THERAPEUTIC EXERCISES: CPT | Performed by: PHYSICAL THERAPIST

## 2024-11-25 ENCOUNTER — OFFICE VISIT (OUTPATIENT)
Dept: PHYSICAL THERAPY | Age: 54
End: 2024-11-25
Attending: STUDENT IN AN ORGANIZED HEALTH CARE EDUCATION/TRAINING PROGRAM
Payer: COMMERCIAL

## 2024-11-25 PROCEDURE — 97140 MANUAL THERAPY 1/> REGIONS: CPT | Performed by: PHYSICAL THERAPIST

## 2024-11-25 PROCEDURE — 97014 ELECTRIC STIMULATION THERAPY: CPT | Performed by: PHYSICAL THERAPIST

## 2024-11-25 PROCEDURE — 97110 THERAPEUTIC EXERCISES: CPT | Performed by: PHYSICAL THERAPIST

## 2024-11-25 NOTE — PROGRESS NOTES
Diagnosis:   Nontraumatic incomplete rupture of rotator cuff, left (M75.112)  Superior labrum anterior-to-posterior (SLAP) tear of left shoulder (S43.109A)       Referring Provider: Igor Smith  Date of Evaluation:    8/28/2024    Precautions:       - post surgical precautions/protocol  Next MD visit:   1/2025  Date of Surgery: 8/22/2024   Insurance Primary/Secondary: [employer] [place] / N/A     # Auth Visits: 10/30/2024: 8 auth, 5 additional auth    Subjective:  Completing some of HEP.   Deny questions re: HEP. Estim/MHP help.  Stil have a hard time getting my L arm going to reach overhead at times.   Pain:   0/10 - pt c/o stiffness  but not as bad    Objective:       AROM (degrees): (* denotes performed with pain)  Shoulder    Flexion: L ~ variable  deg   Superior L humeral glide with active flex/abd    Assessment:    Pt with continued difficulty consistently reaching overhead using L UE, superior humeral glide observed potentially indicating rotator cuff weakness/dysfunction. Pt with increased ease reaching following there ex/rotator cuff strengthening/AAROM. Will monitor for consistent progress. Hep compliance in entirety encouraged, resisted ER and wall slides, at minimum.     Goals: (To be met in 18 visits) -  ALL GOALS IN PROGRESS  Pt will report improved ability to sleep without waking due to shoulder pain  Pt will improve shoulder flexion AROM to >160 degrees to be able to reach into overhead cabinets without pain or restriction  Pt will improve shoulder abduction AROM to >160 degrees to improve ability to don deodorant, don/doff shirts, and wash hair  Pt will increase shoulder AROM ER to 90 to reach and fasten seatbelt   Pt will increase shoulder AROM IR to 70 to be able to reach in back pocket, tuck in shirt, and turn steering wheel without pain  Pt will improve shoulder strength throughout to 4+/5 to improve function with ADLs including reaching and lifting  Pt will demonstrate increased mid/low trap  strength to 4/5 to promote improved shoulder mechanics and stabilization with ADL such as lifting and reaching   Pt will be independent and compliant with comprehensive HEP to maintain progress achieved in PT       Plan: Continue  skilled Physical Therapy 2 x/week or a total of up to 31 visits, pending auth. .Progress per post op protocol, P/AA/AROM, strengthening, standing interventions, reaching, scapular stabilization.    Date: 11/11/2024  Tx #: 19/22-28 (4/8 auth) Date: 11/13/2024  Tx #: 20/28 (5/8 auth) Date: 11/18/2024  Tx # 21/28 (6/8) Date: 11/20/2024  Tx #: 22/28 (7/8), 5 additional auth (f/u with insurance pool to clarify)- Pt arrived ~ 15 min late.  Date: 11/25/2024  Tx #: 23/28 (8/8 auth, 0/5 additional auth)          There Ex:  Seated shoulder pulleys: flex, scap, abd, IR x ~10 reps each  Standing L shoulder IR using yellow tubing 2 x 10 reps   S/L L shoulder ER 0# :  x 10 reps   S/L L shoulder horiz abd 0# x 10 reps   S/L L shoulder horizontal abduction 0# x 10 reps   Prone L shoulder row 0# 2 x 10  Supine L shoulder flexion 0# x 10 reps  L shoulder wall slides AAROM: 9, 10, 12, 1, 3 o'clock x 4 reps each There Ex:  Seated shoulder pulleys: flex, scap, abd, IR x ~10 reps each, ER in 90 deg abd 5 x 10 sec each  Supine L shoulder flexion 0# x 5 reps   Supine shoulder AAROM flexion using wand 2# 5 x 5 sec each  Standing L shoulder IR using yellow tubing 2 x 10 reps - not completed today  S/L L shoulder ER 0# :  x 10 reps    S/L L shoulder abd 0# x 10 reps  S/L L shoulder horizontal abduction 0# x 10 reps   Prone L shoulder row 0# 2 x 10  L shoulder wall slides AAROM: 9, 10, 12, 1, 3 o'clock x 4 reps each  L shoulder AAROM flexion, abduction standing using cane x 10 reps each There Ex:   Seated shoulder pulleys: flex, scap, abd, IR 5 x 10 sec each  Standing L shoulder ER using YTB 2 x 10 reps  Standing L shoulder IR using YTB 2 x 10 reps  UBE: 2 min forward/2 min backward, level 1 (4 min)  Supine L  shoulder flexion 0# x 10 reps, pt declined using 1#  S/L L shoulder abd 0# x  - not completed, insufficient time  S/L L shoulder horizontal abduction 0# x 10 reps - cuing form  Prone L shoulder row 0# - not completed, insufficient tiem  L shoulder wall slides AAROM: 9, 10, 12, 1, 3 o'clock x 4 reps each  L shoulder AAROM flexion, abduction standing using cane - not completed, insufficient time  HEP review; see below  There Ex:   Seated shoulder pulleys: flex, scap, abd, IR, ER  5 x 10 sec each  Standing L shoulder IR using YTB 2 x 10 reps; pt requested to review ER using band 2 x 10 reps - verbal cuing (HEP)  S/L L shoulder horizontal abduction 0# x 10 reps - cuing form  B row using GTB x 20 reps (HEP)  Standing shoulder AAROM flexion using wand x 10 reps - mirror/PT cuing to reduce upper trap compensation  HEP review/instruction/handout; see below.    There Ex:   Seated shoulder pulleys: flex, scap, abd, IR, ER  5 x 10 sec each  Verbal HEP review; see below.   S/L L shoulder ER 0# ~ x 15 reps  S/L L shoulder abd 0# x ~ 15 reps  S/L L shoulder horiz abd 0# x ~ 15 reps  Supine L shoulder circles x 10 reps each clockwise/counterclockwise  Row using blue tubing 2 x 10 reps   Mini wall push up x 10 reps   Standing shoulder AAROM using wand flex x 5 reps, abd x 5 reps  Standing wall circles x 5 reps each clockwise/counterclockwise   Manual:   Surgical scar mobilization L shoulder axilla area only - not completed today  L GH post/ant and inf glides grade II - pt declined post glides  PROM L shoulder ER, IR, flex, abd, scap, ext  Manual:   Surgical scar mobilization L shoulder axilla area only   L GH post/ant and inf glides grade II - pt declined post glides  PROM L shoulder ER, IR, flex, abd, scap, ext   STM L upper trap Manual:   Surgical scar mobilization L shoulder axilla area only   L GH post/ant and inf glides grade II - pt declined post glides  PROM L shoulder ER, IR, flex, abd, scap, ext   STM L upper trap Manual:    Surgical scar mobilization L shoulder axilla area only  - not completed, clothing/time prohibit  L GH post/ant and inf glides grade II - pt declined post glides  PROM L shoulder ER, IR, flex, abd, scap, ext   STM L upper trap Manual:   Surgical scar mobilization L shoulder axilla area only  - not completed, clothing/time prohibit  L GH post/ant and inf glides grade II - pt declined post glides  PROM L shoulder ER, IR, flex, abd, scap, ext   STM L upper trap   IFC/MHP L shoulder x 10 min seated IFC/MHP L shoulder x 10 min seated IFC/MHP L shoulder x 10 min seated IFC/MHP L shoulder x 10 min seated IFC/MHP L shoulder x 10 min seated          HEP:  clock or W wall slides, shoulder ER PROM using wand or seated or doorframe, scap retraction using GTB, shoulder IR/ER using YTB, shoulder IR/hand behind back AAROM using strap    Charges: 2 TE (30 min), 1 manual (10 min), Estim Total Timed Treatment: 40 min  Total Treatment Time: 55 min    Post op protocol:  Protocol (2-6 weeks):  Continue shoulder immobilization until 4 weeks, discontinue based on tear size  Normalize elbow and wrist motion  Prevent motion stretching repair   Progress motion as per protocol. For small and medium tears, begin to progress shoulder motion  Weightbearing: Progress per protocol  Protocol (6-8 weeks):  Discontinue shoulder immobilization if not already done  Progress motion as per protocol  Small and medium tears: AAROM for FLEX/ABD to 120  Large and massive tears: PROM for FLEX/ABD to 120  Weightbearing: Limit elbow flexion to <10 pounds during weeks 7-8 and to <15 pounds during weeks 8-12  Protocol (9-16 weeks):  Normalize shoulder motion  Avoid push-ups, pull-ups, bench press, and overhead weightlifting activities  Weightbearing: Progress per protocol  Protocol (16 weeks-8 months):  Meet occupational requirements by 4 months  Restore overhead strength by 6-8 months  No weightlifting restrictions        RANGE-OF-MOTION LIMITATIONS: Per  schedule below     Tear Size Sling Use Begin PROM Begin AROM   Small <1 cm2 4 weeks Immediate 4 weeks   Medium 1-3 cm2 4-6 weeks Immediate 6 weeks   Large 3-5 cm2 6 weeks 2 weeks 8 weeks   Massive >5 cm2 6-8 weeks 4 weeks 8 weeks

## 2024-11-27 ENCOUNTER — OFFICE VISIT (OUTPATIENT)
Dept: PHYSICAL THERAPY | Age: 54
End: 2024-11-27
Attending: STUDENT IN AN ORGANIZED HEALTH CARE EDUCATION/TRAINING PROGRAM
Payer: COMMERCIAL

## 2024-11-27 PROCEDURE — 97140 MANUAL THERAPY 1/> REGIONS: CPT | Performed by: PHYSICAL THERAPIST

## 2024-11-27 PROCEDURE — 97014 ELECTRIC STIMULATION THERAPY: CPT | Performed by: PHYSICAL THERAPIST

## 2024-11-27 PROCEDURE — 97110 THERAPEUTIC EXERCISES: CPT | Performed by: PHYSICAL THERAPIST

## 2024-11-27 NOTE — PROGRESS NOTES
Diagnosis:   Nontraumatic incomplete rupture of rotator cuff, left (M75.112)  Superior labrum anterior-to-posterior (SLAP) tear of left shoulder (S43.170A)       Referring Provider: Igor Smith  Date of Evaluation:    8/28/2024    Precautions:       - post surgical precautions/protocol  Next MD visit:   1/2025  Date of Surgery: 8/22/2024   Insurance Primary/Secondary: [employer] [place] / N/A     # Auth Visits: 10/30/2024: 8 auth, 5 additional auth    Subjective:  No new c/o.  Limited HEP compliance.   Pain:   0/10 - pt c/o stiffness     Objective:       AROM (degrees): (* denotes performed with pain)  Shoulder    Flexion: L ~ variable  deg   Superior L humeral glide with active flex/abd    Assessment:    Pt with continued variability with ability to reach overhead using L UE, superior humeral glide observed potentially indicating rotator cuff weakness/dysfunction. Pt with increased ease reaching following there ex/rotator cuff strengthening/AAROM. Will monitor for consistent progress. Hep compliance in entirety encouraged, resisted ER and wall slides, at minimum. Pt tolerated session well.     Goals: (To be met in 18 visits) -  ALL GOALS IN PROGRESS  Pt will report improved ability to sleep without waking due to shoulder pain  Pt will improve shoulder flexion AROM to >160 degrees to be able to reach into overhead cabinets without pain or restriction  Pt will improve shoulder abduction AROM to >160 degrees to improve ability to don deodorant, don/doff shirts, and wash hair  Pt will increase shoulder AROM ER to 90 to reach and fasten seatbelt   Pt will increase shoulder AROM IR to 70 to be able to reach in back pocket, tuck in shirt, and turn steering wheel without pain  Pt will improve shoulder strength throughout to 4+/5 to improve function with ADLs including reaching and lifting  Pt will demonstrate increased mid/low trap strength to 4/5 to promote improved shoulder mechanics and stabilization with ADL such as  lifting and reaching   Pt will be independent and compliant with comprehensive HEP to maintain progress achieved in PT       Plan: Continue  skilled Physical Therapy 2 x/week or a total of up to 31 visits, pending auth. Progress per post op protocol, P/AA/AROM, strengthening, standing interventions, reaching, scapular stabilization.    Date: 11/13/2024  Tx #: 20/28 (5/8 auth) Date: 11/18/2024  Tx # 21/28 (6/8) Date: 11/20/2024  Tx #: 22/28 (7/8), 5 additional auth (f/u with insurance pool to clarify)- Pt arrived ~ 15 min late.  Date: 11/25/2024  Tx #: 23/28 (8/8 auth, 0/5 additional auth) Date: 11/27/2024  Tx #: 24/28 (1/5 auth)          There Ex:  Seated shoulder pulleys: flex, scap, abd, IR x ~10 reps each, ER in 90 deg abd 5 x 10 sec each  Supine L shoulder flexion 0# x 5 reps   Supine shoulder AAROM flexion using wand 2# 5 x 5 sec each  Standing L shoulder IR using yellow tubing 2 x 10 reps - not completed today  S/L L shoulder ER 0# :  x 10 reps    S/L L shoulder abd 0# x 10 reps  S/L L shoulder horizontal abduction 0# x 10 reps   Prone L shoulder row 0# 2 x 10  L shoulder wall slides AAROM: 9, 10, 12, 1, 3 o'clock x 4 reps each  L shoulder AAROM flexion, abduction standing using cane x 10 reps each There Ex:   Seated shoulder pulleys: flex, scap, abd, IR 5 x 10 sec each  Standing L shoulder ER using YTB 2 x 10 reps  Standing L shoulder IR using YTB 2 x 10 reps  UBE: 2 min forward/2 min backward, level 1 (4 min)  Supine L shoulder flexion 0# x 10 reps, pt declined using 1#  S/L L shoulder abd 0# x  - not completed, insufficient time  S/L L shoulder horizontal abduction 0# x 10 reps - cuing form  Prone L shoulder row 0# - not completed, insufficient tiem  L shoulder wall slides AAROM: 9, 10, 12, 1, 3 o'clock x 4 reps each  L shoulder AAROM flexion, abduction standing using cane - not completed, insufficient time  HEP review; see below  There Ex:   Seated shoulder pulleys: flex, scap, abd, IR, ER  5 x 10 sec  each  Standing L shoulder IR using YTB 2 x 10 reps; pt requested to review ER using band 2 x 10 reps - verbal cuing (HEP)  S/L L shoulder horizontal abduction 0# x 10 reps - cuing form  B row using GTB x 20 reps (HEP)  Standing shoulder AAROM flexion using wand x 10 reps - mirror/PT cuing to reduce upper trap compensation  HEP review/instruction/handout; see below.    There Ex:   Seated shoulder pulleys: flex, scap, abd, IR, ER  5 x 10 sec each  Verbal HEP review; see below.   S/L L shoulder ER 0# ~ x 15 reps  S/L L shoulder abd 0# x ~ 15 reps  S/L L shoulder horiz abd 0# x ~ 15 reps  Supine L shoulder circles x 10 reps each clockwise/counterclockwise  Row using blue tubing 2 x 10 reps   Mini wall push up x 10 reps   Standing shoulder AAROM using wand flex x 5 reps, abd x 5 reps  Standing wall circles x 5 reps each clockwise/counterclockwise There Ex:  UBE: 30 sec forward/30 sec backward (1 min total), level 1   Seated shoulder pulleys: flex, scap, abd, IR, ER 10 x 5 sec each  L shoulder pendulums x ~ 30 sec   S/L L shoulder ER 0# ~ x 15 reps  S/L L shoulder abd 0# x ~ 15 reps  S/L L shoulder horiz abd 0# x ~ 15 reps  Supine L shoulder circles x 10 reps each clockwise/counterclockwise  Row using blue tubing x ~ 15 reps   Mini wall push up - attempted, pt c/o, stopped  R shoulder IR using YTB  - not completed today  Standing shoulder AAROM using wand flex x 5 reps, abd x 5 reps  Standing wall circles x 5 reps each clockwise   Manual:   Surgical scar mobilization L shoulder axilla area only   L GH post/ant and inf glides grade II - pt declined post glides  PROM L shoulder ER, IR, flex, abd, scap, ext   STM L upper trap Manual:   Surgical scar mobilization L shoulder axilla area only   L GH post/ant and inf glides grade II - pt declined post glides  PROM L shoulder ER, IR, flex, abd, scap, ext   STM L upper trap Manual:   Surgical scar mobilization L shoulder axilla area only  - not completed, clothing/time prohibit  L  GH post/ant and inf glides grade II - pt declined post glides  PROM L shoulder ER, IR, flex, abd, scap, ext   STM L upper trap Manual:   Surgical scar mobilization L shoulder axilla area only  - not completed, clothing/time prohibit  L GH post/ant and inf glides grade II - pt declined post glides  PROM L shoulder ER, IR, flex, abd, scap, ext   STM L upper trap Manual:   Surgical scar mobilization L shoulder area only  - not needed, WNL  L GH post/ant and inf glides grade II - pt declined post glides  PROM L shoulder ER, IR, flex, abd, scap, ext   STM L upper trap   IFC/MHP L shoulder x 10 min seated IFC/MHP L shoulder x 10 min seated IFC/MHP L shoulder x 10 min seated IFC/MHP L shoulder x 10 min seated IFC/MHP L shoulder x 10 min seated          HEP:  clock or W wall slides, shoulder ER PROM using wand or seated or doorframe, scap retraction using GTB, shoulder IR/ER using YTB, shoulder IR/hand behind back AAROM using strap    Charges: 2 TE (30 min), 1 manual (10 min), Estim Total Timed Treatment: 40 min  Total Treatment Time: 55 min    Post op protocol:  Protocol (2-6 weeks):  Continue shoulder immobilization until 4 weeks, discontinue based on tear size  Normalize elbow and wrist motion  Prevent motion stretching repair   Progress motion as per protocol. For small and medium tears, begin to progress shoulder motion  Weightbearing: Progress per protocol  Protocol (6-8 weeks):  Discontinue shoulder immobilization if not already done  Progress motion as per protocol  Small and medium tears: AAROM for FLEX/ABD to 120  Large and massive tears: PROM for FLEX/ABD to 120  Weightbearing: Limit elbow flexion to <10 pounds during weeks 7-8 and to <15 pounds during weeks 8-12  Protocol (9-16 weeks):  Normalize shoulder motion  Avoid push-ups, pull-ups, bench press, and overhead weightlifting activities  Weightbearing: Progress per protocol  Protocol (16 weeks-8 months):  Meet occupational requirements by 4 months  Restore  overhead strength by 6-8 months  No weightlifting restrictions        RANGE-OF-MOTION LIMITATIONS: Per schedule below     Tear Size Sling Use Begin PROM Begin AROM   Small <1 cm2 4 weeks Immediate 4 weeks   Medium 1-3 cm2 4-6 weeks Immediate 6 weeks   Large 3-5 cm2 6 weeks 2 weeks 8 weeks   Massive >5 cm2 6-8 weeks 4 weeks 8 weeks

## 2024-12-02 ENCOUNTER — OFFICE VISIT (OUTPATIENT)
Dept: PHYSICAL THERAPY | Age: 54
End: 2024-12-02
Attending: STUDENT IN AN ORGANIZED HEALTH CARE EDUCATION/TRAINING PROGRAM
Payer: COMMERCIAL

## 2024-12-02 PROCEDURE — 97140 MANUAL THERAPY 1/> REGIONS: CPT | Performed by: PHYSICAL THERAPIST

## 2024-12-02 PROCEDURE — 97014 ELECTRIC STIMULATION THERAPY: CPT | Performed by: PHYSICAL THERAPIST

## 2024-12-02 PROCEDURE — 97110 THERAPEUTIC EXERCISES: CPT | Performed by: PHYSICAL THERAPIST

## 2024-12-03 NOTE — PROGRESS NOTES
Diagnosis:   Nontraumatic incomplete rupture of rotator cuff, left (M75.112)  Superior labrum anterior-to-posterior (SLAP) tear of left shoulder (S43.776A)       Referring Provider: Igor Smith  Date of Evaluation:    8/28/2024    Precautions:       - post surgical precautions/protocol  Next MD visit:   1/2025  Date of Surgery: 8/22/2024   Insurance Primary/Secondary: [employer] [place] / N/A     # Auth Visits: 10/30/2024: 8 auth, 5 additional auth    Subjective:  L shoulder feels stiff. Limited HEP compliance. C/o L/R shoulder pain after last session, may be related to UBE.   Pain:   0/10 - pt c/o stiffness     Objective:       AROM (degrees): (* denotes performed with pain)  Shoulder    Flexion: L ~ variable  deg   Superior L humeral glide with active flex/abd    Assessment:    Avoided UBE today based on Subjective. Pt tolerated session well.  Pt with continued variability with ability to reach overhead using L UE, but increased consistency noted. Progressing periscapular and rotator cuff strengthening. Pt with increased ease reaching following there ex/rotator cuff strengthening/AAROM. Will monitor for consistent progress.     Goals: (To be met in 18 visits) -  ALL GOALS IN PROGRESS  Pt will report improved ability to sleep without waking due to shoulder pain  Pt will improve shoulder flexion AROM to >160 degrees to be able to reach into overhead cabinets without pain or restriction  Pt will improve shoulder abduction AROM to >160 degrees to improve ability to don deodorant, don/doff shirts, and wash hair  Pt will increase shoulder AROM ER to 90 to reach and fasten seatbelt   Pt will increase shoulder AROM IR to 70 to be able to reach in back pocket, tuck in shirt, and turn steering wheel without pain  Pt will improve shoulder strength throughout to 4+/5 to improve function with ADLs including reaching and lifting  Pt will demonstrate increased mid/low trap strength to 4/5 to promote improved shoulder  mechanics and stabilization with ADL such as lifting and reaching   Pt will be independent and compliant with comprehensive HEP to maintain progress achieved in PT       Plan: Continue  skilled Physical Therapy 2 x/week or a total of up to 31 visits, pending auth. Progress per post op protocol, P/AA/AROM, strengthening, standing interventions, reaching, scapular stabilization.    Date: 11/18/2024  Tx # 21/28 (6/8) Date: 11/20/2024  Tx #: 22/28 (7/8), 5 additional auth (f/u with insurance pool to clarify)- Pt arrived ~ 15 min late.  Date: 11/25/2024  Tx #: 23/28 (8/8 auth, 0/5 additional auth) Date: 11/27/2024  Tx #: 24/28 (1/5 auth) Date: 12/2/2024  Tx #: 25/28 (2/5 auth)          There Ex:   Seated shoulder pulleys: flex, scap, abd, IR 5 x 10 sec each  Standing L shoulder ER using YTB 2 x 10 reps  Standing L shoulder IR using YTB 2 x 10 reps  UBE: 2 min forward/2 min backward, level 1 (4 min)  Supine L shoulder flexion 0# x 10 reps, pt declined using 1#  S/L L shoulder abd 0# x  - not completed, insufficient time  S/L L shoulder horizontal abduction 0# x 10 reps - cuing form  Prone L shoulder row 0# - not completed, insufficient tiem  L shoulder wall slides AAROM: 9, 10, 12, 1, 3 o'clock x 4 reps each  L shoulder AAROM flexion, abduction standing using cane - not completed, insufficient time  HEP review; see below  There Ex:   Seated shoulder pulleys: flex, scap, abd, IR, ER  5 x 10 sec each  Standing L shoulder IR using YTB 2 x 10 reps; pt requested to review ER using band 2 x 10 reps - verbal cuing (HEP)  S/L L shoulder horizontal abduction 0# x 10 reps - cuing form  B row using GTB x 20 reps (HEP)  Standing shoulder AAROM flexion using wand x 10 reps - mirror/PT cuing to reduce upper trap compensation  HEP review/instruction/handout; see below.    There Ex:   Seated shoulder pulleys: flex, scap, abd, IR, ER  5 x 10 sec each  Verbal HEP review; see below.   S/L L shoulder ER 0# ~ x 15 reps  S/L L shoulder abd 0#  x ~ 15 reps  S/L L shoulder horiz abd 0# x ~ 15 reps  Supine L shoulder circles x 10 reps each clockwise/counterclockwise  Row using blue tubing 2 x 10 reps   Mini wall push up x 10 reps   Standing shoulder AAROM using wand flex x 5 reps, abd x 5 reps  Standing wall circles x 5 reps each clockwise/counterclockwise There Ex:  UBE: 30 sec forward/30 sec backward (1 min total), level 1   Seated shoulder pulleys: flex, scap, abd, IR, ER 10 x 5 sec each  L shoulder pendulums x ~ 30 sec   S/L L shoulder ER 0# ~ x 15 reps  S/L L shoulder abd 0# x ~ 15 reps  S/L L shoulder horiz abd 0# x ~ 15 reps  Supine L shoulder circles x 10 reps each clockwise/counterclockwise  Row using blue tubing x ~ 15 reps   Mini wall push up - attempted, pt c/o, stopped  R shoulder IR using YTB  - not completed today  Standing shoulder AAROM using wand flex x 5 reps, abd x 5 reps  Standing wall circles x 5 reps each clockwise/counter clockwise There Ex:  Seated shoulder pulleys: flex, scap, abd, IR, ER 10 x 5 sec each  Supine L shoulder circles x 10 reps each clockwise/counterclockwise  Supine L shoulder flexion: 0# x 5 reps, 1# x 10 reps   S/L L shoulder ER 0# ~ x 15 reps  S/L L shoulder abd 0# x ~ 15 reps  S/L L shoulder horiz abd 0# x ~ 15 reps  Row using blue theraband  x ~ 15 reps   R shoulder IR using RTB  2 x 10 reps  Standing shoulder AAROM using wand flex x 10 reps, abd x 10 reps  Standing wall circles x 5 reps each clockwise/counterclockwise   Manual:   Surgical scar mobilization L shoulder axilla area only   L GH post/ant and inf glides grade II - pt declined post glides  PROM L shoulder ER, IR, flex, abd, scap, ext   STM L upper trap Manual:   Surgical scar mobilization L shoulder axilla area only  - not completed, clothing/time prohibit  L GH post/ant and inf glides grade II - pt declined post glides  PROM L shoulder ER, IR, flex, abd, scap, ext   STM L upper trap Manual:   Surgical scar mobilization L shoulder axilla area only  -  not completed, clothing/time prohibit  L GH post/ant and inf glides grade II - pt declined post glides  PROM L shoulder ER, IR, flex, abd, scap, ext   STM L upper trap Manual:   Surgical scar mobilization L shoulder area only  - not needed, WNL  L GH post/ant and inf glides grade II - pt declined post glides  PROM L shoulder ER, IR, flex, abd, scap, ext   STM L upper trap Manual:   Surgical scar mobilization L shoulder axilla  L GH post/ant and inf glides grade II - pt declined post glides  PROM L shoulder ER, IR, flex, abd, scap, ext   STM L upper trap   IFC/MHP L shoulder x 10 min seated IFC/MHP L shoulder x 10 min seated IFC/MHP L shoulder x 10 min seated IFC/MHP L shoulder x 10 min seated IFC/MHP L shoulder x 10 min seated          HEP:  clock or W wall slides, shoulder ER PROM using wand or seated or doorframe, scap retraction using GTB, shoulder IR/ER using YTB, shoulder IR/hand behind back AAROM using strap    Charges: 2 TE (30 min), 1 manual (10 min), Estim Total Timed Treatment: 40 min  Total Treatment Time: 55 min    Post op protocol:  Protocol (2-6 weeks):  Continue shoulder immobilization until 4 weeks, discontinue based on tear size  Normalize elbow and wrist motion  Prevent motion stretching repair   Progress motion as per protocol. For small and medium tears, begin to progress shoulder motion  Weightbearing: Progress per protocol  Protocol (6-8 weeks):  Discontinue shoulder immobilization if not already done  Progress motion as per protocol  Small and medium tears: AAROM for FLEX/ABD to 120  Large and massive tears: PROM for FLEX/ABD to 120  Weightbearing: Limit elbow flexion to <10 pounds during weeks 7-8 and to <15 pounds during weeks 8-12  Protocol (9-16 weeks):  Normalize shoulder motion  Avoid push-ups, pull-ups, bench press, and overhead weightlifting activities  Weightbearing: Progress per protocol  Protocol (16 weeks-8 months):  Meet occupational requirements by 4 months  Restore overhead  strength by 6-8 months  No weightlifting restrictions        RANGE-OF-MOTION LIMITATIONS: Per schedule below     Tear Size Sling Use Begin PROM Begin AROM   Small <1 cm2 4 weeks Immediate 4 weeks   Medium 1-3 cm2 4-6 weeks Immediate 6 weeks   Large 3-5 cm2 6 weeks 2 weeks 8 weeks   Massive >5 cm2 6-8 weeks 4 weeks 8 weeks

## 2024-12-04 ENCOUNTER — OFFICE VISIT (OUTPATIENT)
Dept: PHYSICAL THERAPY | Age: 54
End: 2024-12-04
Attending: STUDENT IN AN ORGANIZED HEALTH CARE EDUCATION/TRAINING PROGRAM
Payer: COMMERCIAL

## 2024-12-04 PROCEDURE — 97140 MANUAL THERAPY 1/> REGIONS: CPT | Performed by: PHYSICAL THERAPIST

## 2024-12-04 PROCEDURE — 97110 THERAPEUTIC EXERCISES: CPT | Performed by: PHYSICAL THERAPIST

## 2024-12-04 PROCEDURE — 97014 ELECTRIC STIMULATION THERAPY: CPT | Performed by: PHYSICAL THERAPIST

## 2024-12-04 NOTE — PROGRESS NOTES
Diagnosis:   Nontraumatic incomplete rupture of rotator cuff, left (M75.112)  Superior labrum anterior-to-posterior (SLAP) tear of left shoulder (S43.604A)       Referring Provider: Igor Smith  Date of Evaluation:    8/28/2024    Precautions:       - post surgical precautions/protocol  Next MD visit:   1/2025  Date of Surgery: 8/22/2024   Insurance Primary/Secondary: [employer] [place] / N/A     # Auth Visits: 10/30/2024: 8 auth, 5 additional auth    Subjective:  L shoulder feels stiff.  Deny c/o after last session. Think that arm bike bothers my shoulder.   Pain:   0/10 - pt c/o stiffness     Objective:       AROM (degrees): (* denotes performed with pain)  Shoulder    Flexion: L ~ variable  deg   Superior L humeral glide with active flex/abd    Shoulder PROM L: flex, abd, each ~ 170 deg    Assessment:    Avoided UBE again today based on Subjective. Pt tolerated session well.  Pt with continued variability with ability to reach overhead using L UE, but increased consistency noted. Progressing periscapular and rotator cuff strengthening, AAROM.  Pt with increased ease reaching following there ex/rotator cuff strengthening/AAROM. Will monitor for consistent progress. Surgeon f/u advisable as scheduled ~1/2025 due to continued c/o. Completed STM for upper trap/lateral UE c/o.     Goals: (To be met in 18 visits) -  ALL GOALS IN PROGRESS  Pt will report improved ability to sleep without waking due to shoulder pain  Pt will improve shoulder flexion AROM to >160 degrees to be able to reach into overhead cabinets without pain or restriction  Pt will improve shoulder abduction AROM to >160 degrees to improve ability to don deodorant, don/doff shirts, and wash hair  Pt will increase shoulder AROM ER to 90 to reach and fasten seatbelt   Pt will increase shoulder AROM IR to 70 to be able to reach in back pocket, tuck in shirt, and turn steering wheel without pain  Pt will improve shoulder strength throughout to 4+/5 to  improve function with ADLs including reaching and lifting  Pt will demonstrate increased mid/low trap strength to 4/5 to promote improved shoulder mechanics and stabilization with ADL such as lifting and reaching   Pt will be independent and compliant with comprehensive HEP to maintain progress achieved in PT       Plan: Continue  skilled Physical Therapy 2 x/week or a total of up to 31 visits, pending auth. Request additional auth after next session. Progress per post op protocol, P/AA/AROM, strengthening, standing interventions, reaching, scapular stabilization.    Date: 11/20/2024  Tx #: 22/28 (7/8), 5 additional auth (f/u with insurance pool to clarify)- Pt arrived ~ 15 min late.  Date: 11/25/2024  Tx #: 23/28 (8/8 auth, 0/5 additional auth) Date: 11/27/2024  Tx #: 24/28 (1/5 auth) Date: 12/2/2024  Tx #: 25/28 (2/5 auth) Date: 12/4/2024  Tx #:  26/28 (3/5)          There Ex:   Seated shoulder pulleys: flex, scap, abd, IR, ER  5 x 10 sec each  Standing L shoulder IR using YTB 2 x 10 reps; pt requested to review ER using band 2 x 10 reps - verbal cuing (HEP)  S/L L shoulder horizontal abduction 0# x 10 reps - cuing form  B row using GTB x 20 reps (HEP)  Standing shoulder AAROM flexion using wand x 10 reps - mirror/PT cuing to reduce upper trap compensation  HEP review/instruction/handout; see below.    There Ex:   Seated shoulder pulleys: flex, scap, abd, IR, ER  5 x 10 sec each  Verbal HEP review; see below.   S/L L shoulder ER 0# ~ x 15 reps  S/L L shoulder abd 0# x ~ 15 reps  S/L L shoulder horiz abd 0# x ~ 15 reps  Supine L shoulder circles x 10 reps each clockwise/counterclockwise  Row using blue tubing 2 x 10 reps   Mini wall push up x 10 reps   Standing shoulder AAROM using wand flex x 5 reps, abd x 5 reps  Standing wall circles x 5 reps each clockwise/counterclockwise There Ex:  UBE: 30 sec forward/30 sec backward (1 min total), level 1   Seated shoulder pulleys: flex, scap, abd, IR, ER 10 x 5 sec each  L  shoulder pendulums x ~ 30 sec   S/L L shoulder ER 0# ~ x 15 reps  S/L L shoulder abd 0# x ~ 15 reps  S/L L shoulder horiz abd 0# x ~ 15 reps  Supine L shoulder circles x 10 reps each clockwise/counterclockwise  Row using blue tubing x ~ 15 reps   Mini wall push up - attempted, pt c/o, stopped  R shoulder IR using YTB  - not completed today  Standing shoulder AAROM using wand flex x 5 reps, abd x 5 reps  Standing wall circles x 5 reps each clockwise/counter clockwise There Ex:  Seated shoulder pulleys: flex, scap, abd, IR, ER 10 x 5 sec each  Supine L shoulder circles x 10 reps each clockwise/counterclockwise  Supine L shoulder flexion: 0# x 5 reps, 1# x 10 reps   S/L L shoulder ER 0# ~ x 15 reps  S/L L shoulder abd 0# x ~ 15 reps  S/L L shoulder horiz abd 0# x ~ 15 reps  Row using blue theraband  x ~ 15 reps   R shoulder IR using RTB  2 x 10 reps  Standing shoulder AAROM using wand flex x 10 reps, abd x 10 reps  Standing wall circles x 5 reps each clockwise/counterclockwise There Ex:  Seated shoulder pulleys: flex, scap, abd, IR, ER 10 x 5 sec each  Row using blue theraband  x ~ 15 reps   R shoulder IR using RTB   - not completed today  Supine L shoulder flexion: 0# x 5 reps, 1# x 10 reps   S/L L shoulder ER 0# ~ x 15 reps  Attempted s/l L shoulder circles - pt c/o, stopped  S/L L shoulder abd 0# - pt c/o, stopped  S/L L shoulder horiz abd 0# x  - pt c/o, stopped  Supine B shoulder horiz abd: no band x ~ 2 reps, using YTB x 10 reps  B elbow flexion <->overhead flexion AAROM x 10 reps using wand  L UE wall slides /clocks x 3 reps each: 9,10, 12, 1, 3 o'clock   Manual:   Surgical scar mobilization L shoulder axilla area only  - not completed, clothing/time prohibit  L GH post/ant and inf glides grade II - pt declined post glides  PROM L shoulder ER, IR, flex, abd, scap, ext   STM L upper trap Manual:   Surgical scar mobilization L shoulder axilla area only  - not completed, clothing/time prohibit  L GH post/ant and  inf glides grade II - pt declined post glides  PROM L shoulder ER, IR, flex, abd, scap, ext   STM L upper trap Manual:   Surgical scar mobilization L shoulder area only  - not needed, WNL  L GH post/ant and inf glides grade II - pt declined post glides  PROM L shoulder ER, IR, flex, abd, scap, ext   STM L upper trap Manual:   Surgical scar mobilization L shoulder axilla  L GH post/ant and inf glides grade II - pt declined post glides  PROM L shoulder ER, IR, flex, abd, scap, ext   STM L upper trap Manual:   Surgical scar mobilization L shoulder axilla  L GH post/ant and inf glides grade II - pt declined post glides  PROM L shoulder ER, IR, flex, abd, scap, ext   STM L upper trap, lateral UE   IFC/MHP L shoulder x 10 min seated IFC/MHP L shoulder x 10 min seated IFC/MHP L shoulder x 10 min seated IFC/MHP L shoulder x 10 min seated IFC/MHP L shoulder x 10 min seated          HEP:  clock or W wall slides, shoulder ER PROM using wand or seated or doorframe, scap retraction using GTB, shoulder IR/ER using YTB, shoulder IR/hand behind back AAROM using strap    Charges: 2 TE (30 min), 1 manual (10 min), Estim Total Timed Treatment: 40 min  Total Treatment Time: 55 min    Post op protocol:  Protocol (2-6 weeks):  Continue shoulder immobilization until 4 weeks, discontinue based on tear size  Normalize elbow and wrist motion  Prevent motion stretching repair   Progress motion as per protocol. For small and medium tears, begin to progress shoulder motion  Weightbearing: Progress per protocol  Protocol (6-8 weeks):  Discontinue shoulder immobilization if not already done  Progress motion as per protocol  Small and medium tears: AAROM for FLEX/ABD to 120  Large and massive tears: PROM for FLEX/ABD to 120  Weightbearing: Limit elbow flexion to <10 pounds during weeks 7-8 and to <15 pounds during weeks 8-12  Protocol (9-16 weeks):  Normalize shoulder motion  Avoid push-ups, pull-ups, bench press, and overhead weightlifting  activities  Weightbearing: Progress per protocol  Protocol (16 weeks-8 months):  Meet occupational requirements by 4 months  Restore overhead strength by 6-8 months  No weightlifting restrictions        RANGE-OF-MOTION LIMITATIONS: Per schedule below     Tear Size Sling Use Begin PROM Begin AROM   Small <1 cm2 4 weeks Immediate 4 weeks   Medium 1-3 cm2 4-6 weeks Immediate 6 weeks   Large 3-5 cm2 6 weeks 2 weeks 8 weeks   Massive >5 cm2 6-8 weeks 4 weeks 8 weeks

## 2024-12-09 ENCOUNTER — TELEPHONE (OUTPATIENT)
Dept: PHYSICAL THERAPY | Age: 54
End: 2024-12-09

## 2024-12-09 ENCOUNTER — OFFICE VISIT (OUTPATIENT)
Dept: PHYSICAL THERAPY | Age: 54
End: 2024-12-09
Attending: STUDENT IN AN ORGANIZED HEALTH CARE EDUCATION/TRAINING PROGRAM
Payer: COMMERCIAL

## 2024-12-09 ENCOUNTER — PATIENT MESSAGE (OUTPATIENT)
Dept: INTERNAL MEDICINE CLINIC | Facility: CLINIC | Age: 54
End: 2024-12-09

## 2024-12-09 DIAGNOSIS — Z12.31 SCREENING MAMMOGRAM FOR BREAST CANCER: ICD-10-CM

## 2024-12-09 DIAGNOSIS — Z12.39 SCREENING BREAST EXAMINATION: Primary | ICD-10-CM

## 2024-12-09 PROCEDURE — 97014 ELECTRIC STIMULATION THERAPY: CPT | Performed by: PHYSICAL THERAPIST

## 2024-12-09 PROCEDURE — 97110 THERAPEUTIC EXERCISES: CPT | Performed by: PHYSICAL THERAPIST

## 2024-12-09 PROCEDURE — 97140 MANUAL THERAPY 1/> REGIONS: CPT | Performed by: PHYSICAL THERAPIST

## 2024-12-09 NOTE — PROGRESS NOTES
Diagnosis:   Nontraumatic incomplete rupture of rotator cuff, left (M75.112)  Superior labrum anterior-to-posterior (SLAP) tear of left shoulder (S43.116A)       Referring Provider: Igor Smith  Date of Evaluation:    8/28/2024    Precautions:       - post surgical precautions/protocol  Next MD visit:   1/2025  Date of Surgery: 8/22/2024   Insurance Primary/Secondary: [employer] [place] / N/A     # Auth Visits: 10/30/2024: 8 auth, 5 additional auth    Subjective:  Increased ease with reaching overhead, behind back, able to dry my hair with increased HEP compliance.     Pain:   0/10 - pt c/o stiffness     Objective:   L shoulder AROM flexion ~ 165 deg, superior humeral glide    Assessment:    Pt with increased ease with reaching with less c/o with improved HEP compliance. Reviewed Hep, encouraged HEP compliance for progress. Pt tolerated session well.     Goals: (To be met in 18 visits) -  ALL GOALS IN PROGRESS  Pt will report improved ability to sleep without waking due to shoulder pain  Pt will improve shoulder flexion AROM to >160 degrees to be able to reach into overhead cabinets without pain or restriction  Pt will improve shoulder abduction AROM to >160 degrees to improve ability to don deodorant, don/doff shirts, and wash hair  Pt will increase shoulder AROM ER to 90 to reach and fasten seatbelt   Pt will increase shoulder AROM IR to 70 to be able to reach in back pocket, tuck in shirt, and turn steering wheel without pain  Pt will improve shoulder strength throughout to 4+/5 to improve function with ADLs including reaching and lifting  Pt will demonstrate increased mid/low trap strength to 4/5 to promote improved shoulder mechanics and stabilization with ADL such as lifting and reaching   Pt will be independent and compliant with comprehensive HEP to maintain progress achieved in PT       Plan: Continue  skilled Physical Therapy 2 x/week or a total of up to 31 visits, pending auth. Request additional auth.  Progress per post op protocol, P/AA/AROM, strengthening, standing interventions, reaching, scapular stabilization.    Date: 11/25/2024  Tx #: 23/28 (8/8 auth, 0/5 additional auth) Date: 11/27/2024  Tx #: 24/28 (1/5 auth) Date: 12/2/2024  Tx #: 25/28 (2/5 auth) Date: 12/4/2024  Tx #:  26/28 (3/5) Date: 12/9/2024  Tx #: 27/31 (4/5) - pt arrived ~ 15 min late due to traffic.          There Ex:   Seated shoulder pulleys: flex, scap, abd, IR, ER  5 x 10 sec each  Verbal HEP review; see below.   S/L L shoulder ER 0# ~ x 15 reps  S/L L shoulder abd 0# x ~ 15 reps  S/L L shoulder horiz abd 0# x ~ 15 reps  Supine L shoulder circles x 10 reps each clockwise/counterclockwise  Row using blue tubing 2 x 10 reps   Mini wall push up x 10 reps   Standing shoulder AAROM using wand flex x 5 reps, abd x 5 reps  Standing wall circles x 5 reps each clockwise/counterclockwise There Ex:  UBE: 30 sec forward/30 sec backward (1 min total), level 1   Seated shoulder pulleys: flex, scap, abd, IR, ER 10 x 5 sec each  L shoulder pendulums x ~ 30 sec   S/L L shoulder ER 0# ~ x 15 reps  S/L L shoulder abd 0# x ~ 15 reps  S/L L shoulder horiz abd 0# x ~ 15 reps  Supine L shoulder circles x 10 reps each clockwise/counterclockwise  Row using blue tubing x ~ 15 reps   Mini wall push up - attempted, pt c/o, stopped  R shoulder IR using YTB  - not completed today  Standing shoulder AAROM using wand flex x 5 reps, abd x 5 reps  Standing wall circles x 5 reps each clockwise/counter clockwise There Ex:  Seated shoulder pulleys: flex, scap, abd, IR, ER 10 x 5 sec each  Supine L shoulder circles x 10 reps each clockwise/counterclockwise  Supine L shoulder flexion: 0# x 5 reps, 1# x 10 reps   S/L L shoulder ER 0# ~ x 15 reps  S/L L shoulder abd 0# x ~ 15 reps  S/L L shoulder horiz abd 0# x ~ 15 reps  Row using blue theraband  x ~ 15 reps   R shoulder IR using RTB  2 x 10 reps  Standing shoulder AAROM using wand flex x 10 reps, abd x 10 reps  Standing wall  circles x 5 reps each clockwise/counterclockwise There Ex:  Seated shoulder pulleys: flex, scap, abd, IR, ER 10 x 5 sec each  Row using blue theraband  x ~ 15 reps   R shoulder IR using RTB   - not completed today  Supine L shoulder flexion: 0# x 5 reps, 1# x 10 reps   S/L L shoulder ER 0# ~ x 15 reps  Attempted s/l L shoulder circles - pt c/o, stopped  S/L L shoulder abd 0# - pt c/o, stopped  S/L L shoulder horiz abd 0# x  - pt c/o, stopped  Supine B shoulder horiz abd: no band x ~ 2 reps, using YTB x 10 reps  B elbow flexion <->overhead flexion AAROM x 10 reps using wand  L UE wall slides /clocks x 3 reps each: 9,10, 12, 1, 3 o'clock There Ex:   Seated shoulder pulleys: flex, scap, abd, IR, ER 10 x 5 sec each  Supine supported  L shoulder IR  using YTB in ~ 60 deg abd x ~ 15 reps  S/L L shoulder ER 0# x ~ 15 reps  Supine B shoulder horiz abd using YTB 2 x 10 reps   Hep review; see below.   B elbow flexion <-> shoulder flexion x 5 reps, abd x 5 reps   L shoulder W wall slides x 5 reps    Manual:   Surgical scar mobilization L shoulder axilla area only  - not completed, clothing/time prohibit  L GH post/ant and inf glides grade II - pt declined post glides  PROM L shoulder ER, IR, flex, abd, scap, ext   STM L upper trap Manual:   Surgical scar mobilization L shoulder area only  - not needed, WNL  L GH post/ant and inf glides grade II - pt declined post glides  PROM L shoulder ER, IR, flex, abd, scap, ext   STM L upper trap Manual:   Surgical scar mobilization L shoulder axilla  L GH post/ant and inf glides grade II - pt declined post glides  PROM L shoulder ER, IR, flex, abd, scap, ext   STM L upper trap Manual:   Surgical scar mobilization L shoulder axilla  L GH post/ant and inf glides grade II - pt declined post glides  PROM L shoulder ER, IR, flex, abd, scap, ext   STM L upper trap, lateral UE Manual:   L GH post/ant and inf glides grade II - pt declined post glides  PROM L shoulder ER, IR, flex, abd, scap, ext    STM L upper trap, lateral UE   IFC/MHP L shoulder x 10 min seated IFC/MHP L shoulder x 10 min seated IFC/MHP L shoulder x 10 min seated IFC/MHP L shoulder x 10 min seated IFC/MHP L shoulder x 10 min seated          HEP:  clock or W wall slides, shoulder ER PROM using wand or seated or doorframe, scap retraction using GTB, shoulder IR/ER using YTB, shoulder IR/hand behind back AAROM using strap    Charges: 1 TE (20 min), 1 manual (10 min), Estim Total Timed Treatment: 30 min  Total Treatment Time: 45 min    Post op protocol:  Protocol (2-6 weeks):  Continue shoulder immobilization until 4 weeks, discontinue based on tear size  Normalize elbow and wrist motion  Prevent motion stretching repair   Progress motion as per protocol. For small and medium tears, begin to progress shoulder motion  Weightbearing: Progress per protocol  Protocol (6-8 weeks):  Discontinue shoulder immobilization if not already done  Progress motion as per protocol  Small and medium tears: AAROM for FLEX/ABD to 120  Large and massive tears: PROM for FLEX/ABD to 120  Weightbearing: Limit elbow flexion to <10 pounds during weeks 7-8 and to <15 pounds during weeks 8-12  Protocol (9-16 weeks):  Normalize shoulder motion  Avoid push-ups, pull-ups, bench press, and overhead weightlifting activities  Weightbearing: Progress per protocol  Protocol (16 weeks-8 months):  Meet occupational requirements by 4 months  Restore overhead strength by 6-8 months  No weightlifting restrictions        RANGE-OF-MOTION LIMITATIONS: Per schedule below     Tear Size Sling Use Begin PROM Begin AROM   Small <1 cm2 4 weeks Immediate 4 weeks   Medium 1-3 cm2 4-6 weeks Immediate 6 weeks   Large 3-5 cm2 6 weeks 2 weeks 8 weeks   Massive >5 cm2 6-8 weeks 4 weeks 8 weeks

## 2024-12-10 NOTE — TELEPHONE ENCOUNTER
Last visit 8/2024    Last mammo 12/16/23:    RECOMMENDATIONS:    ROUTINE MAMMOGRAM AND CLINICAL EVALUATION IN 12 MONTHS.     Ordered as per protocol.

## 2024-12-18 ENCOUNTER — TELEPHONE (OUTPATIENT)
Dept: PHYSICAL THERAPY | Age: 54
End: 2024-12-18

## 2024-12-18 NOTE — TELEPHONE ENCOUNTER
Called to offer pt 2nd PT appt week of 12/23. She declined due to traveling. Pt requested to reschedule appt to 12/23 at 6:45pm

## 2024-12-23 ENCOUNTER — PATIENT MESSAGE (OUTPATIENT)
Dept: PHYSICAL THERAPY | Age: 54
End: 2024-12-23

## 2024-12-23 ENCOUNTER — TELEPHONE (OUTPATIENT)
Dept: PHYSICAL THERAPY | Facility: HOSPITAL | Age: 54
End: 2024-12-23

## 2024-12-23 ENCOUNTER — OFFICE VISIT (OUTPATIENT)
Dept: PHYSICAL THERAPY | Age: 54
End: 2024-12-23
Attending: STUDENT IN AN ORGANIZED HEALTH CARE EDUCATION/TRAINING PROGRAM
Payer: COMMERCIAL

## 2024-12-23 PROCEDURE — 97140 MANUAL THERAPY 1/> REGIONS: CPT | Performed by: PHYSICAL THERAPIST

## 2024-12-23 PROCEDURE — 97110 THERAPEUTIC EXERCISES: CPT | Performed by: PHYSICAL THERAPIST

## 2024-12-24 ENCOUNTER — APPOINTMENT (OUTPATIENT)
Dept: PHYSICAL THERAPY | Age: 54
End: 2024-12-24
Attending: STUDENT IN AN ORGANIZED HEALTH CARE EDUCATION/TRAINING PROGRAM
Payer: COMMERCIAL

## 2024-12-24 NOTE — PROGRESS NOTES
Diagnosis:   Nontraumatic incomplete rupture of rotator cuff, left (M75.112)  Superior labrum anterior-to-posterior (SLAP) tear of left shoulder (S43.019A)       Referring Provider: Igor Smith  Date of Evaluation:    8/28/2024    Precautions:       - post surgical precautions/protocol  Next MD visit:   1/10/2025  Date of Surgery: 8/22/2024   Insurance Primary/Secondary: [employer] [place] HMO / N/A     # Auth Visits: 10/30/2024: 8 auth, 5 additional auth    Subjective:  Intermittent HEP compliance. Increased ease with reaching, less sticking in shoulder when complete HEP.     Pain:   0/10 - pt c/o stiffness     Objective:   L shoulder AROM flexion ~ 165 deg, superior humeral glide - improvement noted    Assessment:    Pt with increased ease with reaching, less superior humeral glide but deficits persist. Reviewed Hep, encouraged HEP compliance for progress. Pt tolerated session well.     Goals: (To be met in 18 visits) -  ALL GOALS IN PROGRESS  Pt will report improved ability to sleep without waking due to shoulder pain  Pt will improve shoulder flexion AROM to >160 degrees to be able to reach into overhead cabinets without pain or restriction  Pt will improve shoulder abduction AROM to >160 degrees to improve ability to don deodorant, don/doff shirts, and wash hair  Pt will increase shoulder AROM ER to 90 to reach and fasten seatbelt   Pt will increase shoulder AROM IR to 70 to be able to reach in back pocket, tuck in shirt, and turn steering wheel without pain  Pt will improve shoulder strength throughout to 4+/5 to improve function with ADLs including reaching and lifting  Pt will demonstrate increased mid/low trap strength to 4/5 to promote improved shoulder mechanics and stabilization with ADL such as lifting and reaching   Pt will be independent and compliant with comprehensive HEP to maintain progress achieved in PT       Plan: Continue  skilled Physical Therapy 2 x/week or a total of up to 31 visits, pending auth.  Progress per post op protocol, P/AA/AROM, strengthening, standing interventions, reaching, scapular stabilization.    Date: 12/2/2024  Tx #: 25/28 (2/5 auth) Date: 12/4/2024  Tx #:  26/28 (3/5) Date: 12/9/2024  Tx #: 27/31 (4/5) - pt arrived ~ 15 min late due to traffic. Date: 12/23/2024  Tx #: 28/31 (5/5 auth)         There Ex:  Seated shoulder pulleys: flex, scap, abd, IR, ER 10 x 5 sec each  Supine L shoulder circles x 10 reps each clockwise/counterclockwise  Supine L shoulder flexion: 0# x 5 reps, 1# x 10 reps   S/L L shoulder ER 0# ~ x 15 reps  S/L L shoulder abd 0# x ~ 15 reps  S/L L shoulder horiz abd 0# x ~ 15 reps  Row using blue theraband  x ~ 15 reps   R shoulder IR using RTB  2 x 10 reps  Standing shoulder AAROM using wand flex x 10 reps, abd x 10 reps  Standing wall circles x 5 reps each clockwise/counterclockwise There Ex:  Seated shoulder pulleys: flex, scap, abd, IR, ER 10 x 5 sec each  Row using blue theraband  x ~ 15 reps   R shoulder IR using RTB   - not completed today  Supine L shoulder flexion: 0# x 5 reps, 1# x 10 reps   S/L L shoulder ER 0# ~ x 15 reps  Attempted s/l L shoulder circles - pt c/o, stopped  S/L L shoulder abd 0# - pt c/o, stopped  S/L L shoulder horiz abd 0# x  - pt c/o, stopped  Supine B shoulder horiz abd: no band x ~ 2 reps, using YTB x 10 reps  B elbow flexion <->overhead flexion AAROM x 10 reps using wand  L UE wall slides /clocks x 3 reps each: 9,10, 12, 1, 3 o'clock There Ex:   Seated shoulder pulleys: flex, scap, abd, IR, ER 10 x 5 sec each  Supine supported  L shoulder IR  using YTB in ~ 60 deg abd x ~ 15 reps  S/L L shoulder ER 0# x ~ 15 reps  Supine B shoulder horiz abd using YTB 2 x 10 reps   Hep review; see below.   B elbow flexion <-> shoulder flexion x 5 reps, abd x 5 reps   L shoulder W wall slides x 5 reps  There Ex:   Seated shoulder pulleys: flex, scap, abd, IR, ER 5 x 5 sec each  Hip hinge row using L UE 0# x 10 reps, 3# x 10 reps  Hip hinge horiz abd using L  UE 0#  S/L L shoulder ER 0# x ~ 15 reps  B elbow flexion <-> shoulder flexion x 5 reps, abd x 5 reps using wand   L UE wall slides /clocks x 4 reps each: 9,10, 12, 1, 3 o'clock  Standing L shoulder IR using RTB 2 x 10 reps   Manual:   Surgical scar mobilization L shoulder axilla  L GH post/ant and inf glides grade II - pt declined post glides  PROM L shoulder ER, IR, flex, abd, scap, ext   STM L upper trap Manual:   Surgical scar mobilization L shoulder axilla  L GH post/ant and inf glides grade II - pt declined post glides  PROM L shoulder ER, IR, flex, abd, scap, ext   STM L upper trap, lateral UE Manual:   L GH post/ant and inf glides grade II - pt declined post glides  PROM L shoulder ER, IR, flex, abd, scap, ext   STM L upper trap, lateral UE Manual:   L GH post/ant and inf glides grade II - pt declined post glides  PROM L shoulder ER, IR, flex, abd, scap   STM L upper trap   IFC/MHP L shoulder x 10 min seated IFC/MHP L shoulder x 10 min seated IFC/MHP L shoulder x 10 min seated IFC/MHP L shoulder - pt declined          HEP:  clock or W wall slides, shoulder ER PROM using wand or seated or doorframe, scap retraction using GTB, shoulder IR/ER using YTB, shoulder IR/hand behind back AAROM using strap    Charges: 2TE (30 min), 1 manual (10 min) Total Timed Treatment: 40 min  Total Treatment Time: 42 min    Post op protocol:  Protocol (2-6 weeks):  Continue shoulder immobilization until 4 weeks, discontinue based on tear size  Normalize elbow and wrist motion  Prevent motion stretching repair   Progress motion as per protocol. For small and medium tears, begin to progress shoulder motion  Weightbearing: Progress per protocol  Protocol (6-8 weeks):  Discontinue shoulder immobilization if not already done  Progress motion as per protocol  Small and medium tears: AAROM for FLEX/ABD to 120  Large and massive tears: PROM for FLEX/ABD to 120  Weightbearing: Limit elbow flexion to <10 pounds during weeks 7-8 and to <15  pounds during weeks 8-12  Protocol (9-16 weeks):  Normalize shoulder motion  Avoid push-ups, pull-ups, bench press, and overhead weightlifting activities  Weightbearing: Progress per protocol  Protocol (16 weeks-8 months):  Meet occupational requirements by 4 months  Restore overhead strength by 6-8 months  No weightlifting restrictions        RANGE-OF-MOTION LIMITATIONS: Per schedule below     Tear Size Sling Use Begin PROM Begin AROM   Small <1 cm2 4 weeks Immediate 4 weeks   Medium 1-3 cm2 4-6 weeks Immediate 6 weeks   Large 3-5 cm2 6 weeks 2 weeks 8 weeks   Massive >5 cm2 6-8 weeks 4 weeks 8 weeks

## 2025-01-04 ENCOUNTER — HOSPITAL ENCOUNTER (OUTPATIENT)
Dept: MAMMOGRAPHY | Age: 55
Discharge: HOME | End: 2025-01-04
Attending: INTERNAL MEDICINE
Payer: COMMERCIAL

## 2025-01-04 DIAGNOSIS — Z12.31 SCREENING MAMMOGRAM FOR BREAST CANCER: ICD-10-CM

## 2025-01-04 PROCEDURE — 77067 SCR MAMMO BI INCL CAD: CPT | Performed by: INTERNAL MEDICINE

## 2025-01-04 PROCEDURE — 77063 BREAST TOMOSYNTHESIS BI: CPT | Performed by: INTERNAL MEDICINE

## 2025-01-06 ENCOUNTER — OFFICE VISIT (OUTPATIENT)
Dept: PHYSICAL THERAPY | Age: 55
End: 2025-01-06
Attending: STUDENT IN AN ORGANIZED HEALTH CARE EDUCATION/TRAINING PROGRAM
Payer: COMMERCIAL

## 2025-01-06 PROCEDURE — 97140 MANUAL THERAPY 1/> REGIONS: CPT | Performed by: PHYSICAL THERAPIST

## 2025-01-06 PROCEDURE — 97110 THERAPEUTIC EXERCISES: CPT | Performed by: PHYSICAL THERAPIST

## 2025-01-06 NOTE — PROGRESS NOTES
ProgressSummary  Pt has attended 29 visits in Physical Therapy.     Diagnosis:   Nontraumatic incomplete rupture of rotator cuff, left (M75.112)  Superior labrum anterior-to-posterior (SLAP) tear of left shoulder (S43.432A)       Referring Provider: Igor Smith  Date of Evaluation:    8/28/2024    Precautions:       - post surgical precautions/protocol  Next MD visit:   1/10/2025  Date of Surgery: 8/22/2024   Insurance Primary/Secondary: [employer] [place] / N/A     # Auth Visits: 10/30/2024: 5 auth    Subjective:  Improvement in shoulder, c/o tightness, but it would be better if I completed my HEP.     Pain:   0/10 - pt c/o stiffness     Objective:     Surgical scars apear WNL, palpation WNL    AROM (degrees): (* denotes performed with pain)  Shoulder    Flexion: L 160  Abduction: L 165*   ER:  L ~60 deg, C7  IR: L T9   Increased L upper trap recruitment with AROM flex, abd    PROM (degrees): (* denotes performed with pain)  Shoulder    Flexion: L 165  Abduction: L 165  ER:  ~ 70 deg   IR: L 70     MMT: (* denotes performed with pain)  Shoulder    Flexion: L 4-/5  Abduction: L 4-/5  ER:  L 4/5  IR: L 4/5     Assessment:    Pt progressing: increased ease with reaching, improved A/PROM, less superior humeral glide,  but deficits persist. Reviewed Hep, encouraged HEP compliance for progress. Pt tolerated session well.     Goals: (To be met in 18 visits) -  ALL GOALS IN PROGRESS  Pt will report improved ability to sleep without waking due to shoulder pain  Pt will improve shoulder flexion AROM to >160 degrees to be able to reach into overhead cabinets without pain or restriction  Pt will improve shoulder abduction AROM to >160 degrees to improve ability to don deodorant, don/doff shirts, and wash hair  Pt will increase shoulder AROM ER to 90 to reach and fasten seatbelt   Pt will increase shoulder AROM IR to 70 to be able to reach in back pocket, tuck in shirt, and turn steering wheel without pain  Pt will improve  shoulder strength throughout to 4+/5 to improve function with ADLs including reaching and lifting  Pt will demonstrate increased mid/low trap strength to 4/5 to promote improved shoulder mechanics and stabilization with ADL such as lifting and reaching   Pt will be independent and compliant with comprehensive HEP to maintain progress achieved in PT       Plan: Continue  skilled Physical Therapy 1-2 x/week or a total of up to 33 visits, pending auth. Progress per post op protocol, P/AA/AROM, strengthening, standing interventions, reaching, scapular stabilization.    Date: 12/2/2024  Tx #: 25/28 (2/5 auth) Date: 12/4/2024  Tx #:  26/28 (3/5) Date: 12/9/2024  Tx #: 27/31 (4/5) - pt arrived ~ 15 min late due to traffic. Date: 12/23/2024  Tx #: 28/31 (5/5 auth) Date: 1/6/2025  Tx #: 29/34 (1/5 auth)          There Ex:  Seated shoulder pulleys: flex, scap, abd, IR, ER 10 x 5 sec each  Supine L shoulder circles x 10 reps each clockwise/counterclockwise  Supine L shoulder flexion: 0# x 5 reps, 1# x 10 reps   S/L L shoulder ER 0# ~ x 15 reps  S/L L shoulder abd 0# x ~ 15 reps  S/L L shoulder horiz abd 0# x ~ 15 reps  Row using blue theraband  x ~ 15 reps   R shoulder IR using RTB  2 x 10 reps  Standing shoulder AAROM using wand flex x 10 reps, abd x 10 reps  Standing wall circles x 5 reps each clockwise/counterclockwise There Ex:  Seated shoulder pulleys: flex, scap, abd, IR, ER 10 x 5 sec each  Row using blue theraband  x ~ 15 reps   R shoulder IR using RTB   - not completed today  Supine L shoulder flexion: 0# x 5 reps, 1# x 10 reps   S/L L shoulder ER 0# ~ x 15 reps  Attempted s/l L shoulder circles - pt c/o, stopped  S/L L shoulder abd 0# - pt c/o, stopped  S/L L shoulder horiz abd 0# x  - pt c/o, stopped  Supine B shoulder horiz abd: no band x ~ 2 reps, using YTB x 10 reps  B elbow flexion <->overhead flexion AAROM x 10 reps using wand  L UE wall slides /clocks x 3 reps each: 9,10, 12, 1, 3 o'clock There Ex:   Seated  shoulder pulleys: flex, scap, abd, IR, ER 10 x 5 sec each  Supine supported  L shoulder IR  using YTB in ~ 60 deg abd x ~ 15 reps  S/L L shoulder ER 0# x ~ 15 reps  Supine B shoulder horiz abd using YTB 2 x 10 reps   Hep review; see below.   B elbow flexion <-> shoulder flexion x 5 reps, abd x 5 reps   L shoulder W wall slides x 5 reps  There Ex:   Seated shoulder pulleys: flex, scap, abd, IR, ER 5 x 5 sec each  Hip hinge row using L UE 0# x 10 reps, 3# x 10 reps  Hip hinge horiz abd using L UE 0#  S/L L shoulder ER 0# x ~ 15 reps  B elbow flexion <-> shoulder flexion x 5 reps, abd x 5 reps using wand   L UE wall slides /clocks x 4 reps each: 9,10, 12, 1, 3 o'clock  Standing L shoulder IR using RTB 2 x 10 reps There Ex:   Hep review; see below.   S/L L shoulder ER 0# 2 x 10 reps  S/L L shoulder abduction 0# x 10 reps   Quadruped L shoulder horiz abd 0# 2 x 10 reps - attempted using 1#, pt too weak  Quadruped L shoulder row 0# x 3 reps, 2# x 20 reps  L shoulder IR using RTB 2 x 10 reps   B elbow flexion <-> shoulder flexion x 5 reps, abd x 5 reps using wand   L UE wall slides clocks x 4 reps each: 9,10, 12, 1, 3 o'clock   Manual:   Surgical scar mobilization L shoulder axilla  L GH post/ant and inf glides grade II - pt declined post glides  PROM L shoulder ER, IR, flex, abd, scap, ext   STM L upper trap Manual:   Surgical scar mobilization L shoulder axilla  L GH post/ant and inf glides grade II - pt declined post glides  PROM L shoulder ER, IR, flex, abd, scap, ext   STM L upper trap, lateral UE Manual:   L GH post/ant and inf glides grade II - pt declined post glides  PROM L shoulder ER, IR, flex, abd, scap, ext   STM L upper trap, lateral UE Manual:   L GH post/ant and inf glides grade II - pt declined post glides  PROM L shoulder ER, IR, flex, abd, scap   STM L upper trap Manual:   L GH post/ant and inf glides grade II - pt declined post glides  PROM L shoulder ER, IR, flex, abd, scap   STM L upper trap - not  completed   IFC/MHP L shoulder x 10 min seated IFC/MHP L shoulder x 10 min seated IFC/MHP L shoulder x 10 min seated IFC/MHP L shoulder - pt declined  IFC/MHP L shoulder - pt declined           HEP:  clock or W wall slides, shoulder ER PROM using wand or seated or doorframe, scap retraction using GTB, shoulder IR/ER using YTB, shoulder IR/hand behind back AAROM using strap    Charges: 2TE (32 min), 1 manual (8 min) Total Timed Treatment: 40 min  Total Treatment Time: 45 min    Post op protocol:  Protocol (2-6 weeks):  Continue shoulder immobilization until 4 weeks, discontinue based on tear size  Normalize elbow and wrist motion  Prevent motion stretching repair   Progress motion as per protocol. For small and medium tears, begin to progress shoulder motion  Weightbearing: Progress per protocol  Protocol (6-8 weeks):  Discontinue shoulder immobilization if not already done  Progress motion as per protocol  Small and medium tears: AAROM for FLEX/ABD to 120  Large and massive tears: PROM for FLEX/ABD to 120  Weightbearing: Limit elbow flexion to <10 pounds during weeks 7-8 and to <15 pounds during weeks 8-12  Protocol (9-16 weeks):  Normalize shoulder motion  Avoid push-ups, pull-ups, bench press, and overhead weightlifting activities  Weightbearing: Progress per protocol  Protocol (16 weeks-8 months):  Meet occupational requirements by 4 months  Restore overhead strength by 6-8 months  No weightlifting restrictions        RANGE-OF-MOTION LIMITATIONS: Per schedule below     Tear Size Sling Use Begin PROM Begin AROM   Small <1 cm2 4 weeks Immediate 4 weeks   Medium 1-3 cm2 4-6 weeks Immediate 6 weeks   Large 3-5 cm2 6 weeks 2 weeks 8 weeks   Massive >5 cm2 6-8 weeks 4 weeks 8 weeks     Quick DASH to be re-administered at discharge    Plan: Continue skilled Physical Therapy 1-2 x/week or a total of 34 visits over a 90 day period. Treatment will include: there ex, there activities, manual therapy, NMRE, and modalities as  needed.        Patient/Family/Caregiver was advised of these findings, precautions, and treatment options and has agreed to actively participate in planning and for this course of care.    Thank you for your referral. If you have any questions, please contact me at Dept: [phone].    Sincerely,  Electronically signed by therapist: Isha Burtno PT     Physician's certification required:  Yes  Please co-sign or sign and return this letter via fax as soon as possible to [phone].   I certify the need for these services furnished under this plan of treatment and while under my care.    X___________________________________________________ Date____________________    Certification From: 1/6/2025  To:4/6/2025

## 2025-01-10 ENCOUNTER — OFFICE VISIT (OUTPATIENT)
Dept: ORTHOPEDICS CLINIC | Facility: CLINIC | Age: 55
End: 2025-01-10
Payer: COMMERCIAL

## 2025-01-10 VITALS — WEIGHT: 199 LBS | HEIGHT: 62 IN | BODY MASS INDEX: 36.62 KG/M2

## 2025-01-10 DIAGNOSIS — M75.81 RIGHT ROTATOR CUFF TENDINITIS: ICD-10-CM

## 2025-01-10 DIAGNOSIS — M75.112 NONTRAUMATIC INCOMPLETE RUPTURE OF ROTATOR CUFF, LEFT: Primary | ICD-10-CM

## 2025-01-10 PROCEDURE — 99214 OFFICE O/P EST MOD 30 MIN: CPT | Performed by: STUDENT IN AN ORGANIZED HEALTH CARE EDUCATION/TRAINING PROGRAM

## 2025-01-10 PROCEDURE — 3008F BODY MASS INDEX DOCD: CPT | Performed by: STUDENT IN AN ORGANIZED HEALTH CARE EDUCATION/TRAINING PROGRAM

## 2025-01-10 RX ORDER — PSEUDOEPHED/ACETAMINOPH/DIPHEN 30MG-500MG
2 TABLET ORAL EVERY 8 HOURS PRN
COMMUNITY
Start: 2024-08-22

## 2025-01-12 NOTE — PROGRESS NOTES
Orthopaedic Surgery Postop Patient Visit  _______________________________________________________________________________________________________________  _______________________________________________________________________________________________________________    DATE OF VISIT: 1/10/2025     DATE OF SURGERY: 8/22/2024     CHIEF COMPLAINT: Follow-up Left  shoulder rotator cuff repair (supraspinatus), subacromial decompression, arthroscopic distal clavicle excision, and biceps tenodesis  Chief Complaint   Patient presents with    Follow - Up     Left Shoulder Pain, pt states no pain today, pt is in PT        HISTORY OF PRESENT ILLNESS: Nunu Guan is a 54 year old female who presents to the clinic for follow-up after Left  shoulder surgery. The patient has progressed with therapy, though she feels that she still has more work to do in order to fully restore her strength and function.  She feels she is making progress on a regular basis but that is overall slow.  She still has some intermittent pain in the shoulder and is not fully comfortable using the shoulder.  She also has diminished motion.  She additionally reports right shoulder pain which is similar but less severe than the left shoulder was prior to surgery.  This is anterolateral and deep, made worse with raising the arm and overhead motions and any substantial lifting.  She denies any new injuries.  She denies neurologic symptoms, instability, or other major concerns.     MEDICATIONS  Reviewed   Acetaminophen Extra Strength 500 MG Oral Tab Take 2 tablets (1,000 mg total) by mouth every 8 (eight) hours as needed.      topiramate 25 MG Oral Tab Take 1 tablet (25 mg total) by mouth in the morning, at noon, and at bedtime. 270 tablet 1    Phentermine HCl 37.5 MG Oral Tab Take 1 tablet (37.5 mg total) by mouth every morning before breakfast. 30 tablet 3    celecoxib 200 MG Oral Cap Take 1 capsule (200 mg total) by mouth 2 (two) times daily. 60  capsule 0    Acetaminophen 500 MG Oral Cap Take 2 capsules (1,000 mg total) by mouth every 8 (eight) hours as needed for Pain. Never exceed 3000 mg in a 24-hour period.  Many over-the-counter medications also have acetaminophen in them. 180 capsule 0    levothyroxine 100 MCG Oral Tab Take 1 tablet (100 mcg total) by mouth before breakfast. 90 tablet 3    losartan-hydroCHLOROthiazide 100-25 MG Oral Tab Take 1 tablet by mouth daily. 90 tablet 3        ALLERGIES  Allergies[1]     REVIEW OF SYSTEMS  A 14 point review of systems was performed. Pertinent positives and negatives noted in the HPI.    PHYSICAL EXAM  Ht 5' 2\" (1.575 m)   Wt 199 lb (90.3 kg)   LMP 08/01/2024 (Within Days)   BMI 36.40 kg/m²      Constitutional: The patient is well-developed, well-nourished, in no acute distress.  Neurological: Alert and oriented to person, place, and time.  Psychiatric: Mood and affect normal.  Head: Normocephalic and atraumatic.  Cardiovascular: regular rate by palpation  Pulmonary/Chest: Effort normal. No respiratory distress. Breathing non-labored  Abdominal: Abdomen exhibits no distension.   Left shoulder  Wound well healed  Swelling resolving  ROM: Forward flexion: 160, Abduction: 160, ER: 60, IR: L1  Motor/Strength:  Motor intact Ax/Msc/M/U/R  Forward Flexion: 4+/5  Abduction: 4/5  Internal Rotation: 4+/5  External Rotation: 4+/5  SILT R/U/M/MSC/axillary  Radial pulse 2+, distally warm and well perfused, brisk capillary refill      Right  Shoulder  Inspection/Palpation  No readily apparent visual abnormalities of shoulder.   No ecchymosis or erythema  No effusion   Focally tender to palpation to anterolateral acromion  ROM  Forward Flexion: 0-170 degrees  Abduction: 0-170 degrees  Internal Rotation: 80 degrees  External Rotation: 80 degrees  Strength  Forward Flexion: 5/5  Abduction: 5/5  Internal Rotation: 5/5  External Rotation: 5/5  POSITIVE Tests  Impingement: Travis  SS: Full Can Test  IS: None  TM: None  SubS:  None  ACJ: None  SLAP: None  Biceps: None  NEGATIVE Tests  Impingement: Neer  SS: Drop Arm Test  IS: Infraspinatus Test  TM: Hornblowers  SubS: Belly Press  ACJ: Cross Body Adduction Test and Scarf Test  SLAP: O'Briens  Biceps: Speeds and Yergasons  SILT R/U/M/MSC/axillary  Radial pulse 2+, distally warm and well perfused, brisk capillary refill     ASSESSMENT/PLAN: Nunu Guan is a 54 year old female who presents to the Orthopaedic surgery clinic today for follow-up 16 weeks after left rotator cuff repair (supraspinatus), subacromial decompression, arthroscopic distal clavicle excision, and biceps tenodesis.  She also has right shoulder rotator cuff tendinitis.  We discussed the treatment to date, treatment options for her right shoulder, the utility of anti-inflammatories, continued therapy, and other interventions.  At this time, we agreed to proceed with physical therapy for both shoulders and anti-inflammatories as needed for pain.      We reviewed the PT protocol and adjustments were made as needed  Protocol (16 weeks-8 months):  Meet occupational requirements by 4 months  Restore overhead strength by 6-8 months  No weightlifting restrictions  RANGE-OF-MOTION LIMITATIONS:As tolerated    NEW PRESCRIPTIONS: none  IMAGING ORDERED: none  CONSULTS PLACED: physical therapy renewal    FOLLOW-UP: In 2 months or after therapy    RADIOGRAPHS AT NEXT VISIT: none    I have personally seen Nunu Guan and discussed in detail their plan of care. Prior to departure, they indicated agreement with and understanding of their plan of care and their follow-up as documented herein this note. Please note that this note was written in combination with voice recognition/dictation software and there is a possibility of transcription errors which were not identified at the time of note submission. If clarification is necessary, please contact the author or clinic staff.    Igor Smith MD  Orthopaedic  Surgery  1/12/2025          [1]   Allergies  Allergen Reactions    Azithromycin DIZZINESS     Other reaction(s): AZITHROMYCIN    Hydrocodone DIZZINESS     Other reaction(s): feels dazed      skin

## 2025-01-13 ENCOUNTER — OFFICE VISIT (OUTPATIENT)
Dept: PHYSICAL THERAPY | Age: 55
End: 2025-01-13
Attending: STUDENT IN AN ORGANIZED HEALTH CARE EDUCATION/TRAINING PROGRAM
Payer: COMMERCIAL

## 2025-01-13 PROCEDURE — 97110 THERAPEUTIC EXERCISES: CPT | Performed by: PHYSICAL THERAPIST

## 2025-01-13 PROCEDURE — 97140 MANUAL THERAPY 1/> REGIONS: CPT | Performed by: PHYSICAL THERAPIST

## 2025-01-13 NOTE — PROGRESS NOTES
ProgressSummary  Pt has attended 30 visits in Physical Therapy.     Diagnosis:   Nontraumatic incomplete rupture of rotator cuff, left (M75.112)  Superior labrum anterior-to-posterior (SLAP) tear of left shoulder (S43.432A)          Right rotator cuff tendinitis (M75.81)  Referring Provider: Igor Smith  Date of Evaluation:    8/28/2024    Precautions:       - post surgical precautions/protocol  Next MD visit:   1/10/2025  Date of Surgery: 8/22/2024   Insurance Primary/Secondary: [employer] [place] / N/A     # Auth Visits: 10/30/2024: 5 auth    Subjective: Had f/u with Dr. Smith: continue PT, including for R shoulder, diagnosed with tendonitis. Pt c/o chronic R shoulder pain with pulling.    Pain:   0/10 - pt c/o stiffness     Objective:     AROM (degrees): (* denotes performed with pain)  Shoulder    Flexion: L 160: R 170  Abduction: L 165* ; R 160   ER:  L ~60 deg, C7, T2  IR: L T9, R T10*   Increased L upper trap recruitment with AROM flex, abd    PROM (degrees): (* denotes performed with pain)  Shoulder    Flexion: L 165; R 165 deg  Abduction: L 165; R 165 deg  ER:  L ~ 70 deg : R 80 deg  IR: L 70; R IR 60 deg deg*     MMT: (* denotes performed with pain)  Shoulder    Flexion: L 4-/5; R 4+/5  Abduction: L 4-/5; 4- to 4/5*  ER:  L 4/5, R 5/5  IR: L 4/5; 5/5     Negative Speed's and Empty Can test R       Assessment:    Pt with new PT order from Dr. Smith for R shoulder rotator cuff tendonitis. Evaluated, Hep advised/reviewed for both L/R shoulders. Pt with R shoulder AROM, reaching deficits, c/o pain. Pt progressing in terms of L shoulder: increased ease with reaching, improved A/PROM, less superior humeral glide,  but deficits persist. Pt tolerated session well.     Goals: (To be met in 18 visits) -  ALL GOALS IN PROGRESS  Pt will report improved ability to sleep without waking due to shoulder pain  Pt will improve shoulder flexion AROM to >160 degrees to be able to reach into overhead cabinets without pain or  restriction  Pt will improve shoulder abduction AROM to >160 degrees to improve ability to don deodorant, don/doff shirts, and wash hair  Pt will increase shoulder AROM ER to 90 to reach and fasten seatbelt   Pt will increase shoulder AROM IR to 70 to be able to reach in back pocket, tuck in shirt, and turn steering wheel without pain  Pt will improve shoulder strength throughout to 4+/5 to improve function with ADLs including reaching and lifting  Pt will demonstrate increased mid/low trap strength to 4/5 to promote improved shoulder mechanics and stabilization with ADL such as lifting and reaching   Pt will be independent and compliant with comprehensive HEP to maintain progress achieved in PT       Plan: Continue  skilled Physical Therapy 1-2 x/week or a total of up to 38visits, pending auth. R shoulder AAROM, rotator cuff/periscapular strengthening. Progress L shoulder per post op protocol, P/AA/AROM, strengthening, standing interventions, reaching, scapular stabilization.    Date: 12/4/2024  Tx #:  26/28 (3/5) Date: 12/9/2024  Tx #: 27/31 (4/5) - pt arrived ~ 15 min late due to traffic. Date: 12/23/2024  Tx #: 28/31 (5/5 auth) Date: 1/6/2025  Tx #: 29/34 (1/5 auth) Date: 1/13/2025  Tx #: 30/34 (2/5 auth           There Ex:  Seated shoulder pulleys: flex, scap, abd, IR, ER 10 x 5 sec each  Row using blue theraband  x ~ 15 reps   R shoulder IR using RTB   - not completed today  Supine L shoulder flexion: 0# x 5 reps, 1# x 10 reps   S/L L shoulder ER 0# ~ x 15 reps  Attempted s/l L shoulder circles - pt c/o, stopped  S/L L shoulder abd 0# - pt c/o, stopped  S/L L shoulder horiz abd 0# x  - pt c/o, stopped  Supine B shoulder horiz abd: no band x ~ 2 reps, using YTB x 10 reps  B elbow flexion <->overhead flexion AAROM x 10 reps using wand  L UE wall slides /clocks x 3 reps each: 9,10, 12, 1, 3 o'clock There Ex:   Seated shoulder pulleys: flex, scap, abd, IR, ER 10 x 5 sec each  Supine supported  L shoulder IR  using  YTB in ~ 60 deg abd x ~ 15 reps  S/L L shoulder ER 0# x ~ 15 reps  Supine B shoulder horiz abd using YTB 2 x 10 reps   Hep review; see below.   B elbow flexion <-> shoulder flexion x 5 reps, abd x 5 reps   L shoulder W wall slides x 5 reps  There Ex:   Seated shoulder pulleys: flex, scap, abd, IR, ER 5 x 5 sec each  Hip hinge row using L UE 0# x 10 reps, 3# x 10 reps  Hip hinge horiz abd using L UE 0#  S/L L shoulder ER 0# x ~ 15 reps  B elbow flexion <-> shoulder flexion x 5 reps, abd x 5 reps using wand   L UE wall slides /clocks x 4 reps each: 9,10, 12, 1, 3 o'clock  Standing L shoulder IR using RTB 2 x 10 reps There Ex:   Hep review; see below.   S/L L shoulder ER 0# 2 x 10 reps  S/L L shoulder abduction 0# x 10 reps   Quadruped L shoulder horiz abd 0# 2 x 10 reps - attempted using 1#, pt too weak  Quadruped L shoulder row 0# x 3 reps, 2# x 20 reps  L shoulder IR using RTB 2 x 10 reps   B elbow flexion <-> shoulder flexion x 5 reps, abd x 5 reps using wand   L UE wall slides clocks x 4 reps each: 9,10, 12, 1, 3 o'clock There Ex:   HEP instruction/review/handout; see below.   Shoulder IR AAROM using pulleys 5 x 5 sec each (HEP)  Standing L/R shoulder ER using YTB  2x 10 reps each (HEP)  Kneeling L/R shoulder horiz abd x 10 reps each       Manual:   Surgical scar mobilization L shoulder axilla  L GH post/ant and inf glides grade II - pt declined post glides  PROM L shoulder ER, IR, flex, abd, scap, ext   STM L upper trap, lateral UE Manual:   L GH post/ant and inf glides grade II - pt declined post glides  PROM L shoulder ER, IR, flex, abd, scap, ext   STM L upper trap, lateral UE Manual:   L GH post/ant and inf glides grade II - pt declined post glides  PROM L shoulder ER, IR, flex, abd, scap   STM L upper trap Manual:   L GH post/ant and inf glides grade II - pt declined post glides  PROM L shoulder ER, IR, flex, abd, scap   STM L upper trap - not completed Manual:   L GH post/ant and inf glides grade II - pt  declined post glides  PROM L/R shoulder ER, IR, flex, abd, scap   STM L upper trap - not completed, pt reports improvement     IFC/MHP L shoulder x 10 min seated IFC/MHP L shoulder x 10 min seated IFC/MHP L shoulder - pt declined  IFC/MHP L shoulder - pt declined  IFC/MHP L shoulder - pt declined           HEP:  clock or W wall slides, shoulder ER PROM using wand or seated or doorframe, scap retraction using GTB, shoulder IR/ER using YTB, shoulder IR/hand behind back AAROM using strap    Charges: 2TE (30 min), 1 manual (8 min) Total Timed Treatment:  38 min  Total Treatment Time: 45 min    Post op protocol:  Protocol (2-6 weeks):  Continue shoulder immobilization until 4 weeks, discontinue based on tear size  Normalize elbow and wrist motion  Prevent motion stretching repair   Progress motion as per protocol. For small and medium tears, begin to progress shoulder motion  Weightbearing: Progress per protocol  Protocol (6-8 weeks):  Discontinue shoulder immobilization if not already done  Progress motion as per protocol  Small and medium tears: AAROM for FLEX/ABD to 120  Large and massive tears: PROM for FLEX/ABD to 120  Weightbearing: Limit elbow flexion to <10 pounds during weeks 7-8 and to <15 pounds during weeks 8-12  Protocol (9-16 weeks):  Normalize shoulder motion  Avoid push-ups, pull-ups, bench press, and overhead weightlifting activities  Weightbearing: Progress per protocol  Protocol (16 weeks-8 months):  Meet occupational requirements by 4 months  Restore overhead strength by 6-8 months  No weightlifting restrictions        RANGE-OF-MOTION LIMITATIONS: Per schedule below     Tear Size Sling Use Begin PROM Begin AROM   Small <1 cm2 4 weeks Immediate 4 weeks   Medium 1-3 cm2 4-6 weeks Immediate 6 weeks   Large 3-5 cm2 6 weeks 2 weeks 8 weeks   Massive >5 cm2 6-8 weeks 4 weeks 8 weeks     Quick DASH to be re-administered at discharge    Plan: Continue skilled Physical Therapy 1-2 x/week or a total of 38  visits over a 90 day period. Treatment will include: there ex, there activities, manual therapy, NMRE, and modalities as needed.        Patient/Family/Caregiver was advised of these findings, precautions, and treatment options and has agreed to actively participate in planning and for this course of care.    Thank you for your referral. If you have any questions, please contact me at Dept: [phone].    Sincerely,  Electronically signed by therapist: Isha Burton PT     Physician's certification required:  Yes  Please co-sign or sign and return this letter via fax as soon as possible to [phone].   I certify the need for these services furnished under this plan of treatment and while under my care.    X___________________________________________________ Date____________________    Certification From: 1/6/2025  To:4/6/2025

## 2025-01-20 ENCOUNTER — OFFICE VISIT (OUTPATIENT)
Dept: SURGERY | Facility: CLINIC | Age: 55
End: 2025-01-20
Payer: COMMERCIAL

## 2025-01-20 VITALS
OXYGEN SATURATION: 99 % | DIASTOLIC BLOOD PRESSURE: 80 MMHG | HEART RATE: 60 BPM | SYSTOLIC BLOOD PRESSURE: 122 MMHG | WEIGHT: 197 LBS | HEIGHT: 62.5 IN | BODY MASS INDEX: 35.35 KG/M2

## 2025-01-20 DIAGNOSIS — R73.03 PREDIABETES: ICD-10-CM

## 2025-01-20 DIAGNOSIS — E66.9 OBESITY (BMI 30-39.9): ICD-10-CM

## 2025-01-20 DIAGNOSIS — F43.9 STRESS: ICD-10-CM

## 2025-01-20 DIAGNOSIS — I10 PRIMARY HYPERTENSION: Primary | ICD-10-CM

## 2025-01-20 DIAGNOSIS — E78.00 HYPERCHOLESTEREMIA: ICD-10-CM

## 2025-01-20 DIAGNOSIS — Z86.718 HISTORY OF DVT (DEEP VEIN THROMBOSIS): ICD-10-CM

## 2025-01-20 DIAGNOSIS — E03.9 ACQUIRED HYPOTHYROIDISM: ICD-10-CM

## 2025-01-20 DIAGNOSIS — E55.9 VITAMIN D DEFICIENCY: ICD-10-CM

## 2025-01-20 DIAGNOSIS — Z51.81 ENCOUNTER FOR THERAPEUTIC DRUG MONITORING: ICD-10-CM

## 2025-01-20 PROCEDURE — 3008F BODY MASS INDEX DOCD: CPT | Performed by: NURSE PRACTITIONER

## 2025-01-20 PROCEDURE — 99214 OFFICE O/P EST MOD 30 MIN: CPT | Performed by: NURSE PRACTITIONER

## 2025-01-20 PROCEDURE — 3074F SYST BP LT 130 MM HG: CPT | Performed by: NURSE PRACTITIONER

## 2025-01-20 PROCEDURE — 3079F DIAST BP 80-89 MM HG: CPT | Performed by: NURSE PRACTITIONER

## 2025-01-20 RX ORDER — PHENTERMINE HYDROCHLORIDE 37.5 MG/1
37.5 TABLET ORAL
Qty: 30 TABLET | Refills: 3 | Status: SHIPPED | OUTPATIENT
Start: 2025-01-20

## 2025-01-20 NOTE — PROGRESS NOTES
[facility] / [facility], [facility], [place]  [address]  [facility] [address]  Dept: [phone]       Patient:  Nunu Guan  :      1970  MRN:      [id number]    Chief Complaint:  Weight Management, Obesity, Follow up     SUBJECTIVE     History of Present Illness:  Nunu is being seen today for a follow-up for non surgical weight loss.     S/p rotator cuff surgery. In PT.    Initial HPI: 2020  50 yo female presents to clinic for medically supported weight loss.      Patient is considering medications and is not considering bariatric surgery for weight loss.     Patient denies any history of eating disorder(s).     Patient is employed: .  Patient lives with, spouse and 2 children (13 and 15).     Patient's goal weight: Unsure   Biggest weight loss in the past: 100 lbs  How weight loss was achieved: Exercise   Heaviest weight ever: Current   Previous use of medical weight loss medications:     Physical activity: None      Sleep: 5-6 hours/night    Sleep screening: +snores, consider sleep study     Barriers: Motivation      Values: Breathing well, Fitting on Furniture, Children, Health      Past Medical History:   Past Medical History:    Clot in the renal vein (HCC)    left lower extremity    Cyst of skin    right abdominal skin cyst 2016    Deep vein thrombosis (HCC)    hx of DVT    Disorder of thyroid    High blood pressure    Hx of motion sickness    Hypercholesteremia    Hypothyroidism    PONV (postoperative nausea and vomiting)    Unspecified essential hypertension    Visual impairment    wear glasses        Comorbidities:  Back pain-Improvement?  yes, Hypertension-Improvement?  yes and Hyperlipidemia-Improvement?  no    OBJECTIVE     Vitals: /80   Pulse 60   Ht 5' 2.5\" (1.588 m)   Wt 197 lb (89.4 kg)   LMP 2024 (Within Days)   SpO2 99%   BMI 35.46 kg/m²     Initial weight loss: -02   Total  weight loss: -70   Start weight: 267    Wt Readings from Last 6 Encounters:   01/20/25 197 lb (89.4 kg)   01/10/25 199 lb (90.3 kg)   10/07/24 199 lb (90.3 kg)   08/22/24 198 lb (89.8 kg)   08/01/24 198 lb (89.8 kg)   06/25/24 198 lb (89.8 kg)       Patient Medications:    Current Outpatient Medications   Medication Sig Dispense Refill    Phentermine HCl 37.5 MG Oral Tab Take 1 tablet (37.5 mg total) by mouth every morning before breakfast. 30 tablet 3    Acetaminophen Extra Strength 500 MG Oral Tab Take 2 tablets (1,000 mg total) by mouth every 8 (eight) hours as needed.      topiramate 25 MG Oral Tab Take 1 tablet (25 mg total) by mouth in the morning, at noon, and at bedtime. 270 tablet 1    celecoxib 200 MG Oral Cap Take 1 capsule (200 mg total) by mouth 2 (two) times daily. 60 capsule 0    Acetaminophen 500 MG Oral Cap Take 2 capsules (1,000 mg total) by mouth every 8 (eight) hours as needed for Pain. Never exceed 3000 mg in a 24-hour period.  Many over-the-counter medications also have acetaminophen in them. 180 capsule 0    celecoxib 200 MG Oral Cap Take 1 capsule (200 mg total) by mouth 2 (two) times daily. 28 capsule 0    levothyroxine 100 MCG Oral Tab Take 1 tablet (100 mcg total) by mouth before breakfast. 90 tablet 3    losartan-hydroCHLOROthiazide 100-25 MG Oral Tab Take 1 tablet by mouth daily. 90 tablet 3     Allergies:  Azithromycin and Hydrocodone     Social History:  Reviewed    Surgical History:    Past Surgical History:   Procedure Laterality Date    Ankle fracture surgery Left 2014    left ankle stress fracture 2014    Arthroscopy of joint unlisted      left knee screw    Colonoscopy N/A 04/28/2022    Procedure: COLONOSCOPY;  Surgeon: Flex Rubi MD;  Location: [facility] ENDOSCOPY    Knee surgery  2001    MCL     Other surgical history  2004    vaginal delivery    Other surgical history  2006    vaginal delivery     Family History:    Family History   Problem Relation Age of Onset     Hypertension Father         overweight     Other (alzheimers) Father     Diabetes Mother         borderline, overweight     Other (Other) Maternal Grandmother         Karie's Granulomatosis    Cancer Maternal Grandfather         LUNG    Other (Other) Sister         Rheumatoid Arthritis    Colon Cancer Paternal Uncle      Initial Intake:  After dinner behavior: chips, popcorn cakes   Night eating: -  Portion sizes: at times   Binge: BED 7-  Emotional: +  Depression: Total PHQ Score: 1  Grazing: -  Sweet tooth: at times   Crunchy/salty: +  Etoh: Wine, a few times a week (1-2 glasses)   Soda Drinker: Yes                 If yes, how much?:  Occasional  Sports Drinks:  No                  Juice:  No     Food Journal  Reviewed and Discussed:       Patient has a Food Journal?: yes   [employer]  Patient is reading nutrition labels?  yes  Average Caloric Intake:      Average CHO Intake:    Is patient exercising? no Minimal s/p rotator cuff surgery  Type of exercise? In PT    Eating Habits  Patient states the following:  Eats 3 meal(s) per day  # of snacks per day: 1-2  Type of snacks:  fruit    Amount of soda consumption per day:  1 every couple of weeks   Amount of water (in ounces) per day: Improved  Toughest challenge:  Family stress  Sleep: Improved    Nutritional Goals  Eat 3-4 cups of fresh fruits or vegetables daily    Behavior Modifications Reviewed and Discussed  Eat breakfast, Eat 3 meals per day, Plan meals in advance, Read nutrition labels, Drink 64 oz of water per day, Maintain a daily food journal, Utlize portion control strategies to reduce calorie intake, Identify triggers for eating and manage cues and Eat slowly and take 20 to 30 minutes to complete each meal    Exercise Goals Reviewed and Discussed    Aim for 150 minutes moderate level exercise weekly with 2-3 days strength training as tolerated     ROS:    Constitutional: negative  Respiratory: negative  Cardiovascular: negative  Gastrointestinal:  negative  Genitourinary:negative  Integument/breast: negative  Hematologic/lymphatic: negative  Musculoskeletal:positive for back pain and improved   Neurological: positive for headaches  Behavioral/Psych: negative  Endocrine: negative  All other systems were reviewed and are negative    Physical Exam:   General appearance: alert, appears stated age, cooperative and obese   Head: Normocephalic, without obvious abnormality, atraumatic  Neck: no adenopathy, no carotid bruit, no JVD, supple, symmetrical, trachea midline and thyroid not enlarged, symmetric, no tenderness/mass/nodules  Lungs: clear to auscultation bilaterally  Heart: S1, S2 normal, no murmur, click, rub or gallop, regular rate and rhythm  Abdomen: soft, non-tender; bowel sounds normal; no masses,  no organomegaly and abdomen obese   Extremities: extremities normal, atraumatic, no cyanosis or edema  Pulses: 2+ and symmetric  Skin: Skin color, texture, turgor normal. No rashes or lesions  Neurologic: Grossly normal    ASSESSMENT     Encounter Diagnosis(ses):   Encounter Diagnoses   Name Primary?    Primary hypertension Yes    Hypercholesteremia     Obesity (BMI 30-39.9)     Prediabetes     Encounter for therapeutic drug monitoring     Stress     Acquired hypothyroidism     History of DVT (deep vein thrombosis)     Vitamin D deficiency          PLAN         Diagnoses and all orders for this visit:    Primary hypertension    Hypercholesteremia    Obesity (BMI 30-39.9)  -     Phentermine HCl 37.5 MG Oral Tab; Take 1 tablet (37.5 mg total) by mouth every morning before breakfast.    Prediabetes    Encounter for therapeutic drug monitoring    Stress    Acquired hypothyroidism    History of DVT (deep vein thrombosis)    Vitamin D deficiency        Dyslipidemia  Recommend dietary changes and lifestyle modifications as discussed below. Monitor.     Lab Results   Component Value Date/Time    CHOLEST 233 (H) 03/09/2024 09:28 AM     (H) 03/09/2024 09:28 AM     HDL 56 03/09/2024 09:28 AM    TRIG 89 03/09/2024 09:28 AM    VLDL 17 03/09/2024 09:28 AM       HYPERTENSION: Blood pressure controlled on the above medications. No interval change in antihypertensive medication. Monitor closely.       HYPOTHYROIDISM: On levothyroxine. Continue present management.     History Vitamin D deficiency.     OBESITY/WEIGHT GAIN:  PREDIABETES:     Discussed starting weight:  267 lbs; BMI: 48.1; WC: 51 in.  Patient's goal weight: <200, met goal this visit.   Values: Breathing well, Fitting on Furniture, Children, Health       Recommended patient continue intensive lifestyle and behavioral modifications at this time for weight loss.     Reviewed lifestyle modifications: Whole Food/Plant Strong/Low Glycemic Index diet, moderate alcohol consumption, reduced sodium intake to no more than 2,400 mg/day, and at least 150 minutes of moderate physical activity per week.   Avoid processed, poor quality carbohydrates, refined grains, flour, sugar.     Goals for next month:  1. Keep a food log.   2. Drink 64 ounces of non-caloric beverages per day. No fruit juices or regular soda.  3. Aim for 150 minutes moderate exercise per week.    4. Increase fruit and vegetable servings to 5-6 per day.    5. Improve sleep and stress.      Reviewed labs: 10/14/19  Renal/liver function intact; CBC in range  TSH 3.790 on levothyroxine  2/22/2020 Updated TSH, T4 in range.  B 12 in range.  Vitamin D low- continue 50,000 IU capsule weekly.   11/28/2020: CMP and CBC in range.  Vitamin D remains low.  2/21/22- Blood sugar elevated, CMP otherwise notes no significant abnormalities.   LDL and triglycerides elevated.   Vitamin D low- will need to supplement.    4/19/23- CBC in range.  , CMP otherwise in range.   Vitamin D 36.8. Supplement OTC daily.   3/9/2024- a1c 5.7%. Elevated cholesterol. Slight elevated Creatinine.   CBC, TSH, vitamin D in range.     Reviewed medication options for weight loss in detail with  patient.      +cravings, emotional eating, evening snacking- improved.     Leptin elevated.  Denies hx cardiac disease or substance abuse.    Previous successful treatment with phentermine/topiramate combination (topiramate 25 mg BID, phentermine 37.5 mg/day).     Hold off on Qsymia due to high price at this time.     Continue phentermine 37.5 mg in the AM.    Continue 25 mg topiramate TID- 75 mg daily.     Prefers not to take injectable.    Discussed risks, benefits, rationale, and side effects of medication(s) including hypertension, palpitations, tachycardia, dizziness, constipation, dry mouth, and anxiety among others.     2/23/2021 EKG within normal limits. EKG done 2/15/22, no change.  4/19/23- EKG NSR.  EKG done 8/1/24.     Recommend magnesium glycinate to improve sleep.      IF.     Hold off on food tracking for now, causes significant anxiety- follow Healthy [employer].     Recommend counseling as needed.     Goal weight: < 200 lbs. Goal met.    RTC: 4 months.   CELESTE Mckee

## 2025-01-21 ENCOUNTER — OFFICE VISIT (OUTPATIENT)
Dept: PHYSICAL THERAPY | Age: 55
End: 2025-01-21
Attending: STUDENT IN AN ORGANIZED HEALTH CARE EDUCATION/TRAINING PROGRAM
Payer: COMMERCIAL

## 2025-01-21 PROCEDURE — 97110 THERAPEUTIC EXERCISES: CPT | Performed by: PHYSICAL THERAPIST

## 2025-01-21 PROCEDURE — 97140 MANUAL THERAPY 1/> REGIONS: CPT | Performed by: PHYSICAL THERAPIST

## 2025-01-22 NOTE — PROGRESS NOTES
Diagnosis:   Nontraumatic incomplete rupture of rotator cuff, left (M75.112)  Superior labrum anterior-to-posterior (SLAP) tear of left shoulder (S43.432A)          Right rotator cuff tendinitis (M75.81)  Referring Provider: Igor Smith  Date of Evaluation:    8/28/2024    Precautions:       - post surgical precautions/protocol  Next MD visit:   1/10/2025  Date of Surgery: 8/22/2024   Insurance Primary/Secondary: [employer] [place] / N/A     # Auth Visits: 10/30/2024: 5 auth    Subjective: C/o muscular neck pain. Limited HEP compliance.     Pain:   0/10 - pt c/o stiffness     Objective:  MMT: (* denotes performed with pain)  Shoulder    Flexion: L 4-/5; R 4+/5  Abduction: L 4-/5; 4- to 4/5*  ER:  L 4/5, R 5/5  IR: L 4/5; 5/5     Negative Speed's and Empty Can test R       Assessment:    Pt with L/R shoulder PROM grossly  WNL. Pt with L/R shoulder weakness contributing to some c/o with AROM. Progressing shoulder/scapular strengthening, AAROM, AROM, Pt reported shoulders felt better at end of session.       Goals: (To be met in 18 visits) -  ALL GOALS IN PROGRESS  Pt will report improved ability to sleep without waking due to shoulder pain  Pt will improve shoulder flexion AROM to >160 degrees to be able to reach into overhead cabinets without pain or restriction  Pt will improve shoulder abduction AROM to >160 degrees to improve ability to don deodorant, don/doff shirts, and wash hair  Pt will increase shoulder AROM ER to 90 to reach and fasten seatbelt   Pt will increase shoulder AROM IR to 70 to be able to reach in back pocket, tuck in shirt, and turn steering wheel without pain  Pt will improve shoulder strength throughout to 4+/5 to improve function with ADLs including reaching and lifting  Pt will demonstrate increased mid/low trap strength to 4/5 to promote improved shoulder mechanics and stabilization with ADL such as lifting and reaching   Pt will be independent and compliant with comprehensive HEP to maintain  progress achieved in PT       Plan: Continue  skilled Physical Therapy 1-2 x/week or a total of up to 38visits, pending auth. R shoulder AAROM, rotator cuff/periscapular strengthening. Progress L shoulder per post op protocol, P/AA/AROM, strengthening, standing interventions, reaching, scapular stabilization.    Date: 12/9/2024  Tx #: 27/31 (4/5) - pt arrived ~ 15 min late due to traffic. Date: 12/23/2024  Tx #: 28/31 (5/5 auth) Date: 1/6/2025  Tx #: 29/34 (1/5 auth) Date: 1/13/2025  Tx #: 30/34 (2/5 auth  Date: 1/21/2025  Tx #: 31/34 (3/5 auth)          There Ex:   Seated shoulder pulleys: flex, scap, abd, IR, ER 10 x 5 sec each  Supine supported  L shoulder IR  using YTB in ~ 60 deg abd x ~ 15 reps  S/L L shoulder ER 0# x ~ 15 reps  Supine B shoulder horiz abd using YTB 2 x 10 reps   Hep review; see below.   B elbow flexion <-> shoulder flexion x 5 reps, abd x 5 reps   L shoulder W wall slides x 5 reps  There Ex:   Seated shoulder pulleys: flex, scap, abd, IR, ER 5 x 5 sec each  Hip hinge row using L UE 0# x 10 reps, 3# x 10 reps  Hip hinge horiz abd using L UE 0#  S/L L shoulder ER 0# x ~ 15 reps  B elbow flexion <-> shoulder flexion x 5 reps, abd x 5 reps using wand   L UE wall slides /clocks x 4 reps each: 9,10, 12, 1, 3 o'clock  Standing L shoulder IR using RTB 2 x 10 reps There Ex:   Hep review; see below.   S/L L shoulder ER 0# 2 x 10 reps  S/L L shoulder abduction 0# x 10 reps   Quadruped L shoulder horiz abd 0# 2 x 10 reps - attempted using 1#, pt too weak  Quadruped L shoulder row 0# x 3 reps, 2# x 20 reps  L shoulder IR using RTB 2 x 10 reps   B elbow flexion <-> shoulder flexion x 5 reps, abd x 5 reps using wand   L UE wall slides clocks x 4 reps each: 9,10, 12, 1, 3 o'clock There Ex:   HEP instruction/review/handout; see below.   Shoulder IR AAROM using pulleys 5 x 5 sec each (HEP)  Standing L/R shoulder ER using YTB  2x 10 reps each (HEP)  Kneeling L/R shoulder horiz abd x 10 reps each     There Ex:    Seated shoulder pulleys: flex, scap, abd, IR, ER 5 x 5 sec each  Pt requested to review prone B shoulder horiz abd for HEP at corner of table: 0# x ~  15 reps   Prone B shoulder row: 0# x 5 reps, 2 # x ~ 10 reps, attempted 3# each hand - too heavy/difficult  Prone B Y 0# x 10 reps   Standing R shoulder ER using YTB  2 x  10 reps, L x 10 reps; pt c/o weakness L, modified to R shoulder isometric ER walk out using YTB x 10  reps for 1st set  Standing L/R shoulder IR using RTB 2 x 10 reps each  Wall circles x 5 reps each clockwise/counterclockwise   L/R elbow flexion <-> overhead shoulder flexion 0# x 10 reps (pt with good form, no shoulder shrug.)  L/R biceps curls 0# each hand x 5 reps, 2# each hand (4# total):  x 10 reps       Manual:   L GH post/ant and inf glides grade II - pt declined post glides  PROM L shoulder ER, IR, flex, abd, scap, ext   STM L upper trap, lateral UE Manual:   L GH post/ant and inf glides grade II - pt declined post glides  PROM L shoulder ER, IR, flex, abd, scap   STM L upper trap Manual:   L GH post/ant and inf glides grade II - pt declined post glides  PROM L shoulder ER, IR, flex, abd, scap   STM L upper trap - not completed Manual:   L GH post/ant and inf glides grade II - pt declined post glides  PROM L/R shoulder ER, IR, flex, abd, scap   STM L upper trap - not completed, pt reports improvement   Manual:  L GH post/ant and inf glides grade II - pt declined post glides, inf glides WNL, not completed  PROM L/R shoulder ER, IR, flex, abd, scap - min due to WNL  STM B upper and mid traps  Grade II mid thoracic P/A  B pec stretch 3 x 10 sec each   IFC/MHP L shoulder x 10 min seated IFC/MHP L shoulder - pt declined  IFC/MHP L shoulder - pt declined  IFC/MHP L shoulder - pt declined  IFC/MHP L shoulder - pt declined           HEP:  clock or W wall slides, shoulder ER PROM using wand or seated or doorframe, scap retraction using GTB, shoulder IR/ER using YTB, shoulder IR/hand behind back  AAROM using strap    Charges: 2TE (30 min), 1 manual (10 min) Total Timed Treatment:  40 min  Total Treatment Time: 45 min    Post op protocol:  Protocol (2-6 weeks):  Continue shoulder immobilization until 4 weeks, discontinue based on tear size  Normalize elbow and wrist motion  Prevent motion stretching repair   Progress motion as per protocol. For small and medium tears, begin to progress shoulder motion  Weightbearing: Progress per protocol  Protocol (6-8 weeks):  Discontinue shoulder immobilization if not already done  Progress motion as per protocol  Small and medium tears: AAROM for FLEX/ABD to 120  Large and massive tears: PROM for FLEX/ABD to 120  Weightbearing: Limit elbow flexion to <10 pounds during weeks 7-8 and to <15 pounds during weeks 8-12  Protocol (9-16 weeks):  Normalize shoulder motion  Avoid push-ups, pull-ups, bench press, and overhead weightlifting activities  Weightbearing: Progress per protocol  Protocol (16 weeks-8 months):  Meet occupational requirements by 4 months  Restore overhead strength by 6-8 months  No weightlifting restrictions        RANGE-OF-MOTION LIMITATIONS: Per schedule below     Tear Size Sling Use Begin PROM Begin AROM   Small <1 cm2 4 weeks Immediate 4 weeks   Medium 1-3 cm2 4-6 weeks Immediate 6 weeks   Large 3-5 cm2 6 weeks 2 weeks 8 weeks   Massive >5 cm2 6-8 weeks 4 weeks 8 weeks

## 2025-02-03 ENCOUNTER — OFFICE VISIT (OUTPATIENT)
Dept: PHYSICAL THERAPY | Age: 55
End: 2025-02-03
Attending: ORTHOPAEDIC SURGERY
Payer: COMMERCIAL

## 2025-02-03 PROCEDURE — 97110 THERAPEUTIC EXERCISES: CPT | Performed by: PHYSICAL THERAPIST

## 2025-02-03 PROCEDURE — 97140 MANUAL THERAPY 1/> REGIONS: CPT | Performed by: PHYSICAL THERAPIST

## 2025-02-04 NOTE — PROGRESS NOTES
Diagnosis:   Nontraumatic incomplete rupture of rotator cuff, left (M75.112)  Superior labrum anterior-to-posterior (SLAP) tear of left shoulder (S43.432A)          Right rotator cuff tendinitis (M75.81)  Referring Provider: Igor Smith  Date of Evaluation:    8/28/2024    Precautions:       - post surgical precautions/protocol  Next MD visit:   1/10/2025  Date of Surgery: 8/22/2024   Insurance Primary/Secondary[place] [employer] / N/A     # Auth Visits: 10/30/2024: 5 auth    Subjective: Shoulders are good but cannot sleep on them.  R shoulder feels stiff and sore.  Fair HEP compliance.     Pain:   0/10 at rest, 1/10 with movement    Objective:  L/R shoulder AROM flex/abd each ~ 165 deg, IR: L ~T5, R ~ T10 c/o       Assessment:  '  Pt progressing but with residual L shoulder ER weakness as noted with reduced excursion and fatigue during exercises.  Pt with  R shoulder IR AROM deficit, residual c/o related to RC tendonitis. Pt tolerated session well, may benefit from ~ 5-6 additional sessions to address residual deficits.     Goals: (To be met in 18 visits) -  ALL GOALS IN PROGRESS  Pt will report improved ability to sleep without waking due to shoulder pain  Pt will improve shoulder flexion AROM to >160 degrees to be able to reach into overhead cabinets without pain or restriction  Pt will improve shoulder abduction AROM to >160 degrees to improve ability to don deodorant, don/doff shirts, and wash hair  Pt will increase shoulder AROM ER to 90 to reach and fasten seatbelt   Pt will increase shoulder AROM IR to 70 to be able to reach in back pocket, tuck in shirt, and turn steering wheel without pain  Pt will improve shoulder strength throughout to 4+/5 to improve function with ADLs including reaching and lifting  Pt will demonstrate increased mid/low trap strength to 4/5 to promote improved shoulder mechanics and stabilization with ADL such as lifting and reaching   Pt will be independent and compliant with  comprehensive HEP to maintain progress achieved in PT       Plan: Continue  skilled Physical Therapy 1-2 x/week or a total of up to 38visits, pending auth. See Assessment.. R shoulder AAROM, rotator cuff/periscapular strengthening. Progress L shoulder per post op protocol, P/AA/AROM, strengthening, standing interventions, reaching, scapular stabilization.    Date: 1/6/2025  Tx #: 29/34 (1/5 auth) Date: 1/13/2025  Tx #: 30/34 (2/5 auth  Date: 1/21/2025  Tx #: 31/34 (3/5 auth) Date: 2/3/2025  Tx #: 32/34 (4/5 auth)         There Ex:   Hep review; see below.   S/L L shoulder ER 0# 2 x 10 reps  S/L L shoulder abduction 0# x 10 reps   Quadruped L shoulder horiz abd 0# 2 x 10 reps - attempted using 1#, pt too weak  Quadruped L shoulder row 0# x 3 reps, 2# x 20 reps  L shoulder IR using RTB 2 x 10 reps   B elbow flexion <-> shoulder flexion x 5 reps, abd x 5 reps using wand   L UE wall slides clocks x 4 reps each: 9,10, 12, 1, 3 o'clock There Ex:   HEP instruction/review/handout; see below.   Shoulder IR AAROM using pulleys 5 x 5 sec each (HEP)  Standing L/R shoulder ER using YTB  2x 10 reps each (HEP)  Kneeling L/R shoulder horiz abd x 10 reps each     There Ex:   Seated shoulder pulleys: flex, scap, abd, IR, ER 5 x 5 sec each  Pt requested to review prone B shoulder horiz abd for HEP at corner of table: 0# x ~  15 reps   Prone B shoulder row: 0# x 5 reps, 2 # x ~ 10 reps, attempted 3# each hand - too heavy/difficult  Prone B Y 0# x 10 reps   Standing R shoulder ER using YTB  2 x  10 reps, L x 10 reps; pt c/o weakness L, modified to R shoulder isometric ER walk out using YTB x 10  reps for 1st set  Standing L/R shoulder IR using RTB 2 x 10 reps each  Wall circles x 5 reps each clockwise/counterclockwise   L/R elbow flexion <-> overhead shoulder flexion 0# x 10 reps (pt with good form, no shoulder shrug.)  L/R biceps curls 0# each hand x 5 reps, 2# each hand (4# total):  x 10 reps     There  Ex:   Pt c/o shoulder pain with  prone shoulder horiz abd at home; pt reports modified to single UE standing/hip hinge, tolerating well.   HEP review; see below.   Standing B shoulder ER using YTB x 10 reps - reduced L ER excursion  Standing B shoulder horiz abd using YTB  x 10 reps - L UE weakness   B row using GTB x 10 reps, using blue theraband x 10 reps (HEP review)  Seated shoulder pulleys: flex, scap, abd, ER 5 x 5 sec each  S/L L/R shoulder ER 0# x 10 reps each (HEP alternative to standing ER using YTB)  L/R elbow flexion <-> overhead shoulder flexion 0# x 10 reps (pt with good form, no shoulder shrug.), using 1# each  hand x 10 reps - pt reported L shoulder fatigue, mild shrug  Doorway low pec stretch 2 x 20 stretch   R hand behind back IR AAROM using green strap 5 x 5 sec each - L shoulder WNL, not completed   Manual:   L GH post/ant and inf glides grade II - pt declined post glides  PROM L shoulder ER, IR, flex, abd, scap   STM L upper trap - not completed Manual:   L GH post/ant and inf glides grade II - pt declined post glides  PROM L/R shoulder ER, IR, flex, abd, scap   STM L upper trap - not completed, pt reports improvement   Manual:  L GH post/ant and inf glides grade II - pt declined post glides, inf glides WNL, not completed  PROM L/R shoulder ER, IR, flex, abd, scap - min due to WNL  STM B upper and mid traps  Grade II mid thoracic P/A  B pec stretch 3 x 10 sec each Manual:   L/R GH inf glides grade II - pt declined post glides  PROM L/R shoulder ER, IR, flex, abd, scap - min due to , c/o  STM B upper and mid traps     IFC/MHP L shoulder - pt declined  IFC/MHP L shoulder - pt declined  IFC/MHP L shoulder - pt declined  IFC/MHP L shoulder - pt declined          HEP:  clock or W wall slides, shoulder ER PROM using wand or seated or doorframe, scap retraction using GTB, shoulder IR/ER using YTB, shoulder IR/hand behind back AAROM using strap    Charges: 2TE (30 min), 1 manual (10 min) Total Timed Treatment:  40 min  Total Treatment  Time: 45 min    Post op protocol:  Protocol (2-6 weeks):  Continue shoulder immobilization until 4 weeks, discontinue based on tear size  Normalize elbow and wrist motion  Prevent motion stretching repair   Progress motion as per protocol. For small and medium tears, begin to progress shoulder motion  Weightbearing: Progress per protocol  Protocol (6-8 weeks):  Discontinue shoulder immobilization if not already done  Progress motion as per protocol  Small and medium tears: AAROM for FLEX/ABD to 120  Large and massive tears: PROM for FLEX/ABD to 120  Weightbearing: Limit elbow flexion to <10 pounds during weeks 7-8 and to <15 pounds during weeks 8-12  Protocol (9-16 weeks):  Normalize shoulder motion  Avoid push-ups, pull-ups, bench press, and overhead weightlifting activities  Weightbearing: Progress per protocol  Protocol (16 weeks-8 months):  Meet occupational requirements by 4 months  Restore overhead strength by 6-8 months  No weightlifting restrictions        RANGE-OF-MOTION LIMITATIONS: Per schedule below     Tear Size Sling Use Begin PROM Begin AROM   Small <1 cm2 4 weeks Immediate 4 weeks   Medium 1-3 cm2 4-6 weeks Immediate 6 weeks   Large 3-5 cm2 6 weeks 2 weeks 8 weeks   Massive >5 cm2 6-8 weeks 4 weeks 8 weeks

## 2025-02-10 ENCOUNTER — OFFICE VISIT (OUTPATIENT)
Dept: PHYSICAL THERAPY | Age: 55
End: 2025-02-10
Attending: ORTHOPAEDIC SURGERY
Payer: COMMERCIAL

## 2025-02-10 PROCEDURE — 97110 THERAPEUTIC EXERCISES: CPT | Performed by: PHYSICAL THERAPIST

## 2025-02-10 NOTE — PROGRESS NOTES
Diagnosis:   Nontraumatic incomplete rupture of rotator cuff, left (M75.112)  Superior labrum anterior-to-posterior (SLAP) tear of left shoulder (S43.432A)          Right rotator cuff tendinitis (M75.81)  Referring Provider: Igor Smith  Date of Evaluation:    8/28/2024    Precautions:       - post surgical precautions/protocol  Next MD visit:   1/10/2025  Date of Surgery: 8/22/2024   Insurance Primary/Secondary: [employer] [place] / N/A     # Auth Visits: 5 auth    Subjective: Shoulders are good but c/o with sleeping on them and  pulling blankets on/off (horiz abd/add).  Fair Hep compliance. L shoulder has it moments; feels good, then not okay.     Pain:   0/10 at rest, 1/10 with movement    Objective:  L/R shoulder AROM flex/abd each ~ 165 deg, IR: L ~T9, R ~ T8 c/o, ER: L T2/R T2     L/R shoulder PROM flex, abd, ER each WNL, R IR WNL, L IR restriction    Shoulder MMT:   Flex: L 4/5, R 5/5  Abd: L 4-/5*, R 5/5  ER: L 4-/5*, L 5/5  IR: L/R each 5/5    Assessment:  Pt progressing but with residual L shoulder ER and abd weakness. Progressed/modified HEP to address residual deficits and in the event additional PT not auth. Reviewed importance of s/l or standing resisted shoulder ER for continued progress/strengthening. Pt tolerated session well, c/o no worse at close of session        Goals: (To be met in 18 visits) -  ALL GOALS IN PROGRESS  Pt will report improved ability to sleep without waking due to shoulder pain  Pt will improve shoulder flexion AROM to >160 degrees to be able to reach into overhead cabinets without pain or restriction.  Pt will improve shoulder abduction AROM to >160 degrees to improve ability to don deodorant, don/doff shirts, and wash hair  Pt will increase shoulder AROM ER to 90 to reach and fasten seatbelt   Pt will increase shoulder AROM IR to 70 to be able to reach in back pocket, tuck in shirt, and turn steering wheel without pain  Pt will improve shoulder strength throughout to 4+/5 to  improve function with ADLs including reaching and lifting  Pt will demonstrate increased mid/low trap strength to 4/5 to promote improved shoulder mechanics and stabilization with ADL such as lifting and reaching   Pt will be independent and compliant with comprehensive HEP to maintain progress achieved in PT       Plan: Continue  skilled Physical Therapy 1-2 x/week or a total of up to 38 visits, pending auth. See Assessment. F/u on HEP compliance. L shoulder IR ROM,  rotator cuff/periscapular strengthening, reaching, scapular stabilization.    Date: 1/6/2025  Tx #: 29/34 (1/5 auth) Date: 1/13/2025  Tx #: 30/34 (2/5 auth  Date: 1/21/2025  Tx #: 31/34 (3/5 auth) Date: 2/3/2025  Tx #: 32/34 (4/5 auth) Date: 2/10/2025  Tx # 33/38  (5/5 auth)          There Ex:   Hep review; see below.   S/L L shoulder ER 0# 2 x 10 reps  S/L L shoulder abduction 0# x 10 reps   Quadruped L shoulder horiz abd 0# 2 x 10 reps - attempted using 1#, pt too weak  Quadruped L shoulder row 0# x 3 reps, 2# x 20 reps  L shoulder IR using RTB 2 x 10 reps   B elbow flexion <-> shoulder flexion x 5 reps, abd x 5 reps using wand   L UE wall slides clocks x 4 reps each: 9,10, 12, 1, 3 o'clock There Ex:   HEP instruction/review/handout; see below.   Shoulder IR AAROM using pulleys 5 x 5 sec each (HEP)  Standing L/R shoulder ER using YTB  2x 10 reps each (HEP)  Kneeling L/R shoulder horiz abd x 10 reps each     There Ex:   Seated shoulder pulleys: flex, scap, abd, IR, ER 5 x 5 sec each  Pt requested to review prone B shoulder horiz abd for HEP at corner of table: 0# x ~  15 reps   Prone B shoulder row: 0# x 5 reps, 2 # x ~ 10 reps, attempted 3# each hand - too heavy/difficult  Prone B Y 0# x 10 reps   Standing R shoulder ER using YTB  2 x  10 reps, L x 10 reps; pt c/o weakness L, modified to R shoulder isometric ER walk out using YTB x 10  reps for 1st set  Standing L/R shoulder IR using RTB 2 x 10 reps each  Wall circles x 5 reps each  clockwise/counterclockwise   L/R elbow flexion <-> overhead shoulder flexion 0# x 10 reps (pt with good form, no shoulder shrug.)  L/R biceps curls 0# each hand x 5 reps, 2# each hand (4# total):  x 10 reps     There  Ex:   Pt c/o shoulder pain with prone shoulder horiz abd at home; pt reports modified to single UE standing/hip hinge, tolerating well.   HEP review; see below.   Standing B shoulder ER using YTB x 10 reps - reduced L ER excursion  Standing B shoulder horiz abd using YTB  x 10 reps - L UE weakness   B row using GTB x 10 reps, using blue theraband x 10 reps (HEP review)  Seated shoulder pulleys: flex, scap, abd, ER 5 x 5 sec each  S/L L/R shoulder ER 0# x 10 reps each (HEP alternative to standing ER using YTB)  L/R elbow flexion <-> overhead shoulder flexion 0# x 10 reps (pt with good form, no shoulder shrug.), using 1# each  hand x 10 reps - pt reported L shoulder fatigue, mild shrug  Doorway low pec stretch 2 x 20 stretch   R hand behind back IR AAROM using green strap 5 x 5 sec each - L shoulder WNL, not completed There Ex:     HEP review/modification/handout; see below   S/L shoulder ER 0# L/1# R x 10 reps each or standing shoulder ER x 10 reps YTB L/RTB R  Attempted standing and kneeling shoulder horiz abd - pt c/o, stopped. Supine B shoulder horiz abd using RTB 2 x 10 reps  ( or GTB for HEP due to pt reporting easy using RTB)  L/R elbow flex <-> overhead flexion 0# x 5 reps - easy, 1# each L/R UE x ~ 10 reps - pt reported challenging (HEP)   Manual:   L GH post/ant and inf glides grade II - pt declined post glides  PROM L shoulder ER, IR, flex, abd, scap   STM L upper trap - not completed Manual:   L GH post/ant and inf glides grade II - pt declined post glides  PROM L/R shoulder ER, IR, flex, abd, scap   STM L upper trap - not completed, pt reports improvement   Manual:  L GH post/ant and inf glides grade II - pt declined post glides, inf glides WNL, not completed  PROM L/R shoulder ER, IR, flex,  abd, scap - min due to WNL  STM B upper and mid traps  Grade II mid thoracic P/A  B pec stretch 3 x 10 sec each Manual:   L/R GH inf glides grade II - pt declined post glides  PROM L/R shoulder ER, IR, flex, abd, scap - min due to , c/o  STM B upper and mid traps   Manual   PROM L shoulder IR, min flex/abd/ER due to WNL  STM L > R upper trap   IFC/MHP L shoulder - pt declined  IFC/MHP L shoulder - pt declined  IFC/MHP L shoulder - pt declined  IFC/MHP L shoulder - pt declined            HEP:  s/l shoulder ER or standing using RTB/YTB; supine B shoulder horiz abd using RTB/GTB, L/R elbow flexion <-> overhead reach  0 - 1#    Charges: 3 TE (33 min), 0 manual (5 min) Total Timed Treatment:  38 min  Total Treatment Time: 45 min    Post op protocol:  Protocol (2-6 weeks):  Continue shoulder immobilization until 4 weeks, discontinue based on tear size  Normalize elbow and wrist motion  Prevent motion stretching repair   Progress motion as per protocol. For small and medium tears, begin to progress shoulder motion  Weightbearing: Progress per protocol  Protocol (6-8 weeks):  Discontinue shoulder immobilization if not already done  Progress motion as per protocol  Small and medium tears: AAROM for FLEX/ABD to 120  Large and massive tears: PROM for FLEX/ABD to 120  Weightbearing: Limit elbow flexion to <10 pounds during weeks 7-8 and to <15 pounds during weeks 8-12  Protocol (9-16 weeks):  Normalize shoulder motion  Avoid push-ups, pull-ups, bench press, and overhead weightlifting activities  Weightbearing: Progress per protocol  Protocol (16 weeks-8 months):  Meet occupational requirements by 4 months  Restore overhead strength by 6-8 months  No weightlifting restrictions        RANGE-OF-MOTION LIMITATIONS: Per schedule below     Tear Size Sling Use Begin PROM Begin AROM   Small <1 cm2 4 weeks Immediate 4 weeks   Medium 1-3 cm2 4-6 weeks Immediate 6 weeks   Large 3-5 cm2 6 weeks 2 weeks 8 weeks   Massive >5 cm2 6-8 weeks 4  weeks 8 weeks

## 2025-02-11 DIAGNOSIS — I10 ESSENTIAL HYPERTENSION WITH GOAL BLOOD PRESSURE LESS THAN 140/90: ICD-10-CM

## 2025-02-13 RX ORDER — LOSARTAN POTASSIUM AND HYDROCHLOROTHIAZIDE 25; 100 MG/1; MG/1
1 TABLET ORAL DAILY
Qty: 90 TABLET | Refills: 3 | Status: SHIPPED | OUTPATIENT
Start: 2025-02-13

## 2025-02-13 NOTE — TELEPHONE ENCOUNTER
Refill passed per [facility] protocol.     Requested Prescriptions   Pending Prescriptions Disp Refills    LOSARTAN-HYDROCHLOROTHIAZIDE 100-25 MG Oral Tab [Pharmacy Med Name: LOSARTAN/HCTZ 100/25MG TABLETS] 90 tablet 3     Sig: Take 1 tablet by mouth daily.       Hypertension Medications Protocol Passed - 2/13/2025  9:54 AM        Passed - CMP or BMP in past 12 months        Passed - Last BP reading less than 140/90     BP Readings from Last 1 Encounters:   01/20/25 122/80               Passed - In person appointment or virtual visit in the past 12 mos or appointment in next 3 mos     Recent Outpatient Visits              3 days ago     [facility] Isha Burton PT    Office Visit    1 week ago     [facility] Isha Burton, PT    Office Visit    3 weeks ago     [facility] Isha Burton, PT    Office Visit    3 weeks ago Primary hypertension    [facility] Sangeeta Marcum APRN    Office Visit    1 month ago     [facility] Isha Burton PT    Office Visit          Future Appointments         Provider Department Appt Notes    In 5 days Isha Burton, PT [facility] need  auth???  [employer] HMO  no c/p, 60v pcy    In 1 week [facility], [facility] need  auth???  [employer] HMO  no c/p, 60v pcy    In 2 weeks MichealIsha freeman, [facility] auth?  [employer] HMO  no c/p, 60v pcy    In 3 weeks Luz Anguiano APRN [facility] yearly physical    In 3 weeks Isha Burton, [facility] auth?  [employer]BS HMO  no c/p, 60v pcy    In 4 months Sangeeta Marcum APRN [facility]  [facility] 5 month                    Passed - EGFRCR or GFRNAA > 50     GFR Evaluation  EGFRCR: 80 , resulted on 8/1/2024          Passed - Medication is active on med list             [email or web address]      [email or web address]

## 2025-02-15 DIAGNOSIS — E66.9 OBESITY (BMI 30-39.9): ICD-10-CM

## 2025-02-17 RX ORDER — PHENTERMINE HYDROCHLORIDE 37.5 MG/1
37.5 TABLET ORAL
Qty: 30 TABLET | Refills: 3 | Status: SHIPPED | OUTPATIENT
Start: 2025-02-17

## 2025-02-18 ENCOUNTER — APPOINTMENT (OUTPATIENT)
Dept: PHYSICAL THERAPY | Age: 55
End: 2025-02-18
Attending: ORTHOPAEDIC SURGERY
Payer: COMMERCIAL

## 2025-02-24 ENCOUNTER — OFFICE VISIT (OUTPATIENT)
Dept: PHYSICAL THERAPY | Age: 55
End: 2025-02-24
Attending: ORTHOPAEDIC SURGERY
Payer: COMMERCIAL

## 2025-02-24 PROCEDURE — 97110 THERAPEUTIC EXERCISES: CPT | Performed by: PHYSICAL THERAPIST

## 2025-02-24 NOTE — PROGRESS NOTES
Diagnosis:   Nontraumatic incomplete rupture of rotator cuff, left (M75.112)  Superior labrum anterior-to-posterior (SLAP) tear of left shoulder (S43.432A)          Right rotator cuff tendinitis (M75.81)  Referring Provider: Igor Smith  Date of Evaluation:    8/28/2024    Precautions:       - post surgical precautions/protocol  Next MD visit:   TBD  Date of Surgery: 8/22/2024   Insurance Primary/Secondary[place] [employer] [place] / N/A     # Auth Visits: 10 auth    Subjective:  Shoulders are sore from sleeping on a couch at my parent's home; my mom is recovering from TKA. Ability to lift weight overhead varies using L UE.  Not aware of limitations reaching with ADL. L shoulder still clicks like mad without pain.  Compliant with HEP.    Pain:   2-3/10, uncomfortable    Objective:  L/R shoulder AROM flex/abd each ~ 165 deg, IR: L ~T9, R ~ T8 c/o, ER: L T2/R T2     L/R shoulder PROM flex, abd, ER, IR each WNL, mild stiffness end range IR      Assessment: Pt maintaining full L/R shoulder A/PROM. Pt with residual shoulder weakness/endurance deficit contributing to limitations with overhead resistance/endurance. Progressing interventions to address. HEP reviewed.       Goals: (To be met in 18 visits) -  ALL GOALS IN PROGRESS  Pt will report improved ability to sleep without waking due to shoulder pain  Pt will improve shoulder flexion AROM to >160 degrees to be able to reach into overhead cabinets without pain or restriction.  Pt will improve shoulder abduction AROM to >160 degrees to improve ability to don deodorant, don/doff shirts, and wash hair  Pt will increase shoulder AROM ER to 90 to reach and fasten seatbelt   Pt will increase shoulder AROM IR to 70 to be able to reach in back pocket, tuck in shirt, and turn steering wheel without pain  Pt will improve shoulder strength throughout to 4+/5 to improve function with ADLs including reaching and lifting  Pt will demonstrate increased mid/low trap strength to 4/5 to promote  improved shoulder mechanics and stabilization with ADL such as lifting and reaching   Pt will be independent and compliant with comprehensive HEP to maintain progress achieved in PT       Plan: Continue  skilled Physical Therapy 1-2 x/week or a total of up to 38 visits, pending auth. See Assessment. L/R shoulder rotator cuff/periscapular strengthening, reaching, scapular stabilization, overhead strength and endurance.  Date: 1/21/2025  Tx #: 31/34 (3/5 auth) Date: 2/3/2025  Tx #: 32/34 (4/5 auth) Date: 2/10/2025  Tx # 33/38  (5/5 auth) Date: 2/24/2025  Tx #: 34/38 (6/10 auth)         There Ex:   Seated shoulder pulleys: flex, scap, abd, IR, ER 5 x 5 sec each  Pt requested to review prone B shoulder horiz abd for HEP at corner of table: 0# x ~  15 reps   Prone B shoulder row: 0# x 5 reps, 2 # x ~ 10 reps, attempted 3# each hand - too heavy/difficult  Prone B Y 0# x 10 reps   Standing R shoulder ER using YTB  2 x  10 reps, L x 10 reps; pt c/o weakness L, modified to R shoulder isometric ER walk out using YTB x 10  reps for 1st set  Standing L/R shoulder IR using RTB 2 x 10 reps each  Wall circles x 5 reps each clockwise/counterclockwise   L/R elbow flexion <-> overhead shoulder flexion 0# x 10 reps (pt with good form, no shoulder shrug.)  L/R biceps curls 0# each hand x 5 reps, 2# each hand (4# total):  x 10 reps     There  Ex:   Pt c/o shoulder pain with prone shoulder horiz abd at home; pt reports modified to single UE standing/hip hinge, tolerating well.   HEP review; see below.   Standing B shoulder ER using YTB x 10 reps - reduced L ER excursion  Standing B shoulder horiz abd using YTB  x 10 reps - L UE weakness   B row using GTB x 10 reps, using blue theraband x 10 reps (HEP review)  Seated shoulder pulleys: flex, scap, abd, ER 5 x 5 sec each  S/L L/R shoulder ER 0# x 10 reps each (HEP alternative to standing ER using YTB)  L/R elbow flexion <-> overhead shoulder flexion 0# x 10 reps (pt with good form, no  shoulder shrug.), using 1# each  hand x 10 reps - pt reported L shoulder fatigue, mild shrug  Doorway low pec stretch 2 x 20 stretch   R hand behind back IR AAROM using green strap 5 x 5 sec each - L shoulder WNL, not completed There Ex:     HEP review/modification/handout; see below   S/L shoulder ER 0# L/1# R x 10 reps each or standing shoulder ER x 10 reps YTB L/RTB R  Attempted standing and kneeling shoulder horiz abd - pt c/o, stopped. Supine B shoulder horiz abd using RTB 2 x 10 reps  ( or GTB for HEP due to pt reporting easy using RTB)  L/R elbow flex <-> overhead flexion 0# x 5 reps - easy, 1# each L/R UE x ~ 10 reps - pt reported challenging (HEP) There Ex:   UBE: pt declined, reports c/o in past  Standing B shoulder ER using YTB - pt reports too difficult,  unable  Standing B shoulder horiz abd using YTB x 10 reps - pt reported fatigue, declined 2nd set  Diagonal pull aparts using YTB x 10 reps each L/R  - pt reported fatigue, declined 2nd set  S/L L shoulder ER 0# 2 x 10 reps - PT min A to end range (HEP instruction)  S/L R shoulder ER using 0# x 5 reps - too easy, 1# x 10 reps  Standing B row using blue tubing 2 x 10 reps   Low pec stretch x 20 sec, mid pec stretch - attempted, pt c/o, stopped   W 2# ball taps to wall: R x 4 reps, attempted L UE -too weak; completed using L UE 1# x 4 reps  Body blade overhead reaching x 10 reps   Reviewed standing L shoulder ER using YTB x 5 reps - cuing for form     Manual:  L GH post/ant and inf glides grade II - pt declined post glides, inf glides WNL, not completed  PROM L/R shoulder ER, IR, flex, abd, scap - min due to WNL  STM B upper and mid traps  Grade II mid thoracic P/A  B pec stretch 3 x 10 sec each Manual:   L/R GH inf glides grade II - pt declined post glides  PROM L/R shoulder ER, IR, flex, abd, scap - min due to , c/o  STM B upper and mid traps   Manual   PROM L shoulder IR, min flex/abd/ER due to WNL  STM L > R upper trap Manual:   PROM L shoulder IR,  min flex/abd/ER due to WNL  STM L > R upper trap   IFC/MHP L shoulder - pt declined  IFC/MHP L shoulder - pt declined            HEP:  s/l shoulder ER or standing using RTB/YTB; supine B shoulder horiz abd using RTB/GTB, - progress to standing,  L/R elbow flexion <-> overhead reach  0 - 1#    Charges: 3 TE (33 min), 0 manual (5 min) Total Timed Treatment:  38 min  Total Treatment Time: 45 min    Post op protocol:  Protocol (2-6 weeks):  Continue shoulder immobilization until 4 weeks, discontinue based on tear size  Normalize elbow and wrist motion  Prevent motion stretching repair   Progress motion as per protocol. For small and medium tears, begin to progress shoulder motion  Weightbearing: Progress per protocol  Protocol (6-8 weeks):  Discontinue shoulder immobilization if not already done  Progress motion as per protocol  Small and medium tears: AAROM for FLEX/ABD to 120  Large and massive tears: PROM for FLEX/ABD to 120  Weightbearing: Limit elbow flexion to <10 pounds during weeks 7-8 and to <15 pounds during weeks 8-12  Protocol (9-16 weeks):  Normalize shoulder motion  Avoid push-ups, pull-ups, bench press, and overhead weightlifting activities  Weightbearing: Progress per protocol  Protocol (16 weeks-8 months):  Meet occupational requirements by 4 months  Restore overhead strength by 6-8 months  No weightlifting restrictions        RANGE-OF-MOTION LIMITATIONS: Per schedule below     Tear Size Sling Use Begin PROM Begin AROM   Small <1 cm2 4 weeks Immediate 4 weeks   Medium 1-3 cm2 4-6 weeks Immediate 6 weeks   Large 3-5 cm2 6 weeks 2 weeks 8 weeks   Massive >5 cm2 6-8 weeks 4 weeks 8 weeks

## 2025-02-26 ENCOUNTER — PATIENT MESSAGE (OUTPATIENT)
Dept: INTERNAL MEDICINE CLINIC | Facility: CLINIC | Age: 55
End: 2025-02-26

## 2025-02-27 NOTE — TELEPHONE ENCOUNTER
Future Appointments   Date Time Provider Department Center   3/10/2025  4:00 PM Luz Anguiano APRN [facility]     [employer] message sent to patient in response to [employer] message received--->see message.

## 2025-03-03 ENCOUNTER — OFFICE VISIT (OUTPATIENT)
Dept: PHYSICAL THERAPY | Age: 55
End: 2025-03-03
Attending: ORTHOPAEDIC SURGERY
Payer: COMMERCIAL

## 2025-03-03 PROCEDURE — 97110 THERAPEUTIC EXERCISES: CPT | Performed by: PHYSICAL THERAPIST

## 2025-03-03 NOTE — PROGRESS NOTES
Diagnosis:   Nontraumatic incomplete rupture of rotator cuff, left (M75.112)  Superior labrum anterior-to-posterior (SLAP) tear of left shoulder (S43.432A)          Right rotator cuff tendinitis (M75.81)  Referring Provider: Igor Smith  Date of Evaluation:    8/28/2024    Precautions:       - post surgical precautions/protocol  Next MD visit: 3/21/2025  Date of Surgery: 8/22/2024   Insurance Primary/Secondary: [employer] [place] / N/A     # Auth Visits: 10 auth    Subjective: Shoulders better if I don't sleep on them. Deny any new c/o.  Not completing HEP daily. C/o biceps spasms when hold computer; have stopped holding it that way.     Pain:   0/10    Objective:  Superior L humeral glide with standing shoulder horiz abd using YTB    L/R shoulder AROM flex, IR each WNL    Assessment:   Pt maintaining full L/R shoulder A/PROM. Pt with residual L > R shoulder ER/generalized weakness/endurance deficit contributing to limitations with overhead resistance/endurance, L superior humeral glide. Encouraged continued L shoulder ER strengthening.  Pt with increased L shoulder ER AROM excursion for s/l ER following min PT A for a few reps. Progressing interventions to address. HEP reviewed.       Goals: (To be met in 18 visits) -  ALL GOALS IN PROGRESS  Pt will report improved ability to sleep without waking due to shoulder pain  Pt will improve shoulder flexion AROM to >160 degrees to be able to reach into overhead cabinets without pain or restriction.  Pt will improve shoulder abduction AROM to >160 degrees to improve ability to don deodorant, don/doff shirts, and wash hair  Pt will increase shoulder AROM ER to 90 to reach and fasten seatbelt   Pt will increase shoulder AROM IR to 70 to be able to reach in back pocket, tuck in shirt, and turn steering wheel without pain  Pt will improve shoulder strength throughout to 4+/5 to improve function with ADLs including reaching and lifting  Pt will demonstrate increased mid/low trap  strength to 4/5 to promote improved shoulder mechanics and stabilization with ADL such as lifting and reaching   Pt will be independent and compliant with comprehensive HEP to maintain progress achieved in PT       Plan: Continue  skilled Physical Therapy 1-2 x/week or a total of up to 38 visits. L/R shoulder rotator cuff/periscapular strengthening, reaching, scapular stabilization, overhead strength and endurance.  Date: 1/21/2025  Tx #: 31/34 (3/5 auth) Date: 2/3/2025  Tx #: 32/34 (4/5 auth) Date: 2/10/2025  Tx # 33/38  (5/5 auth) Date: 2/24/2025  Tx #: 34/38 (6/10 auth) Date: 3/3/2025  Tx #: 35/38 (7/10 auth)          There Ex:   Seated shoulder pulleys: flex, scap, abd, IR, ER 5 x 5 sec each  Pt requested to review prone B shoulder horiz abd for HEP at corner of table: 0# x ~  15 reps   Prone B shoulder row: 0# x 5 reps, 2 # x ~ 10 reps, attempted 3# each hand - too heavy/difficult  Prone B Y 0# x 10 reps   Standing R shoulder ER using YTB  2 x  10 reps, L x 10 reps; pt c/o weakness L, modified to R shoulder isometric ER walk out using YTB x 10  reps for 1st set  Standing L/R shoulder IR using RTB 2 x 10 reps each  Wall circles x 5 reps each clockwise/counterclockwise   L/R elbow flexion <-> overhead shoulder flexion 0# x 10 reps (pt with good form, no shoulder shrug.)  L/R biceps curls 0# each hand x 5 reps, 2# each hand (4# total):  x 10 reps     There  Ex:   Pt c/o shoulder pain with prone shoulder horiz abd at home; pt reports modified to single UE standing/hip hinge, tolerating well.   HEP review; see below.   Standing B shoulder ER using YTB x 10 reps - reduced L ER excursion  Standing B shoulder horiz abd using YTB  x 10 reps - L UE weakness   B row using GTB x 10 reps, using blue theraband x 10 reps (HEP review)  Seated shoulder pulleys: flex, scap, abd, ER 5 x 5 sec each  S/L L/R shoulder ER 0# x 10 reps each (HEP alternative to standing ER using YTB)  L/R elbow flexion <-> overhead shoulder flexion 0#  x 10 reps (pt with good form, no shoulder shrug.), using 1# each  hand x 10 reps - pt reported L shoulder fatigue, mild shrug  Doorway low pec stretch 2 x 20 stretch   R hand behind back IR AAROM using green strap 5 x 5 sec each - L shoulder WNL, not completed There Ex:     HEP review/modification/handout; see below   S/L shoulder ER 0# L/1# R x 10 reps each or standing shoulder ER x 10 reps YTB L/RTB R  Attempted standing and kneeling shoulder horiz abd - pt c/o, stopped. Supine B shoulder horiz abd using RTB 2 x 10 reps  ( or GTB for HEP due to pt reporting easy using RTB)  L/R elbow flex <-> overhead flexion 0# x 5 reps - easy, 1# each L/R UE x ~ 10 reps - pt reported challenging (HEP) There Ex:   UBE: pt declined, reports c/o in past  Standing B shoulder ER using YTB - pt reports too difficult,  unable  Standing B shoulder horiz abd using YTB x 10 reps - pt reported fatigue, declined 2nd set  Diagonal pull aparts using YTB x 10 reps each L/R  - pt reported fatigue, declined 2nd set  S/L L shoulder ER 0# 2 x 10 reps - PT min A to end range (HEP instruction)  S/L R shoulder ER using 0# x 5 reps - too easy, 1# x 10 reps  Standing B row using blue tubing 2 x 10 reps   Low pec stretch x 20 sec, mid pec stretch - attempted, pt c/o, stopped   W 2# ball taps to wall: R x 4 reps, attempted L UE -too weak; completed using L UE 1# x 4 reps  Body blade overhead reaching x 10 reps   Reviewed standing L shoulder ER using YTB x 5 reps - cuing for form   There Ex:   UBE: pt declined, reports c/o in past  Standing B row using blue tubing 2 x 10 reps   Standing B shoulder horiz abd using YTB x ~ 5eps - pt reported fatigue, mirror for effort ot reduce superior humeral glide; modified to supine using YTB  x 5 reps - too easy, using RTB x ~ 20 reps   Supine diagonal pull aparts using YTB x 10 reps each L/R    Supine shoulder ER using YTB 2 x 10 reps  S/L L shoulder ER 0# x 10 reps - PT min A to end range for initial reps (HEP  isntruction for independent compltion)  W 2# ball taps to wall B UE  x 4 reps, L unilateral using 1# x 2 reps - pt reported fatigue, R 1# x 5 reps    Manual:  L GH post/ant and inf glides grade II - pt declined post glides, inf glides WNL, not completed  PROM L/R shoulder ER, IR, flex, abd, scap - min due to WNL  STM B upper and mid traps  Grade II mid thoracic P/A  B pec stretch 3 x 10 sec each Manual:   L/R GH inf glides grade II - pt declined post glides  PROM L/R shoulder ER, IR, flex, abd, scap - min due to , c/o  STM B upper and mid traps   Manual   PROM L shoulder IR, min flex/abd/ER due to WNL  STM L > R upper trap Manual:   PROM L shoulder IR, min flex/abd/ER due to WNL  STM L > R upper trap Manual:   PROM L shoulder IR, min flex/abd/ER due to WNL  STM L > R upper trap   IFC/MHP L shoulder - pt declined  IFC/MHP L shoulder - pt declined    TA:   Avoid lifting heavy weight overhead.           HEP:  s/l shoulder ER or standing using RTB/YTB; supine B shoulder horiz abd using RTB/GTB, - progress to standing,  L/R elbow flexion <-> overhead reach  0 - 1#    Charges: 3 TE (33 min), 0 manual (5 min) Total Timed Treatment:  38 min  Total Treatment Time: 45 min    Post op protocol:  Protocol (2-6 weeks):  Continue shoulder immobilization until 4 weeks, discontinue based on tear size  Normalize elbow and wrist motion  Prevent motion stretching repair   Progress motion as per protocol. For small and medium tears, begin to progress shoulder motion  Weightbearing: Progress per protocol  Protocol (6-8 weeks):  Discontinue shoulder immobilization if not already done  Progress motion as per protocol  Small and medium tears: AAROM for FLEX/ABD to 120  Large and massive tears: PROM for FLEX/ABD to 120  Weightbearing: Limit elbow flexion to <10 pounds during weeks 7-8 and to <15 pounds during weeks 8-12  Protocol (9-16 weeks):  Normalize shoulder motion  Avoid push-ups, pull-ups, bench press, and overhead weightlifting  activities  Weightbearing: Progress per protocol  Protocol (16 weeks-8 months):  Meet occupational requirements by 4 months  Restore overhead strength by 6-8 months  No weightlifting restrictions        RANGE-OF-MOTION LIMITATIONS: Per schedule below     Tear Size Sling Use Begin PROM Begin AROM   Small <1 cm2 4 weeks Immediate 4 weeks   Medium 1-3 cm2 4-6 weeks Immediate 6 weeks   Large 3-5 cm2 6 weeks 2 weeks 8 weeks   Massive >5 cm2 6-8 weeks 4 weeks 8 weeks

## 2025-03-05 ENCOUNTER — TELEPHONE (OUTPATIENT)
Dept: PHYSICAL THERAPY | Age: 55
End: 2025-03-05

## 2025-03-10 ENCOUNTER — OFFICE VISIT (OUTPATIENT)
Dept: PHYSICAL THERAPY | Age: 55
End: 2025-03-10
Attending: ORTHOPAEDIC SURGERY
Payer: COMMERCIAL

## 2025-03-10 ENCOUNTER — OFFICE VISIT (OUTPATIENT)
Dept: INTERNAL MEDICINE CLINIC | Facility: CLINIC | Age: 55
End: 2025-03-10
Payer: COMMERCIAL

## 2025-03-10 VITALS
BODY MASS INDEX: 36.07 KG/M2 | RESPIRATION RATE: 16 BRPM | TEMPERATURE: 97 F | HEART RATE: 92 BPM | WEIGHT: 196 LBS | HEIGHT: 62 IN | OXYGEN SATURATION: 98 % | DIASTOLIC BLOOD PRESSURE: 90 MMHG | SYSTOLIC BLOOD PRESSURE: 134 MMHG

## 2025-03-10 DIAGNOSIS — Z12.31 SCREENING MAMMOGRAM FOR BREAST CANCER: ICD-10-CM

## 2025-03-10 DIAGNOSIS — E66.9 OBESITY (BMI 30-39.9): ICD-10-CM

## 2025-03-10 DIAGNOSIS — M25.511 CHRONIC RIGHT SHOULDER PAIN: ICD-10-CM

## 2025-03-10 DIAGNOSIS — R73.03 PREDIABETES: ICD-10-CM

## 2025-03-10 DIAGNOSIS — Z98.890 HX OF REPAIR OF LEFT ROTATOR CUFF: ICD-10-CM

## 2025-03-10 DIAGNOSIS — E03.9 ACQUIRED HYPOTHYROIDISM: ICD-10-CM

## 2025-03-10 DIAGNOSIS — Z12.11 COLON CANCER SCREENING: ICD-10-CM

## 2025-03-10 DIAGNOSIS — Z00.00 ANNUAL PHYSICAL EXAM: Primary | ICD-10-CM

## 2025-03-10 DIAGNOSIS — E55.9 VITAMIN D DEFICIENCY: ICD-10-CM

## 2025-03-10 DIAGNOSIS — G89.29 CHRONIC RIGHT SHOULDER PAIN: ICD-10-CM

## 2025-03-10 DIAGNOSIS — Z12.4 CERVICAL CANCER SCREENING: ICD-10-CM

## 2025-03-10 DIAGNOSIS — I10 PRIMARY HYPERTENSION: ICD-10-CM

## 2025-03-10 DIAGNOSIS — E78.00 HYPERCHOLESTEREMIA: ICD-10-CM

## 2025-03-10 PROCEDURE — 97530 THERAPEUTIC ACTIVITIES: CPT | Performed by: PHYSICAL THERAPIST

## 2025-03-10 PROCEDURE — 97140 MANUAL THERAPY 1/> REGIONS: CPT | Performed by: PHYSICAL THERAPIST

## 2025-03-10 PROCEDURE — 97110 THERAPEUTIC EXERCISES: CPT | Performed by: PHYSICAL THERAPIST

## 2025-03-10 RX ORDER — LEVOTHYROXINE SODIUM 100 UG/1
100 TABLET ORAL
Qty: 90 TABLET | Refills: 3 | Status: SHIPPED | OUTPATIENT
Start: 2025-03-10

## 2025-03-10 NOTE — PROGRESS NOTES
Nunu Guan is a 54 year old female.  Chief Complaint   Patient presents with    Physical     HPI:   She presents with HTN, hypercholesteremia, hypothyroidism, obesity, prediabetes, vitamin D and anxiety.       Mammogram - 1/4/2025  Colonoscopy - 4/28/2022  Repeat 4/28/2032  Pap smear - 6/25/2024     She is seeing Sangeeta OROURKE for weight loss. She is taking phentermine/topamax.       She had recent left shoulder surgery. She is seeing Dr Smith. She is having right shoulder pain and is currently in PT.   Current Outpatient Medications   Medication Sig Dispense Refill    levothyroxine 100 MCG Oral Tab Take 1 tablet (100 mcg total) by mouth before breakfast. 90 tablet 3    Phentermine HCl 37.5 MG Oral Tab Take 1 tablet (37.5 mg total) by mouth every morning before breakfast. 30 tablet 3    losartan-hydroCHLOROthiazide 100-25 MG Oral Tab Take 1 tablet by mouth daily. 90 tablet 3    Acetaminophen Extra Strength 500 MG Oral Tab Take 2 tablets (1,000 mg total) by mouth every 8 (eight) hours as needed.      topiramate 25 MG Oral Tab Take 1 tablet (25 mg total) by mouth in the morning, at noon, and at bedtime. 270 tablet 1    celecoxib 200 MG Oral Cap Take 1 capsule (200 mg total) by mouth 2 (two) times daily. 60 capsule 0    Acetaminophen 500 MG Oral Cap Take 2 capsules (1,000 mg total) by mouth every 8 (eight) hours as needed for Pain. Never exceed 3000 mg in a 24-hour period.  Many over-the-counter medications also have acetaminophen in them. 180 capsule 0      Past Medical History:    Clot in the renal vein (HCC)    left lower extremity    Cyst of skin    right abdominal skin cyst January 2016    Deep vein thrombosis (HCC)    hx of DVT    Disorder of thyroid    High blood pressure    Hx of motion sickness    Hypercholesteremia    Hypothyroidism    PONV (postoperative nausea and vomiting)    Unspecified essential hypertension    Visual impairment    wear glasses      Past Surgical History:   Procedure  Laterality Date    Ankle fracture surgery Left 2014    left ankle stress fracture 2014    Arthroscopy of joint unlisted      left knee screw    Colonoscopy N/A 04/28/2022    Procedure: COLONOSCOPY;  Surgeon: Flex Rubi MD;  Location: [facility] ENDOSCOPY    Knee surgery  2001    MCL     Other surgical history  2004    vaginal delivery    Other surgical history  2006    vaginal delivery      Social History:  Social History     Socioeconomic History    Marital status:    Tobacco Use    Smoking status: Never    Smokeless tobacco: Never   Vaping Use    Vaping status: Never Used   Substance and Sexual Activity    Alcohol use: Not Currently     Alcohol/week: 4.0 standard drinks of alcohol     Types: 4 Standard drinks or equivalent per week     Comment: rarely    Drug use: No   Other Topics Concern    Caffeine Concern Yes     Comment: coffee, 1 cup daily      Family History   Problem Relation Age of Onset    Hypertension Father         overweight     Other (alzheimers) Father     Diabetes Mother         borderline, overweight     Other (Other) Maternal Grandmother         Karie's Granulomatosis    Cancer Maternal Grandfather         LUNG    Other (Other) Sister         Rheumatoid Arthritis    Colon Cancer Paternal Uncle       Allergies[1]     REVIEW OF SYSTEMS:     Review of Systems   Constitutional:  Negative for fever.   HENT: Negative.     Respiratory:  Negative for cough, shortness of breath and wheezing.    Cardiovascular:  Negative for chest pain.   Gastrointestinal: Negative.    Genitourinary: Negative.    Musculoskeletal:  Positive for arthralgias (right shoulder).   Skin: Negative.    Neurological: Negative.    Psychiatric/Behavioral: Negative.        Wt Readings from Last 5 Encounters:   03/10/25 196 lb (88.9 kg)   01/20/25 197 lb (89.4 kg)   01/10/25 199 lb (90.3 kg)   10/07/24 199 lb (90.3 kg)   08/22/24 198 lb (89.8 kg)     Body mass index is 35.85 kg/m².      EXAM:   /90 (BP Location: Right  arm, Patient Position: Sitting, Cuff Size: large)   Pulse 92   Temp 97.3 °F (36.3 °C) (Temporal)   Resp 16   Ht 5' 2\" (1.575 m)   Wt 196 lb (88.9 kg)   LMP 08/01/2024 (Within Days)   SpO2 98%   BMI 35.85 kg/m²     Physical Exam  Vitals reviewed.   Constitutional:       Appearance: Normal appearance.   HENT:      Head: Normocephalic.      Right Ear: Tympanic membrane normal.      Left Ear: Tympanic membrane normal.   Eyes:      Extraocular Movements: Extraocular movements intact.      Pupils: Pupils are equal, round, and reactive to light.   Cardiovascular:      Rate and Rhythm: Normal rate and regular rhythm.      Pulses: Normal pulses.   Pulmonary:      Breath sounds: Normal breath sounds. No wheezing.   Abdominal:      General: Bowel sounds are normal.      Palpations: Abdomen is soft.      Tenderness: There is no abdominal tenderness.   Musculoskeletal:         General: No swelling.      Right shoulder: No swelling. Decreased range of motion.      Cervical back: Normal range of motion and neck supple.   Skin:     General: Skin is warm and dry.   Neurological:      Mental Status: She is alert and oriented to person, place, and time.   Psychiatric:         Mood and Affect: Mood normal.         Behavior: Behavior normal.            ASSESSMENT AND PLAN:   1. Annual physical exam  - CBC With Differential With Platelet; Future  - Comp Metabolic Panel (14); Future  - Lipid Panel [E]; Future  - Hemoglobin A1C [E]; Future    2. Primary hypertension  - DBP slightly elevated in the office  - dw patient to monitor her BP at home and send a [employer] message with results.   - CPM    3. Acquired hypothyroidism  - levothyroxine 100 MCG Oral Tab; Take 1 tablet (100 mcg total) by mouth before breakfast.  Dispense: 90 tablet; Refill: 3  - check TSH    4. Vitamin D deficiency  - Vitamin D; Future    5. Hypercholesteremia  - monitor diet and continue to exercise as tolerated  - may consider CT calcium score based on results    - Lipid Panel [E]; Future    6. Prediabetes  - Hemoglobin A1C [E]; Future    7. Chronic right shoulder pain  - Ortho Referral - In Network    8. Screening mammogram for breast cancer  - mammogram completed 1/4/2025    9. Colon cancer screening  - Colonoscopy - 4/28/2022  Repeat 4/28/2032    10. Cervical cancer screening  Pap smear - 6/25/2024     11. Hx of repair of left rotator cuff  - Ortho Referral - In Network    12. Obesity (BMI 30-39.9)   - following with bariatrics   - continue medication as prescribed     The patient indicates understanding of these issues and agrees to the plan.  Return in about 1 year (around 3/10/2026).       [1]   Allergies  Allergen Reactions    Azithromycin DIZZINESS     Other reaction(s): AZITHROMYCIN    Hydrocodone DIZZINESS     Other reaction(s): feels dazed

## 2025-03-11 NOTE — PROGRESS NOTES
Patient: Nunu Guan (54 year old, female) Referring Provider:  Insurance:   Diagnosis:    Nontraumatic incomplete rupture of rotator cuff, left (M75.112)  Superior labrum anterior-to-posterior (SLAP) tear of left shoulder (S43.432A)    [facility]   Date of Surgery: 8/22/2024 Next MD visit:  N/A   Precautions:    per post op protocol No data recorded Referral Information:    Date of Evaluation: Req: 10, Auth: 10, Exp: 5/1/2025 8/28/2024 POC Auth Visits:          Today's Date   3/10/2025    Subjective  R shoulder hurts. My sister RN thinks that I have scar tissue, maybe nerve issues contributing to shoulder. Cannot lay on my shoulders due to neck/mid back c/o. Completing HEP at least 3 days weekly.       Pain:       Objective  L shoulder PROM/AROM each WNL without c/o.       Assessment  Pt 's L shoulder AROM defiicts seem related to weakness. Pt with L/R shoulder A/PROM WNL. Pt's L shoulder ER excursion during s/l shoulder ER improved following min PT A; reviewed how to complete at home. Should pt not make continued strength/endurance gains L shoulder and have less R shoulder c/o, pt to f/u with surgeon. Reviewed considerations to facilitate s/l position as well tolerated.     Goals (to be met in  )   (To be met in 18 visits) -  ALL GOALS IN PROGRESS  Pt will report improved ability to sleep without waking due to shoulder pain  Pt will improve shoulder flexion AROM to >160 degrees to be able to reach into overhead cabinets without pain or restriction.  Pt will improve shoulder abduction AROM to >160 degrees to improve ability to don deodorant, don/doff shirts, and wash hair  Pt will increase shoulder AROM ER to 90 to reach and fasten seatbelt   Pt will increase shoulder AROM IR to 70 to be able to reach in back pocket, tuck in shirt, and turn steering wheel without pain  Pt will improve shoulder strength throughout to 4+/5 to improve function with ADLs including reaching and lifting  Pt will  demonstrate increased mid/low trap strength to 4/5 to promote improved shoulder mechanics and stabilization with ADL such as lifting and reaching   Pt will be independent and compliant with comprehensive HEP to maintain progress achieved in PT      Plan  See Assessent. Consider discharge in 2 PT sessions.    Treatment Last 4 Visits       3/10/2025   PT Treatment   Treatment Day 36       36/38 total (8/10 auth)          3/10/2025   UE Treatment   Treatment Day 36       36/38 total (8/10 auth)   Therapeutic Exercise S/L L shoulder ER 0# x 10 reps - PT min A for increased excursion initial few reps  (HEP review for independent completion), L 0# x 3 reps, 1# x 10 reps    Pt prefers to avoid prone interventions.     S/L L/R shoulder horiz abd 0# x 3 reps, 1# x 10 reps     Seated thoracic spine ext/pec stretch 5 x 5 sec each     B row using blue tubing x 20 reps     2 # ball reach overhead W x 4 reps    HEP review; see below.        Manual Therapy L GH inferior glides grade II    Pt has declined post GH glides, pec stretch     PROM L shoulder IR, flex/abd/IR each WNL, not completed R    STM B upper trap   Therapeutic Activity Sleeping posture/pillows s/l and supine for neutral cervical spine, modified s/l position, shoulder across chest in effort to facilitate s/l sleeping as well itzel    May consider Theracaes use for muscular c/o, practiced.   Therapeutic Exercise Minutes 25   Manual Therapy Minutes 8   Therapeutic Activity Minutes 10   Total Time Of Timed Procedures 43   Total Time Of Service-Based Procedures 0   Total Treatment Time 43   HEP s/l shoulder ER  or standing using RTB/YTB; supine B shoulder horiz abd using RTB/GTB, - progress to standing,  L/R elbow flexion <-> overhead reach  0 - 1# or diagonal pull aparts             HEP  s/l shoulder ER  or standing using RTB/YTB; supine B shoulder horiz abd using RTB/GTB, - progress to standing,  L/R elbow flexion <-> overhead reach  0 - 1# or diagonal pull aparts          Charges  2 TE, 1 TA, 1 manual

## 2025-03-24 DIAGNOSIS — M25.512 ACUTE PAIN OF LEFT SHOULDER: Primary | ICD-10-CM

## 2025-04-07 ENCOUNTER — OFFICE VISIT (OUTPATIENT)
Dept: PHYSICAL THERAPY | Age: 55
End: 2025-04-07
Attending: ORTHOPAEDIC SURGERY
Payer: COMMERCIAL

## 2025-04-07 PROCEDURE — 97110 THERAPEUTIC EXERCISES: CPT | Performed by: PHYSICAL THERAPIST

## 2025-04-08 NOTE — PROGRESS NOTES
Patient: Nunu Guan (54 year old, female) Referring Provider:  Insurance:   Diagnosis:    Nontraumatic incomplete rupture of rotator cuff, left (M75.112)  Superior labrum anterior-to-posterior (SLAP) tear of left shoulder (S43.432A)  [facility]   Date of Surgery: 8/22/2024 Next MD visit:  N/A   Precautions:    TBD, following MRI Referral Information:    Date of Evaluation: Req: 10, Auth: 10, Exp: 5/1/2025 8/28/2024 POC Auth Visits:          Today's Date   4/7/2025    Subjective  My shoulder was not doing well when I f/u with surgeon ~ 3/21/2025. He was concerned about my strength, ordered MRI, scheduled Friday,  and f/u with him afterward. Min - non compliant with PT HEP due to shoulder hurting.  R shoulder is sore. L shoulder is better than before surgery, but it is not great.       Pain: 3/10 (L shoulder 3/10)     Objective  L shoulder AROM: flex/abd each WFL - increased  L upper trap, mild superior humeral glide, IR L T9*/R T5, ER each WNL. L shoulder flex, abd MMT - pt apprehensive, not tested, R 4+/5, ER: R 4/5, L 4- to 4/5, IR R/L each 4/5.  + L shoulder with assuming Speed's position            Assessment  Pt returns to PT after last attending ~ 3/10/2025. Pt with continued L shoulder weakness/pain with reaching and with MMT. Per pt, she is scheduled to have a MRI of her L shoulder this Friday. Contination of PT pending MRI results/physician consultation. HEP reviewed; compliance encouraged as well itzel. Pt maintaining fairly good shoulder A/PROM.    Goals (to be met in  )   (To be met in 18 visits) -  ALL GOALS IN PROGRESS  Pt will report improved ability to sleep without waking due to shoulder pain  Pt will improve shoulder flexion AROM to >160 degrees to be able to reach into overhead cabinets without pain or restriction.  Pt will improve shoulder abduction AROM to >160 degrees to improve ability to don deodorant, don/doff shirts, and wash hair  Pt will increase shoulder AROM ER to  90 to reach and fasten seatbelt   Pt will increase shoulder AROM IR to 70 to be able to reach in back pocket, tuck in shirt, and turn steering wheel without pain  Pt will improve shoulder strength throughout to 4+/5 to improve function with ADLs including reaching and lifting  Pt will demonstrate increased mid/low trap strength to 4/5 to promote improved shoulder mechanics and stabilization with ADL such as lifting and reaching   Pt will be independent and compliant with comprehensive HEP to maintain progress achieved in PT          Plan  See Assessment.    Treatment Last 4 Visits  Treatment Day: 37       3/10/2025 4/7/2025   UE Treatment   Therapeutic Exercise S/L L shoulder ER 0# x 10 reps - PT min A for increased excursion initial few reps  (HEP review for independent completion), L 0# x 3 reps, 1# x 10 reps    Pt prefers to avoid prone interventions.     S/L L/R shoulder horiz abd 0# x 3 reps, 1# x 10 reps     Seated thoracic spine ext/pec stretch 5 x 5 sec each     B row using blue tubing x 20 reps     2 # ball reach overhead W x 4 reps    HEP review; see below.      HEP review; see below.     Supine Shoulder AAROM flexion with 2#  on wand x 10 reps    Standing L/R shoulder ER using YTB x 10 reps    Standing L/R shoulder IR using YTB x 10 reps - pt c/o some discomfort L  shoulder     B row using yellow tubing x 20 reps    W  wall slide using 2# ball x 4 reps    Hand behind back IR AAROM using strap L 5 x 5 sec each    Supine B shoulder horiz abd using YTB x 10 reps, PT clinic all out of RTB        Manual Therapy L GH inferior glides grade II    Pt has declined post GH glides, pec stretch     PROM L shoulder IR, flex/abd/IR each WNL, not completed R    STM B upper trap PROM L/R shoulder flex, abd, IR each WNL - PROM not completed; ER WNL R, c/o L   Therapeutic Activity Sleeping posture/pillows s/l and supine for neutral cervical spine, modified s/l position, shoulder across chest in effort to facilitate s/l  sleeping as well itzel    May consider Theracaes use for muscular c/o, practiced. Avoid symptoms/pain with ADL, there ex   Therapeutic Exercise Minutes 25 38   Manual Therapy Minutes 8 1   Therapeutic Activity Minutes 10 1   Total Time Of Timed Procedures 43 40   Total Time Of Service-Based Procedures 0 0   Total Treatment Time 43 40   HEP s/l shoulder ER  or standing using RTB/YTB; supine B shoulder horiz abd using RTB/GTB, - progress to standing,  L/R elbow flexion <-> overhead reach  0 - 1# or diagonal pull aparts        s/l shoulder ER  or standing using RTB/YTB*; supine B shoulder horiz abd using RTB/GTB, - progress to standing,  L/R elbow flexion <-> overhead reach  0 - 1# or diagonal pull aparts - consider supine*, * = priority exercises.  IR AAROM with belt as needed, resisted IR using YTB as well itzel/appropriate      Stop if c/o          HEP    s/l shoulder ER  or standing using RTB/YTB*; supine B shoulder horiz abd using RTB/GTB, - progress to standing,  L/R elbow flexion <-> overhead reach  0 - 1# or diagonal pull aparts - consider supine*, * = priority exercises.  IR AAROM with belt as needed, resisted IR using YTB as well itzel/appropriate      Stop if c/o      Charges  3 TE

## 2025-04-11 ENCOUNTER — HOSPITAL ENCOUNTER (OUTPATIENT)
Dept: MRI IMAGING | Age: 55
Discharge: HOME | End: 2025-04-11
Payer: COMMERCIAL

## 2025-04-11 DIAGNOSIS — M25.512 ACUTE PAIN OF LEFT SHOULDER: ICD-10-CM

## 2025-04-11 PROCEDURE — 73221 MRI JOINT UPR EXTREM W/O DYE: CPT

## 2025-04-14 ENCOUNTER — APPOINTMENT (OUTPATIENT)
Dept: PHYSICAL THERAPY | Age: 55
End: 2025-04-14
Attending: ORTHOPAEDIC SURGERY
Payer: COMMERCIAL

## 2025-04-15 DIAGNOSIS — E66.9 OBESITY (BMI 30-39.9): ICD-10-CM

## 2025-04-16 RX ORDER — TOPIRAMATE 25 MG/1
25 TABLET, FILM COATED ORAL 3 TIMES DAILY
Qty: 270 TABLET | Refills: 1 | Status: SHIPPED | OUTPATIENT
Start: 2025-04-16

## 2025-04-29 ENCOUNTER — LAB ENCOUNTER (OUTPATIENT)
Dept: LAB | Age: 55
End: 2025-04-29
Attending: INTERNAL MEDICINE
Payer: COMMERCIAL

## 2025-04-29 ENCOUNTER — EKG ENCOUNTER (OUTPATIENT)
Dept: LAB | Age: 55
End: 2025-04-29
Attending: INTERNAL MEDICINE
Payer: COMMERCIAL

## 2025-04-29 ENCOUNTER — HOSPITAL ENCOUNTER (OUTPATIENT)
Dept: GENERAL RADIOLOGY | Age: 55
Discharge: HOME | End: 2025-04-29
Attending: INTERNAL MEDICINE
Payer: COMMERCIAL

## 2025-04-29 ENCOUNTER — TELEPHONE (OUTPATIENT)
Dept: INTERNAL MEDICINE CLINIC | Facility: CLINIC | Age: 55
End: 2025-04-29

## 2025-04-29 ENCOUNTER — OFFICE VISIT (OUTPATIENT)
Dept: INTERNAL MEDICINE CLINIC | Facility: CLINIC | Age: 55
End: 2025-04-29
Payer: COMMERCIAL

## 2025-04-29 VITALS
WEIGHT: 193 LBS | OXYGEN SATURATION: 97 % | SYSTOLIC BLOOD PRESSURE: 130 MMHG | HEART RATE: 99 BPM | HEIGHT: 62 IN | BODY MASS INDEX: 35.51 KG/M2 | DIASTOLIC BLOOD PRESSURE: 84 MMHG

## 2025-04-29 DIAGNOSIS — Z01.818 PREOP EXAMINATION: ICD-10-CM

## 2025-04-29 DIAGNOSIS — E55.9 VITAMIN D DEFICIENCY: ICD-10-CM

## 2025-04-29 DIAGNOSIS — R73.03 PREDIABETES: ICD-10-CM

## 2025-04-29 DIAGNOSIS — E03.9 ACQUIRED HYPOTHYROIDISM: ICD-10-CM

## 2025-04-29 DIAGNOSIS — E78.00 HYPERCHOLESTEREMIA: ICD-10-CM

## 2025-04-29 DIAGNOSIS — Z01.818 PREOP EXAMINATION: Primary | ICD-10-CM

## 2025-04-29 DIAGNOSIS — Z00.00 ANNUAL PHYSICAL EXAM: ICD-10-CM

## 2025-04-29 LAB
ALBUMIN SERPL-MCNC: 4.5 G/DL (ref 3.2–4.8)
ALBUMIN/GLOB SERPL: 1.7 {RATIO} (ref 1–2)
ALP LIVER SERPL-CCNC: 52 U/L (ref 41–108)
ALT SERPL-CCNC: 23 U/L (ref 10–49)
ANION GAP SERPL CALC-SCNC: 8 MMOL/L (ref 0–18)
APTT PPP: 27.8 SECONDS (ref 23–36)
AST SERPL-CCNC: 18 U/L (ref ?–34)
ATRIAL RATE: 83 BPM
BASOPHILS # BLD AUTO: 0.06 X10(3) UL (ref 0–0.2)
BASOPHILS NFR BLD AUTO: 1.2 %
BILIRUB SERPL-MCNC: 0.7 MG/DL (ref 0.3–1.2)
BILIRUB UR QL: NEGATIVE
BUN BLD-MCNC: 19 MG/DL (ref 9–23)
BUN/CREAT SERPL: 20 (ref 10–20)
CALCIUM BLD-MCNC: 9 MG/DL (ref 8.7–10.4)
CHLORIDE SERPL-SCNC: 104 MMOL/L (ref 98–112)
CHOLEST SERPL-MCNC: 254 MG/DL (ref ?–200)
CLARITY UR: CLEAR
CO2 SERPL-SCNC: 27 MMOL/L (ref 21–32)
CREAT BLD-MCNC: 0.95 MG/DL (ref 0.55–1.02)
DEPRECATED RDW RBC AUTO: 39.7 FL (ref 35.1–46.3)
EGFRCR SERPLBLD CKD-EPI 2021: 72 ML/MIN/1.73M2 (ref 60–?)
EOSINOPHIL # BLD AUTO: 0.12 X10(3) UL (ref 0–0.7)
EOSINOPHIL NFR BLD AUTO: 2.4 %
ERYTHROCYTE [DISTWIDTH] IN BLOOD BY AUTOMATED COUNT: 12.6 % (ref 11–15)
EST. AVERAGE GLUCOSE BLD GHB EST-MCNC: 120 MG/DL (ref 68–126)
FASTING PATIENT LIPID ANSWER: YES
FASTING STATUS PATIENT QL REPORTED: YES
GLOBULIN PLAS-MCNC: 2.6 G/DL (ref 2–3.5)
GLUCOSE BLD-MCNC: 112 MG/DL (ref 70–99)
GLUCOSE UR-MCNC: NORMAL MG/DL
HBA1C MFR BLD: 5.8 % (ref ?–5.7)
HCT VFR BLD AUTO: 43.5 % (ref 35–48)
HDLC SERPL-MCNC: 83 MG/DL (ref 40–59)
HGB BLD-MCNC: 14.9 G/DL (ref 12–16)
HGB UR QL STRIP.AUTO: NEGATIVE
IMM GRANULOCYTES # BLD AUTO: 0.01 X10(3) UL (ref 0–1)
IMM GRANULOCYTES NFR BLD: 0.2 %
INR BLD: 0.99 (ref 0.8–1.2)
KETONES UR-MCNC: NEGATIVE MG/DL
LDLC SERPL CALC-MCNC: 153 MG/DL (ref ?–100)
LEUKOCYTE ESTERASE UR QL STRIP.AUTO: 500
LYMPHOCYTES # BLD AUTO: 1.68 X10(3) UL (ref 1–4)
LYMPHOCYTES NFR BLD AUTO: 34.1 %
MCH RBC QN AUTO: 29.7 PG (ref 26–34)
MCHC RBC AUTO-ENTMCNC: 34.3 G/DL (ref 31–37)
MCV RBC AUTO: 86.7 FL (ref 80–100)
MONOCYTES # BLD AUTO: 0.4 X10(3) UL (ref 0.1–1)
MONOCYTES NFR BLD AUTO: 8.1 %
NEUTROPHILS # BLD AUTO: 2.65 X10 (3) UL (ref 1.5–7.7)
NEUTROPHILS # BLD AUTO: 2.65 X10(3) UL (ref 1.5–7.7)
NEUTROPHILS NFR BLD AUTO: 54 %
NITRITE UR QL STRIP.AUTO: NEGATIVE
NONHDLC SERPL-MCNC: 171 MG/DL (ref ?–130)
OSMOLALITY SERPL CALC.SUM OF ELEC: 291 MOSM/KG (ref 275–295)
P AXIS: 19 DEGREES
P-R INTERVAL: 152 MS
PH UR: 6 [PH] (ref 5–8)
PLATELET # BLD AUTO: 304 10(3)UL (ref 150–450)
POTASSIUM SERPL-SCNC: 3.4 MMOL/L (ref 3.5–5.1)
PROT SERPL-MCNC: 7.1 G/DL (ref 5.7–8.2)
PROT UR-MCNC: NEGATIVE MG/DL
PROTHROMBIN TIME: 13.7 SECONDS (ref 11.6–14.8)
Q-T INTERVAL: 382 MS
QRS DURATION: 74 MS
QTC CALCULATION (BEZET): 448 MS
R AXIS: 22 DEGREES
RBC # BLD AUTO: 5.02 X10(6)UL (ref 3.8–5.3)
SODIUM SERPL-SCNC: 139 MMOL/L (ref 136–145)
SP GR UR STRIP: 1.01 (ref 1–1.03)
T AXIS: 15 DEGREES
TRIGL SERPL-MCNC: 104 MG/DL (ref 30–149)
TSI SER-ACNC: 1.89 UIU/ML (ref 0.55–4.78)
UROBILINOGEN UR STRIP-ACNC: NORMAL
VENTRICULAR RATE: 83 BPM
VIT D+METAB SERPL-MCNC: 23.5 NG/ML (ref 30–100)
VLDLC SERPL CALC-MCNC: 20 MG/DL (ref 0–30)
WBC # BLD AUTO: 4.9 X10(3) UL (ref 4–11)

## 2025-04-29 PROCEDURE — 99204 OFFICE O/P NEW MOD 45 MIN: CPT | Performed by: INTERNAL MEDICINE

## 2025-04-29 PROCEDURE — 3075F SYST BP GE 130 - 139MM HG: CPT | Performed by: INTERNAL MEDICINE

## 2025-04-29 PROCEDURE — 71046 X-RAY EXAM CHEST 2 VIEWS: CPT | Performed by: INTERNAL MEDICINE

## 2025-04-29 PROCEDURE — 85025 COMPLETE CBC W/AUTO DIFF WBC: CPT

## 2025-04-29 PROCEDURE — 85730 THROMBOPLASTIN TIME PARTIAL: CPT

## 2025-04-29 PROCEDURE — 83036 HEMOGLOBIN GLYCOSYLATED A1C: CPT

## 2025-04-29 PROCEDURE — 93010 ELECTROCARDIOGRAM REPORT: CPT | Performed by: STUDENT IN AN ORGANIZED HEALTH CARE EDUCATION/TRAINING PROGRAM

## 2025-04-29 PROCEDURE — 84443 ASSAY THYROID STIM HORMONE: CPT

## 2025-04-29 PROCEDURE — 36415 COLL VENOUS BLD VENIPUNCTURE: CPT

## 2025-04-29 PROCEDURE — 87081 CULTURE SCREEN ONLY: CPT

## 2025-04-29 PROCEDURE — 93005 ELECTROCARDIOGRAM TRACING: CPT

## 2025-04-29 PROCEDURE — 81001 URINALYSIS AUTO W/SCOPE: CPT

## 2025-04-29 PROCEDURE — 87086 URINE CULTURE/COLONY COUNT: CPT

## 2025-04-29 PROCEDURE — 80061 LIPID PANEL: CPT

## 2025-04-29 PROCEDURE — 82306 VITAMIN D 25 HYDROXY: CPT

## 2025-04-29 PROCEDURE — 85610 PROTHROMBIN TIME: CPT

## 2025-04-29 PROCEDURE — 80053 COMPREHEN METABOLIC PANEL: CPT

## 2025-04-29 PROCEDURE — 3079F DIAST BP 80-89 MM HG: CPT | Performed by: INTERNAL MEDICINE

## 2025-04-29 PROCEDURE — 3008F BODY MASS INDEX DOCD: CPT | Performed by: INTERNAL MEDICINE

## 2025-04-29 NOTE — TELEPHONE ENCOUNTER
Patient called after being sent to [facility] for pre-op chest xray and there is no order for her complete.   Please advise.

## 2025-04-29 NOTE — PROGRESS NOTES
Nunu Guan is a 54 year old female.  Chief Complaint   Patient presents with    Pre-Op Exam     Left shoulder RTC repair 5/22/25 with Dr Smith     Referral     PT        HPI:   Pre op   C/c pre op   C/o left shoulder repair by dr smith   Had left shoulder surg aug 22, 2024 but ruptured on its own and needs rpt surg planned for 5/22/2025 by dr smith   Most recent surgery is the shoulder surgery August 2024 no significant change in health since then can walk up 2 flights of stairs without feeling short of breath      Signed PT 6/2023           HISTORY   New pt -used to see PA ARNAV  C/c establish care   C/o anxiety >depression -going through a divorce and now living separate separate so sleeping better and feeling better   Her wt mngt  gave her sertraline 25 mg first but now 50mg         PMH  HTN  Hypothyroidism  Anxiety  Morbid obesity    Borderline diabetes A1c of 5.7 February 2020  H/o covid 10/20/2021   Vit d def         Lives with kids , 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          Current Medications[1]   Past Medical History[2]   Past Surgical History[3]   Social History:  Short Social Hx on File[4]     REVIEW OF SYSTEMS:   GENERAL HEALTH: No fevers, chills, sweats, fatigue  VISION: No recent vision problems, blurry vision or double vision  HEENT: No decreased hearing ear pain nasal congestion or sore throat  SKIN: denies any unusual skin lesions or rashes  RESPIRATORY: denies shortness of breath, cough, wheezing  CARDIOVASCULAR: denies chest pain on exertion, palpitations, swelling in feet  GI: denies abdominal pain and denies heartburn, nausea or vomiting  : No Pain on urination, change in the color of urine, discharge, urinating frequently  MUS: No back pain, joint pain, muscle pain  NEURO: denies headaches , anxiety, depression, +stress - all stable off meds     EXAM:   /84   Pulse 99   Ht 5' 2\" (1.575 m)   Wt 193 lb (87.5 kg)   LMP 08/01/2024 (Within Days)   SpO2 97%   BMI 35.30 kg/m²   GENERAL:  well developed, well nourished,in no apparent distress  SKIN: no rashes,no suspicious lesions  HEENT: atraumatic, normocephalic  NECK: supple,no adenopathy,nontender , good ROM   LUNGS: clear to auscultation, no wheeze  CARDIO: RRR   GI: good BS's,no masses or tenderness  EXTREMITIES: no cyanosis, or edema    ASSESSMENT AND PLAN:   Diagnoses and all orders for this visit:    Preop examination  -     Prothrombin Time (PT); Future  -     PTT, Activated; Future  -     MSSA and MRSA Culture Screen; Future  -     EKG 12 Lead; Future  -     XR CHEST PA + LAT CHEST (CPT=71046); Future      Labs EKG and chest x-ray.  Patient with good exercise tolerance  Patient had undergone surgery less than 1 year ago and did well without any complications  Discontinue vitamins and supplements 1 week prior to surgery     Preventive medicine  Pap smear-6/2024 primo obrien   Colonoscopy-will refer, has apt dr koch   Mammogram 1/22  Labs 8/2024    The patient indicates understanding of these issues and agrees to the plan.  No follow-ups on file.         [1]   Current Outpatient Medications   Medication Sig Dispense Refill    topiramate 25 MG Oral Tab Take 1 tablet (25 mg total) by mouth in the morning, at noon, and at bedtime. 270 tablet 1    levothyroxine 100 MCG Oral Tab Take 1 tablet (100 mcg total) by mouth before breakfast. 90 tablet 3    Phentermine HCl 37.5 MG Oral Tab Take 1 tablet (37.5 mg total) by mouth every morning before breakfast. 30 tablet 3    losartan-hydroCHLOROthiazide 100-25 MG Oral Tab Take 1 tablet by mouth daily. 90 tablet 3    Acetaminophen Extra Strength 500 MG Oral Tab Take 2 tablets (1,000 mg total) by mouth every 8 (eight) hours as needed.      celecoxib 200 MG Oral Cap Take 1 capsule (200 mg total) by mouth 2 (two) times daily. 60 capsule 0   [2]   Past Medical History:   Clot in the renal vein (HCC)    left lower extremity    Cyst of skin    right abdominal skin cyst January 2016    Deep vein thrombosis (HCC)     hx of DVT    Disorder of thyroid    High blood pressure    Hx of motion sickness    Hypercholesteremia    Hypothyroidism    PONV (postoperative nausea and vomiting)    Unspecified essential hypertension    Visual impairment    wear glasses   [3]   Past Surgical History:  Procedure Laterality Date    Ankle fracture surgery Left 2014    left ankle stress fracture 2014    Arthroscopy of joint unlisted      left knee screw    Colonoscopy N/A 04/28/2022    Procedure: COLONOSCOPY;  Surgeon: Flex Rubi MD;  Location: [facility] ENDOSCOPY    Knee surgery  2001    MCL     Other surgical history  2004    vaginal delivery    Other surgical history  2006    vaginal delivery   [4]   Social History  Socioeconomic History    Marital status:    Tobacco Use    Smoking status: Never    Smokeless tobacco: Never   Vaping Use    Vaping status: Never Used   Substance and Sexual Activity    Alcohol use: Not Currently     Alcohol/week: 4.0 standard drinks of alcohol     Types: 4 Standard drinks or equivalent per week     Comment: rarely    Drug use: No   Other Topics Concern    Caffeine Concern Yes     Comment: coffee, 1 cup daily

## 2025-05-04 NOTE — PROGRESS NOTES
Diagnosis:   Disorder of left rotator cuff (M67.912)    Referring Provider: Mike Morgan  Date of Evaluation:    5/1/2024    Precautions:  None Next MD visit:   none scheduled  Date of Surgery: n/a   Insurance Primary/Secondary: [employer] [place] / N/A     # Auth Visits: 10 through 7/24/2024         Subjective: Pain increased to a 2/10 after raking and picking up leaves.    Pain: ~2/10       Objective:   + c/o L shoulder pain with overhead A/PROM flex, abd    ROM: (* denotes performed with pain)  Shoulder    Flexion: R 164; L 150*  Abduction: R 180; L 170*  ER: R T2; L T2   IR: R T5; L T5   Shoulder mechanics WNL with AROM shoulder flex, abd    L Shoulder MMT:   Flexion: 4/5  Abduction: 4/5* c/o  ER: 5-/5  IR: 5-/5      Assessment:   Patient tolerated therapy well and we adjusted a few of her home exercises to isolate desired muscles. Discussed importance of scpaular mobility and stability to decreased posterior impingement of the shoulder. Education on pain neuroscience and graded exposure to build tissue tolerance and resilience. Discussed load management and likelihood of overuse from lifting leaves into a bad repetitively that aggravated her shoulder. She verbalized agreement and understanding.      Goals:    (to be met in 8 visits)   Patient to report independence with PT HEP to facilitate self management and to maintain progress made in PT.   Patient to have at least 160 deg active L shoulder AROM flex/abd with min - no c/o to facilitate overhead ADL.   Patient to have L shoulder flex, abd, ER MMT grossly 4+/5 in order to lift/lower 3# overhead using L UE.   Patient to report at least 50% improvement in L shoulder to facilitate reaching, ADL.     Plan: Continue PT 1-2 x weekly for 8 visits, pending authorization.  F/u on response to today's session. Progress L shoulder A/AAROM, rotator cuff/periscapular strengthening, posture.   Date: 5/6/2024  TX#: 2/8 (2/5 auth sessions) Date: 5/13/2024                 TX#: 3/5 (3/5 auth) Date: 5/22/2024                TX#: 4/5 (4/5 auth sessions) Date: 5/30/24                TX#: 5/5 (5/10 auth sessions) Date:   Tx#: 6/ There Ex:   Hep review/practice; see below  S/L L shoulder abduction 0 - 90 deg 0# x 10 reps  Seated thoracic spine ext 5 x 5 sec each, with pec stretch 5 x 5 sec each  B row using blue tubing x 10 reps - pt reported fatigue  Standing L shoulder IR using YTB x 10 reps  Standing L shoulder ER using YTB x 10 reps  Seated shoulder pulleys: flex, scap, abd, IRx 10 reps each  L shoulder pendulums x 30 sec   Supine B shoulder horiz abd using YTB x 10 reps  W wall slides - not completed, consider next session     There Ex:   UBE: ~ 1.5 min forward/~ 1.5 min backward (3 min total), level 1  B row using yellow tubing x 15 reps  L shoulder ER using YTB x 10 rep  L shoulder IR using YTB x 10 reps  Supine B shoulder horiz abd using YTB x 10 reps  Seated shoulder pulleys: flex, scap, abd, IRx 10 reps each  L shoulder pendulums x 30 sec   W wall slides x 5 reps (HEP)  Shoulder pulleys: flexion, scaption, abduction x 10 reps each  Seated thoracic spine ext/pec stretch 5 x 5 sec each (HEP)     There   HEP: may focus one s/l shoulder ER, wall slides if facilitates compliance   UBE: 2 min retro, 1 min forward (3 min total), level 1  B row using yellow tubing x 15 reps  L shoulder ER using YTB x 10 rep  L shoulder IR using YTB x 10 reps  B shoulder ER x 10 reps   Seated thoracic spine ext/pec stretch 5 x 5 sec each  Seated B shoulder horiz abd using YTB x 10 reps  Seated L GH posterior capsule stretch 3 x 20 sec each  Seated L GH inferior glide 3 x 15 sec each (HEP)  Doorway low pec stretch 2 x 20 sec each  Seated shoulder pulleys: flex, scap, abd, IRx 10 reps each TE:  B shoulder ER x 25 reps  Wall horizontal posterior capsule stretch, 30x 3  B shoulder ER YTB seated, 3x15  Scapular wall slide, 3x10    TA:  - Pain science education on graded exposure to build tissue tolerance  and resilience.   - Discussed load management and likelihood of overuse from lifting leaves into a bad repetitively that aggravated her shoulder.    Manual:   See Daily Note Manual:   B pec stretch 3 x 20 sec each  L shoulder/GH posterior glide - pt c/o, stopped  L GH inferior glides grade II  PROM L shoulder flex, abd, ER - pt c/o, stopped. IR PROM Manual:   Not completed; pt with poor tolerated in past     Cold pack - pt declined, reported would complete at home IFC/cold pack L shoulder x 10 min supine             HEP: supine shoulder AAROM flexion (shoulder width and wider) - progress to wall slide , s/l L shoulder ER 0#, pec stretch doorway - modify to seated thoracic spine ext/pec stretch, B shoulder ER, seated GH inferior glides    Charges: 2 TE (25 min), 1 TA  (20 min)   Total Timed Treatment: 45 min  Total Treatment Time: 45 min      Cardiac

## 2025-05-05 ENCOUNTER — TELEPHONE (OUTPATIENT)
Dept: INTERNAL MEDICINE CLINIC | Facility: CLINIC | Age: 55
End: 2025-05-05

## 2025-05-06 RX ORDER — SULFAMETHOXAZOLE AND TRIMETHOPRIM 800; 160 MG/1; MG/1
1 TABLET ORAL 2 TIMES DAILY
Qty: 6 TABLET | Refills: 0 | Status: SHIPPED | OUTPATIENT
Start: 2025-05-06 | End: 2025-05-09

## 2025-05-06 NOTE — TELEPHONE ENCOUNTER
Faxed clearance successfully to [phone]     Also relayed pt the message above she voiced understanding and will  form pharmacy

## 2025-05-06 NOTE — TELEPHONE ENCOUNTER
Patient called (identified name and ),   Checking on whether her pre op note was addended and faxed to Dr Smith.  Dr Youngblood, please advise?

## 2025-05-06 NOTE — TELEPHONE ENCOUNTER
Please let her know that although no UTI, because she is going for surgery I will send 3 days of antibiotics for her to take for bacteria in the urine

## 2025-05-13 ENCOUNTER — TELEPHONE (OUTPATIENT)
Dept: INTERNAL MEDICINE CLINIC | Facility: CLINIC | Age: 55
End: 2025-05-13

## 2025-05-15 DIAGNOSIS — M75.122 COMPLETE TEAR OF LEFT ROTATOR CUFF, UNSPECIFIED WHETHER TRAUMATIC: Primary | ICD-10-CM

## 2025-05-22 ENCOUNTER — HOSPITAL ENCOUNTER (OUTPATIENT)
Facility: HOSPITAL | Age: 55
Discharge: HOME | End: 2025-05-22
Attending: STUDENT IN AN ORGANIZED HEALTH CARE EDUCATION/TRAINING PROGRAM | Admitting: STUDENT IN AN ORGANIZED HEALTH CARE EDUCATION/TRAINING PROGRAM
Payer: COMMERCIAL

## 2025-05-22 ENCOUNTER — ANESTHESIA EVENT (OUTPATIENT)
Dept: SURGERY | Facility: HOSPITAL | Age: 55
End: 2025-05-22
Payer: COMMERCIAL

## 2025-05-22 ENCOUNTER — ANESTHESIA (OUTPATIENT)
Dept: SURGERY | Facility: HOSPITAL | Age: 55
End: 2025-05-22
Payer: COMMERCIAL

## 2025-05-22 VITALS
OXYGEN SATURATION: 91 % | HEART RATE: 88 BPM | RESPIRATION RATE: 22 BRPM | BODY MASS INDEX: 36.25 KG/M2 | DIASTOLIC BLOOD PRESSURE: 73 MMHG | SYSTOLIC BLOOD PRESSURE: 148 MMHG | HEIGHT: 62 IN | WEIGHT: 197 LBS | TEMPERATURE: 97 F

## 2025-05-22 DIAGNOSIS — M75.122 NONTRAUMATIC COMPLETE TEAR OF LEFT ROTATOR CUFF: Primary | ICD-10-CM

## 2025-05-22 LAB — B-HCG UR QL: NEGATIVE

## 2025-05-22 PROCEDURE — 81025 URINE PREGNANCY TEST: CPT

## 2025-05-22 PROCEDURE — 64415 NJX AA&/STRD BRCH PLXS IMG: CPT | Performed by: STUDENT IN AN ORGANIZED HEALTH CARE EDUCATION/TRAINING PROGRAM

## 2025-05-22 DEVICE — SWIVELOCK, SP BC 5.5MM
Type: IMPLANTABLE DEVICE | Status: FUNCTIONAL
Brand: ARTHREX®

## 2025-05-22 DEVICE — 3.5MM PUSHLOCK, BIOCOMP, SELF-PUNCHING
Type: IMPLANTABLE DEVICE | Status: FUNCTIONAL
Brand: ARTHREX®

## 2025-05-22 RX ORDER — ROPIVACAINE HYDROCHLORIDE 5 MG/ML
INJECTION, SOLUTION EPIDURAL; INFILTRATION; PERINEURAL
Status: COMPLETED | OUTPATIENT
Start: 2025-05-22 | End: 2025-05-22

## 2025-05-22 RX ORDER — DEXAMETHASONE SODIUM PHOSPHATE 10 MG/ML
INJECTION, SOLUTION INTRAMUSCULAR; INTRAVENOUS
Status: COMPLETED | OUTPATIENT
Start: 2025-05-22 | End: 2025-05-22

## 2025-05-22 RX ORDER — HYDROMORPHONE HYDROCHLORIDE 1 MG/ML
0.2 INJECTION, SOLUTION INTRAMUSCULAR; INTRAVENOUS; SUBCUTANEOUS EVERY 5 MIN PRN
Refills: 0 | Status: DISCONTINUED | OUTPATIENT
Start: 2025-05-22 | End: 2025-05-22

## 2025-05-22 RX ORDER — OXYCODONE HYDROCHLORIDE 5 MG/1
5 TABLET ORAL EVERY 4 HOURS PRN
Status: DISCONTINUED | OUTPATIENT
Start: 2025-05-22 | End: 2025-05-22

## 2025-05-22 RX ORDER — PSEUDOEPHED/ACETAMINOPH/DIPHEN 30MG-500MG
2 TABLET ORAL EVERY 8 HOURS PRN
Qty: 180 TABLET | Refills: 0 | Status: SHIPPED | OUTPATIENT
Start: 2025-05-22

## 2025-05-22 RX ORDER — ONDANSETRON 2 MG/ML
4 INJECTION INTRAMUSCULAR; INTRAVENOUS EVERY 6 HOURS PRN
Status: DISCONTINUED | OUTPATIENT
Start: 2025-05-22 | End: 2025-05-22

## 2025-05-22 RX ORDER — GABAPENTIN 300 MG/1
300 CAPSULE ORAL EVERY 8 HOURS
Qty: 30 CAPSULE | Refills: 0 | Status: SHIPPED | OUTPATIENT
Start: 2025-05-22

## 2025-05-22 RX ORDER — HYDROMORPHONE HYDROCHLORIDE 1 MG/ML
0.4 INJECTION, SOLUTION INTRAMUSCULAR; INTRAVENOUS; SUBCUTANEOUS EVERY 5 MIN PRN
Refills: 0 | Status: DISCONTINUED | OUTPATIENT
Start: 2025-05-22 | End: 2025-05-22

## 2025-05-22 RX ORDER — HYDROCODONE BITARTRATE AND ACETAMINOPHEN 5; 325 MG/1; MG/1
1 TABLET ORAL EVERY 6 HOURS PRN
Qty: 25 TABLET | Refills: 0 | Status: SHIPPED | OUTPATIENT
Start: 2025-05-22

## 2025-05-22 RX ORDER — MORPHINE SULFATE 4 MG/ML
6 INJECTION, SOLUTION INTRAMUSCULAR; INTRAVENOUS EVERY 2 HOUR PRN
Status: DISCONTINUED | OUTPATIENT
Start: 2025-05-22 | End: 2025-05-22

## 2025-05-22 RX ORDER — ACETAMINOPHEN 500 MG
1000 TABLET ORAL ONCE
Status: COMPLETED | OUTPATIENT
Start: 2025-05-22 | End: 2025-05-22

## 2025-05-22 RX ORDER — HYDROMORPHONE HYDROCHLORIDE 1 MG/ML
0.6 INJECTION, SOLUTION INTRAMUSCULAR; INTRAVENOUS; SUBCUTANEOUS EVERY 5 MIN PRN
Refills: 0 | Status: DISCONTINUED | OUTPATIENT
Start: 2025-05-22 | End: 2025-05-22

## 2025-05-22 RX ORDER — OXYCODONE HYDROCHLORIDE 5 MG/1
10 TABLET ORAL EVERY 4 HOURS PRN
Status: DISCONTINUED | OUTPATIENT
Start: 2025-05-22 | End: 2025-05-22

## 2025-05-22 RX ORDER — MEPERIDINE HYDROCHLORIDE 25 MG/ML
12.5 INJECTION INTRAMUSCULAR; INTRAVENOUS; SUBCUTANEOUS AS NEEDED
Refills: 0 | Status: DISCONTINUED | OUTPATIENT
Start: 2025-05-22 | End: 2025-05-22

## 2025-05-22 RX ORDER — LIDOCAINE HYDROCHLORIDE 10 MG/ML
INJECTION, SOLUTION EPIDURAL; INFILTRATION; INTRACAUDAL; PERINEURAL AS NEEDED
Status: DISCONTINUED | OUTPATIENT
Start: 2025-05-22 | End: 2025-05-22 | Stop reason: SURG

## 2025-05-22 RX ORDER — ACETAMINOPHEN 500 MG
1000 TABLET ORAL EVERY 8 HOURS
Status: DISCONTINUED | OUTPATIENT
Start: 2025-05-22 | End: 2025-05-22

## 2025-05-22 RX ORDER — HYDROCODONE BITARTRATE AND ACETAMINOPHEN 5; 325 MG/1; MG/1
1 TABLET ORAL ONCE AS NEEDED
Refills: 0 | Status: COMPLETED | OUTPATIENT
Start: 2025-05-22 | End: 2025-05-22

## 2025-05-22 RX ORDER — FAMOTIDINE 10 MG/ML
20 INJECTION, SOLUTION INTRAVENOUS ONCE
Status: COMPLETED | OUTPATIENT
Start: 2025-05-22 | End: 2025-05-22

## 2025-05-22 RX ORDER — PROCHLORPERAZINE EDISYLATE 5 MG/ML
5 INJECTION INTRAMUSCULAR; INTRAVENOUS EVERY 8 HOURS PRN
Status: DISCONTINUED | OUTPATIENT
Start: 2025-05-22 | End: 2025-05-22

## 2025-05-22 RX ORDER — SODIUM CHLORIDE, SODIUM LACTATE, POTASSIUM CHLORIDE, CALCIUM CHLORIDE 600; 310; 30; 20 MG/100ML; MG/100ML; MG/100ML; MG/100ML
INJECTION, SOLUTION INTRAVENOUS CONTINUOUS
Status: DISCONTINUED | OUTPATIENT
Start: 2025-05-22 | End: 2025-05-22

## 2025-05-22 RX ORDER — MORPHINE SULFATE 4 MG/ML
4 INJECTION, SOLUTION INTRAMUSCULAR; INTRAVENOUS EVERY 10 MIN PRN
Status: DISCONTINUED | OUTPATIENT
Start: 2025-05-22 | End: 2025-05-22

## 2025-05-22 RX ORDER — LIDOCAINE HYDROCHLORIDE 10 MG/ML
INJECTION, SOLUTION INFILTRATION; PERINEURAL
Status: COMPLETED | OUTPATIENT
Start: 2025-05-22 | End: 2025-05-22

## 2025-05-22 RX ORDER — NALOXONE HYDROCHLORIDE 0.4 MG/ML
80 INJECTION, SOLUTION INTRAMUSCULAR; INTRAVENOUS; SUBCUTANEOUS AS NEEDED
Status: DISCONTINUED | OUTPATIENT
Start: 2025-05-22 | End: 2025-05-22

## 2025-05-22 RX ORDER — HYDROMORPHONE HYDROCHLORIDE 1 MG/ML
0.4 INJECTION, SOLUTION INTRAMUSCULAR; INTRAVENOUS; SUBCUTANEOUS EVERY 2 HOUR PRN
Refills: 0 | Status: DISCONTINUED | OUTPATIENT
Start: 2025-05-22 | End: 2025-05-22

## 2025-05-22 RX ORDER — ROCURONIUM BROMIDE 10 MG/ML
INJECTION, SOLUTION INTRAVENOUS AS NEEDED
Status: DISCONTINUED | OUTPATIENT
Start: 2025-05-22 | End: 2025-05-22 | Stop reason: SURG

## 2025-05-22 RX ORDER — METHOCARBAMOL 750 MG/1
750 TABLET, FILM COATED ORAL 3 TIMES DAILY PRN
Qty: 42 TABLET | Refills: 0 | Status: SHIPPED | OUTPATIENT
Start: 2025-05-22

## 2025-05-22 RX ORDER — MORPHINE SULFATE 10 MG/ML
6 INJECTION, SOLUTION INTRAMUSCULAR; INTRAVENOUS EVERY 10 MIN PRN
Refills: 0 | Status: DISCONTINUED | OUTPATIENT
Start: 2025-05-22 | End: 2025-05-22

## 2025-05-22 RX ORDER — OXYCODONE HYDROCHLORIDE 5 MG/1
5 TABLET ORAL EVERY 4 HOURS PRN
Refills: 0 | Status: DISCONTINUED | OUTPATIENT
Start: 2025-05-22 | End: 2025-05-22

## 2025-05-22 RX ORDER — ONDANSETRON 2 MG/ML
INJECTION INTRAMUSCULAR; INTRAVENOUS AS NEEDED
Status: DISCONTINUED | OUTPATIENT
Start: 2025-05-22 | End: 2025-05-22 | Stop reason: SURG

## 2025-05-22 RX ORDER — MORPHINE SULFATE 15 MG/1
15 TABLET ORAL EVERY 4 HOURS PRN
Status: DISCONTINUED | OUTPATIENT
Start: 2025-05-22 | End: 2025-05-22

## 2025-05-22 RX ORDER — SENNA AND DOCUSATE SODIUM 50; 8.6 MG/1; MG/1
2 TABLET, FILM COATED ORAL NIGHTLY
Qty: 28 TABLET | Refills: 0 | Status: SHIPPED | OUTPATIENT
Start: 2025-05-22

## 2025-05-22 RX ORDER — CELECOXIB 200 MG/1
200 CAPSULE ORAL 2 TIMES DAILY
Qty: 60 CAPSULE | Refills: 0 | Status: SHIPPED | OUTPATIENT
Start: 2025-05-22

## 2025-05-22 RX ORDER — MORPHINE SULFATE 2 MG/ML
2 INJECTION, SOLUTION INTRAMUSCULAR; INTRAVENOUS EVERY 2 HOUR PRN
Status: DISCONTINUED | OUTPATIENT
Start: 2025-05-22 | End: 2025-05-22

## 2025-05-22 RX ORDER — ACETAMINOPHEN 500 MG
1000 TABLET ORAL EVERY 8 HOURS SCHEDULED
Status: DISCONTINUED | OUTPATIENT
Start: 2025-05-22 | End: 2025-05-22

## 2025-05-22 RX ORDER — GLYCOPYRROLATE 0.2 MG/ML
INJECTION, SOLUTION INTRAMUSCULAR; INTRAVENOUS AS NEEDED
Status: DISCONTINUED | OUTPATIENT
Start: 2025-05-22 | End: 2025-05-22 | Stop reason: SURG

## 2025-05-22 RX ORDER — MORPHINE SULFATE 4 MG/ML
4 INJECTION, SOLUTION INTRAMUSCULAR; INTRAVENOUS EVERY 2 HOUR PRN
Status: DISCONTINUED | OUTPATIENT
Start: 2025-05-22 | End: 2025-05-22

## 2025-05-22 RX ORDER — NEOSTIGMINE METHYLSULFATE 1 MG/ML
INJECTION INTRAVENOUS AS NEEDED
Status: DISCONTINUED | OUTPATIENT
Start: 2025-05-22 | End: 2025-05-22 | Stop reason: SURG

## 2025-05-22 RX ORDER — MIDAZOLAM HYDROCHLORIDE 1 MG/ML
INJECTION INTRAMUSCULAR; INTRAVENOUS
Status: COMPLETED | OUTPATIENT
Start: 2025-05-22 | End: 2025-05-22

## 2025-05-22 RX ORDER — OXYCODONE HYDROCHLORIDE 5 MG/1
10 TABLET ORAL EVERY 4 HOURS PRN
Refills: 0 | Status: DISCONTINUED | OUTPATIENT
Start: 2025-05-22 | End: 2025-05-22

## 2025-05-22 RX ORDER — MAGNESIUM HYDROXIDE 1200 MG/15ML
LIQUID ORAL CONTINUOUS PRN
Status: DISCONTINUED | OUTPATIENT
Start: 2025-05-22 | End: 2025-05-22

## 2025-05-22 RX ORDER — ONDANSETRON 4 MG/1
4 TABLET, ORALLY DISINTEGRATING ORAL EVERY 8 HOURS PRN
Qty: 10 TABLET | Refills: 0 | Status: SHIPPED | OUTPATIENT
Start: 2025-05-22

## 2025-05-22 RX ORDER — FAMOTIDINE 20 MG/1
20 TABLET, FILM COATED ORAL ONCE
Status: COMPLETED | OUTPATIENT
Start: 2025-05-22 | End: 2025-05-22

## 2025-05-22 RX ORDER — MORPHINE SULFATE 4 MG/ML
2 INJECTION, SOLUTION INTRAMUSCULAR; INTRAVENOUS EVERY 10 MIN PRN
Status: DISCONTINUED | OUTPATIENT
Start: 2025-05-22 | End: 2025-05-22

## 2025-05-22 RX ORDER — HYDROMORPHONE HYDROCHLORIDE 1 MG/ML
0.8 INJECTION, SOLUTION INTRAMUSCULAR; INTRAVENOUS; SUBCUTANEOUS EVERY 2 HOUR PRN
Refills: 0 | Status: DISCONTINUED | OUTPATIENT
Start: 2025-05-22 | End: 2025-05-22

## 2025-05-22 RX ORDER — DEXAMETHASONE SODIUM PHOSPHATE 4 MG/ML
VIAL (ML) INJECTION AS NEEDED
Status: DISCONTINUED | OUTPATIENT
Start: 2025-05-22 | End: 2025-05-22 | Stop reason: SURG

## 2025-05-22 RX ADMIN — ONDANSETRON 4 MG: 2 INJECTION INTRAMUSCULAR; INTRAVENOUS at 16:35:00

## 2025-05-22 RX ADMIN — DEXAMETHASONE SODIUM PHOSPHATE 8 MG: 4 MG/ML VIAL (ML) INJECTION at 16:25:00

## 2025-05-22 RX ADMIN — SODIUM CHLORIDE, SODIUM LACTATE, POTASSIUM CHLORIDE, CALCIUM CHLORIDE: 600; 310; 30; 20 INJECTION, SOLUTION INTRAVENOUS at 18:44:00

## 2025-05-22 RX ADMIN — LIDOCAINE HYDROCHLORIDE 5 ML: 10 INJECTION, SOLUTION INFILTRATION; PERINEURAL at 14:58:00

## 2025-05-22 RX ADMIN — GLYCOPYRROLATE 0.6 MG: 0.2 INJECTION, SOLUTION INTRAMUSCULAR; INTRAVENOUS at 18:45:00

## 2025-05-22 RX ADMIN — DEXAMETHASONE SODIUM PHOSPHATE 10 MG: 10 INJECTION, SOLUTION INTRAMUSCULAR; INTRAVENOUS at 15:02:00

## 2025-05-22 RX ADMIN — ROCURONIUM BROMIDE 10 MG: 10 INJECTION, SOLUTION INTRAVENOUS at 16:25:00

## 2025-05-22 RX ADMIN — LIDOCAINE HYDROCHLORIDE 50 MG: 10 INJECTION, SOLUTION EPIDURAL; INFILTRATION; INTRACAUDAL; PERINEURAL at 16:07:00

## 2025-05-22 RX ADMIN — NEOSTIGMINE METHYLSULFATE 3 MG: 1 INJECTION INTRAVENOUS at 18:45:00

## 2025-05-22 RX ADMIN — ROPIVACAINE HYDROCHLORIDE 30 ML: 5 INJECTION, SOLUTION EPIDURAL; INFILTRATION; PERINEURAL at 15:02:00

## 2025-05-22 RX ADMIN — SODIUM CHLORIDE, SODIUM LACTATE, POTASSIUM CHLORIDE, CALCIUM CHLORIDE: 600; 310; 30; 20 INJECTION, SOLUTION INTRAVENOUS at 15:57:00

## 2025-05-22 RX ADMIN — MIDAZOLAM HYDROCHLORIDE 2 MG: 1 INJECTION INTRAMUSCULAR; INTRAVENOUS at 14:58:00

## 2025-05-22 RX ADMIN — ROCURONIUM BROMIDE 40 MG: 10 INJECTION, SOLUTION INTRAVENOUS at 16:07:00

## 2025-05-22 NOTE — ANESTHESIA PROCEDURE NOTES
Peripheral Block    Date/Time: 5/22/2025 2:58 PM    Performed by: Christina Kauffman MD  Authorized by: Christina Kauffman MD      General Information and Staff    Start Time:  5/22/2025 2:58 PM  End Time:  5/22/2025 3:02 PM  Anesthesiologist:  Christina Kauffman MD  Performed by:  Anesthesiologist  Patient Location:  PACU    Block Placement: Pre Induction  Site Identification: real time ultrasound guided and image stored and retrievable      Reason for Block: at surgeon's request and post-op pain management    Preanesthetic Checklist: 2 patient identifers, IV checked, risks and benefits discussed, monitors and equipment checked, pre-op evaluation, timeout performed, anesthesia consent and sterile technique used      Procedure Details    Patient Position:  Sitting  Prep: ChloraPrep    Monitoring:  Cardiac monitor, heart rate, continuous pulse ox and blood pressure cuff  Block Type:  Interscalene  Laterality:  Left  Injection Technique:  Single-shot    Needle    Needle Type:  Short-bevel  Needle Gauge:  22 G  Needle Length:  50 mm  Needle Localization:  Ultrasound guidance  Reason for Ultrasound Use: appropriate spread of the medication was noted in real time and no ultrasound evidence of intravascular and/or intraneural injection            Assessment    Injection Assessment:  Good spread noted, incremental injection, local visualized surrounding nerve on ultrasound, low pressure, negative aspiration for heme and no pain on injection  Paresthesia Pain:  None  Heart Rate Change: No        Medications  5/22/2025 2:58 PM  midazolam (VERSED)injection 2mg/2ml - Intravenous   2 mg - 5/22/2025 2:58:00 PM  lidocaine injection 1% - Intradermal   5 mL - 5/22/2025 2:58:00 PM  ropivacaine (NAROPIN) injection 0.5% - Infiltration   30 mL - 5/22/2025 3:02:00 PM  dexamethasone (DECADRON) PF injection 10 mg/ml - Injection   10 mg - 5/22/2025 3:02:00 PM    Additional Comments

## 2025-05-22 NOTE — ANESTHESIA PROCEDURE NOTES
Airway  Date/Time: 5/22/2025 4:09 PM  Reason: elective      General Information and Staff   Patient location during procedure: OR  Anesthesiologist: Christina Kauffman MD  Resident/CRNA: Kat Uribe CRNA  Performed: CRNA   Performed by: Kat Uribe CRNA  Authorized by: Christina Kauffman MD        Indications and Patient Condition  Indications for airway management: anesthesia  Sedation level: minimal      Preoxygenated: yesPatient position: sniffing  MILS maintained throughout    Mask difficulty assessment: 1 - vent by mask    Final Airway Details    Final airway type: endotracheal airway    Successful airway: ETT     Successful intubation technique: direct laryngoscopy  Endotracheal tube insertion site: oral  Blade: Lazaro  Blade size: #3    Cormack-Lehane Classification: grade I - full view of glottis  Placement verified by: capnometry   Measured from: lips  ETT to lips (cm): 23  Number of attempts at approach: 1

## 2025-05-22 NOTE — OPERATIVE REPORT
PRE-OP DX: Left  shoulder complete supraspinatus tear and complete infraspinatus tear  POST-OP DX: Left  shoulder complete supraspinatus tear and complete infraspinatus tear     SURGEON: Igor Smith MD  ASSISTANT(S): Sanchez Perdue CSA    PROCEDURE(S):   Left shoulder arthroscopy with Rotator Cuff Repair, massive tear of the supraspinatus and infraspinatus, Allograft Augmentation of Rotator Cuff, Glenohumeral and Labral Debridement, and Subacromial Debridement    ANESTHESIA:  General    EBL: 20cc  MATERIALS:  None  BLOOD:  None  SPECIMEN(S):  None  DRAINS:  None    DISPOSITION/CONDITIONS: Stable to PACU    IMPLANTS:  Implant Name Type Inv. Item Serial No.  Lot No. LRB No. Used Action   decellularized dermis   [id number] [facility] "[employer] [id number] Left 1 Implanted   SWIVELOCK, SP BC 5.5MM - [id number]  SWIVELOCK, SP BC 5.5MM [id number] [employer] WD [id number] Left 2 Implanted   ANCHOR SUT 3.5X19.5MM SLF PUNCHING PEEK EYELT - [id number]  ANCHOR SUT 3.5X19.5MM SLF PUNCHING PEEK EYELT [id number] [employer] WD [id number] Left 2 Implanted      COMPLICATIONS:  None    INDICATIONS FOR PROCEDURE:  Nunu Guan is a 54 year old female who presented with the above diagnosis after prior cuff repair.  Patient reports shoulder pain and functional weakness that failed conservative measures. We discussed the risks and benefits of operative versus nonoperative management. The risks of nonoperative management specifically the risk of persistent pain were discussed and the patient elected to undergo operative treatment.  We discussed benefits of the surgery including return of function and pain management. We discussed alternative options including nonoperative management. We additionally discussed the risks of surgery, which include, but are not limited to bleeding, pain, infection, stiffness, damage to nerves/vessels, incomplete relief of pain, incomplete return of function, failure of healing, need for  additional surgery, blood clots, and death. The patient understands the above risks and elected to proceed.     DESCRIPTION OF PROCEDURE:   On the day of the procedure, the patient was identified in the preoperative holding area using three identifiers and the correct operative site was verified with the patient and marked by myself.  The patient was then transferred to the operating room.  Once in the operating room, they underwent anesthesia without complications. We then placed the patient in the supine position on the operating table. Preoperative antibiotics and TXA were administered within one hour of skin incision.  The patient was repositioned into the beach chair position with all bony prominences appropriately padded. The neck was in neutral alignment.  The left  upper extremity was prepped and draped in standard sterile fashion and the arm was positioned in an extremity hensley.  A surgical time-out was performed identifying the correct patient, procedure, and site. 20cc of normal saline with epinephrine were injected into the subacromial space for hemostasis.    Exam under anesthesia:  FE: 150      ABD: 150    ER at 90: 70        IR at 90: 70  25-50% anterior and 25% posterior translation on operative shoulder (compared with 25-50% anterior and 25% posterior translation on contralateral shoulder)    Diagnostic arthroscopy: We began the procedure with the standard posterolateral portal, entered the glenohumeral joint, and an anterior portal was made within the rotator cuff interval under direct visualization with the assistance of a spinal needle. A probe was used to assist with diagnostic arthroscopy and we visualized from both posteriorly and anteriorly.    Diagnostic Findings    Synovitis   substantial anterior and superior  SLAP    Scarred to cuff  Anterior/Inferior Labrum Synovitic  Posterior/Inferior Labrum Synovitic    Biceps tendon   Surgically absent  Rotator Interval   Scarred  Axillary  recess   empty    Rotator Cuff  Subscapularis   normal  Supraspinatus   full thickness complete tear, retracted to glenoid and scarred to labrum and acromion  Infraspinatus   full thickness complete tear, retracted to glenoid  Teres Minor   normal    Articular Surface  Glenoid    intact with minimal chondral injury   Humeral Head   intact with minimal chondral injury     Arthroscopic limited debridement of the glenohumeral joint:   We used a combination of the arthroscopic motorized shaver and radiofrequency device to perform a limited debridement inside the glenohumeral joint. The hyperemia, erythema, and synovitis of the joint and joint capsule was focally debrided. Labral fraying was debrided and thermally imbricated to a smooth margin. Synovitic fronds were thermally ablated. Chondral degeneration was debrided to a stable edge. Labral fraying and degeneration was also resected and debrided to a stable edge  Arthroscopic subacromial debridement:  The subacromial space was entered from posteriorly. A separate posterior-lateral portal was made for additional instrumentation and visualization. We then performed our subacromial debridement in a systematic fashion from anterior to posterior and from lateral to medial, removing the bursal tissue carefully. This was done with a radiofrequency device and motorized shaver. The undersurface of the acromion was skeletonized. The CA ligament was visualized and preserved.    Rotator cuff mobilization:  Viewing from the posterior lateral and lateral portals, the rotator cuff was mobilized from the capsule and scar tissue.  Using a combination of labral elevator, shaver, and radiofrequency device, the capsular adhesions were identified and released, taking care to avoid injury to the suprascapular nerve at the medial aspect of the superior glenoid.       Having performed capsular release, the rotator cuff was then tensioned using a cuff grasper, and demonstrated that improved  mobilization had been achieved but not sufficient for low tension repair.  The decision was then made to perform an anterior interval slide, releasing the leading edge of the supraspinatus from the rotator interval to the base of the coracoid.  After this was performed, an additional 4 mm of rotator cuff excursion was achieved.  We then began the rotator cuff repair.    Cuffmend Interposition/Bridge Augment:  After performing sufficient releases and interval slides, the rotator cuff tissue was still not sufficiently mobile to achieve a low tension repair to the footprint.  The decision was thus made to perform an allograft interposition augmentation/reconstruction.  On the back table, a Cuffmend dermal allograft was prepared using alternating colored tapes with knot stacks to act as pull reins. We used two self punching Arthrex Fibertak anchors for repair of the anterior and posterior margins of the rotator cuff and they were placed just lateral to the articular margin, one anteriorly and one posteriorly. These had excellent fixation within the bone. The sutures from each anchor were then passed through the supraspinatus and infraspinatus with the tissue-piercing scorpion device, approximately 6mm from its edge. Sutures were then shuttled appropriately through the cannulas and tied to reapproximate the margins, leaving a [id number] gap between the tendon edge and the bone centrally.  Next, the sutures from the graft were passed sequentially through the rotator cuff using either the scorpion antegrade passer or a lasso retrograde passer.  These were passed from inferior to superior, approximately 6 mm away from the edge and evenly spaced throughout the available tendon/muscle.  Tails were passed through the percutaneous portals that were made for the placement of the Fibertak anchors.  Once all sutures were appropriately passed through the rotator cuff, they were then pulled evenly to pass the allograft through the  cannula and approximate the graft edge to the tendon with appropriate apposition/eversion.  These were sequentially tied, achieving excellent integrity between the graft and tendon edge.  Then, the lateral luggage tag stitches on the graft were secured to the greater tuberosity using Arthrex PushLock anchors.    Wound closure:   We thoroughly irrigated then drained the joint and subacromial space and we removed the arthroscopic equipment. The wounds were closed with 3-0 Monocryl sutures in buried deep fashion followed by Dermabond glue. The shoulder was sterilely dressed and placed in a shoulder immobilizer.     The patient was awoken from anesthesia and transferred to the PACU in stable condition. An ice pack was applied over a skin protecting barrier in the PACU.    I spoke with the family regarding the operation after surgery.     Postoperative rehabilitation: large tear protocol. The patient will remain in a sling for 6 weeks.     Surgical Assistant Necessity:  For this procedure, a surgical assistant was present for, and assisted with, the entirety of the case. The surgical assistant was involved with patient positioning, prepping and draping, directly assisting with key portions of the case including retracting and/or managing instrumentation, and closing. The surgical assistant was necessaryto ensure greater likelihood of a safe and efficient operation, and no Orthopaedic surgery resident was available to operate as an assistant.      Rationale for 22 Modifier Addendum    Rotator Cuff Repair Including Substantial Mobilization and Graft Augmentation  This procedure was performed for a scarred, retracted, and atrophied rotator cuff, which represents a high-risk injury pattern for failure with normal repair techniques. Performance of this procedure to achieve an appropriate repair included addition of several additional portals/incision sites, a substantial increase in time to mobilize the rotator cuff tissue  and debride scar tissue, placement of additional anchors to reinforce the repair, and application of a graft which necessitated preparation, insertion, and use of several additional sutures and anchors. This necessitated a substantial increase in the total time and skill required to complete the case, as has been documented in the body of the operative report.

## 2025-05-22 NOTE — ANESTHESIA PREPROCEDURE EVALUATION
Anesthesia PreOp Note    HPI:     Nunu Guan is a 54 year old female who presents for preoperative consultation requested by: Igor Smith MD    Date of Surgery: 5/22/2025    Procedure(s):  Left shoulder revision rotator cuff repair with allograft augmentation  Indication: Left rotator cuff tear    Relevant Problems   No relevant active problems       NPO:  Last Liquid Consumption Date: 05/22/25  Last Liquid Consumption Time: 0800  Last Solid Consumption Date: 05/21/25  Last Solid Consumption Time: 2300  Last Liquid Consumption Date: 05/22/25          History Review:  Patient Active Problem List    Diagnosis Date Noted    Complete tear of left rotator cuff 05/15/2025    Right rotator cuff tendinitis 01/10/2025    Orthopedic aftercare 09/06/2024    Nontraumatic incomplete rupture of rotator cuff, left 08/10/2024    Incomplete rotator cuff tear or rupture of left shoulder, not specified as traumatic 07/26/2024    Superior labrum anterior-to-posterior (SLAP) tear of left shoulder 07/26/2024    Stress 12/30/2021    Anxiety and depression 12/20/2021    Obesity (BMI 30-39.9) 07/10/2020    Vitamin D deficiency 02/27/2020    Hypercholesteremia 01/30/2020    Prediabetes 01/30/2020    Weight gain 01/30/2020    History of DVT (deep vein thrombosis) 01/30/2020    Snoring 01/30/2020    Morbid obesity with BMI of 40.0-44.9, adult (HCC) 01/30/2020    Encounter for therapeutic drug monitoring 10/08/2018    Obesity 01/19/2015    Hypothyroidism 04/01/2014    Hypertension 12/16/2011       Past Medical History[1]    Past Surgical History[2]    Prescriptions Prior to Admission[3]  Current Medications and Prescriptions Ordered in Epic[4]    Allergies[5]    Family History[6]  Social Hx on file[7]    Available pre-op labs reviewed.  Lab Results   Component Value Date    WBC 4.9 04/29/2025    RBC 5.02 04/29/2025    HGB 14.9 04/29/2025    HCT 43.5 04/29/2025    MCV 86.7 04/29/2025    MCH 29.7 04/29/2025    MCHC 34.3 04/29/2025     RDW 12.6 04/29/2025    .0 04/29/2025    URINEPREG Negative 05/22/2025     Lab Results   Component Value Date     04/29/2025    K 3.4 (L) 04/29/2025     04/29/2025    CO2 27.0 04/29/2025    BUN 19 04/29/2025    CREATSERUM 0.95 04/29/2025     (H) 04/29/2025    CA 9.0 04/29/2025          Vital Signs:  Body mass index is 36.03 kg/m².   height is 1.575 m (5' 2\") and weight is 89.4 kg (197 lb). Her oral temperature is 98.3 °F (36.8 °C). Her blood pressure is 144/84 and her pulse is 74. Her respiration is 14 and oxygen saturation is 95%.   Vitals:    05/20/25 1752 05/22/25 1330   BP:  144/84   Pulse:  74   Resp:  14   Temp:  98.3 °F (36.8 °C)   TempSrc:  Oral   SpO2:  95%   Weight: 89.4 kg (197 lb) 89.4 kg (197 lb)   Height: 1.575 m (5' 2\")         Anesthesia Evaluation     Patient summary reviewed and Nursing notes reviewed    History of anesthetic complications   Airway   Dental      Pulmonary    Cardiovascular   (+) hypertension    Neuro/Psych    (+)  anxiety/panic attacks,        GI/Hepatic/Renal      Endo/Other    (+) blood dyscrasia (DVT LLE)  Abdominal                  Anesthesia Plan:   ASA:  2  Plan:   General and regional  Airway:  ETT  Post-op Pain Management: Interscalene block  Informed Consent Plan and Risks Discussed With:  Patient  Discussed plan with:  CRNA      I have informed Nunu Guan and/or legal guardian or family member of the nature of the anesthetic plan, benefits, risks including possible dental damage if relevant, major complications, and any alternative forms of anesthetic management.   All of the patient's questions were answered to the best of my ability. The patient desires the anesthetic management as planned.  Christina Kauffman MD  5/22/2025 2:27 PM  Present on Admission:  **None**           [1]   Past Medical History:   Anxiety state    Clot in the renal vein (HCC)    left lower extremity    Cyst of skin    right abdominal skin cyst January 2016    Deep  vein thrombosis (HCC)    hx of DVT in the leg    Deep vein thrombosis (HCC)    Disorder of thyroid    High blood pressure    High cholesterol    Hx of motion sickness    Hypercholesteremia    Hypothyroidism    PONV (postoperative nausea and vomiting)    Unspecified essential hypertension    Visual impairment    wear glasses   [2]   Past Surgical History:  Procedure Laterality Date    Ankle fracture surgery Left 2014    left ankle stress fracture 2014    Arthroscopy of joint unlisted      left knee screw    Colonoscopy N/A 2022    Procedure: COLONOSCOPY;  Surgeon: Flex Rubi MD;  Location: [facility] ENDOSCOPY    Knee surgery      MCL     Other surgical history      vaginal delivery    Other surgical history      vaginal delivery   [3]   Medications Prior to Admission   Medication Sig Dispense Refill Last Dose/Taking    topiramate 25 MG Oral Tab Take 1 tablet (25 mg total) by mouth in the morning, at noon, and at bedtime. 270 tablet 1 2025 at  8:00 AM    levothyroxine 100 MCG Oral Tab Take 1 tablet (100 mcg total) by mouth before breakfast. 90 tablet 3 2025 at  8:00 AM    Phentermine HCl 37.5 MG Oral Tab Take 1 tablet (37.5 mg total) by mouth every morning before breakfast. 30 tablet 3 2025    losartan-hydroCHLOROthiazide 100-25 MG Oral Tab Take 1 tablet by mouth daily. 90 tablet 3 2025 at  8:00 AM    Acetaminophen Extra Strength 500 MG Oral Tab Take 2 tablets (1,000 mg total) by mouth every 8 (eight) hours as needed.   Past Month    celecoxib 200 MG Oral Cap Take 1 capsule (200 mg total) by mouth 2 (two) times daily. 60 capsule 0 Past Week    [] sulfamethoxazole-trimethoprim -160 MG Oral Tab per tablet Take 1 tablet by mouth 2 (two) times daily for 3 days. 6 tablet 0     ergocalciferol 1.25 MG (17386 UT) Oral Cap Take 1 capsule (50,000 Units total) by mouth every 7 days. 12 capsule 0 Unknown   [4]   Current Facility-Administered Medications Ordered in Epic    Medication Dose Route Frequency Provider Last Rate Last Admin    lactated ringers infusion   Intravenous Continuous Igor Smith MD 20 mL/hr at 05/22/25 1333 New Bag at 05/22/25 1333    ceFAZolin (Ancef) 2g in 10mL IV syringe premix  2 g Intravenous Once Igor Smith MD         No current [facility]-ordered outpatient medications on file.   [5]   Allergies  Allergen Reactions    Azithromycin DIZZINESS     Other reaction(s): AZITHROMYCIN    Hydrocodone DIZZINESS     Other reaction(s): feels dazed   [6]   Family History  Problem Relation Age of Onset    Hypertension Father         overweight     Other (alzheimers) Father     Diabetes Mother         borderline, overweight     Other (Other) Maternal Grandmother         Karie's Granulomatosis    Cancer Maternal Grandfather         LUNG    Other (Other) Sister         Rheumatoid Arthritis    Colon Cancer Paternal Uncle    [7]   Social History  Socioeconomic History    Marital status:    Tobacco Use    Smoking status: Never    Smokeless tobacco: Never   Vaping Use    Vaping status: Never Used   Substance and Sexual Activity    Alcohol use: Yes     Alcohol/week: 4.0 standard drinks of alcohol     Types: 4 Standard drinks or equivalent per week    Drug use: No   Other Topics Concern    Caffeine Concern Yes     Comment: coffee, 1 cup daily

## 2025-05-22 NOTE — DISCHARGE INSTRUCTIONS
Patient Discharge Instructions  Shoulder Rotator Cuff Repair +/- Biceps Tenodesis      WEIGHT BEARING: You should not bear weight on your operative shoulder at first. You will be immobilized in a sling to protect your shoulder after surgery. This will typically be for a period of 4-6 weeks. See post-op therapy protocol timeline for additional details.     RANGE OF MOTION: Range of Motion about the shoulder should progress slowly and only as pain allows. You should not extend the elbow or actively flex it against resistance or gravity. Working on motion is important to avoid post-operative stiffness. You may remove the sling to shower, get dressed, and do your exercises. When the sling is off, you may let your arm hang straight down at the side. While your arm is hanging down, you are encouraged to bend your elbow open and closed to prevent stiffness. You may do this while seated or standing.    DIET:    Begin with bland foods (e.g., saltines, bread) and clear liquids (e.g., ginger ale, juice, water) immediately after surgery.  You may progress to normal diet if you are feeling nauseated  Nausea and vomiting are common after surgery.  Remain on clear liquids and bland diet until this passes.  This will typically resolve within the first 24 hours after surgery.  You have been provided a nausea medication which may help.  Should your nausea persist more than 24 hours despite this medication, contact your physician.    PAIN & PAIN MEDICATIONS:   The Anesthesiologist may have performed a Nerve Block, if you had agreed to it before the surgery. A combination of local anesthetics (numbing medicines) are used to numb your shoulder and arm so your brain will not receive any pain signals during and immediately after surgery.  The length of effect varies from person to person, but the nerve block usually provides 12-24 hours of pain relief.  You will notice a gradual increase in pain as this begins to wear off. You may feel  tingling, burning, electric shocks, pins and needles or many other strange sensations as your arm is \"waking up\". You may want to take a pain medication pill before the nerve block completely wears off. People usually start to get their feeling back before they have return of ability to move their elbow, wrist and hand.  For many people, post-operative pain can be managed with simple measures, such as elevation of the operative extremity above the level of the heart, cryotherapy and ice, compression, etc.   In addition to these measures, we have prescribed pain medications. To minimize narcotic use and establish better pain control, we employ a multimodal pain approach. This protocol combines the use of scheduled acetaminophen, gabapentin, and narcotics.  We will often use anti-inflammatories (NSAID's such as ibuprofen, celecoxib, and/or naproxen) to further assist with pain control thus allowing for a lower dose of narcotic to be used. Dosing of medications will vary from patient to patient based on their needs and past medical history, so please refer to your discharge medication list.  Opiate pain medications (oxycodone) will only be refilled after an in-person visit. For all other medication refills, please contact us using the contact information below    You have been prescribed:    Acetaminophen (Tylenol) 500mg - Take 2 tabs by mouth every 8 hours for pain. Do not take more than 4000mg each day. Tylenol should be taken as a first-line, scheduled pain medication. If taking Norco, take less of this medication to ensure not taking too much acetaminophen in a day. You should only stop taking this once you have no pain or so little pain as to not need any medications. If you have liver problems, please discontinue and notify your surgeon.    Celecoxib (Celebrex) 100mg or 200mg (dosage will be determined by your age, weight, and/or kidney function) - Take 1 tablet by mouth every 12 hours for pain. NSAIDs  (Ibuprofen, Naproxen, Celecoxib) should be taken as a first-line pain medication like acetaminophen. NSAIDs are non-steroidal anti-inflammatory medications. They reduce inflammation in the affected area. You should also plan to take this until you have essentially no pain. You may wean down to just celecoxib or just acetaminophen prior to completely discontinuing medicines, and we do not have a strong recommendation of which one to stop last. Please take NSAIDs as prescribed, with food, to avoid stomach ulcers. If you have a history of stomach ulcers or develop GI bleeding, please discontinue and notify your surgeon.    Methocarbamol (Robaxin) 750mg - Take 1 tablet by mouth every 8 hours as needed for muscle pain and/or muscle spasms after surgery. This medication will help with muscle spasms, which can be particularly painful after shoulder surgery. You may discontinue this medicine when muscle pain is not particularly painful or limiting. You may also opt to take this medicine prior to sleep to help prevent muscle pains from waking you.    Gabapentin 300mg - Take 1 tab by mouth three times daily on a scheduled basis for the first ten days after surgery. This medication helps control nerve sensitivity after surgery. After ten days, we usually discontinue this medication. However, you may continue taking if you find it to be beneficial.     Hydrocodone/Acetaminophen (Norco) 5mg/325mg - Take one tablet by mouth as often as every 4 hours as needed for breakthrough pain. Take this medication as your breakthrough pain medication, after maximizing other pain medications. Opiate pain medications (Oxycodone, Hydrocodone, Oxycontin) are prescribed as a second-line pain medication for moderate to severe post-operative pain. Opiates are associated with dependency and addiction if taken for a prolonged period. For this reason, it is best to use opiates ONLY as needed.    Ondansetron (Zofran) 4mg - Dissolve 1 tab under your  tongue as often as every 8 hours as needed for nausea and/or vomiting. You do not need to swallow this medication.     Senna-Docusate 8.6mg-50mg - Take 2 tabs by mouth every evening for constipation prevention. Constipation is common after surgery and while taking pain medications. When taking this medication, you should be having a bowel movement every 2-3 days.  If not, then proceed with over-the-counter laxatives (Docusate/Miralax), enemas, or suppositories to fix the constipation.  All of these are over the counter and can be discussed in more detail with your pharmacist. If you are having multiple bowel movements daily, you should discontinue this medication.    Example Medication Schedule  Medication Morning   0600 Mid-Morning   1000 Early Afternoon   1400 Late Afternoon   1800 Evening   2200   Acetaminophen * x  x  x   Celecoxib * x    x   Methocarbamol  x  x  x   Gabapentin ^ x  x  x   Norco If needed If needed If needed If needed If needed   Ondansetron   If needed  If needed    Senna-Docusate *     x   Ice  x x x x x   *Take as scheduled until essentially no pain (or until loose bowel movements for senna-docusate)  ^Take/use as scheduled until out  If no marking, you should use as needed    ICE PACK/CRYOTHERAPY: Use frequently over the next 7-10 days.  Ice bags/packs/bladder should be used for 20-30 minutes every 3-4 hours during waking hours. Protect your skin from frostbite with a washcloth, towel, or ace wrap between the ice bag/pack and your skin.     DRESSINGS/WOUND CARE:   You may remove your shoulder dressings after three to four days. There is a tape and/or purple glue underneath and this may have a small amount of blood in it. This is normal. Do not remove the tape or glue - they will eventually come off on their own  Follow the bathing instructions below.   If you have increased drainage, incision redness, foul smell, or fevers - inform the office.  You may need to be evaluated in the office  earlier than your follow-up appointment.    BATHING:   You should keep your surgical site clean and dry when possible.   Do not peel off the glue/tape or scrub your wound site. You may allow warm soapy water to wash over the wound.  Do not soak the wound/incision, use hot tubs or swimming pools, oceans, lakes, ponds until 4 weeks after surgery   Following these guidelines will help avoid any wound healing issues and/or infections.    PHYSICAL/OCCUPATIONAL THERAPY (PT/OT): To maximize surgical benefit, PT/OT is necessary. If you do not have an appointment, you should contact PT/OT to set up an appointment AS SOON AS POSSIBLE. If you have problems setting up PT, please contact our nursing team.    HOME EXERCISES  You are encouraged to begin home exercises the next day after surgery and continue them for 4 weeks  This will be IN ADDITION TO your therapy protocol  Perform 15 repetitions of each exercise, 3-5 times per day, or more, depending on your level of discomfort.  Keep your arm against your stomach, resting on your leg, or hanging at your side.  Do not rotate or lift your arm away from the body or behind the body.  ELBOW MOTION  Begin next day after surgery  Remove sling and allow arm to rest at your side (you may perform this sitting or standing).  Allow your arm to straighten at the side, then gently bend elbow up.   If you had a biceps tenodesis performed, you may use your other arm to gently support the wrist and passively bend/extend the elbow.    HAND AND WRIST MOTION  Begin next day after surgery  Can be performed while arm is in sling  Curl the fingers into the palm to make a tight fist and then straighten them out.  Move the wrist up and down as far as you can tolerate to prevent stiffness.                      PENDULUM EXERCISES  Begin 7-10 days after surgery  Bend forward at the waist, using a table for support. Rock your body in a circular pattern to move arm clockwise 10-15 times and then  counter-clockwise. If you are unable to rock your body to make arm circles, you may rock back and forth and then side to side.  Your goal is to make small circles, no larger than 12 inches in diameter                                                                    DVT PREVENTION:   Deep vein thrombosis (DVT) or blood clots sometimes occur following surgery. It is important to understand the signs/symptoms and methods to avoid DVTs.   Symptoms of DVT include swelling, throbbing and cramping in the extremity, swollen veins that are hard or sore. DVTs can travel to the lungs and cause a pulmonary embolus (PE) and death. Symptoms of a pulmonary embolus include chest pain and shortness of breath. If you experience any of these symptoms you should contact the clinic immediately and/or go to the nearest emergency room.   To prevent blood clots, you should move your extremities and joints, limited by the restrictions above. Perform foot pumps, ankle circles, leg lifts, etc. to circulate blood in the extremity.   If you have been prescribed Aspirin, please take it as prescribed for DVT prophylaxis. If you plan to travel in a car or plane for long periods (> 1 hour), contact your surgeon regarding the need for DVT prophylaxis. If you have a history of blood clots, and have not been prescribed a medication, contact your surgeon immediately.     DRIVING: Driving after your surgery is dependent on several factors. You may not drive if you are taking opiate pain medications. You may not drive if you're physically unable to steer with 2 hands and press the brake with full force. If you are unsure whether you are safe to drive, you should visit your local DMV. We are not medically or legally responsible for an accident caused by unsafe driving.     POST-OPERATIVE APPOINTMENT: Your first post-operative appointment is scheduled for 10-14 days after the procedure. If you do not have an appointment scheduled yet, please contact our  office. Our phone number is [phone]    SPECIAL INSTRUCTIONS: If you experience fevers greater than 100.4°F on two consecutive measurements or any fever over 102°F, chest pain, difficulty breathing, confusion, or an allergic reaction, return to your nearest emergency department as soon as possible. You may also call the emergency department to help you determine if you should come in. [facility] Emergency Department phone number is [phone].     CONTACT INFORMATION: For any questions or concerns, please contact our nursing staff.       Igor Smith MD  Department of Orthopaedics       Shoulder Rotator Cuff Repair Therapy Protocol      The intent of this protocol is to provide the therapist with a guideline/treatment protocol for the postoperative rehabilitation management for a patient who has undergone surgery for rotator cuff tear. It is not intended to be a substitute for a therapist's clinical decision making regarding the progression of a patient's postoperative rehabilitation based on the individual patient's physical exam/findings, progress, and/or the presence of postoperative complications.     If the physical therapist requires assistance in the progression of a postoperative patient who has had shoulder replacement surgery, the therapist should consult with Dr. Smith. Please contact Dr. Smith with any concerns/questions.     This protocol is applicable for patients who have undergone rotator cuff repair with/without biceps tenodesis and/or distal clavicle excision.     Definitions  Active range of motion (AROM) is defined as motion performed by the muscles of that extremity such as bending your elbow while standing.  This type of motion releases increased force on the joint and activates the muscles around the joint.  Passive range of motion (PROM) is defined as motion achieved by an outside force such as when your therapist bends your shoulder for you.  This type of motion keeps the  joint mobile but prevents activation of the muscles and forces across the joint.  Active-assisted range of motion (AAROM) is defined as motion performed by the muscles of that extremity but with assistance such as when you bend your shoulder and your therapist helps the motion. This type of motion restores normal activity of the muscles about the joint without greatly increasing the strain on the muscles or joint.    Progression to the next phase based on Clinical Criteria and Time Frames as Appropriate.      Phase I - Immediate Post-Surgical Phase (Day 0 to 6 Weeks):  Goals:  Reduce postoperative pain and swelling  Promote healing of skin/muscles  Normalize elbow/wrist motion  Protect surgical repair  Begin early shoulder motion for small to medium tears     Precautions:  No weightbearing with the operative extremity  No active elbow flexion or passive elbow extension  Subscapularis tears: No passive external rotation beyond neutral  Sling (with pillow attachment) worn at all times except for therapy exercises  Support elbow with water resistant sling or shower  for hygiene/showering  No supporting of body weight with involved extremity.  Keep incision clean and dry; no submersion (beach, bath, swimming pool) for 4 weeks.  If BT Performed:   No active elbow flexion or passive elbow extension  Support elbow while out of brace for hygiene/showering    Phase I Breakdown  First Therapy Visits:  Therapist should check proper sling fit/use.  Therapist should instruct patient in proper positioning, posture, initial home exercise program  Ensure bandages are removed; sutures will be removed by Ortho at first post-op visit  Therapist should instruct patient in showering with appropriate modifications  Therapist should provide patient/ family with written home program including exercises     ROM & Sling Use:  Progress as pain allows  If a subscapularis repair was performed, avoid passive ER  Shoulder PROM for small and  medium tears (see below) in hammer  position  Avoid impingement position for forward flexion    Tear Size Sling Use Begin PROM Begin AROM   Small <1 cm2 4 weeks Immediate 4 weeks   Medium 1-3 cm2 4-6 weeks Immediate 6 weeks   Large 3-5 cm2 6 weeks 6 weeks 8 weeks   Massive >5 cm2 6-8 weeks 6-8 weeks 8 weeks     Cryotherapy:  Frequent application of cryotherapy/ice (4-5 times a day for about 20 minutes).  Cryotherapy hourly for 15 minutes during the first 24 hours after sensation is restored until acute inflammation is controlled.  Once controlled, use cryotherapy 3 times daily for 15 minutes at a time.    Exercises/Progression:   strengthening with forearm and shoulder in neutral position  Shoulder PROM/AAROM in neutral position depending on tear size  Scapular mobilization including scapular retractions/depressions and shoulder shrugs  Modified pendulums for 5 days, progress to full pendulums after 5 days  Gentle sub-maximal shoulder isometrics except ER/IR  If Biceps Tenodesis Performed:  Elbow motion starting at 2 weeks post-operatively when biceps tenodesis performed  No resisted elbow flexion greater than 10 pounds during weeks 4-8  No resisted elbow flexion greater than 15 pounds during weeks 8-12   Other:   Cardiovascular training:  NO running or impact aerobics  Recumbent bicycle while weaning sling      Phase II - Intermediate Post-Surgical Phase (Week 6 to 8):  Goals:  Reduce postoperative pain and swelling  Improve shoulder motion:  Small and medium tears: AAROM for FLEX/ABD to 120  Large and massive tears: PROM for FLEX/ABD to 90  Progress passive ER  Normalize elbow motion   Protect biceps tendon repair     Phase II Precautions:  No resistance exercises for shoulder abduction/flexion  No active elbow flexion or forearm supination >5 pounds  Wean from sling   No supporting of body weight with involved extremity.  No submersion (beach, bath, swimming pool) for 4 weeks.  No running or high impact  activity for aerobic training    Phase II Breakdown  Exercises/Progression:  Continue application of cryotherapy/ice as needed   strengthening with forearm in neutral position  Shoulder PROM/AAROM in neutral position  Scapular mobilization  Modified pendulums for 5 days, progress to full pendulums after 5 days.  Soft tissue mobilization/trigger point work to the kinetic chain  Gentle submaximal shoulder isometrics    Other:  Blood flow restriction for elbow flexion/extension on nonoperative arm and lower extremities for physiologic benefits  Recumbent bicycle for cardiovascular training    Criteria for progression to the next phase (Phase III):  Minimal pain    Phase III - Late Post-Surgical Phase (Weeks 9 to 16):  Goals:  Full shoulder motion by 6 to 8 weeks  Initiate strengthening at 6 weeks  Unrestricted activity by 3 months  Enhance functional use of operative extremity and advance functional activities per patient specific goals.  Enhance shoulder mechanics, muscular strength, and endurance.  Precautions:  No resisted elbow flexion greater than 10 pounds during weeks 4-8   No resisted elbow flexion greater than 15 pounds during weeks 8-12   Avoid impingement positions, higher level exertional activities with operative extremity, and high impact aerobic activity     Phase III Breakdown  Exercises/Progression:  Shoulder range of motion exercises   Trunk stabilization exercises   Scapular strengthening emphasizing scapular retraction and upward rotators  Shoulder/rotator cuff strength & endurance progression within elbow flexion precautions   Increase functional activities   All other modalities as needed   Consider adjuncts including dry needling, manual therapy to glenohumeral joint and cervical thoracic regions, aquatic walking with water at chest  Continue cardiovascular training stationary bike, treadmill walking, elliptical with no push/pull of operative extremity    Criteria for progression to the next  phase (Phase IV):  Full, painless active and passive motion at shoulder, elbow, wrist, and hand  Normal  strength restored  Postoperative assessment at 12-week visit with surgeon    Phase IV - Return to Sport/Full (Months 4-6):  This phase may last between 4 to 9 months depending on occupational requirements and patient's functional performance, endurance, and progression    Goals:  Shoulder strength equal bilaterally  Meet occupational requirements by 4 to 6 months   If active duty , pass service fitness test at 9-12 months   Pain free function  Precautions (until rehabilitation is complete):  Avoid low rep/high weight bicep curls   Avoid heavy dead lifting (>185lbs)  Avoid preacher curls    Phase IV Breakdown  Exercises/Progression:  Advanced, specific, functional, and individualized training to achieve phase IV goals    Criteria for discharge from skilled therapy:  Full painless motion at shoulder, elbow, wrist, and hand  Independent with home exercise program     Igor Smith MD              HOME INSTRUCTIONS  [facility] HOME CARE INSTRUCTIONS: POST-OP ANESTHESIA  The medication that you received for sedation or general anesthesia can last up to 24 hours. Your judgment and reflexes may be altered, even if you feel like your normal self.      We Recommend:   Do not drive any motor vehicle or bicycle   Avoid mowing the lawn, playing sports, or working with power tools/applicances (power saws, electric knives or mixers)   That you have someone stay with you on your first night home   Do not drink alcohol or take sleeping pills or tranquilizers   Do not sign legal documents within 24 hours of your procedure   If you had a nerve block for your surgery, take extra care not to put any pressure on your arm or hand for 24 hours    It is normal:  For you to have a sore throat if you had a breathing tube during surgery (while you were asleep!). The sore throat should get better within 48 hours. You can gargle  with warm salt water (1/2 tsp in 4 oz warm water) or use a throat lozenge for comfort  To feel muscle aches or soreness especially in the abdomen, chest or neck. The achy feeling should go away in the next 24 hours  To feel weak, sleepy or \"wiped out\". Your should start feeling better in the next 24 hours.   To experience mild discomforts such as sore lip or tongue, headache, cramps, gas pains or a bloated feeling in your abdomen.   To experience mild back pain or soreness for a day or two if you had spinal or epidural anesthesia.   If you had laparoscopic surgery, to feel shoulder pain or discomfort on the day of surgery.   For some patients to have nausea after surgery/anesthesia    If you feel nausea or experience vomiting:   Try to move around less.   Eat less than usual or drink only liquids until the next morning   Nausea should resolve in about 24 hours    If you have a problem when you are at home:    Call your surgeons office       Discharge Instructions: After Your Surgery  You’ve just had surgery. During surgery, you were given medicine called anesthesia to keep you relaxed and free of pain. After surgery, you may have some pain or nausea. This is common. Here are some tips for feeling better and getting well after surgery.   Going home  Your healthcare provider will show you how to take care of yourself when you go home. They'll also answer your questions. Have an adult family member or friend drive you home. For the first 24 hours after your surgery:   Don't drive or use heavy equipment.  Don't make important decisions or sign legal papers.  Take medicines as directed.  Don't drink alcohol.  Have someone stay with you, if needed. They can watch for problems and help keep you safe.  Be sure to go to all follow-up visits with your healthcare provider. And rest after your surgery for as long as your provider tells you to.   Coping with pain  If you have pain after surgery, pain medicine will help you feel  better. Take it as directed, before pain becomes severe. Also, ask your healthcare provider or pharmacist about other ways to control pain. This might be with heat, ice, or relaxation. And follow any other instructions your surgeon or nurse gives you.      Stay on schedule with your medicine.     Tips for taking pain medicine  To get the best relief possible, remember these points:   Pain medicines can upset your stomach. Taking them with a little food may help.  Most pain relievers taken by mouth need at least 20 to 30 minutes to start to work.  Don't wait till your pain becomes severe before you take your medicine. Try to time your medicine so that you can take it before starting an activity. This might be before you get dressed, go for a walk, or sit down for dinner.  Constipation is a common side effect of some pain medicines. Call your healthcare provider before taking any medicines, such as laxatives or stool softeners, to help ease constipation. Also ask if you should skip any foods. Drinking lots of fluids and eating foods, such as fruits and vegetables, that are high in fiber can also help. Remember, don't take laxatives unless your surgeon has prescribed them.  Drinking alcohol and taking pain medicine can cause dizziness and slow your breathing. It can even be deadly. Don't drink alcohol while taking pain medicine.  Pain medicine can make you react more slowly to things. Don't drive or run machinery while taking pain medicine.  Your healthcare provider may tell you to take acetaminophen to help ease your pain. Ask them how much you're supposed to take each day. Acetaminophen or other pain relievers may interact with your prescription medicines or other over-the-counter (OTC) medicines. Some prescription medicines have acetaminophen and other ingredients in them. Using both prescription and OTC acetaminophen for pain can cause you to accidentally overdose. Read the labels on your OTC medicines with care.  This will help you to clearly know the list of ingredients, how much to take, and any warnings. It may also help you not take too much acetaminophen. If you have questions or don't understand the information, ask your pharmacist or healthcare provider to explain it to you before you take the OTC medicine.   Managing nausea  Some people have an upset stomach (nausea) after surgery. This is often because of anesthesia, pain, or pain medicine, less movement of food in the stomach, or the stress of surgery. These tips will help you handle nausea and eat healthy foods as you get better. If you were on a special food plan before surgery, ask your healthcare provider if you should follow it while you get better. Check with your provider on how your eating should progress. It may depend on the surgery you had. These general tips may help:   Don't push yourself to eat. Your body will tell you when to eat and how much.  Start off with clear liquids and soup. They're easier to digest.  Next try semi-solid foods as you feel ready. These include mashed potatoes, applesauce, and gelatin.  Slowly move to solid foods. Don’t eat fatty, rich, or spicy foods at first.  Don't force yourself to have 3 large meals a day. Instead eat smaller amounts more often.  Take pain medicines with a small amount of solid food, such as crackers or toast. This helps prevent nausea.  When to call your healthcare provider  Call your healthcare provider right away if you have any of these:   You still have too much pain, or the pain gets worse, after taking the medicine. The medicine may not be strong enough. Or there may be a complication from the surgery.  You feel too sleepy, dizzy, or groggy. The medicine may be too strong.  Side effects, such as nausea or vomiting. Your healthcare provider may advise taking other medicines to treat these or may change your treatment plan..  Skin changes, such as rash, itching, or hives. This may mean you have an  allergic reaction. Your provider may advise taking other medicines.  The incision looks different (for instance, part of it opens up).  Bleeding or fluid leaking from the incision site, and you weren't told to expect that.  Fever of 100.4°F (38°C) or higher, or as directed by your healthcare provider.  Call 911  Call 911 right away if you have:   Trouble breathing  Facial swelling    If you have obstructive sleep apnea   You were given anesthesia medicine during surgery to keep you comfortable and free of pain. After surgery, you may have more apnea spells because of this medicine and other medicines you were given. The spells may last longer than normal.    At home:  Keep using the continuous positive airway pressure (CPAP) device when you sleep. Unless your healthcare provider tells you not to, use it when you sleep, day or night. CPAP is a common device used to treat obstructive sleep apnea.  Talk with your provider before taking any pain medicine, muscle relaxants, or sedatives. Your provider will tell you about the possible dangers of taking these medicines.  Contact your provider if your sleeping changes a lot even when taking medicines as directed.  [employer] last reviewed this educational content on 4/1/2024  This information is for informational purposes only. This is not intended to be a substitute for professional medical advice, diagnosis, or treatment. Always seek the advice and follow the directions from your physician or other qualified health care provider.  © 6235-3977 [employer]. All rights reserved. This information is not intended as a substitute for professional medical care. Always follow your healthcare professional's instructions.

## 2025-05-22 NOTE — H&P
Orthopaedic Outpatient  Surgery History & Physical  _______________________________________________________________________________________________________________  _______________________________________________________________________________________________________________    Nunu Guan Patient Status:  Outpatient in a Bed    1970 MRN [id number]   [facility] POST ANESTHESIA CARE UNIT Attending Igor Smith MD     DATE: 2025     CHIEF COMPLAINT: Left shoulder pain     History provided by:patient  HISTORY OF PRESENT ILLNESS: Nunu Guan is a 54 year old female who presents as a patient to outpatient surgery for left shoulder pain. She previously underwent L shouolder RCR but this failed in the past few months with worsening pain and dysfunction. She presents today for operative intervention. Since her last visit, she denies changes to her medica history of symptoms    SOCIAL HISTORY  Social History     Socioeconomic History    Marital status:      Spouse name: Not on file    Number of children: Not on file    Years of education: Not on file    Highest education level: Not on file   Occupational History    Not on file   Tobacco Use    Smoking status: Never    Smokeless tobacco: Never   Vaping Use    Vaping status: Never Used   Substance and Sexual Activity    Alcohol use: Yes     Alcohol/week: 4.0 standard drinks of alcohol     Types: 4 Standard drinks or equivalent per week    Drug use: No    Sexual activity: Not on file   Other Topics Concern     Service Not Asked    Blood Transfusions Not Asked    Caffeine Concern Yes     Comment: coffee, 1 cup daily    Occupational Exposure Not Asked    Hobby Hazards Not Asked    Sleep Concern Not Asked    Stress Concern Not Asked    Weight Concern Not Asked    Special Diet Not Asked    Back Care Not Asked    Exercise Not Asked    Bike Helmet Not Asked    Seat Belt Not Asked    Self-Exams Not Asked   Social  History Narrative    Not on file     Social Drivers of Health     Food Insecurity: Not on file   Transportation Needs: Not on file   Stress: Not on file   Housing Stability: Not on file        PAST MEDICAL HISTORY  Past Medical History[1]     PAST SURGICAL HISTORY  Past Surgical History[2]     MEDICATIONS  Reviewed     ALLERGIES  Allergies[3]     FAMILY HISTORY  Family History[4]     REVIEW OF SYSTEMS  A 14 point review of systems was performed. Pertinent positives and negatives noted in the HPI.    PHYSICAL EXAM  /84 (BP Location: Right arm)   Pulse 74   Temp 98.3 °F (36.8 °C) (Oral)   Resp 14   Ht 5' 2\" (1.575 m)   Wt 197 lb (89.4 kg)   LMP 02/13/2024 (Within Days)   SpO2 95%   BMI 36.03 kg/m²      Constitutional: The patient is well-developed, well-nourished, in no acute distress.  Neurological: Alert and oriented to person, place, and time.  Psychiatric: Mood and affect normal.  Head: Normocephalic and atraumatic.  Cardiovascular: regular rate by palpation  Pulmonary/Chest: Effort normal. No respiratory distress. Breathing non-labored  Abdominal: Abdomen exhibits no distension.   Left  shoulder  INSPECTION/PALPATION  Wounds well healed  Mild  swelling about shoulder  ROM  deferred   MOTOR  Intact to AIN/PIN/U/MSC/Ax motor distributions   SILT SILT M/U/R/Ax distributions  2+ radial pulse, brisk capillary refill     LAB RESULTS  Lab Results   Component Value Date    WBC 4.9 04/29/2025    HGB 14.9 04/29/2025    HCT 43.5 04/29/2025    .0 04/29/2025    CREATSERUM 0.95 04/29/2025    BUN 19 04/29/2025     04/29/2025    K 3.4 (L) 04/29/2025     04/29/2025    CO2 27.0 04/29/2025     (H) 04/29/2025    CA 9.0 04/29/2025    ALB 4.5 04/29/2025    ALKPHO 52 04/29/2025    BILT 0.7 04/29/2025    TP 7.1 04/29/2025    AST 18 04/29/2025    ALT 23 04/29/2025    PTT 27.8 04/29/2025    INR 0.99 04/29/2025    T4F 1.1 11/28/2020    TSH 1.887 04/29/2025    ESRML 7 12/20/2021    CRP 0.40 (H)  12/20/2021    B12 659 02/22/2020       IMAGING  I independently viewed and interpreted the imaging. Radiologist interpretation is available in the imaging report.  MRI: MRI L shoulder demonstrates full thickness, complete tear of the supraspinatus and infraspinatus with retraction to the glenoid      ASSESSMENT/PLAN: Nunu Guan is a 54 year old female who presents for L shoulder recurrent RCT, massive, retracted. Nunu Guan  meets appropriate indications to undergo surgical intervention for the above diagnosis and this option was discussed in detail again today. After discussion of risks, benefits, and alternatives, they elected to proceed with surgery as planned and a witnessed consent form was signed. The operative site was marked with an indelible marker. They were offered the opportunity to ask all additional questions, which were answered to their satisfaction.    NPO for OR  Surgery planned: L shoulder revision rotator cuff repair with allograft augmentation  Planned for: Same Day Discharge  Post-operative medications ordered  PRE-OP WEIGHT BEARING STATUS: Weightbearing as tolerated  POST-OP WEIGHT BEARING PLAN: Nonweightbearing    I have personally seen Nunu Guan and discussed in detail their plan of care.   Please note that this note was written in combination with voice recognition/dictation software and there is a possibility of transcription errors which were not identified at the time of note submission. If clarification is necessary, please contact the author or clinic staff.    Igor Smith MD  Orthopaedic Surgery  5/22/2025           [1]   Past Medical History:   Anxiety state    Clot in the renal vein (HCC)    left lower extremity    Cyst of skin    right abdominal skin cyst January 2016    Deep vein thrombosis (HCC)    hx of DVT in the leg    Deep vein thrombosis (HCC)    Disorder of thyroid    High blood pressure    High cholesterol    Hx of motion sickness     Hypercholesteremia    Hypothyroidism    PONV (postoperative nausea and vomiting)    Unspecified essential hypertension    Visual impairment    wear glasses   [2]   Past Surgical History:  Procedure Laterality Date    Ankle fracture surgery Left 2014    left ankle stress fracture 2014    Arthroscopy of joint unlisted      left knee screw    Colonoscopy N/A 04/28/2022    Procedure: COLONOSCOPY;  Surgeon: Flex Rubi MD;  Location: [facility] ENDOSCOPY    Knee surgery  2001    MCL     Other surgical history  2004    vaginal delivery    Other surgical history  2006    vaginal delivery   [3]   Allergies  Allergen Reactions    Azithromycin DIZZINESS     Other reaction(s): AZITHROMYCIN    Hydrocodone DIZZINESS     Other reaction(s): feels dazed   [4]   Family History  Problem Relation Age of Onset    Hypertension Father         overweight     Other (alzheimers) Father     Diabetes Mother         borderline, overweight     Other (Other) Maternal Grandmother         Karie's Granulomatosis    Cancer Maternal Grandfather         LUNG    Other (Other) Sister         Rheumatoid Arthritis    Colon Cancer Paternal Uncle

## 2025-05-23 NOTE — ANESTHESIA POSTPROCEDURE EVALUATION
Patient: Nunu Guan    Procedure Summary       Date: 05/22/25 Room / Location: [facility] MAIN OR 02 / [facility] MAIN OR    Anesthesia Start: 1556 Anesthesia Stop: 1901    Procedure: Left shoulder revision rotator cuff repair with allograft augmentation (Left: Shoulder) Diagnosis: (Left rotator cuff tear)    Surgeons: Igor Smith MD Anesthesiologist: Chica Rodrigues MD    Anesthesia Type: general, regional ASA Status: 2            Anesthesia Type: general, regional    Vitals Value Taken Time   /75 05/22/25 19:01   Temp 97.3 °F (36.3 °C) 05/22/25 19:00   Pulse 86 05/22/25 19:01   Resp 16 05/22/25 19:01   SpO2 96 % 05/22/25 19:01   Vitals shown include unfiled device data.    [facility] AN Post Evaluation:   Patient Evaluated in PACU  Patient Participation: complete - patient participated  Level of Consciousness: awake and alert  Pain Management: adequate  Airway Patency:patent  Dental exam unchanged from preop  Yes    Nausea/Vomiting: none  Cardiovascular Status: acceptable  Respiratory Status: acceptable  Postoperative Hydration acceptable      CHICA RODRIGUES MD  5/22/2025 7:02 PM

## 2025-05-29 ENCOUNTER — OFFICE VISIT (OUTPATIENT)
Dept: PHYSICAL THERAPY | Age: 55
End: 2025-05-29
Attending: STUDENT IN AN ORGANIZED HEALTH CARE EDUCATION/TRAINING PROGRAM
Payer: COMMERCIAL

## 2025-05-29 DIAGNOSIS — M75.122 COMPLETE TEAR OF LEFT ROTATOR CUFF, UNSPECIFIED WHETHER TRAUMATIC: Primary | ICD-10-CM

## 2025-05-29 PROCEDURE — 97110 THERAPEUTIC EXERCISES: CPT | Performed by: PHYSICAL THERAPIST

## 2025-05-29 PROCEDURE — 97162 PT EVAL MOD COMPLEX 30 MIN: CPT | Performed by: PHYSICAL THERAPIST

## 2025-05-29 NOTE — PROGRESS NOTES
UPPER EXTREMITY EVALUATION:     Diagnosis:   Pre/post L revision rotator cuff repair. Please eval and treat, massive protocol Patient:  Nunu Guan (54 year old, female)        Referring Provider: Igor Smith  Today's Date   5/29/2025    Precautions:     No PROM until 6 weeks-8 weeks.  No AROM until 8 weeks Date of Evaluation: 05/29/25  Next MD visit: 6/6/2025  Date of Surgery: 5/22/2025     PATIENT SUMMARY   Summary of chief complaints:  pain and unable to move arm  History of current condition: Aug 2024 had a repair to RC .  Tried therapy and injection prior to surgery.  Then had PT after and the shoulder was not getting stronger and then became more painful and the strength wasnt there .  APril 11, 2025 had an MRI and the RC tore at site of surgery.  Then had surgery again.   Pain level: current 2 /10, at best 0 /10, at worst 2 /10  Description of symptoms: Throbbing   Occupation: Teacher - off for the summer   Leisure activities/Hobbies: swimming   Prior level of function: prior to first ssurgery was able to do move the arms overhead.  ALso had pain on the R shoulder  Current limitations: any use of L arm with activities.  Pt goals: 1. Return to normal activities with the L arm.  Hand Dominance: right   Past medical history was reviewed with Nunu.    Imaging/Tests:     Nunu  has a past medical history of Anxiety state, Clot in the renal vein (HCC) (01/01/2014), Cyst of skin, Deep vein thrombosis (HCC), Deep vein thrombosis (HCC), Disorder of thyroid, High blood pressure, High cholesterol, motion sickness, Hypercholesteremia (01/30/2020), Hypothyroidism, PONV (postoperative nausea and vomiting), Unspecified essential hypertension, and Visual impairment.  She  has a past surgical history that includes knee surgery (2001); ankle fracture surgery (Left, 2014); other surgical history (2004); other surgical history (2006); colonoscopy (N/A, 04/28/2022); and arthroscopy of joint  unlisted.    ASSESSMENT  Nunu presents to physical therapy evaluation with primary c/o  inability to use L arm and pain. The results of the objective tests and measures show impaired ROM L to R shoulder, impaired strength L to R shoulder, and c/o pain. Functional deficits include but are not limited to any use of L arm with activities.. Signs and symptoms are consistent with diagnosis of Pre/post L revision rotator cuff repair. Please eval and treat, massive protocol. Pt and PT discussed evaluation findings, pathology, POC and HEP.  Pt voiced understanding and performs HEP correctly without reported pain. Skilled Physical Therapy is medically necessary to address the above impairments and reach functional goals.  OBJECTIVE:    Musculoskeletal  Observation/Posture: forward head posture, forward shoulders  Palpation:  Tenderness R/L UT/cervical paraspinals        ROM and Strength (* denotes performed with pain)  Shoulder   ROM MMT (-/5)    R L R L     Flex 160  NA for all motions- no PROM for 6 weeks 5/5  NA for all motions     Ext 62   5/5       Abd (C5) 160   5/5       IR lower thoracic   5/5       ER     5/5       Low Trap n/a         Mid Trap n/a         SA n/a             Neurological:  Sensation: intact      Today's Treatment and Response:   Pt education was provided on exam findings, treatment diagnosis, treatment plan, expectations, and prognosis.  Today's Treatment       5/29/2025   UE Treatment   Therapeutic Exercise Minutes 15   Evaluation Minutes 30   Total Time Of Timed Procedures 15   Total Time Of Service-Based Procedures 30   Total Treatment Time 45   HEP Pendulum small motion - out of sling  Scapular retractions - in sling  Shoulder shrugs up and back- in sling  Reviewed elbow flex/ext- out of sling  Wrist flex/ext- in sling  Hand open/close- in sling  STM to R/L upper trap - may be done at home.    Cervical ROM rot R/L, SB R/L- in sling  Reviewed use of sling at all times except for motion  exercises of elbow/pendulum  Use of ice during day        Patient was instructed in and issued a HEP for: Pendulum small motion - out of sling  Scapular retractions - in sling  Shoulder shrugs up and back- in sling  Reviewed elbow flex/ext- out of sling  Wrist flex/ext- in sling  Hand open/close- in sling  STM to R/L upper trap - may be done at home.    Cervical ROM rot R/L, SB R/L- in sling  Reviewed use of sling at all times except for motion exercises of elbow/pendulum  Use of ice during day    Charges:  PT EVAL:  ,  Ex 1  In agreement with evaluation findings and clinical rationale, this evaluation involved MODERATE COMPLEXITY decision making due to 1-2 personal factors/comorbidities, 3 or more body structures involved/activity limitations, and evolving symptoms as documented in the evaluation.                                                          PLAN OF CARE:    Goals: (to be met in 20 visits)   Patient to be able to raise arm within 5-10 deg of all motions of R arm or better to be able to reach easily for daily activities.   Patient to report no pain at the shoulder with return to daily activities.  Patient to have strength at 4/5 or greater all motions of shoulder/scapular region to be able to return to daily activities.   Follow massive RC repair guidelines.     Frequency / Duration: Patient will be seen 1-2x/week or a total of 20  visits over a 90 day period. Treatment will include: Manual Therapy; Therapeutic Exercise; Home Exercise Program instruction; Self-Care Home Management    Education or treatment limitation: None   Rehab Potential: good     QuickDASH Outcome Score  Score: (Patient-Rptd) 47.73 % (5/22/2025  8:27 AM)      Patient was advised of these findings, precautions, and treatment options and has agreed to actively participate in planning and for this course of care.    Thank you for your referral. Please co-sign or sign and return this letter via fax as soon as possible to [phone]. If  you have any questions, please contact me at Dept: [phone]    Sincerely,  Electronically signed by therapist: Supriya Kurtz PT  Physician's certification required: Yes  I certify the need for these services furnished under this plan of treatment and while under my care.    X___________________________________________________ Date____________________    Certification From: 5/29/2025  To:8/27/2025

## 2025-06-02 ENCOUNTER — OFFICE VISIT (OUTPATIENT)
Dept: PHYSICAL THERAPY | Age: 55
End: 2025-06-02
Attending: STUDENT IN AN ORGANIZED HEALTH CARE EDUCATION/TRAINING PROGRAM
Payer: COMMERCIAL

## 2025-06-02 PROCEDURE — 97110 THERAPEUTIC EXERCISES: CPT | Performed by: PHYSICAL THERAPIST

## 2025-06-02 PROCEDURE — 97140 MANUAL THERAPY 1/> REGIONS: CPT | Performed by: PHYSICAL THERAPIST

## 2025-06-02 NOTE — PROGRESS NOTES
Patient: Nunu Guan (54 year old, female) Referring Provider:  Insurance:   Diagnosis: Pre/post L revision rotator cuff repair. Please eval and treat, massive protocol Igor Smith  [facility] HMO   Date of Surgery: 5/22/2025 Next MD visit:  N/A   Precautions:    6/6/2025 Referral Information:    Date of Evaluation: Req: 22, Auth: 22, Exp: 7/31/2025 05/29/25 POC Auth Visits:  22       Today's Date   6/2/2025    Subjective  Patient reports stopped the Gabapentin and mm relaxants now, just taking Ibuprofen. States some light pain at shoulder.          Pain: 2/10     Objective  see outpatient daily record          Assessment  continued with STM, light ROM scapular region/cervical spine, elbow/hand work and codmans.    Goals (to be met in 22 visits)   Patient to be able to raise arm within 5-10 deg of all motions of R arm or better to be able to reach easily for daily activities.   Patient to report no pain at the shoulder with return to daily activities.  Patient to have strength at 4/5 or greater all motions of shoulder/scapular region to be able to return to daily activities.   Follow massive RC repair guidelines.       Plan  Continue per protocol for massive tear.    Treatment Last 4 Visits  Treatment Day: 2       3/10/2025 4/7/2025 5/29/2025 6/2/2025   UE Treatment   Therapeutic Exercise S/L L shoulder ER 0# x 10 reps - PT min A for increased excursion initial few reps  (HEP review for independent completion), L 0# x 3 reps, 1# x 10 reps    Pt prefers to avoid prone interventions.     S/L L/R shoulder horiz abd 0# x 3 reps, 1# x 10 reps     Seated thoracic spine ext/pec stretch 5 x 5 sec each     B row using blue tubing x 20 reps     2 # ball reach overhead W x 4 reps    HEP review; see below.      HEP review; see below.     Supine Shoulder AAROM flexion with 2#  on wand x 10 reps    Standing L/R shoulder ER using YTB x 10 reps    Standing L/R shoulder IR using YTB x 10 reps - pt c/o some discomfort L   shoulder     B row using yellow tubing x 20 reps    W  wall slide using 2# ball x 4 reps    Hand behind back IR AAROM using strap L 5 x 5 sec each    Supine B shoulder horiz abd using YTB x 10 reps, PT clinic all out of RTB       Supine with sling - STM cervical spine/UT, light ROM/stretch  Elbow AROM flex/ext x 10  Wrist AROM flex/ext x 10  Open close hand x 10  Codmans x 45 seconds  Sitting - scapular retractions x 10  Shoulder shrugs back x 10  Cervical AROM Rot R/L x 5 each, SB R/L x 5 each   Manual Therapy L GH inferior glides grade II    Pt has declined post GH glides, pec stretch     PROM L shoulder IR, flex/abd/IR each WNL, not completed R    STM B upper trap PROM L/R shoulder flex, abd, IR each WNL - PROM not completed; ER WNL R, c/o L     Therapeutic Activity Sleeping posture/pillows s/l and supine for neutral cervical spine, modified s/l position, shoulder across chest in effort to facilitate s/l sleeping as well itzel    May consider Theracaes use for muscular c/o, practiced. Avoid symptoms/pain with ADL, there ex     Therapeutic Exercise Minutes 25 38 15 20   Manual Therapy Minutes 8 1  10   Therapeutic Activity Minutes 10 1     Evaluation Minutes   30    Total Time Of Timed Procedures 43 40 15 30   Total Time Of Service-Based Procedures 0 0 30 0   Total Treatment Time 43 40 45 30   HEP s/l shoulder ER  or standing using RTB/YTB; supine B shoulder horiz abd using RTB/GTB, - progress to standing,  L/R elbow flexion <-> overhead reach  0 - 1# or diagonal pull aparts        s/l shoulder ER  or standing using RTB/YTB*; supine B shoulder horiz abd using RTB/GTB, - progress to standing,  L/R elbow flexion <-> overhead reach  0 - 1# or diagonal pull aparts - consider supine*, * = priority exercises.  IR AAROM with belt as needed, resisted IR using YTB as well itzel/appropriate      Stop if c/o   Pendulum small motion - out of sling  Scapular retractions - in sling  Shoulder shrugs up and back- in sling  Reviewed  elbow flex/ext- out of sling  Wrist flex/ext- in sling  Hand open/close- in sling  STM to R/L upper trap - may be done at home.    Cervical ROM rot R/L, SB R/L- in sling  Reviewed use of sling at all times except for motion exercises of elbow/pendulum  Use of ice during day         HEP  Pendulum small motion - out of sling  Scapular retractions - in sling  Shoulder shrugs up and back- in sling  Reviewed elbow flex/ext- out of sling  Wrist flex/ext- in sling  Hand open/close- in sling  STM to R/L upper trap - may be done at home.    Cervical ROM rot R/L, SB R/L- in sling  Reviewed use of sling at all times except for motion exercises of elbow/pendulum  Use of ice during day    Charges      MM, Ex 1

## 2025-06-04 ENCOUNTER — MED REC SCAN ONLY (OUTPATIENT)
Dept: INTERNAL MEDICINE CLINIC | Facility: CLINIC | Age: 55
End: 2025-06-04

## 2025-06-05 ENCOUNTER — OFFICE VISIT (OUTPATIENT)
Dept: PHYSICAL THERAPY | Age: 55
End: 2025-06-05
Attending: STUDENT IN AN ORGANIZED HEALTH CARE EDUCATION/TRAINING PROGRAM
Payer: COMMERCIAL

## 2025-06-05 PROCEDURE — 97110 THERAPEUTIC EXERCISES: CPT | Performed by: PHYSICAL THERAPIST

## 2025-06-05 NOTE — PROGRESS NOTES
Patient: Nunu Guan (54 year old, female) Referring Provider:  Insurance:   Diagnosis: Pre/post L revision rotator cuff repair. Please eval and treat, [facility] protocol [facility]   Date of Surgery: 5/22/2025 Next MD visit:  N/A   Precautions:   Advance per protocol 6/6/2025 Referral Information:    Date of Evaluation: Req: 22, Auth: 22, Exp: 7/31/2025 05/29/25 POC Auth Visits:  22       Today's Date   6/5/2025    Subjective  Patient reports the shoulder is doing good today.  Has gone back to using the mm relaxant as was having difficulty sleeping at night.  Now sleeping better at night.  Using Ibuprofin in the day       Pain: 2/10     Objective  see outpatient daily record          Assessment   Patient without complaints during session.  Continued work at neck/elbow/wrist.  Patient felt more comfortable sitting with lumbar support.  Gave information regarding obtaining one for home.      Goals (to be met in 22 visits)   Patient to be able to raise arm within 5-10 deg of all motions of R arm or better to be able to reach easily for daily activities.   Patient to report no pain at the shoulder with return to daily activities.  Patient to have strength at 4/5 or greater all motions of shoulder/scapular region to be able to return to daily activities.   Follow massive RC repair guidelines.         Plan   Continue per protocol    Treatment Last 4 Visits  Treatment Day: 3       4/7/2025 5/29/2025 6/2/2025 6/5/2025   UE Treatment   Therapeutic Exercise HEP review; see below.     Supine Shoulder AAROM flexion with 2#  on wand x 10 reps    Standing L/R shoulder ER using YTB x 10 reps    Standing L/R shoulder IR using YTB x 10 reps - pt c/o some discomfort L  shoulder     B row using yellow tubing x 20 reps    W  wall slide using 2# ball x 4 reps    Hand behind back IR AAROM using strap L 5 x 5 sec each    Supine B shoulder horiz abd using YTB x 10 reps, PT clinic all out of RTB       Supine with  sling - STM cervical spine/UT, light ROM/stretch  Elbow AROM flex/ext x 10  Wrist AROM flex/ext x 10  Open close hand x 10  Codmans x 45 seconds  Sitting - scapular retractions x 10  Shoulder shrugs back x 10  Cervical AROM Rot R/L x 5 each, SB R/L x 5 each Codmans x 1 min Supine with towel under arm-STM cervical spine/UT, light ROM/stretch  Elbow AROM flex/ext x 10  Wrist AROM flex/ext x 10  Open close hand x 10  Sitting - scapular retractions x 15  Shoulder shrugs back x 15  Cervical AROM Rot R/L x 5 each, SB R/L x 5 each  Seated cervical retractions x 10   Manual Therapy PROM L/R shoulder flex, abd, IR each WNL - PROM not completed; ER WNL R, c/o L      Therapeutic Activity Avoid symptoms/pain with ADL, there ex      Therapeutic Exercise Minutes 38 15 20 30   Manual Therapy Minutes 1  10    Therapeutic Activity Minutes 1      Evaluation Minutes  30     Total Time Of Timed Procedures 40 15 30 30   Total Time Of Service-Based Procedures 0 30 0 0   Total Treatment Time 40 45 30 30   HEP   s/l shoulder ER  or standing using RTB/YTB*; supine B shoulder horiz abd using RTB/GTB, - progress to standing,  L/R elbow flexion <-> overhead reach  0 - 1# or diagonal pull aparts - consider supine*, * = priority exercises.  IR AAROM with belt as needed, resisted IR using YTB as well itzel/appropriate      Stop if c/o   Pendulum small motion - out of sling  Scapular retractions - in sling  Shoulder shrugs up and back- in sling  Reviewed elbow flex/ext- out of sling  Wrist flex/ext- in sling  Hand open/close- in sling  STM to R/L upper trap - may be done at home.    Cervical ROM rot R/L, SB R/L- in sling  Reviewed use of sling at all times except for motion exercises of elbow/pendulum  Use of ice during day          HEP  Pendulum small motion - out of sling  Scapular retractions - in sling  Shoulder shrugs up and back- in sling  Reviewed elbow flex/ext- out of sling  Wrist flex/ext- in sling  Hand open/close- in sling  STM to R/L  upper trap - may be done at home.    Cervical ROM rot R/L, SB R/L- in sling  Reviewed use of sling at all times except for motion exercises of elbow/pendulum  Use of ice during day    Charges   Ex 2

## 2025-06-06 DIAGNOSIS — Z47.89 ORTHOPEDIC AFTERCARE: Primary | ICD-10-CM

## 2025-06-06 RX ORDER — METHOCARBAMOL 750 MG/1
750 TABLET, FILM COATED ORAL 3 TIMES DAILY
Qty: 42 TABLET | Refills: 0 | Status: SHIPPED | OUTPATIENT
Start: 2025-06-06

## 2025-06-09 ENCOUNTER — OFFICE VISIT (OUTPATIENT)
Dept: PHYSICAL THERAPY | Age: 55
End: 2025-06-09
Attending: STUDENT IN AN ORGANIZED HEALTH CARE EDUCATION/TRAINING PROGRAM
Payer: COMMERCIAL

## 2025-06-09 PROCEDURE — 97110 THERAPEUTIC EXERCISES: CPT | Performed by: PHYSICAL THERAPIST

## 2025-06-09 NOTE — PROGRESS NOTES
Patient: Nunu Guan (54 year old, female) Referring Provider:  Insurance:   Diagnosis: Pre/post L revision rotator cuff repair. Please eval and treat, [facility] protocol [facility]   Date of Surgery: 5/22/2025 Next MD visit:  N/A   Precautions:    6/6/2025 Referral Information:    Date of Evaluation: Req: 22, Auth: 22, Exp: 7/31/2025 05/29/25 POC Auth Visits:  22       Today's Date   6/9/2025    Subjective  Patient reports did see MD, can take off sling in 6 weeks.  She will verify the date.  States MD would like 3 x week after that time.       Pain: 0/10     Objective  see outpatient daily record          Assessment  continued with STM, light ROM scapular region/cervical spine, elbow/hand work and codmans.    Goals (to be met in 22 visits)   Patient to be able to raise arm within 5-10 deg of all motions of R arm or better to be able to reach easily for daily activities.   Patient to report no pain at the shoulder with return to daily activities.  Patient to have strength at 4/5 or greater all motions of shoulder/scapular region to be able to return to daily activities.   Follow massive RC repair guidelines.           Plan  Continue per protocol for massive tear.    Treatment Last 4 Visits  Treatment Day: 13 4/7/2025 5/29/2025 6/2/2025 6/5/2025   UE Treatment   Therapeutic Exercise HEP review; see below.     Supine Shoulder AAROM flexion with 2#  on wand x 10 reps    Standing L/R shoulder ER using YTB x 10 reps    Standing L/R shoulder IR using YTB x 10 reps - pt c/o some discomfort L  shoulder     B row using yellow tubing x 20 reps    W  wall slide using 2# ball x 4 reps    Hand behind back IR AAROM using strap L 5 x 5 sec each    Supine B shoulder horiz abd using YTB x 10 reps, PT clinic all out of RTB       Supine with sling - STM cervical spine/UT, light ROM/stretch  Elbow AROM flex/ext x 10  Wrist AROM flex/ext x 10  Open close hand x 10  Codmans x 45 seconds  Sitting -  scapular retractions x 10  Shoulder shrugs back x 10  Cervical AROM Rot R/L x 5 each, SB R/L x 5 each Codmans x 1 min Supine with towel under arm-STM cervical spine/UT, light ROM/stretch  Elbow AROM flex/ext x 10  Wrist AROM flex/ext x 10  Open close hand x 10  Sitting - scapular retractions x 15  Shoulder shrugs back x 15  Cervical AROM Rot R/L x 5 each, SB R/L x 5 each  Seated cervical retractions x 10   Manual Therapy PROM L/R shoulder flex, abd, IR each WNL - PROM not completed; ER WNL R, c/o L      Therapeutic Activity Avoid symptoms/pain with ADL, there ex      Therapeutic Exercise Minutes 38 15 20 30   Manual Therapy Minutes 1  10    Therapeutic Activity Minutes 1      Evaluation Minutes  30     Total Time Of Timed Procedures 40 15 30 30   Total Time Of Service-Based Procedures 0 30 0 0   Total Treatment Time 40 45 30 30   HEP   s/l shoulder ER  or standing using RTB/YTB*; supine B shoulder horiz abd using RTB/GTB, - progress to standing,  L/R elbow flexion <-> overhead reach  0 - 1# or diagonal pull aparts - consider supine*, * = priority exercises.  IR AAROM with belt as needed, resisted IR using YTB as well itzel/appropriate      Stop if c/o   Pendulum small motion - out of sling  Scapular retractions - in sling  Shoulder shrugs up and back- in sling  Reviewed elbow flex/ext- out of sling  Wrist flex/ext- in sling  Hand open/close- in sling  STM to R/L upper trap - may be done at home.    Cervical ROM rot R/L, SB R/L- in sling  Reviewed use of sling at all times except for motion exercises of elbow/pendulum  Use of ice during day          HEP  Pendulum small motion - out of sling  Scapular retractions - in sling  Shoulder shrugs up and back- in sling  Reviewed elbow flex/ext- out of sling  Wrist flex/ext- in sling  Hand open/close- in sling  STM to R/L upper trap - may be done at home.    Cervical ROM rot R/L, SB R/L- in sling  Reviewed use of sling at all times except for motion exercises of  elbow/pendulum  Use of ice during day    Charges      Ex 2

## 2025-06-11 ENCOUNTER — OFFICE VISIT (OUTPATIENT)
Dept: PHYSICAL THERAPY | Age: 55
End: 2025-06-11
Attending: STUDENT IN AN ORGANIZED HEALTH CARE EDUCATION/TRAINING PROGRAM
Payer: COMMERCIAL

## 2025-06-11 PROCEDURE — 97110 THERAPEUTIC EXERCISES: CPT | Performed by: PHYSICAL THERAPIST

## 2025-06-11 NOTE — PROGRESS NOTES
Patient: Nunu Guan (54 year old, female) Referring Provider:  Insurance:   Diagnosis: Pre/post L revision rotator cuff repair. Please eval and treat, [facility] protocol [facility]   Date of Surgery: 5/22/2025 Next MD visit:  N/A   Precautions:    6/6/2025 Referral Information:    Date of Evaluation: Req: 22, Auth: 22, Exp: 7/31/2025 05/29/25 POC Auth Visits:  22       Today's Date   6/11/2025    Subjective  Patient without complaints.  Doing well       Pain: 0/10     Objective  see outpatient daily record          Assessment   Continuing to work on STM cervical region, AROM elbow/wrist, scapular motions, cervical motions and posture.     Goals (to be met in 22 visits)   Patient to be able to raise arm within 5-10 deg of all motions of R arm or better to be able to reach easily for daily activities.   Patient to report no pain at the shoulder with return to daily activities.  Patient to have strength at 4/5 or greater all motions of shoulder/scapular region to be able to return to daily activities.   Follow massive RC repair guidelines.             Plan  Continue per protocol for massive tear.    Treatment Last 4 Visits  Treatment Day: 14       5/29/2025 6/2/2025 6/5/2025 6/11/2025   UE Treatment   Therapeutic Exercise  Supine with sling - STM cervical spine/UT, light ROM/stretch  Elbow AROM flex/ext x 10  Wrist AROM flex/ext x 10  Open close hand x 10  Codmans x 45 seconds  Sitting - scapular retractions x 10  Shoulder shrugs back x 10  Cervical AROM Rot R/L x 5 each, SB R/L x 5 each Codmans x 1 min Supine with towel under arm-STM cervical spine/UT, light ROM/stretch  Elbow AROM flex/ext x 10  Wrist AROM flex/ext x 10  Open close hand x 10  Sitting - scapular retractions x 15  Shoulder shrugs back x 15  Cervical AROM Rot R/L x 5 each, SB R/L x 5 each  Seated cervical retractions x 10 Codmans x 1 min Supine with towel under arm-STM cervical spine/UT, light ROM/stretch  Elbow AROM flex/ext  x 10  Wrist AROM flex/ext x 10  Open close hand x 10  Sitting - scapular retractions x 15  Shoulder shrugs back x 15  Cervical AROM Rot R/L x 5 each, SB R/L x 5 each  Seated cervical retractions x 10   Therapeutic Exercise Minutes 15 20 30 30   Manual Therapy Minutes  10     Evaluation Minutes 30      Total Time Of Timed Procedures 15 30 30 30   Total Time Of Service-Based Procedures 30 0 0 0   Total Treatment Time 45 30 30 30   HEP Pendulum small motion - out of sling  Scapular retractions - in sling  Shoulder shrugs up and back- in sling  Reviewed elbow flex/ext- out of sling  Wrist flex/ext- in sling  Hand open/close- in sling  STM to R/L upper trap - may be done at home.    Cervical ROM rot R/L, SB R/L- in sling  Reviewed use of sling at all times except for motion exercises of elbow/pendulum  Use of ice during day           HEP  Pendulum small motion - out of sling  Scapular retractions - in sling  Shoulder shrugs up and back- in sling  Reviewed elbow flex/ext- out of sling  Wrist flex/ext- in sling  Hand open/close- in sling  STM to R/L upper trap - may be done at home.    Cervical ROM rot R/L, SB R/L- in sling  Reviewed use of sling at all times except for motion exercises of elbow/pendulum  Use of ice during day    Charges     Ex 2

## 2025-06-16 ENCOUNTER — OFFICE VISIT (OUTPATIENT)
Dept: PHYSICAL THERAPY | Age: 55
End: 2025-06-16
Attending: STUDENT IN AN ORGANIZED HEALTH CARE EDUCATION/TRAINING PROGRAM
Payer: COMMERCIAL

## 2025-06-16 PROCEDURE — 97140 MANUAL THERAPY 1/> REGIONS: CPT | Performed by: PHYSICAL THERAPIST

## 2025-06-16 PROCEDURE — 97110 THERAPEUTIC EXERCISES: CPT | Performed by: PHYSICAL THERAPIST

## 2025-06-16 NOTE — PROGRESS NOTES
Patient: Nunu Guan (54 year old, female) Referring Provider:  Insurance:   Diagnosis: Pre/post L revision rotator cuff repair. Please eval and treat, massive protocol [facility]   Date of Surgery: 5/22/2025 Next MD visit:  N/A   Precautions:    6/6/2025 Referral Information:    Date of Evaluation: Req: 22, Auth: 22, Exp: 7/31/2025 05/29/25 POC Auth Visits:  22       Today's Date   6/16/2025    Subjective  Patient reports has been working on her posture and did get a better chair at home which has helped her neck. Tried to be off medicine completely but was too achy.  Taking Ibuprofin during day       Pain: 0/10     Objective  see outpatient daily record          Assessment  continued with STM, light ROM scapular region/cervical spine, elbow/hand work and codmans.    Goals (to be met in 40 visits)   Patient to be able to raise arm within 5-10 deg of all motions of R arm or better to be able to reach easily for daily activities.   Patient to report no pain at the shoulder with return to daily activities.  Patient to have strength at 4/5 or greater all motions of shoulder/scapular region to be able to return to daily activities.   Follow massive RC repair guidelines.               Plan  Continue per protocol for massive tear.    Treatment Last 4 Visits  Treatment Day: 15       6/2/2025 6/5/2025 6/11/2025 6/16/2025   UE Treatment   Therapeutic Exercise Supine with sling - STM cervical spine/UT, light ROM/stretch  Elbow AROM flex/ext x 10  Wrist AROM flex/ext x 10  Open close hand x 10  Codmans x 45 seconds  Sitting - scapular retractions x 10  Shoulder shrugs back x 10  Cervical AROM Rot R/L x 5 each, SB R/L x 5 each Codmans x 1 min Supine with towel under arm-STM cervical spine/UT, light ROM/stretch  Elbow AROM flex/ext x 10  Wrist AROM flex/ext x 10  Open close hand x 10  Sitting - scapular retractions x 15  Shoulder shrugs back x 15  Cervical AROM Rot R/L x 5 each, SB R/L x 5  each  Seated cervical retractions x 10 Codmans x 1 min Supine with towel under arm-STM cervical spine/UT, light ROM/stretch  Elbow AROM flex/ext x 10  Wrist AROM flex/ext x 10  Open close hand x 10  Sitting - scapular retractions x 15  Shoulder shrugs back x 15  Cervical AROM Rot R/L x 5 each, SB R/L x 5 each  Seated cervical retractions x 10 Codmans x 1 min Supine with towel under arm-STM cervical spine/UT, light ROM/stretch  Elbow AROM flex/ext x 10  Wrist AROM flex/ext x 10  Open close hand x 10  Sitting - scapular retractions x 15  Shoulder shrugs back x 15  Cervical AROM Rot R/L x 5 each, SB R/L x 5 each  Seated cervical retractions x 10   Therapeutic Exercise Minutes 20 30 30 18   Manual Therapy Minutes 10   12   Total Time Of Timed Procedures 30 30 30 30   Total Time Of Service-Based Procedures 0 0 0 0   Total Treatment Time 30 30 30 30        HEP  Pendulum small motion - out of sling  Scapular retractions - in sling  Shoulder shrugs up and back- in sling  Reviewed elbow flex/ext- out of sling  Wrist flex/ext- in sling  Hand open/close- in sling  STM to R/L upper trap - may be done at home.    Cervical ROM rot R/L, SB R/L- in sling  Reviewed use of sling at all times except for motion exercises of elbow/pendulum  Use of ice during day    Charges      Ex, MM

## 2025-06-18 ENCOUNTER — OFFICE VISIT (OUTPATIENT)
Dept: PHYSICAL THERAPY | Age: 55
End: 2025-06-18
Attending: STUDENT IN AN ORGANIZED HEALTH CARE EDUCATION/TRAINING PROGRAM
Payer: COMMERCIAL

## 2025-06-18 PROCEDURE — 97110 THERAPEUTIC EXERCISES: CPT | Performed by: PHYSICAL THERAPIST

## 2025-06-18 PROCEDURE — 97140 MANUAL THERAPY 1/> REGIONS: CPT | Performed by: PHYSICAL THERAPIST

## 2025-06-18 NOTE — PROGRESS NOTES
Patient: Nunu Guan (54 year old, female) Referring Provider:  Insurance:   Diagnosis: Pre/post L revision rotator cuff repair. Please eval and treat, [facility] protocol [facility]   Date of Surgery: 5/22/2025 Next MD visit:  N/A   Precautions:   Per massive RC repair 6/6/2025 Referral Information:    Date of Evaluation: Req: 22, Auth: 22, Exp: 7/31/2025 05/29/25 POC Auth Visits:  22       Today's Date   6/18/2025    Subjective   Patient reports doing well , not taking too much medication.         Pain: 1/10     Objective       See outpatient daily record.      Assessment   Continued to work STM, stretches UT/scalenes, STM cervical paraspinals, sub occipital region, scapular mobs, cervical ROM and stretches. Restriction noted in UT bilaterally.      Goals (to be met in 40 visits)   Patient to be able to raise arm within 5-10 deg of all motions of R arm or better to be able to reach easily for daily activities.   Patient to report no pain at the shoulder with return to daily activities.  Patient to have strength at 4/5 or greater all motions of shoulder/scapular region to be able to return to daily activities.   Follow massive RC repair guidelines.                 Plan   Continue as noted.  Able to start light PROM at 6 weeks.    Treatment Last 4 Visits  Treatment Day: 16 6/5/2025 6/11/2025 6/16/2025 6/18/2025   UE Treatment   Therapeutic Exercise Codmans x 1 min Supine with towel under arm-STM cervical spine/UT, light ROM/stretch  Elbow AROM flex/ext x 10  Wrist AROM flex/ext x 10  Open close hand x 10  Sitting - scapular retractions x 15  Shoulder shrugs back x 15  Cervical AROM Rot R/L x 5 each, SB R/L x 5 each  Seated cervical retractions x 10 Codmans x 1 min Supine with towel under arm-STM cervical spine/UT, light ROM/stretch  Elbow AROM flex/ext x 10  Wrist AROM flex/ext x 10  Open close hand x 10  Sitting - scapular retractions x 15  Shoulder shrugs back x 15  Cervical AROM Rot  R/L x 5 each, SB R/L x 5 each  Seated cervical retractions x 10 Codmans x 1 min Supine with towel under arm-STM cervical spine/UT, light ROM/stretch  Elbow AROM flex/ext x 10  Wrist AROM flex/ext x 10  Open close hand x 10  Sitting - scapular retractions x 15  Shoulder shrugs back x 15  Cervical AROM Rot R/L x 5 each, SB R/L x 5 each  Seated cervical retractions x 10 Codmans x 1 min Supine with towel under arm-STM cervical spine/UT, scalenes, light ROM/stretching.   Elbow AROM flex/ext x 15  Wrist AROM flex/ext x 15    Sitting - scapular retractions x 15  Shoulder shrugs back x 15  Cervical AROM Rot R/L x 10 each, SB R/L x 10 each  Upper trap active stretch sitting R/L   Seated cervical retractions x 10   Therapeutic Exercise Minutes 30 30 18 10   Manual Therapy Minutes   12 20   Total Time Of Timed Procedures 30 30 30 30   Total Time Of Service-Based Procedures 0 0 0 0   Total Treatment Time 30 30 30 30        HEP  Pendulum small motion - out of sling  Scapular retractions - in sling  Shoulder shrugs up and back- in sling  Reviewed elbow flex/ext- out of sling  Wrist flex/ext- in sling  Hand open/close- in sling  STM to R/L upper trap - may be done at home.    Cervical ROM rot R/L, SB R/L- in sling  Reviewed use of sling at all times except for motion exercises of elbow/pendulum  Use of ice during day    Charges      MM, Ex

## 2025-06-20 ENCOUNTER — OFFICE VISIT (OUTPATIENT)
Dept: SURGERY | Facility: CLINIC | Age: 55
End: 2025-06-20
Payer: COMMERCIAL

## 2025-06-20 VITALS
OXYGEN SATURATION: 98 % | BODY MASS INDEX: 36.13 KG/M2 | WEIGHT: 201.38 LBS | HEART RATE: 94 BPM | DIASTOLIC BLOOD PRESSURE: 80 MMHG | HEIGHT: 62.5 IN | SYSTOLIC BLOOD PRESSURE: 110 MMHG

## 2025-06-20 DIAGNOSIS — E78.00 HYPERCHOLESTEREMIA: ICD-10-CM

## 2025-06-20 DIAGNOSIS — F43.9 STRESS: ICD-10-CM

## 2025-06-20 DIAGNOSIS — R73.03 PREDIABETES: ICD-10-CM

## 2025-06-20 DIAGNOSIS — I10 PRIMARY HYPERTENSION: Primary | ICD-10-CM

## 2025-06-20 DIAGNOSIS — Z86.718 HISTORY OF DVT (DEEP VEIN THROMBOSIS): ICD-10-CM

## 2025-06-20 DIAGNOSIS — E03.9 ACQUIRED HYPOTHYROIDISM: ICD-10-CM

## 2025-06-20 DIAGNOSIS — E55.9 VITAMIN D DEFICIENCY: ICD-10-CM

## 2025-06-20 DIAGNOSIS — E66.9 OBESITY (BMI 30-39.9): ICD-10-CM

## 2025-06-20 DIAGNOSIS — Z51.81 ENCOUNTER FOR THERAPEUTIC DRUG MONITORING: ICD-10-CM

## 2025-06-20 PROCEDURE — 3079F DIAST BP 80-89 MM HG: CPT | Performed by: NURSE PRACTITIONER

## 2025-06-20 PROCEDURE — 3074F SYST BP LT 130 MM HG: CPT | Performed by: NURSE PRACTITIONER

## 2025-06-20 PROCEDURE — 3008F BODY MASS INDEX DOCD: CPT | Performed by: NURSE PRACTITIONER

## 2025-06-20 PROCEDURE — 99214 OFFICE O/P EST MOD 30 MIN: CPT | Performed by: NURSE PRACTITIONER

## 2025-06-20 RX ORDER — PHENTERMINE HYDROCHLORIDE 37.5 MG/1
37.5 TABLET ORAL
Qty: 30 TABLET | Refills: 3 | Status: SHIPPED | OUTPATIENT
Start: 2025-06-20

## 2025-06-20 NOTE — PROGRESS NOTES
[facility], [facility], [place]  [address]  [facility] [address]  Dept: [phone]       Patient:  Nunu Guan  :      1970  MRN:      [id number]    Chief Complaint:  Weight Management, Obesity, Follow up     SUBJECTIVE     History of Present Illness:  Nunu is being seen today for a follow-up for non surgical weight loss.     Rotator cuff rupture since last visit s/p surgery.   Arm sling intact.     Initial HPI: 2020  50 yo female presents to clinic for medically supported weight loss.      Patient is considering medications and is not considering bariatric surgery for weight loss.     Patient denies any history of eating disorder(s).     Patient is employed: .  Patient lives with, spouse and 2 children (13 and 15).     Patient's goal weight: Unsure   Biggest weight loss in the past: 100 lbs  How weight loss was achieved: Exercise   Heaviest weight ever: Current   Previous use of medical weight loss medications:     Physical activity: None      Sleep: 5-6 hours/night    Sleep screening: +snores, consider sleep study     Barriers: Motivation      Values: Breathing well, Fitting on Furniture, Children, Health      Past Medical History:   Past Medical History:    Anxiety state    Clot in the renal vein (HCC)    left lower extremity    Cyst of skin    right abdominal skin cyst 2016    Deep vein thrombosis (HCC)    hx of DVT in the leg    Deep vein thrombosis (HCC)    Disorder of thyroid    High blood pressure    High cholesterol    Hx of motion sickness    Hypercholesteremia    Hypothyroidism    PONV (postoperative nausea and vomiting)    Unspecified essential hypertension    Visual impairment    wear glasses        Comorbidities:  Back pain-Improvement?  yes, Hypertension-Improvement?  yes and Hyperlipidemia-Improvement?  no    OBJECTIVE     Vitals: /80 (BP Location: Right arm, Patient Position:  Sitting, Cuff Size: adult)   Pulse 94   Ht 5' 2.5\" (1.588 m)   Wt 201 lb 6.4 oz (91.4 kg)   LMP 02/13/2024 (Within Days)   SpO2 98%   BMI 36.25 kg/m²     Initial weight loss: +04   Total weight loss: -66   Start weight: 267    Wt Readings from Last 6 Encounters:   06/20/25 201 lb 6.4 oz (91.4 kg)   05/22/25 197 lb (89.4 kg)   04/29/25 193 lb (87.5 kg)   03/10/25 196 lb (88.9 kg)   01/20/25 197 lb (89.4 kg)   01/10/25 199 lb (90.3 kg)       Patient Medications:    Current Outpatient Medications   Medication Sig Dispense Refill    Phentermine HCl 37.5 MG Oral Tab Take 1 tablet (37.5 mg total) by mouth every morning before breakfast. 30 tablet 3    methocarbamol 750 MG Oral Tab Take 1 tablet (750 mg total) by mouth 3 (three) times daily. 42 tablet 0    celecoxib 200 MG Oral Cap Take 1 capsule (200 mg total) by mouth 2 (two) times daily. 60 capsule 0    Acetaminophen Extra Strength 500 MG Oral Tab Take 2 tablets (1,000 mg total) by mouth every 8 (eight) hours as needed. 180 tablet 0    senna-docusate 8.6-50 MG Oral Tab Take 2 tablets by mouth at bedtime. 28 tablet 0    methocarbamol 750 MG Oral Tab Take 1 tablet (750 mg total) by mouth 3 (three) times daily as needed. 42 tablet 0    ergocalciferol 1.25 MG (03008 UT) Oral Cap Take 1 capsule (50,000 Units total) by mouth every 7 days. 12 capsule 0    topiramate 25 MG Oral Tab Take 1 tablet (25 mg total) by mouth in the morning, at noon, and at bedtime. 270 tablet 1    levothyroxine 100 MCG Oral Tab Take 1 tablet (100 mcg total) by mouth before breakfast. 90 tablet 3    losartan-hydroCHLOROthiazide 100-25 MG Oral Tab Take 1 tablet by mouth daily. 90 tablet 3     Allergies:  Azithromycin and Hydrocodone     Social History:  Reviewed    Surgical History:    Past Surgical History:   Procedure Laterality Date    Ankle fracture surgery Left 2014    left ankle stress fracture 2014    Arthroscopy of joint unlisted      left knee screw    Colonoscopy N/A 04/28/2022     Procedure: COLONOSCOPY;  Surgeon: Flex Rubi MD;  Location: [facility] ENDOSCOPY    Knee surgery  2001    MCL     Other surgical history  2004    vaginal delivery    Other surgical history  2006    vaginal delivery     Family History:    Family History   Problem Relation Age of Onset    Hypertension Father         overweight     Other (alzheimers) Father     Diabetes Mother         borderline, overweight     Other (Other) Maternal Grandmother         Karie's Granulomatosis    Cancer Maternal Grandfather         LUNG    Other (Other) Sister         Rheumatoid Arthritis    Colon Cancer Paternal Uncle      Initial Intake:  After dinner behavior: chips, popcorn cakes   Night eating: -  Portion sizes: at times   Binge: BED 7-  Emotional: +  Depression: Total PHQ Score: 1  Grazing: -  Sweet tooth: at times   Crunchy/salty: +  Etoh: Wine, a few times a week (1-2 glasses)   Soda Drinker: Yes                 If yes, how much?:  Occasional  Sports Drinks:  No                  Juice:  No     Food Journal  Reviewed and Discussed:       Patient has a Food Journal?: yes   [employer]  Patient is reading nutrition labels?  yes  Average Caloric Intake:      Average CHO Intake:    Is patient exercising? no Minimal s/p rotator cuff surgery & revision   Type of exercise? In PT    Eating Habits  Patient states the following:  Eats 3 meal(s) per day  # of snacks per day: 1-2  Type of snacks:  fruit    Amount of soda consumption per day:  1 every couple of weeks   Amount of water (in ounces) per day: Improved  Toughest challenge:  Family stress, surgeries   Sleep: Improved    Nutritional Goals  Eat 3-4 cups of fresh fruits or vegetables daily    Behavior Modifications Reviewed and Discussed  Eat breakfast, Eat 3 meals per day, Plan meals in advance, Read nutrition labels, Drink 64 oz of water per day, Maintain a daily food journal, Utlize portion control strategies to reduce calorie intake, Identify triggers for eating and manage cues and  Eat slowly and take 20 to 30 minutes to complete each meal    Exercise Goals Reviewed and Discussed    Aim for 150 minutes moderate level exercise weekly with 2-3 days strength training as tolerated     ROS:    Constitutional: negative  Respiratory: negative  Cardiovascular: negative  Gastrointestinal: negative  Genitourinary:negative  Integument/breast: negative  Hematologic/lymphatic: negative  Musculoskeletal:positive for back pain and improved   Neurological: positive for headaches  Behavioral/Psych: negative  Endocrine: negative  All other systems were reviewed and are negative    Physical Exam:   General appearance: alert, appears stated age, cooperative and obese   Head: Normocephalic, without obvious abnormality, atraumatic  Neck: no adenopathy, no carotid bruit, no JVD, supple, symmetrical, trachea midline and thyroid not enlarged, symmetric, no tenderness/mass/nodules  Lungs: clear to auscultation bilaterally  Heart: S1, S2 normal, no murmur, click, rub or gallop, regular rate and rhythm  Abdomen: soft, non-tender; bowel sounds normal; no masses,  no organomegaly and abdomen obese   Extremities: extremities normal, atraumatic, no cyanosis or edema  Pulses: 2+ and symmetric  Skin: Skin color, texture, turgor normal. No rashes or lesions  Neurologic: Grossly normal    ASSESSMENT     Encounter Diagnosis(ses):   Encounter Diagnoses   Name Primary?    Primary hypertension Yes    Hypercholesteremia     Prediabetes     Encounter for therapeutic drug monitoring     Obesity (BMI 30-39.9)     Acquired hypothyroidism     Stress     Vitamin D deficiency     History of DVT (deep vein thrombosis)          PLAN         Diagnoses and all orders for this visit:    Primary hypertension  -     Basic Metabolic Panel (8) [E]; Future    Hypercholesteremia  -     Basic Metabolic Panel (8) [E]; Future    Prediabetes  -     Basic Metabolic Panel (8) [E]; Future    Encounter for therapeutic drug monitoring  -     Basic Metabolic  Panel (8) [E]; Future    Obesity (BMI 30-39.9)  -     Phentermine HCl 37.5 MG Oral Tab; Take 1 tablet (37.5 mg total) by mouth every morning before breakfast.  -     Basic Metabolic Panel (8) [E]; Future    Acquired hypothyroidism  -     Basic Metabolic Panel (8) [E]; Future    Stress  -     Basic Metabolic Panel (8) [E]; Future    Vitamin D deficiency  -     Basic Metabolic Panel (8) [E]; Future    History of DVT (deep vein thrombosis)  -     Basic Metabolic Panel (8) [E]; Future        Dyslipidemia  Recommend dietary changes and lifestyle modifications as discussed below. Monitor.     Lab Results   Component Value Date/Time    CHOLEST 254 (H) 04/29/2025 09:33 AM     (H) 04/29/2025 09:33 AM    HDL 83 (H) 04/29/2025 09:33 AM    TRIG 104 04/29/2025 09:33 AM    VLDL 20 04/29/2025 09:33 AM       HYPERTENSION: Blood pressure controlled on the above medications. No interval change in antihypertensive medication. Monitor closely.       HYPOTHYROIDISM: On levothyroxine. Continue present management.     History Vitamin D deficiency.     OBESITY/WEIGHT GAIN:  PREDIABETES:     Discussed starting weight:  267 lbs; BMI: 48.1; WC: 51 in.  Patient's goal weight: <200, met goal this visit.   Values: Breathing well, Fitting on Furniture, Children, Health       Recommended patient continue intensive lifestyle and behavioral modifications at this time for weight loss.     Reviewed lifestyle modifications: Whole Food/Plant Strong/Low Glycemic Index diet, moderate alcohol consumption, reduced sodium intake to no more than 2,400 mg/day, and at least 150 minutes of moderate physical activity per week.   Avoid processed, poor quality carbohydrates, refined grains, flour, sugar.     Goals for next month:  1. Keep a food log.   2. Drink 64 ounces of non-caloric beverages per day. No fruit juices or regular soda.  3. Aim for 150 minutes moderate exercise per week.    4. Increase fruit and vegetable servings to 5-6 per day.    5.  Improve sleep and stress.      Reviewed labs: 10/14/19  Renal/liver function intact; CBC in range  TSH 3.790 on levothyroxine  2/22/2020 Updated TSH, T4 in range.  B 12 in range.  Vitamin D low- continue 50,000 IU capsule weekly.   11/28/2020: CMP and CBC in range.  Vitamin D remains low.  2/21/22- Blood sugar elevated, CMP otherwise notes no significant abnormalities.   LDL and triglycerides elevated.   Vitamin D low- will need to supplement.    4/19/23- CBC in range.  , CMP otherwise in range.   Vitamin D 36.8. Supplement OTC daily.   3/9/2024- a1c 5.7%. Elevated cholesterol. Slight elevated Creatinine.   CBC, TSH, vitamin D in range.   4/9/25- CBC, TSH in range.  Mildly low potassium. Glucose elevated.   a1c 5.8.   Vitamin D low- supplement.    Reviewed medication options for weight loss in detail with patient.      +cravings, emotional eating, evening snacking- improved.     Leptin elevated.  Denies hx cardiac disease or substance abuse.    Previous successful treatment with phentermine/topiramate combination (topiramate 25 mg BID, phentermine 37.5 mg/day).     Hold off on Qsymia due to high price at this time.     Continue phentermine 37.5 mg in the AM.    Continue 25 mg topiramate tabs- increase to 50 mg in the AM and 50 mg in the evening.     Prefers not to take injectable.    Discussed risks, benefits, rationale, and side effects of medication(s) including hypertension, palpitations, tachycardia, dizziness, constipation, dry mouth, and anxiety among others.     2/23/2021 EKG within normal limits. EKG done 2/15/22, no change.  4/19/23- EKG NSR.  EKG done 8/1/24.     Recommend magnesium glycinate to improve sleep.      IF.     Hold off on food tracking for now, causes significant anxiety- follow Healthy Plate.     Recommend counseling as needed.     Goal weight: < 200 lbs.     RTC: 4 months.   CELESTE Mckee

## 2025-06-20 NOTE — PATIENT INSTRUCTIONS
Chat GPT  1500 calories/day  Aim for 120 grams protein/day  Aim for 45-55 fat grams/day  Aim for 100-120 carbs/day

## 2025-06-24 ENCOUNTER — OFFICE VISIT (OUTPATIENT)
Dept: PHYSICAL THERAPY | Age: 55
End: 2025-06-24
Attending: STUDENT IN AN ORGANIZED HEALTH CARE EDUCATION/TRAINING PROGRAM
Payer: COMMERCIAL

## 2025-06-24 PROCEDURE — 97110 THERAPEUTIC EXERCISES: CPT | Performed by: PHYSICAL THERAPIST

## 2025-06-24 NOTE — PROGRESS NOTES
Patient: Nunu Guan (55 year old, female) Referring Provider:  Insurance:   Diagnosis: Pre/post L revision rotator cuff repair. Please eval and treat, massive protocol [facility]or Smith  [facility]   Date of Surgery: 5/22/2025 Next MD visit:  N/A   Precautions:    6/6/2025 Referral Information:    Date of Evaluation: Req: 22, Auth: 22, Exp: 7/31/2025 05/29/25 POC Auth Visits:  22       Today's Date   6/24/2025    Subjective  Patient reports doing well today with shoulder, medicine at night mostly.       Pain: 0/10     Objective  see outpatient daily record          Assessment   Patient doing well.  July 3 will be 6 weeks and can begin light PROM work.  One time this week due to patient leaving for vacation.  Will continue 2 x next week, then looking to increase to 3 x weekly.    Goals (to be met in 40 visits)   Patient to be able to raise arm within 5-10 deg of all motions of R arm or better to be able to reach easily for daily activities.   Patient to report no pain at the shoulder with return to daily activities.  Patient to have strength at 4/5 or greater all motions of shoulder/scapular region to be able to return to daily activities.   Follow massive RC repair guidelines.                   Plan  Continue per protocol for massive tear.    Treatment Last 4 Visits  Treatment Day: 17 6/11/2025 6/16/2025 6/18/2025 6/24/2025   UE Treatment   Therapeutic Exercise Codmans x 1 min Supine with towel under arm-STM cervical spine/UT, light ROM/stretch  Elbow AROM flex/ext x 10  Wrist AROM flex/ext x 10  Open close hand x 10  Sitting - scapular retractions x 15  Shoulder shrugs back x 15  Cervical AROM Rot R/L x 5 each, SB R/L x 5 each  Seated cervical retractions x 10 Codmans x 1 min Supine with towel under arm-STM cervical spine/UT, light ROM/stretch  Elbow AROM flex/ext x 10  Wrist AROM flex/ext x 10  Open close hand x 10  Sitting - scapular retractions x 15  Shoulder shrugs back x 15  Cervical AROM Rot  R/L x 5 each, SB R/L x 5 each  Seated cervical retractions x 10 Codmans x 1 min Supine with towel under arm-STM cervical spine/UT, scalenes, light ROM/stretching.   Elbow AROM flex/ext x 15  Wrist AROM flex/ext x 15    Sitting - scapular retractions x 15  Shoulder shrugs back x 15  Cervical AROM Rot R/L x 10 each, SB R/L x 10 each  Upper trap active stretch sitting R/L   Seated cervical retractions x 10 Codmans x 1 min   Supine with towel under arm-STM cervical spine/UT, scalenes, light ROM/stretching.   Elbow AROM flex/ext x 15 supine  Wrist AROM flex/ext x 15 supine    Sitting - scapular retractions x 15  Shoulder shrugs back x 15  Cervical AROM Rot R/L x 10 each, SB R/L x 10 each  Upper trap active stretch sitting R/L   Seated cervical retractions x 12   Therapeutic Exercise Minutes 30 18 10 20   Manual Therapy Minutes  12 20 10   Total Time Of Timed Procedures 30 30 30 30   Total Time Of Service-Based Procedures 0 0 0 0   Total Treatment Time 30 30 30 30        HEP  Pendulum small motion - out of sling  Scapular retractions - in sling  Shoulder shrugs up and back- in sling  Reviewed elbow flex/ext- out of sling  Wrist flex/ext- in sling  Hand open/close- in sling  STM to R/L upper trap - may be done at home.    Cervical ROM rot R/L, SB R/L- in sling  Reviewed use of sling at all times except for motion exercises of elbow/pendulum  Use of ice during day    Charges      Ex 2

## 2025-06-25 ENCOUNTER — TELEPHONE (OUTPATIENT)
Dept: SURGERY | Facility: CLINIC | Age: 55
End: 2025-06-25

## 2025-06-25 NOTE — TELEPHONE ENCOUNTER
Pt called phentermine will not be releases til tomorrow pt stated she will be leaving for out of town at 4am  want to know if Sangeeta would call the pharmacy to have medication released.

## 2025-06-25 NOTE — TELEPHONE ENCOUNTER
Patient requesting medication was on hold at pharmacy, medication was okay to be released tomorrow, per pharmacy guidelines. Patient is leaving out of town per Sangeeta order medication is okay to be released early. Spoke with pharmacy and okayed early release of medication.     Called patient and let her know medication is good for  later today

## 2025-06-26 ENCOUNTER — APPOINTMENT (OUTPATIENT)
Dept: PHYSICAL THERAPY | Age: 55
End: 2025-06-26
Attending: STUDENT IN AN ORGANIZED HEALTH CARE EDUCATION/TRAINING PROGRAM
Payer: COMMERCIAL

## 2025-06-30 ENCOUNTER — OFFICE VISIT (OUTPATIENT)
Dept: PHYSICAL THERAPY | Age: 55
End: 2025-06-30
Attending: STUDENT IN AN ORGANIZED HEALTH CARE EDUCATION/TRAINING PROGRAM
Payer: COMMERCIAL

## 2025-06-30 PROCEDURE — 97110 THERAPEUTIC EXERCISES: CPT | Performed by: PHYSICAL THERAPIST

## 2025-06-30 PROCEDURE — 97140 MANUAL THERAPY 1/> REGIONS: CPT | Performed by: PHYSICAL THERAPIST

## 2025-06-30 NOTE — PROGRESS NOTES
Patient: Nunu Guan (55 year old, female) Referring Provider:  Insurance:   Diagnosis: Pre/post L revision rotator cuff repair. Please eval and treat, [facility] protocol [facility]   Date of Surgery: 5/22/2025 Next MD visit:  N/A   Precautions:    6/6/2025 Referral Information:    Date of Evaluation: Req: 22, Auth: 22, Exp: 7/31/2025 05/29/25 POC Auth Visits:  22       Today's Date   6/30/2025    Subjective  Patient reports doing well.  DId alot of walking on her trip       Pain: 0/10     Objective  see outpatient daily record          Assessment   Per MD message able to progress to PROM at 6 weeks which is July 3.      Goals (to be met in 40 visits)   Patient to be able to raise arm within 5-10 deg of all motions of R arm or better to be able to reach easily for daily activities.   Patient to report no pain at the shoulder with return to daily activities.  Patient to have strength at 4/5 or greater all motions of shoulder/scapular region to be able to return to daily activities.   Follow massive RC repair guidelines.                     Plan  Continue per protocol for massive tear.    Treatment Last 4 Visits  Treatment Day: 18 6/16/2025 6/18/2025 6/24/2025 6/30/2025   UE Treatment   Therapeutic Exercise Codmans x 1 min Supine with towel under arm-STM cervical spine/UT, light ROM/stretch  Elbow AROM flex/ext x 10  Wrist AROM flex/ext x 10  Open close hand x 10  Sitting - scapular retractions x 15  Shoulder shrugs back x 15  Cervical AROM Rot R/L x 5 each, SB R/L x 5 each  Seated cervical retractions x 10 Codmans x 1 min Supine with towel under arm-STM cervical spine/UT, scalenes, light ROM/stretching.   Elbow AROM flex/ext x 15  Wrist AROM flex/ext x 15    Sitting - scapular retractions x 15  Shoulder shrugs back x 15  Cervical AROM Rot R/L x 10 each, SB R/L x 10 each  Upper trap active stretch sitting R/L   Seated cervical retractions x 10 Codmans x 1 min   Supine with towel under  arm-STM cervical spine/UT, scalenes, light ROM/stretching.   Elbow AROM flex/ext x 15 supine  Wrist AROM flex/ext x 15 supine    Sitting - scapular retractions x 15  Shoulder shrugs back x 15  Cervical AROM Rot R/L x 10 each, SB R/L x 10 each  Upper trap active stretch sitting R/L   Seated cervical retractions x 12 Codmans x 1 min   Supine with towel under arm-STM cervical spine/UT, scalenes, light ROM/stretching.   Elbow AROM flex/ext x 15 supine  Wrist AROM flex/ext x 15 supine    Sitting - scapular retractions x 15  Shoulder shrugs back x 15  Cervical AROM Rot R/L x 10 each, SB R/L x 10 each  Upper trap active stretch sitting R/L   Seated cervical retractions x 12   Therapeutic Exercise Minutes 18 10 20 18   Manual Therapy Minutes 12 20 10 12   Total Time Of Timed Procedures 30 30 30 30   Total Time Of Service-Based Procedures 0 0 0 0   Total Treatment Time 30 30 30 30        HEP  Pendulum small motion - out of sling  Scapular retractions - in sling  Shoulder shrugs up and back- in sling  Reviewed elbow flex/ext- out of sling  Wrist flex/ext- in sling  Hand open/close- in sling  STM to R/L upper trap - may be done at home.    Cervical ROM rot R/L, SB R/L- in sling  Reviewed use of sling at all times except for motion exercises of elbow/pendulum  Use of ice during day    Charges   MM, Ex 1

## 2025-07-03 ENCOUNTER — OFFICE VISIT (OUTPATIENT)
Dept: PHYSICAL THERAPY | Age: 55
End: 2025-07-03
Attending: STUDENT IN AN ORGANIZED HEALTH CARE EDUCATION/TRAINING PROGRAM
Payer: COMMERCIAL

## 2025-07-03 PROCEDURE — 97110 THERAPEUTIC EXERCISES: CPT | Performed by: PHYSICAL THERAPIST

## 2025-07-03 NOTE — PROGRESS NOTES
Patient: Nunu Guan (55 year old, female) Referring Provider:  Insurance:   Diagnosis: Pre/post L revision rotator cuff repair. Please eval and treat, [facility]   Date of Surgery: 5/22/2025 Next MD visit:  N/A   Precautions:    6/6/2025 Referral Information:    Date of Evaluation: Req: 22, Auth: 22, Exp: 7/31/2025 05/29/25 POC Auth Visits:  22       Today's Date   7/3/2025    Subjective   Patient reports able to get out of sling today.  Per MD able to start PROM.        Pain: 0/100/10     Objective       See outpatient daily record.      Assessment   Started PROM - able to go to 80 deg flex, 80 deg abd, 30 deg ER.  Continued previous exercises.     Goals (to be met in 40 visits)   Patient to be able to raise arm within 5-10 deg of all motions of R arm or better to be able to reach easily for daily activities.   Patient to report no pain at the shoulder with return to daily activities.  Patient to have strength at 4/5 or greater all motions of shoulder/scapular region to be able to return to daily activities.   Follow massive RC repair guidelines.                       Plan  Continue per protocol for massive tear.Work on PROM starting at 6 weeks.  Flex/abd 90, ER progress as able.     Treatment Last 4 Visits  Treatment Day: 19       6/18/2025 6/24/2025 6/30/2025 7/3/2025   UE Treatment   Therapeutic Exercise Codmans x 1 min Supine with towel under arm-STM cervical spine/UT, scalenes, light ROM/stretching.   Elbow AROM flex/ext x 15  Wrist AROM flex/ext x 15    Sitting - scapular retractions x 15  Shoulder shrugs back x 15  Cervical AROM Rot R/L x 10 each, SB R/L x 10 each  Upper trap active stretch sitting R/L   Seated cervical retractions x 10 Codmans x 1 min   Supine with towel under arm-STM cervical spine/UT, scalenes, light ROM/stretching.   Elbow AROM flex/ext x 15 supine  Wrist AROM flex/ext x 15 supine    Sitting - scapular retractions x 15  Shoulder shrugs back x  15  Cervical AROM Rot R/L x 10 each, SB R/L x 10 each  Upper trap active stretch sitting R/L   Seated cervical retractions x 12 Codmans x 1 min   Supine with towel under arm-STM cervical spine/UT, scalenes, light ROM/stretching.   Elbow AROM flex/ext x 15 supine  Wrist AROM flex/ext x 15 supine    Sitting - scapular retractions x 15  Shoulder shrugs back x 15  Cervical AROM Rot R/L x 10 each, SB R/L x 10 each  Upper trap active stretch sitting R/L   Seated cervical retractions x 12 Codmans x 1 min   Supine with towel under arm-STM cervical spine/UT, scalenes, light ROM/stretching.   Elbow AROM flex/ext x 15 supine  Wrist AROM flex/ext x 15 supine  PROM started - 2 x 8 reps-   Sitting - scapular retractions x 15  Shoulder shrugs back x 15  Cervical AROM Rot R/L x 10 each, SB R/L x 10 each  Upper trap active stretch sitting R/L      Therapeutic Exercise Minutes 10 20 18 40   Manual Therapy Minutes 20 10 12    Total Time Of Timed Procedures 30 30 30 40   Total Time Of Service-Based Procedures 0 0 0 0   Total Treatment Time 30 30 30 40        HEP  Pendulum small motion - out of sling  Scapular retractions - in sling  Shoulder shrugs up and back- in sling  Reviewed elbow flex/ext- out of sling  Wrist flex/ext- in sling  Hand open/close- in sling  STM to R/L upper trap - may be done at home.    Cervical ROM rot R/L, SB R/L- in sling  Reviewed use of sling at all times except for motion exercises of elbow/pendulum  Use of ice during day  Started PROM   Charges   Ex 3

## 2025-07-07 ENCOUNTER — OFFICE VISIT (OUTPATIENT)
Dept: PHYSICAL THERAPY | Age: 55
End: 2025-07-07
Attending: STUDENT IN AN ORGANIZED HEALTH CARE EDUCATION/TRAINING PROGRAM
Payer: COMMERCIAL

## 2025-07-07 PROCEDURE — 97110 THERAPEUTIC EXERCISES: CPT | Performed by: PHYSICAL THERAPIST

## 2025-07-07 NOTE — PROGRESS NOTES
Patient: Nunu Guan (55 year old, female) Referring Provider:  Insurance:   Diagnosis: Pre/post L revision rotator cuff repair. Please eval and treat, massive protocol [facility] Smith  [facility]   Date of Surgery: 5/22/2025 Next MD visit:  N/A   Precautions:    6/6/2025 Referral Information:    Date of Evaluation: Req: 22, Auth: 22, Exp: 7/31/2025 05/29/25 POC Auth Visits:  22       Today's Date   7/7/2025    Subjective   Patient reports no problems with the arm today.       Pain: 0/10     Objective  see outpatient daily record          Assessment   Easier to perform PROM this session , continued scapular and cervical exercises.      Goals (to be met in 40 visits)   Patient to be able to raise arm within 5-10 deg of all motions of R arm or better to be able to reach easily for daily activities.   Patient to report no pain at the shoulder with return to daily activities.  Patient to have strength at 4/5 or greater all motions of shoulder/scapular region to be able to return to daily activities.   Follow massive RC repair guidelines.                         Plan  Continue per protocol for massive tear.    Treatment Last 4 Visits  Treatment Day: 20/22       6/24/2025 6/30/2025 7/3/2025 7/7/2025   UE Treatment   Therapeutic Exercise Codmans x 1 min   Supine with towel under arm-STM cervical spine/UT, scalenes, light ROM/stretching.   Elbow AROM flex/ext x 15 supine  Wrist AROM flex/ext x 15 supine    Sitting - scapular retractions x 15  Shoulder shrugs back x 15  Cervical AROM Rot R/L x 10 each, SB R/L x 10 each  Upper trap active stretch sitting R/L   Seated cervical retractions x 12 Codmans x 1 min   Supine with towel under arm-STM cervical spine/UT, scalenes, light ROM/stretching.   Elbow AROM flex/ext x 15 supine  Wrist AROM flex/ext x 15 supine    Sitting - scapular retractions x 15  Shoulder shrugs back x 15  Cervical AROM Rot R/L x 10 each, SB R/L x 10 each  Upper trap active stretch sitting R/L    Seated cervical retractions x 12 Codmans x 1 min   Supine with towel under arm-STM cervical spine/UT, scalenes, light ROM/stretching.   Elbow AROM flex/ext x 15 supine  Wrist AROM flex/ext x 15 supine  PROM started - 2 x 8 reps-   Sitting - scapular retractions x 15  Shoulder shrugs back x 15  Cervical AROM Rot R/L x 10 each, SB R/L x 10 each  Upper trap active stretch sitting R/L   Codmans - 1 minute   Codmans x 1 min   Supine with towel under arm-STM cervical spine/UT, scalenes, light ROM/stretching.   Elbow AROM flex/ext x 15 supine  Wrist AROM flex/ext x 15 supine  PROM started - 3 x 8 reps- flexion to 90, abduction to 90, progression of ER  Sitting - scapular retractions x 15  Shoulder shrugs back x 15  Cervical AROM Rot R/L x 10 each, SB R/L x 10 each  Upper trap active stretch sitting R/L      Therapeutic Exercise Minutes 20 18 40 40   Manual Therapy Minutes 10 12     Total Time Of Timed Procedures 30 30 40 40   Total Time Of Service-Based Procedures 0 0 0 0   Total Treatment Time 30 30 40 40        HEP  Pendulum small motion  Scapular retractions   Shoulder shrugs up and back  Elbow flex/ext  Wrist flex/ext-  Cervical ROM rot R/L, SB R/L-       Charges   Ex 3

## 2025-07-09 ENCOUNTER — OFFICE VISIT (OUTPATIENT)
Dept: PHYSICAL THERAPY | Age: 55
End: 2025-07-09
Attending: STUDENT IN AN ORGANIZED HEALTH CARE EDUCATION/TRAINING PROGRAM
Payer: COMMERCIAL

## 2025-07-09 PROCEDURE — 97110 THERAPEUTIC EXERCISES: CPT | Performed by: PHYSICAL THERAPIST

## 2025-07-09 PROCEDURE — 97140 MANUAL THERAPY 1/> REGIONS: CPT | Performed by: PHYSICAL THERAPIST

## 2025-07-09 NOTE — PROGRESS NOTES
Patient: Nunu Guan (55 year old, female) Referring Provider:  Insurance:   Diagnosis: Pre/post L revision rotator cuff repair. Please eval and treat, massive protocol [facility]  [facility]   Date of Surgery: 5/22/2025 Next MD visit:  N/A   Precautions:    6/6/2025 Referral Information:    Date of Evaluation: Req: 22, Auth: 22, Exp: 7/31/2025 05/29/25 POC Auth Visits:  22       Today's Date   7/9/2025    Subjective  Patient reports no complaints.       Pain: 0/10     Objective  see outpatient daily record          Assessment  Continued with PROM work at L shoulder until 8 weeks post op, then able to move to AROM.  Continued STM cervical region, scapular and neck exercises.  Patient without complaints.     Goals (to be met in 40 visits)   Patient to be able to raise arm within 5-10 deg of all motions of R arm or better to be able to reach easily for daily activities.   Patient to report no pain at the shoulder with return to daily activities.  Patient to have strength at 4/5 or greater all motions of shoulder/scapular region to be able to return to daily activities.   Follow massive RC repair guidelines.                           Plan  Continue per protocol for massive tear. Will request additional visits    Treatment Last 4 Visits  Treatment Day: 21       6/30/2025 7/3/2025 7/7/2025 7/9/2025   UE Treatment   Therapeutic Exercise Codmans x 1 min   Supine with towel under arm-STM cervical spine/UT, scalenes, light ROM/stretching.   Elbow AROM flex/ext x 15 supine  Wrist AROM flex/ext x 15 supine    Sitting - scapular retractions x 15  Shoulder shrugs back x 15  Cervical AROM Rot R/L x 10 each, SB R/L x 10 each  Upper trap active stretch sitting R/L   Seated cervical retractions x 12 Codmans x 1 min   Supine with towel under arm-STM cervical spine/UT, scalenes, light ROM/stretching.   Elbow AROM flex/ext x 15 supine  Wrist AROM flex/ext x 15 supine  PROM started - 2 x 8 reps-   Sitting - scapular  retractions x 15  Shoulder shrugs back x 15  Cervical AROM Rot R/L x 10 each, SB R/L x 10 each  Upper trap active stretch sitting R/L    Codmans x 1 min   Supine with towel under arm-STM cervical spine/UT, scalenes, light ROM/stretching.   Elbow AROM flex/ext x 15 supine  Wrist AROM flex/ext x 15 supine  PROM started - 3 x 8 reps- flexion to 90, abduction to 90, progression of ER  Sitting - scapular retractions x 15  Shoulder shrugs back x 15  Cervical AROM Rot R/L x 10 each, SB R/L x 10 each  Upper trap active stretch sitting R/L    Codmans x 1 min   Supine with towel under arm-STM cervical spine/UT, scalenes, light ROM/stretching.   Elbow AROM flex/ext x 15 supine  Wrist AROM flex/ext x 15 supine  PROM started - 3 x 8 reps- flexion to 90, abduction to 90, progression of ER  Sitting - scapular retractions x 15  Shoulder shrugs back x 15  Cervical AROM Rot R/L x 10 each, SB R/L x 10 each  Upper trap active stretch sitting R/L      Therapeutic Exercise Minutes 18 40 40 18   Manual Therapy Minutes 12   20   Total Time Of Timed Procedures 30 40 40 38   Total Time Of Service-Based Procedures 0 0 0 0   Total Treatment Time 30 40 40 38        HEP      Charges      MM 1, Ex 2

## 2025-07-11 ENCOUNTER — OFFICE VISIT (OUTPATIENT)
Dept: PHYSICAL THERAPY | Age: 55
End: 2025-07-11
Attending: STUDENT IN AN ORGANIZED HEALTH CARE EDUCATION/TRAINING PROGRAM
Payer: COMMERCIAL

## 2025-07-11 PROCEDURE — 97110 THERAPEUTIC EXERCISES: CPT | Performed by: PHYSICAL THERAPIST

## 2025-07-11 PROCEDURE — 97140 MANUAL THERAPY 1/> REGIONS: CPT | Performed by: PHYSICAL THERAPIST

## 2025-07-11 NOTE — PROGRESS NOTES
Patient: Nunu Guan (55 year old, female) Referring Provider:  Insurance:   Diagnosis: Pre/post L revision rotator cuff repair. Please eval and treat, massive protocol [facility] Smith  [facility]   Date of Surgery: 5/22/2025 Next MD visit:  N/A   Precautions:    6/6/2025 Referral Information:    Date of Evaluation: Req: 27, Auth: 27, Exp: 9/30/2025 05/29/25 POC Auth Visits:  22       Today's Date   7/11/2025    Subjective  Patient reports no complaints at the shoulder.       Pain: 0/10     Objective  see outpatient daily record          Assessment   Progressing well.  Easily able to go to 100 with flexion without resistance.  Abd to 90, ER to 40.     Goals (to be met in 40 visits)   Patient to be able to raise arm within 5-10 deg of all motions of R arm or better to be able to reach easily for daily activities.   Patient to report no pain at the shoulder with return to daily activities.  Patient to have strength at 4/5 or greater all motions of shoulder/scapular region to be able to return to daily activities.   Follow massive RC repair guidelines.                             Plan  Continue per protocol for massive tear.    Treatment Last 4 Visits  Treatment Day: 22       7/3/2025 7/7/2025 7/9/2025 7/11/2025   UE Treatment   Therapeutic Exercise Codmans x 1 min   Supine with towel under arm-STM cervical spine/UT, scalenes, light ROM/stretching.   Elbow AROM flex/ext x 15 supine  Wrist AROM flex/ext x 15 supine  PROM started - 2 x 8 reps-   Sitting - scapular retractions x 15  Shoulder shrugs back x 15  Cervical AROM Rot R/L x 10 each, SB R/L x 10 each  Upper trap active stretch sitting R/L    Codmans x 1 min   Supine with towel under arm-STM cervical spine/UT, scalenes, light ROM/stretching.   Elbow AROM flex/ext x 15 supine  Wrist AROM flex/ext x 15 supine  PROM started - 3 x 8 reps- flexion to 90, abduction to 90, progression of ER  Sitting - scapular retractions x 15  Shoulder shrugs back x  15  Cervical AROM Rot R/L x 10 each, SB R/L x 10 each  Upper trap active stretch sitting R/L    Codmans x 1 min   Supine with towel under arm-STM cervical spine/UT, scalenes, light ROM/stretching.   Elbow AROM flex/ext x 15 supine  Wrist AROM flex/ext x 15 supine  PROM started - 3 x 8 reps- flexion to 90, abduction to 90, progression of ER  Sitting - scapular retractions x 15  Shoulder shrugs back x 15  Cervical AROM Rot R/L x 10 each, SB R/L x 10 each  Upper trap active stretch sitting R/L    Codmans x 1 min   Supine with towel under arm-STM cervical spine/UT, scalenes, light ROM/stretching.   Elbow AROM flex/ext x 15 supine  Wrist AROM flex/ext x 15 supine  PROM started - 3 x 8 reps- flexion to 100, abduction to 90, progression of ER  Sitting - scapular retractions x 15  Shoulder shrugs back x 15  Cervical AROM Rot R/L x 10 each, SB R/L x 10 each  Upper trap active stretch sitting R/L      Therapeutic Exercise Minutes 40 40 18 23   Manual Therapy Minutes   20 15   Total Time Of Timed Procedures 40 40 38 38   Total Time Of Service-Based Procedures 0 0 0 0   Total Treatment Time 40 40 38 38        HEP      Charges   MM,, Ex 2

## 2025-07-14 ENCOUNTER — OFFICE VISIT (OUTPATIENT)
Dept: PHYSICAL THERAPY | Age: 55
End: 2025-07-14
Attending: STUDENT IN AN ORGANIZED HEALTH CARE EDUCATION/TRAINING PROGRAM
Payer: COMMERCIAL

## 2025-07-14 PROCEDURE — 97110 THERAPEUTIC EXERCISES: CPT | Performed by: PHYSICAL THERAPIST

## 2025-07-14 NOTE — PROGRESS NOTES
Patient: Nunu Guan (55 year old, female) Referring Provider:  Insurance:   Diagnosis: Pre/post L revision rotator cuff repair. Please eval and treat, massive protocol Igor Smith  [facility]   Date of Surgery: 5/22/2025 Next MD visit:  N/A   Precautions:    6/6/2025 Referral Information:    Date of Evaluation: Req: 27, Auth: 27, Exp: 9/30/2025 05/29/25 POC Auth Visits:  27       Today's Date   7/14/2025    Subjective  Patient reports no complaints at the shoulder.       Pain: 0/10     Objective  see outpatient daily record            Assessment   Continued with PROM, patient will be at 8 weeks this Thursday.  Sees MD Friday and will progress forward as he recommends.     Goals (to be met in 40 visits)   Patient to be able to raise arm within 5-10 deg of all motions of R arm or better to be able to reach easily for daily activities.   Patient to report no pain at the shoulder with return to daily activities.  Patient to have strength at 4/5 or greater all motions of shoulder/scapular region to be able to return to daily activities.   Follow massive RC repair guidelines.                               Plan  Continue per protocol for massive tear.    Treatment Last 4 Visits  Treatment Day: 23 7/7/2025 7/9/2025 7/11/2025 7/14/2025   UE Treatment   Therapeutic Exercise Codmans x 1 min   Supine with towel under arm-STM cervical spine/UT, scalenes, light ROM/stretching.   Elbow AROM flex/ext x 15 supine  Wrist AROM flex/ext x 15 supine  PROM started - 3 x 8 reps- flexion to 90, abduction to 90, progression of ER  Sitting - scapular retractions x 15  Shoulder shrugs back x 15  Cervical AROM Rot R/L x 10 each, SB R/L x 10 each  Upper trap active stretch sitting R/L    Codmans x 1 min   Supine with towel under arm-STM cervical spine/UT, scalenes, light ROM/stretching.   Elbow AROM flex/ext x 15 supine  Wrist AROM flex/ext x 15 supine  PROM started - 3 x 8 reps- flexion to 90, abduction to 90, progression  of ER  Sitting - scapular retractions x 15  Shoulder shrugs back x 15  Cervical AROM Rot R/L x 10 each, SB R/L x 10 each  Upper trap active stretch sitting R/L    Codmans x 1 min   Supine with towel under arm-STM cervical spine/UT, scalenes, light ROM/stretching.   Elbow AROM flex/ext x 15 supine  Wrist AROM flex/ext x 15 supine  PROM started - 3 x 8 reps- flexion to 100, abduction to 90, progression of ER  Sitting - scapular retractions x 15  Shoulder shrugs back x 15  Cervical AROM Rot R/L x 10 each, SB R/L x 10 each  Upper trap active stretch sitting R/L    Codmans x 1 min   Supine with towel under arm-STM cervical spine/UT, scalenes, light ROM/stretching.     PROM started - 3 x 8 reps- flexion to 100, abduction to 90, progression of ER  Sitting - scapular retractions x 15  Shoulder shrugs back x 15  Cervical AROM Rot R/L x 10 each, SB R/L x 10 each  Upper trap active stretch sitting R/L      Therapeutic Exercise Minutes 40 18 23 38   Manual Therapy Minutes  20 15    Total Time Of Timed Procedures 40 38 38 38   Total Time Of Service-Based Procedures 0 0 0 0   Total Treatment Time 40 38 38 38        HEP  Scapular/cervical motions  pendulum    Charges      Ex 3

## 2025-07-16 ENCOUNTER — OFFICE VISIT (OUTPATIENT)
Dept: PHYSICAL THERAPY | Age: 55
End: 2025-07-16
Attending: STUDENT IN AN ORGANIZED HEALTH CARE EDUCATION/TRAINING PROGRAM
Payer: COMMERCIAL

## 2025-07-16 PROCEDURE — 97110 THERAPEUTIC EXERCISES: CPT | Performed by: PHYSICAL THERAPIST

## 2025-07-16 NOTE — PROGRESS NOTES
Patient: Nunu Guan (55 year old, female) Referring Provider:  Insurance:   Diagnosis: Pre/post L revision rotator cuff repair. Please eval and treat, massive protocol [facility]  [facility]   Date of Surgery: 5/22/2025 Next MD visit:  N/A   Precautions:    6/6/2025 Referral Information:    Date of Evaluation: Req: 27, Auth: 27, Exp: 9/30/2025 05/29/25 POC Auth Visits:  27       Today's Date   7/16/2025    Subjective   Patient reports no complaints.  Sees MD on Friday       Pain: 0/10     Objective  see outpatient daily record          Assessment   Added table slides for home this session.  Patient's ROM without any strain at 130 flexion, abd to 90 and ER to 42 degrees.  Progressing well.     Goals (to be met in 40 visits)   Patient to be able to raise arm within 5-10 deg of all motions of R arm or better to be able to reach easily for daily activities.   Patient to report no pain at the shoulder with return to daily activities.  Patient to have strength at 4/5 or greater all motions of shoulder/scapular region to be able to return to daily activities.   Follow massive RC repair guidelines.                                 Plan  Continue per protocol for massive tear.    Treatment Last 4 Visits  Treatment Day: 24 7/9/2025 7/11/2025 7/14/2025 7/16/2025   UE Treatment   Therapeutic Exercise Codmans x 1 min   Supine with towel under arm-STM cervical spine/UT, scalenes, light ROM/stretching.   Elbow AROM flex/ext x 15 supine  Wrist AROM flex/ext x 15 supine  PROM started - 3 x 8 reps- flexion to 90, abduction to 90, progression of ER  Sitting - scapular retractions x 15  Shoulder shrugs back x 15  Cervical AROM Rot R/L x 10 each, SB R/L x 10 each  Upper trap active stretch sitting R/L    Codmans x 1 min   Supine with towel under arm-STM cervical spine/UT, scalenes, light ROM/stretching.   Elbow AROM flex/ext x 15 supine  Wrist AROM flex/ext x 15 supine  PROM started - 3 x 8 reps- flexion to 100,  abduction to 90, progression of ER  Sitting - scapular retractions x 15  Shoulder shrugs back x 15  Cervical AROM Rot R/L x 10 each, SB R/L x 10 each  Upper trap active stretch sitting R/L    Codmans x 1 min   Supine with towel under arm-STM cervical spine/UT, scalenes, light ROM/stretching.     PROM started - 3 x 8 reps- flexion to 100, abduction to 90, progression of ER  Sitting - scapular retractions x 15  Shoulder shrugs back x 15  Cervical AROM Rot R/L x 10 each, SB R/L x 10 each  Upper trap active stretch sitting R/L    Codmans x 1 min   Supine with towel under arm-STM cervical spine/UT, scalenes, light ROM/stretching.     PROM started - 3 x 8 reps- flexion to 130 abduction to 90, progression of ER- 42 deg  Table slides x 10 flexion  Sitting - scapular retractions x 15  Shoulder shrugs back x 15  Cervical retractions x 15  Cervical AROM Rot R/L x 10 each, SB R/L x 10 each  Upper trap active stretch sitting R/L      Therapeutic Exercise Minutes 18 23 38 38   Manual Therapy Minutes 20 15     Total Time Of Timed Procedures 38 38 38 38   Total Time Of Service-Based Procedures 0 0 0 0   Total Treatment Time 38 38 38 38        HEP  Started table slides flexion    Charges   Ex 3

## 2025-07-18 ENCOUNTER — OFFICE VISIT (OUTPATIENT)
Dept: PHYSICAL THERAPY | Age: 55
End: 2025-07-18
Attending: STUDENT IN AN ORGANIZED HEALTH CARE EDUCATION/TRAINING PROGRAM
Payer: COMMERCIAL

## 2025-07-18 PROCEDURE — 97110 THERAPEUTIC EXERCISES: CPT | Performed by: PHYSICAL THERAPIST

## 2025-07-18 NOTE — PROGRESS NOTES
Patient: Nunu Guan (55 year old, female) Referring Provider:  Insurance:   Diagnosis: Pre/post L revision rotator cuff repair. Please eval and treat, massive protocol [facility]  [facility]   Date of Surgery: 5/22/2025 Next MD visit:  N/A   Precautions:    6/6/2025 Referral Information:    Date of Evaluation: Req: 27, Auth: 27, Exp: 9/30/2025 05/29/25 POC Auth Visits:  27       Today's Date   7/18/2025    Subjective  Patient reports sees MD today.  States has been feeling good       Pain: 0/10     Objective  see outpatient daily record       PROM:  Flexion - no restriction going to 140  Abduction - 100 before restriction  ER - 45 before restriction     Assessment   Patient at 8 weeks post op massive rotator cuff repair.  Motion is progressing well.  Will look to start AA to Active motion.  Protocol sent to MD to look over for guidelines, make adjustments as needed or other one to follow.      Goals (to be met in 40 visits)   Patient to be able to raise arm within 5-10 deg of all motions of R arm or better to be able to reach easily for daily activities.   Patient to report no pain at the shoulder with return to daily activities.  Patient to have strength at 4/5 or greater all motions of shoulder/scapular region to be able to return to daily activities.   Follow massive RC repair guidelines.                                   Plan  Continue per protocol for massive tear.    Treatment Last 4 Visits  Treatment Day: 25 7/11/2025 7/14/2025 7/16/2025 7/18/2025   UE Treatment   Therapeutic Exercise Codmans x 1 min   Supine with towel under arm-STM cervical spine/UT, scalenes, light ROM/stretching.   Elbow AROM flex/ext x 15 supine  Wrist AROM flex/ext x 15 supine  PROM started - 3 x 8 reps- flexion to 100, abduction to 90, progression of ER  Sitting - scapular retractions x 15  Shoulder shrugs back x 15  Cervical AROM Rot R/L x 10 each, SB R/L x 10 each  Upper trap active stretch sitting R/L     Codmans x 1 min   Supine with towel under arm-STM cervical spine/UT, scalenes, light ROM/stretching.     PROM started - 3 x 8 reps- flexion to 100, abduction to 90, progression of ER  Sitting - scapular retractions x 15  Shoulder shrugs back x 15  Cervical AROM Rot R/L x 10 each, SB R/L x 10 each  Upper trap active stretch sitting R/L    Codmans x 1 min   Supine with towel under arm-STM cervical spine/UT, scalenes, light ROM/stretching.     PROM started - 3 x 8 reps- flexion to 130 abduction to 90, progression of ER- 42 deg  Table slides x 10 flexion  Sitting - scapular retractions x 15  Shoulder shrugs back x 15  Cervical retractions x 15  Cervical AROM Rot R/L x 10 each, SB R/L x 10 each  Upper trap active stretch sitting R/L    Codmans x 1 min   Supine with towel under arm-STM cervical spine/UT, scalenes, light ROM/stretching.     PROM started - 3 x 8 reps- flexion to 130 abduction to 90, progression of ER- 42 deg  Table slides x 10 flexion  Sitting - scapular retractions x 15  Shoulder shrugs back x 15  Cervical retractions x 15  Cervical AROM Rot R/L x 10 each, SB R/L x 10 each     Therapeutic Exercise Minutes 23 38 38 40   Manual Therapy Minutes 15      Total Time Of Timed Procedures 38 38 38 40   Total Time Of Service-Based Procedures 0 0 0 0   Total Treatment Time 38 38 38 40        HEP  Started table slides flexion last session.  Continued PROM  Continued scapular retraction, shoulder shrugs, cervical retraction   Pendulum      Charges   Ex 3

## 2025-07-21 ENCOUNTER — OFFICE VISIT (OUTPATIENT)
Dept: PHYSICAL THERAPY | Age: 55
End: 2025-07-21
Attending: STUDENT IN AN ORGANIZED HEALTH CARE EDUCATION/TRAINING PROGRAM
Payer: COMMERCIAL

## 2025-07-21 PROCEDURE — 97110 THERAPEUTIC EXERCISES: CPT | Performed by: PHYSICAL THERAPIST

## 2025-07-21 RX ORDER — TOPIRAMATE 50 MG/1
50 TABLET, FILM COATED ORAL 2 TIMES DAILY
Qty: 180 TABLET | Refills: 1 | Status: SHIPPED | OUTPATIENT
Start: 2025-07-21

## 2025-07-21 NOTE — PROGRESS NOTES
Patient: Nunu Guan (55 year old, female) Referring Provider:  Insurance:   Diagnosis: Pre/post L revision rotator cuff repair. Please eval and treat, massive protocol [facility]  [facility]   Date of Surgery: 5/22/2025 Next MD visit:  N/A   Precautions:    6/6/2025 Referral Information:    Date of Evaluation: Req: 27, Auth: 27, Exp: 9/30/2025 05/29/25 POC Auth Visits:  27       Today's Date   7/21/2025    Subjective  Patient reports the MD said he was doing well and wrote a release note for work.  Said she could start using her arm but not lifting. States was ok with protocol given.  States went to reach for something quick with L arm the other day - her phone- and some pain still front of shoulder.       Pain: 0/10     Objective  see outpatient daily record          Assessment  Added AAROM supine with press up and shoulder flexion with cane- no pain no aggressive stretching, ALso AAROM into ER with cane supine.  Continued previous exercises. Patient with some tenderness bicep tendon region.  Discussed to avoid reaching for objects as able with L until stronger.     Goals (to be met in 40 visits)   Patient to be able to raise arm within 5-10 deg of all motions of R arm or better to be able to reach easily for daily activities.   Patient to report no pain at the shoulder with return to daily activities.  Patient to have strength at 4/5 or greater all motions of shoulder/scapular region to be able to return to daily activities.   Follow massive RC repair guidelines.                                     Plan  Continue per protocol for massive tear.    Treatment Last 4 Visits  Treatment Day: 26 7/14/2025 7/16/2025 7/18/2025 7/21/2025   UE Treatment   Therapeutic Exercise Codmans x 1 min   Supine with towel under arm-STM cervical spine/UT, scalenes, light ROM/stretching.     PROM started - 3 x 8 reps- flexion to 100, abduction to 90, progression of ER  Sitting - scapular retractions x 15  Shoulder  shrugs back x 15  Cervical AROM Rot R/L x 10 each, SB R/L x 10 each  Upper trap active stretch sitting R/L    Codmans x 1 min   Supine with towel under arm-STM cervical spine/UT, scalenes, light ROM/stretching.     PROM started - 3 x 8 reps- flexion to 130 abduction to 90, progression of ER- 42 deg  Table slides x 10 flexion  Sitting - scapular retractions x 15  Shoulder shrugs back x 15  Cervical retractions x 15  Cervical AROM Rot R/L x 10 each, SB R/L x 10 each  Upper trap active stretch sitting R/L    Codmans x 1 min   Supine with towel under arm-STM cervical spine/UT, scalenes, light ROM/stretching.     PROM started - 3 x 8 reps- flexion to 130 abduction to 90, progression of ER- 42 deg  Table slides x 10 flexion  Sitting - scapular retractions x 15  Shoulder shrugs back x 15  Cervical retractions x 15  Cervical AROM Rot R/L x 10 each, SB R/L x 10 each   Codmans x 1 min   Supine with towel under arm-STM cervical spine/UT, scalenes, light ROM/stretching.     PROM started - 3 x 10 reps- all motions  Supine cane press ups x 10  Supine cane flexion in press up position x 5  Supine ER at side AA with cane ROM x 10  Table slides x 10 flexion  Sitting - scapular retractions x 15  Shoulder shrugs back x 15  Cervical retractions x 15       Therapeutic Exercise Minutes 38 38 40 40   Total Time Of Timed Procedures 38 38 40 40   Total Time Of Service-Based Procedures 0 0 0 0   Total Treatment Time 38 38 40 40        HEP  Added AAROM cane press up to flexion  AAROM into ER supine    Charges   Ex 3

## 2025-07-23 ENCOUNTER — OFFICE VISIT (OUTPATIENT)
Dept: PHYSICAL THERAPY | Age: 55
End: 2025-07-23
Attending: STUDENT IN AN ORGANIZED HEALTH CARE EDUCATION/TRAINING PROGRAM
Payer: COMMERCIAL

## 2025-07-23 PROCEDURE — 97140 MANUAL THERAPY 1/> REGIONS: CPT | Performed by: PHYSICAL THERAPIST

## 2025-07-23 PROCEDURE — 97110 THERAPEUTIC EXERCISES: CPT | Performed by: PHYSICAL THERAPIST

## 2025-07-23 NOTE — PROGRESS NOTES
Patient: Nunu Guan (55 year old, female) Referring Provider:  Insurance:   Diagnosis: Pre/post L revision rotator cuff repair. Please eval and treat, massive protocol [facility]  [facility]   Date of Surgery: 5/22/2025 Next MD visit:  N/A   Precautions:    6/6/2025 Referral Information:    Date of Evaluation: Req: 27, Auth: 27, Exp: 9/30/2025 05/29/25 POC Auth Visits:  27    Progress Note   Today's Date   7/23/2025    Subjective  Patient reports just some light irritation at the bicep at L arm, otherwise all went well.       Pain: 0/10     Objective  see outpatient daily record        Assessment   Continued work over bicep region L arm as tenderness and some discomfort present.  Added sub max isometrics for home.  Patient to continue previous exercises.  Discussed continuing to ice bicep area and avoid quick movements with L UE.  Continuing AAROM work, working to progress to AROM as able.    Goals (to be met in 40 visits)   Patient to be able to raise arm within 5-10 deg of all motions of R arm or better to be able to reach easily for daily activities.   Patient to report no pain at the shoulder with return to daily activities.  Patient to have strength at 4/5 or greater all motions of shoulder/scapular region to be able to return to daily activities.   Follow massive RC repair guidelines.                                       Plan  Continue per protocol for massive tear.    Treatment Last 4 Visits  Treatment Day: 27 7/16/2025 7/18/2025 7/21/2025 7/23/2025   UE Treatment   Therapeutic Exercise Codmans x 1 min   Supine with towel under arm-STM cervical spine/UT, scalenes, light ROM/stretching.     PROM started - 3 x 8 reps- flexion to 130 abduction to 90, progression of ER- 42 deg  Table slides x 10 flexion  Sitting - scapular retractions x 15  Shoulder shrugs back x 15  Cervical retractions x 15  Cervical AROM Rot R/L x 10 each, SB R/L x 10 each  Upper trap active stretch sitting R/L     Codmans x 1 min   Supine with towel under arm-STM cervical spine/UT, scalenes, light ROM/stretching.     PROM started - 3 x 8 reps- flexion to 130 abduction to 90, progression of ER- 42 deg  Table slides x 10 flexion  Sitting - scapular retractions x 15  Shoulder shrugs back x 15  Cervical retractions x 15  Cervical AROM Rot R/L x 10 each, SB R/L x 10 each   Codmans x 1 min   Supine with towel under arm-STM cervical spine/UT, scalenes, light ROM/stretching.     PROM started - 3 x 10 reps- all motions  Supine cane press ups x 10  Supine cane flexion in press up position x 5  Supine ER at side AA with cane ROM x 10  Table slides x 10 flexion  Sitting - scapular retractions x 15  Shoulder shrugs back x 15  Cervical retractions x 15     Codmans x 1 min   Supine with towel under arm-STM cervical spine/UT, scalenes, light ROM/stretching.     PROM started - 3 x 10 reps- all motions  Supine cane press ups x 10  Supine cane flexion in press up position x 5  Supine ER at side AA with cane ROM x 10  Table slides x 10 flexion  Sitting - scapular retractions x 15  Isometrics sub max no pain flex/ER/IR/Ext x 8 3 sec hold         Therapeutic Exercise Minutes 38 40 40 30   Manual Therapy Minutes    10   Total Time Of Timed Procedures 38 40 40 40   Total Time Of Service-Based Procedures 0 0 0 0   Total Treatment Time 38 40 40 40        HEP      Charges  MM, Ex 2

## 2025-07-25 ENCOUNTER — OFFICE VISIT (OUTPATIENT)
Dept: PHYSICAL THERAPY | Age: 55
End: 2025-07-25
Attending: STUDENT IN AN ORGANIZED HEALTH CARE EDUCATION/TRAINING PROGRAM
Payer: COMMERCIAL

## 2025-07-25 PROCEDURE — 97110 THERAPEUTIC EXERCISES: CPT | Performed by: PHYSICAL THERAPIST

## 2025-07-25 PROCEDURE — 97140 MANUAL THERAPY 1/> REGIONS: CPT | Performed by: PHYSICAL THERAPIST

## 2025-07-25 NOTE — PROGRESS NOTES
Patient: Nunu Guan (55 year old, female) Referring Provider:  Insurance:   Diagnosis: Pre/post L revision rotator cuff repair. Please eval and treat, massive protocol [facility]   Date of Surgery: 5/22/2025 Next MD visit:  N/A   Precautions:    6/6/2025 Referral Information:    Date of Evaluation: Req: 32, Auth: 32, Exp: 10/31/2025    05/29/25 POC Auth Visits:  27       Today's Date   7/25/2025    Subjective   Patient reports just feeling some increased aching at the shoulder.  States was able to do the exercises 1 x yesterday.       Pain:  1-2/10     Objective     See outpatient daily record.      Assessment   Patient to ice and hold exercises today.  May start back with pendulum, scapular retractions, and table slides Saturday.  On Sunday may start cane light reps.  Reducing activity due to c/o some pain and some guarding noted with PROM.      Goals (to be met in 40 visits)   Patient to be able to raise arm within 5-10 deg of all motions of R arm or better to be able to reach easily for daily activities.   Patient to report no pain at the shoulder with return to daily activities.  Patient to have strength at 4/5 or greater all motions of shoulder/scapular region to be able to return to daily activities.   Follow massive RC repair guidelines.                                         Plan   Continue with progression of massive RC protocol    Treatment Last 4 Visits  Treatment Day: 28 7/18/2025 7/21/2025 7/23/2025 7/25/2025   UE Treatment   Therapeutic Exercise Codmans x 1 min   Supine with towel under arm-STM cervical spine/UT, scalenes, light ROM/stretching.     PROM started - 3 x 8 reps- flexion to 130 abduction to 90, progression of ER- 42 deg  Table slides x 10 flexion  Sitting - scapular retractions x 15  Shoulder shrugs back x 15  Cervical retractions x 15  Cervical AROM Rot R/L x 10 each, SB R/L x 10 each   Codmans x 1 min   Supine with towel under arm-STM cervical spine/UT,  scalenes, light ROM/stretching.     PROM started - 3 x 10 reps- all motions  Supine cane press ups x 10  Supine cane flexion in press up position x 5  Supine ER at side AA with cane ROM x 10  Table slides x 10 flexion  Sitting - scapular retractions x 15  Shoulder shrugs back x 15  Cervical retractions x 15     Codmans x 1 min   Supine with towel under arm-STM cervical spine/UT, scalenes, light ROM/stretching.     PROM started - 3 x 10 reps- all motions  Supine cane press ups x 10  Supine cane flexion in press up position x 5  Supine ER at side AA with cane ROM x 10  Table slides x 10 flexion  Sitting - scapular retractions x 15  Isometrics sub max no pain flex/ER/IR/Ext x 8 3 sec hold       Codmans x 1 min   Supine STM cervical spine/UT, scalenes, light ROM/stretching.     PROM  - 3x 8 reps- all motions  Sitting - scapular retractions x 15  Isometrics sub max no pain flex/ER/IR/Ext x 5 3 sec hold - supine  Ice x 10 minutes   Therapeutic Exercise Minutes 40 40 30 15   Manual Therapy Minutes   10 15   Total Time Of Timed Procedures 40 40 40 30   Total Time Of Service-Based Procedures 0 0 0 0   Total Treatment Time 40 40 40 30        HEP      Charges      Ex , MM

## 2025-07-28 ENCOUNTER — OFFICE VISIT (OUTPATIENT)
Dept: PHYSICAL THERAPY | Age: 55
End: 2025-07-28
Attending: STUDENT IN AN ORGANIZED HEALTH CARE EDUCATION/TRAINING PROGRAM
Payer: COMMERCIAL

## 2025-07-28 PROCEDURE — 97140 MANUAL THERAPY 1/> REGIONS: CPT | Performed by: PHYSICAL THERAPIST

## 2025-07-28 PROCEDURE — 97110 THERAPEUTIC EXERCISES: CPT | Performed by: PHYSICAL THERAPIST

## 2025-07-28 NOTE — PROGRESS NOTES
Patient: Nunu Guan (55 year old, female) Referring Provider:  Insurance:   Diagnosis: Pre/post L revision rotator cuff repair. Please eval and treat, massive protocol [facility]   Date of Surgery: 5/22/2025 Next MD visit:  N/A   Precautions:    6/6/2025 Referral Information:    Date of Evaluation: Req: 32, Auth: 32, Exp: 10/31/2025    05/29/25 POC Auth Visits:  32       Today's Date   7/28/2025    Subjective   Patient reports the shoulder feels much better since taking a break and icing.         Pain: 0/10     Objective     See outpatient daily record.     PROM flexion 148, abd 110, ER 60, scaption 155     Assessment   Patient with much improvement in ROM and no guarding noted as she had last session.  Pain levels were at 0/10.  Able to perform exercises without discomfort.  Able to start upright row with some cueing this session.  Did need cueing for correct scapular positioning.  Patient will continue to ice as needed at home.    Goals (to be met in 40 visits)   Patient to be able to raise arm within 5-10 deg of all motions of R arm or better to be able to reach easily for daily activities.   Patient to report no pain at the shoulder with return to daily activities.  Patient to have strength at 4/5 or greater all motions of shoulder/scapular region to be able to return to daily activities.   Follow massive RC repair guidelines.                                           Plan  Continue per protocol for massive tear.Continue to advance as able.      Treatment Last 4 Visits  Treatment Day: 28 7/21/2025 7/23/2025 7/25/2025 7/28/2025   UE Treatment   Therapeutic Exercise Codmans x 1 min   Supine with towel under arm-STM cervical spine/UT, scalenes, light ROM/stretching.     PROM started - 3 x 10 reps- all motions  Supine cane press ups x 10  Supine cane flexion in press up position x 5  Supine ER at side AA with cane ROM x 10  Table slides x 10 flexion  Sitting - scapular retractions x  15  Shoulder shrugs back x 15  Cervical retractions x 15     Codmans x 1 min   Supine with towel under arm-STM cervical spine/UT, scalenes, light ROM/stretching.     PROM started - 3 x 10 reps- all motions  Supine cane press ups x 10  Supine cane flexion in press up position x 5  Supine ER at side AA with cane ROM x 10  Table slides x 10 flexion  Sitting - scapular retractions x 15  Isometrics sub max no pain flex/ER/IR/Ext x 8 3 sec hold       Codmans x 1 min   Supine STM cervical spine/UT, scalenes, light ROM/stretching.     PROM  - 3x 8 reps- all motions  Sitting - scapular retractions x 15  Isometrics sub max no pain flex/ER/IR/Ext x 5 3 sec hold - supine  Ice x 10 minutes Codmans x 1 min   Supine STM cervical spine/UT, scalenes, light ROM/stretching.   STM anterior upper arm  PROM  - 3x 8 reps- all motions  Supine press ups x 10 with cane  Supine cane flexion x 5  Seated table slides x 10 flexion  Standing rows leaning onto cabinet 1 x 10, 1 x 5  Seated isometrics x 8, hold 3 sec, flex/abd/ER/IR/Ext  Sitting - scapular retractions x 15  Ice x 10 minutes   Therapeutic Exercise Minutes 40 30 15 25   Manual Therapy Minutes  10 15 15   Total Time Of Timed Procedures 40 40 30 40   Total Time Of Service-Based Procedures 0 0 0 0   Total Treatment Time 40 40 30 40        HEP      Charges   MM, Ex 2

## 2025-07-30 ENCOUNTER — OFFICE VISIT (OUTPATIENT)
Dept: PHYSICAL THERAPY | Age: 55
End: 2025-07-30
Attending: STUDENT IN AN ORGANIZED HEALTH CARE EDUCATION/TRAINING PROGRAM
Payer: COMMERCIAL

## 2025-07-30 PROCEDURE — 97110 THERAPEUTIC EXERCISES: CPT | Performed by: PHYSICAL THERAPIST

## 2025-07-30 PROCEDURE — 97140 MANUAL THERAPY 1/> REGIONS: CPT | Performed by: PHYSICAL THERAPIST

## 2025-08-01 ENCOUNTER — OFFICE VISIT (OUTPATIENT)
Dept: PHYSICAL THERAPY | Age: 55
End: 2025-08-01
Attending: STUDENT IN AN ORGANIZED HEALTH CARE EDUCATION/TRAINING PROGRAM

## 2025-08-01 PROCEDURE — 97140 MANUAL THERAPY 1/> REGIONS: CPT | Performed by: PHYSICAL THERAPIST

## 2025-08-01 PROCEDURE — 97110 THERAPEUTIC EXERCISES: CPT | Performed by: PHYSICAL THERAPIST

## 2025-08-04 ENCOUNTER — APPOINTMENT (OUTPATIENT)
Dept: PHYSICAL THERAPY | Age: 55
End: 2025-08-04
Attending: STUDENT IN AN ORGANIZED HEALTH CARE EDUCATION/TRAINING PROGRAM

## 2025-08-06 ENCOUNTER — OFFICE VISIT (OUTPATIENT)
Dept: PHYSICAL THERAPY | Age: 55
End: 2025-08-06
Attending: STUDENT IN AN ORGANIZED HEALTH CARE EDUCATION/TRAINING PROGRAM

## 2025-08-06 PROCEDURE — 97110 THERAPEUTIC EXERCISES: CPT

## 2025-08-08 ENCOUNTER — OFFICE VISIT (OUTPATIENT)
Dept: PHYSICAL THERAPY | Age: 55
End: 2025-08-08
Attending: STUDENT IN AN ORGANIZED HEALTH CARE EDUCATION/TRAINING PROGRAM

## 2025-08-08 PROCEDURE — 97140 MANUAL THERAPY 1/> REGIONS: CPT

## 2025-08-08 PROCEDURE — 97110 THERAPEUTIC EXERCISES: CPT

## 2025-08-11 ENCOUNTER — APPOINTMENT (OUTPATIENT)
Dept: PHYSICAL THERAPY | Age: 55
End: 2025-08-11
Attending: STUDENT IN AN ORGANIZED HEALTH CARE EDUCATION/TRAINING PROGRAM

## 2025-08-13 ENCOUNTER — APPOINTMENT (OUTPATIENT)
Dept: PHYSICAL THERAPY | Age: 55
End: 2025-08-13
Attending: STUDENT IN AN ORGANIZED HEALTH CARE EDUCATION/TRAINING PROGRAM

## 2025-08-15 ENCOUNTER — OFFICE VISIT (OUTPATIENT)
Dept: PHYSICAL THERAPY | Age: 55
End: 2025-08-15
Attending: STUDENT IN AN ORGANIZED HEALTH CARE EDUCATION/TRAINING PROGRAM

## 2025-08-15 PROCEDURE — 97110 THERAPEUTIC EXERCISES: CPT | Performed by: PHYSICAL THERAPIST

## 2025-08-18 ENCOUNTER — OFFICE VISIT (OUTPATIENT)
Dept: PHYSICAL THERAPY | Age: 55
End: 2025-08-18
Attending: STUDENT IN AN ORGANIZED HEALTH CARE EDUCATION/TRAINING PROGRAM

## 2025-08-18 PROCEDURE — 97110 THERAPEUTIC EXERCISES: CPT | Performed by: PHYSICAL THERAPIST

## 2025-08-20 ENCOUNTER — APPOINTMENT (OUTPATIENT)
Dept: PHYSICAL THERAPY | Age: 55
End: 2025-08-20
Attending: STUDENT IN AN ORGANIZED HEALTH CARE EDUCATION/TRAINING PROGRAM

## 2025-08-22 ENCOUNTER — OFFICE VISIT (OUTPATIENT)
Dept: PHYSICAL THERAPY | Age: 55
End: 2025-08-22
Attending: STUDENT IN AN ORGANIZED HEALTH CARE EDUCATION/TRAINING PROGRAM

## 2025-08-22 DIAGNOSIS — S46.019D TRAUMATIC COMPLETE TEAR OF ROTATOR CUFF, UNSPECIFIED LATERALITY, SUBSEQUENT ENCOUNTER: ICD-10-CM

## 2025-08-22 DIAGNOSIS — Z47.89 ORTHOPEDIC AFTERCARE: Primary | ICD-10-CM

## 2025-08-22 PROCEDURE — 97110 THERAPEUTIC EXERCISES: CPT | Performed by: PHYSICAL THERAPIST

## 2025-08-25 ENCOUNTER — OFFICE VISIT (OUTPATIENT)
Dept: PHYSICAL THERAPY | Age: 55
End: 2025-08-25
Attending: STUDENT IN AN ORGANIZED HEALTH CARE EDUCATION/TRAINING PROGRAM

## 2025-08-25 PROCEDURE — 97110 THERAPEUTIC EXERCISES: CPT | Performed by: PHYSICAL THERAPIST

## 2025-08-27 ENCOUNTER — OFFICE VISIT (OUTPATIENT)
Dept: PHYSICAL THERAPY | Age: 55
End: 2025-08-27
Attending: STUDENT IN AN ORGANIZED HEALTH CARE EDUCATION/TRAINING PROGRAM

## 2025-08-27 PROCEDURE — 97110 THERAPEUTIC EXERCISES: CPT | Performed by: PHYSICAL THERAPIST

## (undated) DIAGNOSIS — R06.83 SNORING: ICD-10-CM

## (undated) DIAGNOSIS — R63.5 WEIGHT GAIN: ICD-10-CM

## (undated) DIAGNOSIS — I10 HYPERTENSION, UNSPECIFIED TYPE: ICD-10-CM

## (undated) DIAGNOSIS — Z86.718 HISTORY OF DVT (DEEP VEIN THROMBOSIS): ICD-10-CM

## (undated) DIAGNOSIS — E03.9 ACQUIRED HYPOTHYROIDISM: ICD-10-CM

## (undated) DIAGNOSIS — E55.9 VITAMIN D DEFICIENCY: ICD-10-CM

## (undated) DIAGNOSIS — F43.9 STRESS: ICD-10-CM

## (undated) DIAGNOSIS — Z51.81 ENCOUNTER FOR THERAPEUTIC DRUG MONITORING: ICD-10-CM

## (undated) DIAGNOSIS — E78.00 HYPERCHOLESTEREMIA: ICD-10-CM

## (undated) DIAGNOSIS — R73.03 PREDIABETES: ICD-10-CM

## (undated) DIAGNOSIS — E66.9 OBESITY (BMI 30-39.9): ICD-10-CM

## (undated) DEVICE — SHEET,DRAPE,70X100,STERILE: Brand: MEDLINE

## (undated) DEVICE — MEDI-VAC NON-CONDUCTIVE SUCTION TUBING: Brand: CARDINAL HEALTH

## (undated) DEVICE — KIT BEACH CHR FOAM W/ SUPP ARM DRP

## (undated) DEVICE — HEWSON SUTURE RETRIEVER: Brand: HEWSON SUTURE RETRIEVER

## (undated) DEVICE — BLADE SHV L13CM DIA4MM BNE CUT AGG DEB

## (undated) DEVICE — TOWEL SURG OR 17X30IN BLUE

## (undated) DEVICE — CANNULA ENDOSCP 5.75MMX7CM DST RNG W/ ORNG

## (undated) DEVICE — DRILL BIT, CANNULATED, LONG, 4.5MM: Brand: MEDLINE

## (undated) DEVICE — HANDLE SUR BLU PLAS LT FLX SLIP ON ST DISP

## (undated) DEVICE — SUT MCRYL 3-0 27IN ABSRB UD L24MM PS-1

## (undated) DEVICE — 3M(TM) MEDIPORE(TM) H SOFT CLOTH TAPE 2866: Brand: 3M™ MEDIPORE™

## (undated) DEVICE — IMPERVIOUS STOCKINETTE: Brand: DEROYAL

## (undated) DEVICE — GAUZE TRAY STERILE 4X4 12PLY

## (undated) DEVICE — SHOULDER P.A.D II: Brand: DEROYAL

## (undated) DEVICE — GAMMEX® NON-LATEX PI ORTHO SIZE 8, STERILE POLYISOPRENE POWDER-FREE SURGICAL GLOVE: Brand: GAMMEX

## (undated) DEVICE — SUT VICRYL 2-0 FS-1 J443H

## (undated) DEVICE — NEEDLE SUT W/ MEGALOADER HD SCORPION 5 PER BX

## (undated) DEVICE — SUT MCRYL 3-0 18IN PS-2 ABSRB UD 19MM 3/8 CIR

## (undated) DEVICE — TUBING PMP L16FT DISP INFLOW

## (undated) DEVICE — CANNULA ARTHSCP 8X30MM SIL BTTN LP 2

## (undated) DEVICE — KIT BIO TEND AR-1676DS

## (undated) DEVICE — SOLUTION IRRIG 3000ML 0.9% NACL FLX CONT

## (undated) DEVICE — BANDAGE,GAUZE,4.5"X4.1YD,STERILE,LF: Brand: MEDLINE

## (undated) DEVICE — CANNULA ARTHSCP 8X40MM SIL BTTN LP 2

## (undated) DEVICE — APPLICATOR PREP 26ML W/ ST SOL CHLORAPREP

## (undated) DEVICE — SUT ETHILON 3-0 FS-1 669H

## (undated) DEVICE — MEDI-VAC NON-CONDUCTIVE SUCTION TUBING 6MM X 1.8M (6FT.) L: Brand: CARDINAL HEALTH

## (undated) DEVICE — 60 ML SYRINGE LUER-LOCK TIP: Brand: MONOJECT

## (undated) DEVICE — COTTON ROLL: Brand: DEROYAL

## (undated) DEVICE — BEACH CHAIR MASK SGL USE

## (undated) DEVICE — GAMMEX® NON-LATEX PI ORTHO SIZE 6.5, STERILE POLYISOPRENE POWDER-FREE SURGICAL GLOVE: Brand: GAMMEX

## (undated) DEVICE — SUT VICRYL 2-0 CT-1  JJ42G

## (undated) DEVICE — GAMMEX® NON-LATEX PI ORTHO SIZE 7.5, STERILE POLYISOPRENE POWDER-FREE SURGICAL GLOVE: Brand: GAMMEX

## (undated) DEVICE — SUT VICRYL 0 CT-1 J740D

## (undated) DEVICE — Device

## (undated) DEVICE — KIT CLEAN ENDOKIT 1.1OZ GOWNX2

## (undated) DEVICE — DRAPE SHEET LARGE 76X55

## (undated) DEVICE — SHOULDER ARTHROSCOPY: Brand: MEDLINE INDUSTRIES, INC.

## (undated) DEVICE — KIT ENDO ORCAPOD 160/180/190

## (undated) DEVICE — SUT SUTTAPE L40IN BLK WHT L36.6MM 1/2

## (undated) DEVICE — UNDYED BRAIDED (POLYGLACTIN 910), SYNTHETIC ABSORBABLE SUTURE: Brand: COATED VICRYL

## (undated) DEVICE — SUT SUTTAPE FIBERLINK N ABSRB L1.3MM

## (undated) DEVICE — LINE MNTR ADLT SET O2 INTMD

## (undated) DEVICE — GUIDEPIN, NON-THREADED, 2.5 X 200MM: Brand: MEDLINEUNITE

## (undated) DEVICE — BANDAGE,GAUZE,BULKEE II,4.5"X4.1YD,STRL: Brand: MEDLINE

## (undated) DEVICE — SUT MONOCRYL 3-0 PS-2 Y497G

## (undated) DEVICE — INTENDED FOR TISSUE SEPARATION, AND OTHER PROCEDURES THAT REQUIRE A SHARP SURGICAL BLADE TO PUNCTURE OR CUT.: Brand: BARD-PARKER ® STAINLESS STEEL BLADES

## (undated) DEVICE — GAMMEX® PI HYBRID SIZE 6.5, STERILE POWDER-FREE SURGICAL GLOVE, POLYISOPRENE AND NEOPRENE BLEND: Brand: GAMMEX

## (undated) DEVICE — BANDAGE COMPR W4INXL12FT E

## (undated) DEVICE — DRESSING FM 4.25X4.25IN PLMM

## (undated) DEVICE — GAUZE SPONGES: Brand: DEROYAL

## (undated) DEVICE — 3M™ TEGADERM™ TRANSPARENT FILM DRESSING FRAME STYLE, 1626W, 4 IN X 4-3/4 IN (10 CM X 12 CM), 50/CT 4CT/CASE: Brand: 3M™ TEGADERM™

## (undated) DEVICE — AMBIENT SUPER TURBOVAC 90: Brand: COBLATION

## (undated) DEVICE — 35 ML SYRINGE REGULAR TIP: Brand: MONOJECT

## (undated) DEVICE — AMBIENT SUPER TURBOVAC 90 IFS: Brand: COBLATION

## (undated) DEVICE — PRECISION (9.0 X 0.51 X 31.0MM)

## (undated) DEVICE — LOWER EXTREMITY: Brand: MEDLINE INDUSTRIES, INC.

## (undated) DEVICE — FORCEP RADIAL JAW 4

## (undated) DEVICE — CANNULA ARTHSCP L7CM DIA7MM TRNSLUC THRD FLX

## (undated) DEVICE — ADHESIVE SKIN TOP FOR WND CLSR DERMBND ADV

## (undated) DEVICE — KIT ORTH INSTR W/ SPEAR OBT 2.6MM DRL DISP

## (undated) DEVICE — APPLICATOR PREP 26ML CHG 2% ISO ALC 70%

## (undated) DEVICE — WRAP COOLING SHLDR W/ICE PILLO

## (undated) DEVICE — ZZ-CONVERTED-TO-524825-ADHESIVE SKIN TOP FOR WND CLSR DERMBND ADV

## (undated) DEVICE — BNDG COHESIVE W4INXL5YD TAN E

## (undated) DEVICE — ANTIBACTERIAL VIOLET BRAIDED (POLYGLACTIN 910), SYNTHETIC ABSORBABLE SUTURE: Brand: COATED VICRYL

## (undated) DEVICE — SPLINT PRECUT SYNTH 4X30

## (undated) DEVICE — PAD,ABDOMINAL,8"X7.5",STERILE,LF,1/PK: Brand: MEDLINE

## (undated) DEVICE — APPLICATOR CHLORAPREP 26ML

## (undated) DEVICE — VIOLET BRAIDED (POLYGLACTIN 910), SYNTHETIC ABSORBABLE SUTURE: Brand: COATED VICRYL

## (undated) NOTE — MR AVS SNAPSHOT
[facility]  [address], [address]  [address] [facility]  [phone]               Thank you for choosing us for your health care visit with STEPHANIE Aly MD.  We are glad to serve you and happy to provide you with this summar Commonly known as:  HYZAAR           mupirocin 2 % Oint   Apply 1 Application topically 3 (three) times daily.    Commonly known as:  Robi Edwards                Where to Get Your Medications      These medications were sent to [place] [id number] [facility]

## (undated) NOTE — LETTER
09/27/18        [place]  [facility] [address]      Dear Marlena Manzanares,    [facility] records indicate that you have outstanding lab work and or testing that was ordered for you and has not yet been completed:  Orders Placed This Encounter

## (undated) NOTE — LETTER
AUTHORIZATION FOR SURGICAL OPERATION OR OTHER PROCEDURE    1. I hereby authorize Cheryl Stockton, ROMY , and [facility] staff assigned to my case to perform the following operation and/or procedure at the [facility] site:  Endometrial Biopsy.    2.  My physician has explained the nature and purpose of the operation or other procedure, possible alternative methods of treatment, the risks involved, and the possibility of complication to me.  I acknowledge that no guarantee has been made as to the result that may be obtained.  3.  I recognize that, during the course of this operation, or other procedure, unforseen conditions may necessitate additional or different procedure than those listed above.  I, therefore, further authorize and request that the above named physician, his/her physician assistants or designees perform such procedures as are, in his/her professional opinion, necessary and desirable.  4.  Any tissue or organs removed in the operation or other procedure may be disposed of by and at the discretion of the [facility] and [facility].  5.  I understand that in the event of a medical emergency, I will be transported by local paramedics to [facility] or other hospital emergency department.  6.  I certify that I have read and fully understand the above consent to operation and/or other procedure.    7.  I acknowledge that my physician has explained sedation/analgesia administration to me including the risks and benefits.  I consent to the administration of sedation/analgesia as may be necessary or desirable in the judgement of my physician.    Witness signature: ___________________________________________________ Date:  ______/______/_____                    Time:  ________ A.M.  P.M.       Patient Name:  ______________________________________________________  (please print)      Patient signature:   ___________________________________________________             Relationship to Patient:           []  Parent    Responsible person                          []  Spouse  In case of minor or                    [] Other  _____________   Incompetent name:  __________________________________________________                               (please print)      _____________      Responsible person  In case of minor or  Incompetent signature:  _______________________________________________    Statement of Physician  My signature below affirms that prior to the time of the procedure, I have explained to the patient and/or his/her guardian, the risks and benefits involved in the proposed treatment and any reasonable alternative to the proposed treatment.  I have also explained the risks and benefits involved in the refusal of the proposed treatment and have answered the patient's questions.                        Date:  ______/______/_______  Provider                      Signature:  __________________________________________________________       Time:  ___________ A.M    P.M.

## (undated) NOTE — LETTER
[facility]  Administration of Anesthesia  1. Felicia Ayala, or _________________________________ acting on her behalf, (Patient) (Dependent/Representative) request to receive anesthesia for my pending procedure/operation/treatment. A physician (anesthesiologist) alone or an anesthesiologist working with a nurse anesthetist may administer my anesthesia. 2. I understand that my anesthesiologist is not an employee or agent of the hospital, but is an independent medical practitioner who has been permitted to use its facilities for the care and treatment of his/her patients. 3. I acknowledge that a physician from [facility] or their designate(s), recommended anesthesia for me using her/his medical judgment. The type(s) of anesthesia I may receive include:                a) General Anesthesia, b) Spinal/Epidural Anesthesia, c) Regional Anesthesia or d) Monitored Anesthesia Care. 4. If my spinal, regional or monitored anesthesia care (local) is not satisfactory for my comfort, or if my medical condition requires, I consent to the administration of general anesthesia. 5. I am aware that the practice of anesthesiology is not an exact science and that some foreseeable risks or consequences may occur. Some common risks/consequences include sore throat and hoarseness, nausea and vomiting, muscle soreness, backache, damage to the mouth/teeth/vocal cords and eye injury. I understand that more rare but serious potential risks of anesthesia include blood pressure changes, drug reactions, cardiac arrest, brain damage, paralysis or death. These risks apply to whether I have general, spinal/epidural, regional or monitored anesthesia care. 6. OBSTETRIC PATIENTS: Specific risks/consequences of spinal/epidural anesthesia may include itching, low blood pressure, difficulty urinating, slowing of the baby's heart rate and headache.  Rare risks include infections, high spinal block, spinal bleeding, seizure, cardiac arrest and death. 7. AWARENESS: I understand that it is possible (but unlikely) to have explicit memory of events from the operating room while under general anesthesia. 8. ELECTROCONVULSIVE THERAPY PATIENTS: This consent serves for all treatments in a single course of therapy. 9. I understand that I must inform my anesthesiologist when I last ate and/or drank to minimize the risk of anesthesia. 10. If I am pregnant, or may pregnant, I understand that elective surgery should be postponed until after the baby is born. Anesthetics cross the placenta and may temporarily anesthetize the baby. Although fetal complications of anesthesia during pregnancy are rare, they may include birth defects, premature labor, brain damage and death. 11. I certify that I informed the anesthesiologist, to the best of my ability, about medication I take including blood thinners, anticoagulants, herbal remedies, narcotics and recreational drugs (e.g. cocaine, marijuana, PCP). Failure to inform my anesthesiologist about these medicines may increase my risk of anesthetic complications. The nature and purpose of my anesthetic management was explained to me. I had the opportunity to ask questions and the answers and information provided meet my satisfaction.   I retain the right to withdraw this consent at any time prior to the administration of said anesthetic.    ___________________________________________________           _____________________________________________________  Patient Signature                                                                                      Witness Signature                ___________________________________________________           _____________________________________________________  Date/Time                                                                                               Responsible person in case of minor/ unconscious pt /Relationship    My signature below affirms that prior to the time of the procedure, I have explained to the patient and/or his/her guardian, the risks and benefits of undergoing anesthesia, as well as any reasonable alternatives.     ___________________________________________________            _____________________________________________________  Physician Signature                            Date/Time  Patient Name: Alejandra Kennedy     : 1970     Printed: 2022      Medical Record #: [id number]                              Page 1 of 1    ----------ANESTHESIA CONSENT----------

## (undated) NOTE — MR AVS SNAPSHOT
[facility]  [address] [place] [address], [address]  [id number] [place] [address] [phone]               Thank you for choosing us for your health care visit with Maritza Robbins MD.  We are glad to serve you and happy to provide you with this summary of y Apply 1 Application topically 3 (three) times daily.    Commonly known as:  Bettye Dickson Orders     CMP    Complete by:  Feb 13, 2017 (Approximate)    Assoc Dx:  Routine health maintenance [Z00.00]           TSH W Reflex To Free T4 [E acquired. Please contact the Patient Business Office at [phone] if you have any questions related to insurance coverage. Thank you.            MyChart     Visit [employer]  You can access your [facility] to more actively manage your health care and view mo

## (undated) NOTE — LETTER
03/26/18        Author Alexis  [address] [facility] [address]      Dear Dora Davis,    [facility] records indicate that you have outstanding lab work and or testing that was ordered for you and has not yet been completed:          CMP      TSH W Reflex

## (undated) NOTE — Clinical Note
Shy,I saw Tod Recinos in clinic today for weight loss/management. I have recommended intensive lifestyle/behavioral modifications for weight loss. In addition, I have recommended phentermine and topiramate medications for weight loss.  She will RTC in one mo

## (undated) NOTE — LETTER
Becky Barajas 70  [facility]       08/06/20        Patient: Eugenio Resendiz   YOB: 1970   Date of Visit: 8/6/2020       Dear  Dr. Julio Fleming, [facility],      Thank you for referring Eugenio Resendiz to my practice.

## (undated) NOTE — MR AVS SNAPSHOT
After Visit Summary   6/25/2024    Nunu Guan   MRN: [id number]           Visit Information     Date & Time  6/25/2024 10:00 AM Provider  Cheryl Stockton CNM Department  [facility] Phone  [phone]      Your Vitals Were  Most recent update: 6/25/2024 10:15 AM    BP   126/84          Pulse   106          Ht   62\"          Wt   198 lb          LMP   06/13/2024 (Within Days)             BMI   36.21 kg/m²         Allergies as of 6/25/2024  Review status set to Review Complete on 6/25/2024       Noted Reaction Type Reactions    Azithromycin 09/18/2014        Other reaction(s): AZITHROMYCIN    Hydrocodone 09/18/2014        Other reaction(s): feels dazed      Your Current Medications        Dosage    Phentermine HCl 37.5 MG Oral Tab Take 1 tablet (37.5 mg total) by mouth every morning before breakfast.    topiramate 25 MG Oral Tab Take 1 tablet (25 mg total) by mouth 2 (two) times daily.    levothyroxine 100 MCG Oral Tab Take 1 tablet (100 mcg total) by mouth before breakfast.    Meloxicam 15 MG Oral Tab Take 1 tablet (15 mg total) by mouth daily.    losartan-hydroCHLOROthiazide 100-25 MG Oral Tab Take 1 tablet by mouth daily.    diclofenac 1 % External Gel Apply 2 g topically 4 (four) times daily.      Diagnoses for This Visit    DUB (dysfunctional uterine bleeding)   [328296]  -  Primary  Screening for cervical cancer   [320841]    Excessive or frequent menstruation   [626.2.ICD-9-CM]             We Ordered the Following     Normal Orders This Visit    BIOPSY OF UTERUS LINING (97409) [20055 CPT(R)]     Hpv Dna  High Risk , Thin Prep Collect [[id number] CUSTOM]     Specimen to Pathology, Tissue [[id number] CUSTOM]     THINPREP PAP SMEAR ONLY [[id number] CUSTOM]     ThinPrep PAP Smear [[id number] CUSTOM]     Future Labs/Procedures Expected by Expires    Hpv Dna  High Risk , Thin Prep Collect [[id number] CUSTOM]  6/25/2024 6/25/2025    Specimen to Pathology,  Tissue [[id number] CUSTOM]  6/25/2024 6/25/2025    ThinPrep PAP Smear [[id number] CUSTOM]  6/25/2024 6/25/2025    US PELVIS (TRANSABDOMINAL AND TRANSVAGINAL) (CPT=76856/47091) [47082 CPT(R)]  6/25/2024 (Approximate) 6/25/2025      Future Appointments        Provider Department    7/1/2024 11:00 AM [facility]    7/15/2024 11:00 AM [facility] Re[facility] Services [facility]    7/16/2024 11:30 AM [facility] US RM6 PM [facility]    10/7/2024 5:20 PM Sangeeta Marcum [facility] Medical [facility]      Imaging Scheduling Instructions     Around June 25, 2024   Imaging:   US PELVIS (TRANSABDOMINAL AND TRANSVAGINAL) (CPT=76856/12480)    Instructions: To schedule an appointment for your radiology test please call [facility] Central Scheduling at [phone].                    Did you know that [facility] primary care physicians now offer Video Visits through [employer] for adult patients for a variety of conditions such as allergies, back pain and cold symptoms? Skip the drive and waiting room and online chat with a doctor face-to-face using your web-cam enabled computer or mobile device wherever you are. Video Visits cost $50 and can be paid hassle-free using a credit, debit, or health savings card.  Not active on [employer]? Ask us how to get signed up today!          If you receive a survey from [employer], please take a few minutes to complete it and provide feedback. We strive to deliver the best patient experience and are looking for ways to make improvements. Your feedback will help us do so. For more information on [employer], please visit [email or web address]           No text in SmartText           No text in SmartText

## (undated) NOTE — LETTER
AUTHORIZATION FOR SURGICAL OPERATION OR OTHER PROCEDURE    1. I hereby authorize Dr. Isaacs, and [facility] staff assigned to my case to perform the following operation and/or procedure at the [facility] site:    _______________________________________________________________________________________________    Left Shoulder Cortisone Injection  _______________________________________________________________________________________________    2.  My physician has explained the nature and purpose of the operation or other procedure, possible alternative methods of treatment, the risks involved, and the possibility of complication to me.  I acknowledge that no guarantee has been made as to the result that may be obtained.  3.  I recognize that, during the course of this operation, or other procedure, unforseen conditions may necessitate additional or different procedure than those listed above.  I, therefore, further authorize and request that the above named physician, his/her physician assistants or designees perform such procedures as are, in his/her professional opinion, necessary and desirable.  4.  Any tissue or organs removed in the operation or other procedure may be disposed of by and at the discretion of the [facility] and [facility].  5.  I understand that in the event of a medical emergency, I will be transported by local paramedics to [facility] or other hospital emergency department.  6.  I certify that I have read and fully understand the above consent to operation and/or other procedure.    7.  I acknowledge that my physician has explained sedation/analgesia administration to me including the risks and benefits.  I consent to the administration of sedation/analgesia as may be necessary or desirable in the judgement of my physician.    Witness signature: ___________________________________________________ Date:   ______/______/_____                    Time:  ________ A.M.  P.M.       Patient Name:  ______________________________________________________  (please print)      Patient signature:  ___________________________________________________             Relationship to Patient:           []  Parent    Responsible person                          []  Spouse  In case of minor or                    [] Other  _____________   Incompetent name:  __________________________________________________                               (please print)      _____________      Responsible person  In case of minor or  Incompetent signature:  _______________________________________________    Statement of Physician  My signature below affirms that prior to the time of the procedure, I have explained to the patient and/or his/her guardian, the risks and benefits involved in the proposed treatment and any reasonable alternative to the proposed treatment.  I have also explained the risks and benefits involved in the refusal of the proposed treatment and have answered the patient's questions.                        Date:  ______/______/_______  Provider                      Signature:  __________________________________________________________       Time:  ___________ A.M    P.M.

## (undated) NOTE — LETTER
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    If you require additional information please contact our office.        Sincerely,    Igor Smith MD

## (undated) NOTE — MR AVS SNAPSHOT
[facility] SPECIAL[facility] [place] [address] [address]  [id number] [phone]               Thank you for choosing us for your health care visit with Thor Sanchez MD.  We are glad to serve you and happy to provide you with this summary o recent med list.                Levothyroxine Sodium 100 MCG Tabs   TAKE 1 TABLET BY MOUTH EVERY DAY BEFORE BREAKFAST   Commonly known as:  SYNTHROID           Losartan Potassium-HCTZ 100-25 MG Tabs   Take 1 tablet by mouth once daily.    Commonly known as:

## (undated) NOTE — LETTER
11/14/19        Devi Hooks  [address] [facility] [address]      Dear Denny Jessica,    [address] [facility] records indicate that you have outstanding lab work and or testing that was ordered for you and has not yet been completed:  Orders Placed This Encounter

## (undated) NOTE — LETTER
[address], [place]  Authorization for Invasive Procedures  1. I hereby authorize Dr. Krista Chiang , my physician and whomever may be designated as the doctor's assistant, to perform the following operation and/or procedure:  Colonoscopy on Pieter Hernandez at [facility].    2. My physician has explained to me the nature and purpose of the operation or other procedure, possible alternative methods of treatment, the risks involved and the possibility of complications to me. I understand the probable consequences of declining the recommended procedure and the alternative methods of treatment. I acknowledge that no guarantee has been made as to the result that may be obtained. 3. I recognize that during the course of this operation or other procedure, unforeseen conditions may necessitate additional or different procedures than those listed above. I, therefore, further authorize and request that the above-named physician, his/her physician assistants, or designees perform such procedures as are, in his/her professional opinion, necessary and desirable. If I have a Do Not Attempt Resuscitation (DNAR) order in place, that status will be suspended while in the operating room, procedural suite, and during the recovery period unless otherwise explicitly stated by me (or a person authorized to consent on my behalf). The surgeon or my attending physician will determine when the applicable recovery period ends for purposes of reinstating the DNAR order. 4. Should the need arise during my operation or immediate post-operative period; I also consent to the administration of blood and/or blood products.  Further, I understand that despite careful testing and screening of blood and blood products, I may still be subject to ill effects as a result of recieving a blood transfusion an/or blood producst. The following are some, but not all, of the potential risks that can occur: fever and allergic reactions, hemolytic reactions, transmission of disease such as hepatitis, AIDS, cytomegalovirus (CMV), and flluid overload. In the event that I wish to have autologous transfusions of my own blood, or a directed donor transfusion, I will discuss this with my physician. 5. I consent to the photographing of the operations or procedures to be performed for the purposes of advancing medicine, science, and/or education, provided my identity is not revealed. If the procedure has been videotaped, the physician/surgeon will obtain the original videotape. The hospital will not be responsible for storage or maintenance of this tape. 6. I consent to the presence of a  or observer as deemed necessary by my physician or his designee. 7. Any tissues or organs removed in the operation or other procedure may be disposed of by and at the discretion of [facility].    8. I understand that the physician and his/her physician assistants may not be employees or agents of [facility], [facility], [facility], but are independent medical practitioners who have been permitted to use its facilities for the care and treatment of their patients. 9. Patients having a sterilization procedure: I understand that if the procedure is successful the results will be permanent and it will therefore be impossible for me to inseminate, conceive or bear children. I also understand that the procedure is intended to result in sterility, although the result has not been guaranteed. 10. I CERTIFY THAT I HAVE READ AND FULLY UNDERSTAND THE ABOVE CONSENT TO OPERATION and/or OTHER PROCEDURE. 11. I acknowledge that my physician has explained sedation/analgesia administration to me including the risks and benefits. I consent to the administration of sedation/analgesia as may be necessary or desirable in the judgment of my physician.      Signature of Patient:  ________________________________________________ Date: _________Time: _________    Responsible person in case of minor or unconscious: _____________________________Relationship: ____________     Witness Signature: ____________________________________________ Date: __________ Time: ___________    Statement of Physician  My signature below affirms that prior to the time of the procedure, I have explained to the patient and/or her legal representative, the risks and benefits involved in the proposed treatment and any reasonable alternative to the proposed treatment. I have also explained the risks and benefits involved in the refusal of the proposed treatment and have answered the patient's questions. If I have a significant financial interest in this procedure/surgery, I have disclosed this and had a discussion with my patient.     Signature of Physician:   ________________________________________Date: _________Time:_______ Patient Name: Channing Jane  : 1970   Printed: 2022    Medical Record #: [id number]

## (undated) NOTE — MR AVS SNAPSHOT
[facility] [address]  [address], [address]  [address] [facility]  [phone]               Thank you for choosing us for your health care visit with STEPHANIE Melo MD.  We are glad to serve you and happy to provide you with this summar Losartan Potassium-HCTZ 100-25 MG Tabs   Take 1 tablet by mouth once daily. Commonly known as:  HYZAAR           mupirocin 2 % Oint   Apply 1 Application topically 3 (three) times daily.    Commonly known as:  Claudine Edge                Where to Get Your M Choose whole grain products Foods high in sodium   Water is best for hydration Fast food.    Eat at home when possible     Tips for increasing your physical activity – Adults who are physically active are less likely to develop some chronic diseases than ad

## (undated) NOTE — LETTER
11/25/20        Pau Dudley  [address] [place] [id number]      Dear Lorena Brennan,    [address] records indicate that you have outstanding lab work and or testing that was ordered for you and has not yet been completed:  Orders Placed This Encounter

## (undated) NOTE — LETTER
[facility]  Administration of Anesthesia  1. Renetta Sofia, or _________________________________ acting on her behalf, (Patient) (Dependent/Representative) request to receive anesthesia for my pending procedure/operation/treatment. A physician (anesthesiologist) alone or an anesthesiologist working with a nurse anesthetist may administer my anesthesia. 2. I understand that my anesthesiologist is not an employee or agent of the hospital, but is an independent medical practitioner who has been permitted to use its facilities for the care and treatment of his/her patients. 3. I acknowledge that a physician from [facility] or their designate(s), recommended anesthesia for me using her/his medical judgment. The type(s) of anesthesia I may receive include:                a) General Anesthesia, b) Spinal/Epidural Anesthesia, c) Regional Anesthesia or d) Monitored Anesthesia Care. 4. If my spinal, regional or monitored anesthesia care (local) is not satisfactory for my comfort, or if my medical condition requires, I consent to the administration of general anesthesia. 5. I am aware that the practice of anesthesiology is not an exact science and that some foreseeable risks or consequences may occur. Some common risks/consequences include sore throat and hoarseness, nausea and vomiting, muscle soreness, backache, damage to the mouth/teeth/vocal cords and eye injury. I understand that more rare but serious potential risks of anesthesia include blood pressure changes, drug reactions, cardiac arrest, brain damage, paralysis or death. These risks apply to whether I have general, spinal/epidural, regional or monitored anesthesia care. 6. OBSTETRIC PATIENTS: Specific risks/consequences of spinal/epidural anesthesia may include itching, low blood pressure, difficulty urinating, slowing of the baby's heart rate and headache.  Rare risks include infections, high spinal block, spinal bleeding, seizure, cardiac arrest and death. 7. AWARENESS: I understand that it is possible (but unlikely) to have explicit memory of events from the operating room while under general anesthesia. 8. ELECTROCONVULSIVE THERAPY PATIENTS: This consent serves for all treatments in a single course of therapy. 9. I understand that I must inform my anesthesiologist when I last ate and/or drank to minimize the risk of anesthesia. 10. If I am pregnant, or may pregnant, I understand that elective surgery should be postponed until after the baby is born. Anesthetics cross the placenta and may temporarily anesthetize the baby. Although fetal complications of anesthesia during pregnancy are rare, they may include birth defects, premature labor, brain damage and death. 11. I certify that I informed the anesthesiologist, to the best of my ability, about medication I take including blood thinners, anticoagulants, herbal remedies, narcotics and recreational drugs (e.g. cocaine, marijuana, PCP). Failure to inform my anesthesiologist about these medicines may increase my risk of anesthetic complications. The nature and purpose of my anesthetic management was explained to me. I had the opportunity to ask questions and the answers and information provided meet my satisfaction.   I retain the right to withdraw this consent at any time prior to the administration of said anesthetic.    ___________________________________________________           _____________________________________________________  Patient Signature                                                                                      Witness Signature                ___________________________________________________           _____________________________________________________  Date/Time                                                                                               Responsible person in case of minor/ unconscious pt /Relationship    My signature below affirms that prior to the time of the procedure, I have explained to the patient and/or his/her guardian, the risks and benefits of undergoing anesthesia, as well as any reasonable alternatives.     ___________________________________________________            _____________________________________________________  Physician Signature                            Date/Time  Patient Name: Corey Molina     : 1970     Printed: 2022      Medical Record #: [id number]                              Page 1 of 1    ----------ANESTHESIA CONSENT----------

## (undated) NOTE — MR AVS SNAPSHOT
[place]  [address]  [phone]               Thank you for choosing us for your health care visit with [place] MD.  We are glad to serve you and happy to provide you with this summary MyChart     Visit [employer]  You can access your MyChart to more actively manage your health care and view more details from this visit by going to [email or web address].   If you've recently had a stay at the Hospital you can access your discharge ins

## (undated) NOTE — LETTER
2/12/2019          To Whom It May Concern:    Rajendra Galvan is currently under my medical care and may not return to work at this time. Please excuse Gale Jauregui for 2 days. She may return to work on 2/14/2019.     If you require additional information

## (undated) NOTE — LETTER
10/12/2022              [address]         To Whom It May Concern,    Brain [place] 6/22/1970 is under my care and may be excused from 10/8-10/14/22 due to COVID positive and lingering symptoms.     Sincerely,        CELESTE Robison  [address]   [address]  [place] [id number] [phone]        Document electronically generated by:  Maribel Zheng